# Patient Record
Sex: MALE | Race: WHITE | NOT HISPANIC OR LATINO | Employment: OTHER | URBAN - METROPOLITAN AREA
[De-identification: names, ages, dates, MRNs, and addresses within clinical notes are randomized per-mention and may not be internally consistent; named-entity substitution may affect disease eponyms.]

---

## 2017-03-08 ENCOUNTER — ALLSCRIPTS OFFICE VISIT (OUTPATIENT)
Dept: OTHER | Facility: OTHER | Age: 72
End: 2017-03-08

## 2017-04-04 ENCOUNTER — GENERIC CONVERSION - ENCOUNTER (OUTPATIENT)
Dept: OTHER | Facility: OTHER | Age: 72
End: 2017-04-04

## 2017-04-06 ENCOUNTER — APPOINTMENT (OUTPATIENT)
Dept: LAB | Facility: HOSPITAL | Age: 72
End: 2017-04-06
Attending: INTERNAL MEDICINE
Payer: MEDICARE

## 2017-04-06 DIAGNOSIS — E03.9 HYPOTHYROIDISM: ICD-10-CM

## 2017-04-06 DIAGNOSIS — M79.10 MYALGIA: ICD-10-CM

## 2017-04-06 DIAGNOSIS — I10 ESSENTIAL (PRIMARY) HYPERTENSION: ICD-10-CM

## 2017-04-06 DIAGNOSIS — E78.5 HYPERLIPIDEMIA: ICD-10-CM

## 2017-04-06 LAB
ALBUMIN SERPL BCP-MCNC: 3.7 G/DL (ref 3.5–5)
ALP SERPL-CCNC: 87 U/L (ref 46–116)
ALT SERPL W P-5'-P-CCNC: 32 U/L (ref 12–78)
ANION GAP SERPL CALCULATED.3IONS-SCNC: 8 MMOL/L (ref 4–13)
AST SERPL W P-5'-P-CCNC: 21 U/L (ref 5–45)
BASOPHILS # BLD AUTO: 0.03 THOUSANDS/ΜL (ref 0–0.1)
BASOPHILS NFR BLD AUTO: 1 % (ref 0–1)
BILIRUB DIRECT SERPL-MCNC: 0.21 MG/DL (ref 0–0.2)
BILIRUB SERPL-MCNC: 1 MG/DL (ref 0.2–1)
BUN SERPL-MCNC: 22 MG/DL (ref 5–25)
CALCIUM SERPL-MCNC: 9.4 MG/DL (ref 8.3–10.1)
CHLORIDE SERPL-SCNC: 107 MMOL/L (ref 100–108)
CHOLEST SERPL-MCNC: 141 MG/DL (ref 50–200)
CK MB SERPL-MCNC: 1.4 NG/ML (ref 0–5)
CK MB SERPL-MCNC: <1 % (ref 0–2.5)
CK SERPL-CCNC: 179 U/L (ref 39–308)
CO2 SERPL-SCNC: 27 MMOL/L (ref 21–32)
CREAT SERPL-MCNC: 1.31 MG/DL (ref 0.6–1.3)
EOSINOPHIL # BLD AUTO: 0.17 THOUSAND/ΜL (ref 0–0.61)
EOSINOPHIL NFR BLD AUTO: 3 % (ref 0–6)
ERYTHROCYTE [DISTWIDTH] IN BLOOD BY AUTOMATED COUNT: 13.9 % (ref 11.6–15.1)
GFR SERPL CREATININE-BSD FRML MDRD: 54.1 ML/MIN/1.73SQ M
GLUCOSE P FAST SERPL-MCNC: 107 MG/DL (ref 65–99)
HCT VFR BLD AUTO: 45 % (ref 36.5–49.3)
HDLC SERPL-MCNC: 46 MG/DL (ref 40–60)
HGB BLD-MCNC: 14.5 G/DL (ref 12–17)
LDLC SERPL CALC-MCNC: 68 MG/DL (ref 0–100)
LYMPHOCYTES # BLD AUTO: 1.62 THOUSANDS/ΜL (ref 0.6–4.47)
LYMPHOCYTES NFR BLD AUTO: 25 % (ref 14–44)
MCH RBC QN AUTO: 28.7 PG (ref 26.8–34.3)
MCHC RBC AUTO-ENTMCNC: 32.2 G/DL (ref 31.4–37.4)
MCV RBC AUTO: 89 FL (ref 82–98)
MONOCYTES # BLD AUTO: 0.68 THOUSAND/ΜL (ref 0.17–1.22)
MONOCYTES NFR BLD AUTO: 11 % (ref 4–12)
NEUTROPHILS # BLD AUTO: 3.94 THOUSANDS/ΜL (ref 1.85–7.62)
NEUTS SEG NFR BLD AUTO: 61 % (ref 43–75)
NRBC BLD AUTO-RTO: 0 /100 WBCS
PLATELET # BLD AUTO: 205 THOUSANDS/UL (ref 149–390)
PMV BLD AUTO: 10.5 FL (ref 8.9–12.7)
POTASSIUM SERPL-SCNC: 4.2 MMOL/L (ref 3.5–5.3)
PROT SERPL-MCNC: 7.3 G/DL (ref 6.4–8.2)
RBC # BLD AUTO: 5.06 MILLION/UL (ref 3.88–5.62)
SODIUM SERPL-SCNC: 142 MMOL/L (ref 136–145)
TRIGL SERPL-MCNC: 133 MG/DL
TSH SERPL DL<=0.05 MIU/L-ACNC: 3.74 UIU/ML (ref 0.36–3.74)
WBC # BLD AUTO: 6.46 THOUSAND/UL (ref 4.31–10.16)

## 2017-04-06 PROCEDURE — 80076 HEPATIC FUNCTION PANEL: CPT

## 2017-04-06 PROCEDURE — 82553 CREATINE MB FRACTION: CPT

## 2017-04-06 PROCEDURE — 85025 COMPLETE CBC W/AUTO DIFF WBC: CPT

## 2017-04-06 PROCEDURE — 84443 ASSAY THYROID STIM HORMONE: CPT

## 2017-04-06 PROCEDURE — 80048 BASIC METABOLIC PNL TOTAL CA: CPT

## 2017-04-06 PROCEDURE — 82550 ASSAY OF CK (CPK): CPT

## 2017-04-06 PROCEDURE — 36415 COLL VENOUS BLD VENIPUNCTURE: CPT

## 2017-04-06 PROCEDURE — 80061 LIPID PANEL: CPT

## 2017-04-11 ENCOUNTER — ALLSCRIPTS OFFICE VISIT (OUTPATIENT)
Dept: OTHER | Facility: OTHER | Age: 72
End: 2017-04-11

## 2017-04-27 ENCOUNTER — ALLSCRIPTS OFFICE VISIT (OUTPATIENT)
Dept: OTHER | Facility: OTHER | Age: 72
End: 2017-04-27

## 2017-07-18 ENCOUNTER — LAB REQUISITION (OUTPATIENT)
Dept: LAB | Facility: HOSPITAL | Age: 72
End: 2017-07-18
Payer: MEDICARE

## 2017-07-18 ENCOUNTER — ALLSCRIPTS OFFICE VISIT (OUTPATIENT)
Dept: OTHER | Facility: OTHER | Age: 72
End: 2017-07-18

## 2017-07-18 DIAGNOSIS — L98.9 DISORDER OF SKIN OR SUBCUTANEOUS TISSUE: ICD-10-CM

## 2017-07-18 PROCEDURE — 88305 TISSUE EXAM BY PATHOLOGIST: CPT | Performed by: DERMATOLOGY

## 2017-07-25 ENCOUNTER — GENERIC CONVERSION - ENCOUNTER (OUTPATIENT)
Dept: OTHER | Facility: OTHER | Age: 72
End: 2017-07-25

## 2017-09-05 ENCOUNTER — ALLSCRIPTS OFFICE VISIT (OUTPATIENT)
Dept: OTHER | Facility: OTHER | Age: 72
End: 2017-09-05

## 2017-10-01 DIAGNOSIS — I10 ESSENTIAL (PRIMARY) HYPERTENSION: ICD-10-CM

## 2017-10-01 DIAGNOSIS — E78.5 HYPERLIPIDEMIA: ICD-10-CM

## 2017-10-01 DIAGNOSIS — R73.01 IMPAIRED FASTING GLUCOSE: ICD-10-CM

## 2017-10-01 DIAGNOSIS — R05.9 COUGH: ICD-10-CM

## 2017-10-01 DIAGNOSIS — E03.9 HYPOTHYROIDISM: ICD-10-CM

## 2017-10-11 ENCOUNTER — LAB REQUISITION (OUTPATIENT)
Dept: LAB | Facility: HOSPITAL | Age: 72
End: 2017-10-11
Payer: MEDICARE

## 2017-10-11 ENCOUNTER — ALLSCRIPTS OFFICE VISIT (OUTPATIENT)
Dept: OTHER | Facility: OTHER | Age: 72
End: 2017-10-11

## 2017-10-11 DIAGNOSIS — L98.9 DISORDER OF SKIN OR SUBCUTANEOUS TISSUE: ICD-10-CM

## 2017-10-11 PROCEDURE — 88305 TISSUE EXAM BY PATHOLOGIST: CPT | Performed by: DERMATOLOGY

## 2017-10-17 ENCOUNTER — GENERIC CONVERSION - ENCOUNTER (OUTPATIENT)
Dept: OTHER | Facility: OTHER | Age: 72
End: 2017-10-17

## 2017-10-26 ENCOUNTER — APPOINTMENT (OUTPATIENT)
Dept: LAB | Facility: HOSPITAL | Age: 72
End: 2017-10-26
Attending: INTERNAL MEDICINE
Payer: MEDICARE

## 2017-10-26 ENCOUNTER — TRANSCRIBE ORDERS (OUTPATIENT)
Dept: ADMINISTRATIVE | Facility: HOSPITAL | Age: 72
End: 2017-10-26

## 2017-10-26 DIAGNOSIS — E78.5 HYPERLIPIDEMIA: ICD-10-CM

## 2017-10-26 DIAGNOSIS — E03.9 HYPOTHYROIDISM: ICD-10-CM

## 2017-10-26 DIAGNOSIS — R73.01 IMPAIRED FASTING GLUCOSE: ICD-10-CM

## 2017-10-26 DIAGNOSIS — I10 ESSENTIAL (PRIMARY) HYPERTENSION: ICD-10-CM

## 2017-10-26 LAB
ALBUMIN SERPL BCP-MCNC: 3.9 G/DL (ref 3.5–5)
ALP SERPL-CCNC: 93 U/L (ref 46–116)
ALT SERPL W P-5'-P-CCNC: 34 U/L (ref 12–78)
ANION GAP SERPL CALCULATED.3IONS-SCNC: 10 MMOL/L (ref 4–13)
AST SERPL W P-5'-P-CCNC: 28 U/L (ref 5–45)
BASOPHILS # BLD AUTO: 0.03 THOUSANDS/ΜL (ref 0–0.1)
BASOPHILS NFR BLD AUTO: 0 % (ref 0–1)
BILIRUB DIRECT SERPL-MCNC: 0.23 MG/DL (ref 0–0.2)
BILIRUB SERPL-MCNC: 1.6 MG/DL (ref 0.2–1)
BUN SERPL-MCNC: 28 MG/DL (ref 5–25)
CALCIUM SERPL-MCNC: 9.4 MG/DL (ref 8.3–10.1)
CHLORIDE SERPL-SCNC: 104 MMOL/L (ref 100–108)
CHOLEST SERPL-MCNC: 148 MG/DL (ref 50–200)
CO2 SERPL-SCNC: 25 MMOL/L (ref 21–32)
CREAT SERPL-MCNC: 1.39 MG/DL (ref 0.6–1.3)
EOSINOPHIL # BLD AUTO: 0.19 THOUSAND/ΜL (ref 0–0.61)
EOSINOPHIL NFR BLD AUTO: 3 % (ref 0–6)
ERYTHROCYTE [DISTWIDTH] IN BLOOD BY AUTOMATED COUNT: 13.2 % (ref 11.6–15.1)
EST. AVERAGE GLUCOSE BLD GHB EST-MCNC: 114 MG/DL
GFR SERPL CREATININE-BSD FRML MDRD: 51 ML/MIN/1.73SQ M
GLUCOSE P FAST SERPL-MCNC: 91 MG/DL (ref 65–99)
HBA1C MFR BLD: 5.6 % (ref 4.2–6.3)
HCT VFR BLD AUTO: 44.6 % (ref 36.5–49.3)
HDLC SERPL-MCNC: 42 MG/DL (ref 40–60)
HGB BLD-MCNC: 15 G/DL (ref 12–17)
LDLC SERPL CALC-MCNC: 90 MG/DL (ref 0–100)
LYMPHOCYTES # BLD AUTO: 1.6 THOUSANDS/ΜL (ref 0.6–4.47)
LYMPHOCYTES NFR BLD AUTO: 23 % (ref 14–44)
MCH RBC QN AUTO: 29 PG (ref 26.8–34.3)
MCHC RBC AUTO-ENTMCNC: 33.6 G/DL (ref 31.4–37.4)
MCV RBC AUTO: 86 FL (ref 82–98)
MONOCYTES # BLD AUTO: 0.64 THOUSAND/ΜL (ref 0.17–1.22)
MONOCYTES NFR BLD AUTO: 9 % (ref 4–12)
NEUTROPHILS # BLD AUTO: 4.37 THOUSANDS/ΜL (ref 1.85–7.62)
NEUTS SEG NFR BLD AUTO: 64 % (ref 43–75)
NRBC BLD AUTO-RTO: 0 /100 WBCS
PLATELET # BLD AUTO: 192 THOUSANDS/UL (ref 149–390)
PMV BLD AUTO: 11.1 FL (ref 8.9–12.7)
POTASSIUM SERPL-SCNC: 4.1 MMOL/L (ref 3.5–5.3)
PROT SERPL-MCNC: 7.4 G/DL (ref 6.4–8.2)
RBC # BLD AUTO: 5.18 MILLION/UL (ref 3.88–5.62)
SODIUM SERPL-SCNC: 139 MMOL/L (ref 136–145)
TRIGL SERPL-MCNC: 82 MG/DL
TSH SERPL DL<=0.05 MIU/L-ACNC: 1.53 UIU/ML (ref 0.36–3.74)
WBC # BLD AUTO: 6.85 THOUSAND/UL (ref 4.31–10.16)

## 2017-10-26 PROCEDURE — 84443 ASSAY THYROID STIM HORMONE: CPT

## 2017-10-26 PROCEDURE — 80061 LIPID PANEL: CPT

## 2017-10-26 PROCEDURE — 80076 HEPATIC FUNCTION PANEL: CPT

## 2017-10-26 PROCEDURE — 36415 COLL VENOUS BLD VENIPUNCTURE: CPT

## 2017-10-26 PROCEDURE — 80048 BASIC METABOLIC PNL TOTAL CA: CPT

## 2017-10-26 PROCEDURE — 85025 COMPLETE CBC W/AUTO DIFF WBC: CPT

## 2017-10-26 PROCEDURE — 83036 HEMOGLOBIN GLYCOSYLATED A1C: CPT

## 2017-10-31 ENCOUNTER — GENERIC CONVERSION - ENCOUNTER (OUTPATIENT)
Dept: OTHER | Facility: OTHER | Age: 72
End: 2017-10-31

## 2017-11-01 ENCOUNTER — TRANSCRIBE ORDERS (OUTPATIENT)
Dept: ADMINISTRATIVE | Facility: HOSPITAL | Age: 72
End: 2017-11-01

## 2017-11-01 DIAGNOSIS — R05.9 COUGH: Primary | ICD-10-CM

## 2017-11-15 ENCOUNTER — HOSPITAL ENCOUNTER (OUTPATIENT)
Dept: RADIOLOGY | Facility: HOSPITAL | Age: 72
Discharge: HOME | End: 2017-11-15
Payer: MEDICARE

## 2017-11-15 ENCOUNTER — GENERIC CONVERSION - ENCOUNTER (OUTPATIENT)
Dept: OTHER | Facility: OTHER | Age: 72
End: 2017-11-15

## 2017-11-15 ENCOUNTER — HOSPITAL ENCOUNTER (OUTPATIENT)
Dept: PULMONOLOGY | Facility: HOSPITAL | Age: 72
Discharge: HOME | End: 2017-11-15
Payer: MEDICARE

## 2017-11-15 DIAGNOSIS — R05.9 COUGH: ICD-10-CM

## 2017-11-15 PROCEDURE — 94727 GAS DIL/WSHOT DETER LNG VOL: CPT

## 2017-11-15 PROCEDURE — 94060 EVALUATION OF WHEEZING: CPT

## 2017-11-15 PROCEDURE — 94760 N-INVAS EAR/PLS OXIMETRY 1: CPT

## 2017-11-15 PROCEDURE — 94729 DIFFUSING CAPACITY: CPT

## 2017-11-15 PROCEDURE — 71020 HB CHEST X-RAY 2VW FRONTAL&LATL: CPT

## 2017-11-15 RX ORDER — ALBUTEROL SULFATE 2.5 MG/3ML
2.5 SOLUTION RESPIRATORY (INHALATION) ONCE
Status: COMPLETED | OUTPATIENT
Start: 2017-11-15 | End: 2017-11-15

## 2017-11-15 RX ADMIN — ALBUTEROL SULFATE 2.5 MG: 2.5 SOLUTION RESPIRATORY (INHALATION) at 10:39

## 2017-11-20 DIAGNOSIS — R06.02 SHORTNESS OF BREATH: ICD-10-CM

## 2017-11-20 DIAGNOSIS — I27.20 PULMONARY HYPERTENSION (HCC): ICD-10-CM

## 2017-11-30 ENCOUNTER — HOSPITAL ENCOUNTER (OUTPATIENT)
Dept: NON INVASIVE DIAGNOSTICS | Facility: CLINIC | Age: 72
Discharge: HOME | End: 2017-11-30
Payer: MEDICARE

## 2017-11-30 DIAGNOSIS — R06.02 SHORTNESS OF BREATH: ICD-10-CM

## 2017-11-30 DIAGNOSIS — I27.20 PULMONARY HYPERTENSION (HCC): ICD-10-CM

## 2017-11-30 PROCEDURE — 93306 TTE W/DOPPLER COMPLETE: CPT

## 2017-12-07 ENCOUNTER — ALLSCRIPTS OFFICE VISIT (OUTPATIENT)
Dept: OTHER | Facility: OTHER | Age: 72
End: 2017-12-07

## 2017-12-08 NOTE — PROGRESS NOTES
Assessment  Assessed    1  Coronary arteriosclerosis (414 00) (I25 10)   2  CABG   3  Hyperlipidemia (272 4) (E78 5)   4  Hypertension (401 9) (I10)   5  Impaired fasting glucose (790 21) (R73 01)    Plan  Anxiety    · Escitalopram Oxalate 20 MG Oral Tablet; take 1 tablet by mouth every day   Rx By: Michelle Benedict; Dispense: 90 Days ; #:90 Tablet; Refill: 0;Anxiety; CALI = N; Print Rx; Last Updated By: Bakari Coffman; 12/7/2017 3:39:33 PM  Anxiety, Health Maintenance    · LORazepam 1 MG Oral Tablet; TAKE 1 TABLET BY MOUTH THREE TIMES DAILYAS NEEDED   Rx By: Michelle Benedict; Dispense: 30 Days ; #:90 Tablet; Refill: 0;Anxiety, Health Maintenance; CALI = N; Print Rx; Last Updated By: Bakari Coffman; 12/7/2017 3:39:33 PM  Health Maintenance    · BuPROPion HCl ER (XL) 300 MG Oral Tablet Extended Release 24 Hour; take 1tablet by mouth every day   Rx By: Michelle Benedict; Dispense: 90 Days ; #:90 Tablet Extended Release 24 Hour; Refill: 0;For: Health Maintenance; CALI = N; Print Rx; Last Updated By: Bakari Coffman; 12/7/2017 3:39:33 PM  Hyperlipidemia    · Atorvastatin Calcium 80 MG Oral Tablet (Lipitor); take 1 tablet by mouth every day   Rx By: Michelle Benedict; Dispense: 90 Days ; #:90 Tablet; Refill: 3;Hyperlipidemia; CALI = N; Print Rx; Last Updated By: Bakari Coffman; 12/7/2017 3:39:33 PM  Hypertension    · Metoprolol Tartrate 25 MG Oral Tablet; TAKE 1 TABLET BY MOUTH THREE TIMESDAILY   Rx By: Michelle Benedict; Dispense: 90 Days ; #:270 Tablet; Refill: 3;Hypertension; CALI = N; Print Rx; Last Updated By: Bakari Coffman; 12/7/2017 3:39:33 PM  Unlinked    · Halobetasol Propionate 0 05 % External Ointment   Dispense: 0 Days ; #:0 GM; Refill: 0; CALI = Y; Record; Last Updated By: Lakeisha Pineda; 12/7/2017 2:55:25 PM      LORazepam 1 MG Oral Tablet; TAKE 1 TABLET BY MOUTH THREE TIMES DAILY AS NEEDED; Therapy: 88SPU1791 to (Evaluate:08Jan2016)  Requested for: 74XCU8493;  Last Rx:09Nov2015; Status: UNAUTHORIZED - Requires Signature Ordered Rx By: Rafa Shaw; Dispense: 30 Days ; #:90 Tablet; Refill: 1;  For: Anxiety, Health Maintenance; CALI = N; Print Rx; Last Updated By: Maria Esther Berger; 11/9/2015 1:41:07 PM  Atorvastatin Calcium 80 MG Oral Tablet; take 1 tablet by mouth every day; Therapy: 84WAF2014 to (Neto Calvin)  Requested for: 32VSO4035; Last Rx:09Nov2015; Status: ACTIVE - Retrospective By Protocol Authorization Ordered Rx By: Rafa Shaw; Dispense: 90 Days ; #:90 Tablet; Refill: 3;  For: Hyperlipidemia; CALI = N; Print Rx; Last Updated By: Maria Esther Berger; 11/9/2015 2:28:54 PM Metoprolol Tartrate 25 MG Oral Tablet; take 1 tablet by mouth twice daily; Therapy: 84JZW7277 to (Evaluate:60Roh0871)  Requested for: 70WTE1973; Last Rx:09Nov2015; Status: ACTIVE - Retrospective By Protocol Authorization Ordered Rx By: Rafa Shaw; Dispense: 90 Days ; #:180 Tablet; Refill: 2;  For: Benign essential hypertension; CALI = N; Print Rx; Last Updated By: Maria Esther Berger; 11/9/2015 2:28:54 PM BuPROPion HCl ER (XL) 300 MG Oral Tablet Extended Release 24 Hour; take 1 tablet by mouth every day; Therapy: 17CID1505 to (Neto Gift)  Requested for: 12KZC7183; Last Rx:09Nov2015; Status: UNAUTHORIZED - Requires Signature Ordered Rx By: Rafa Shaw; Dispense: 90 Days ; #:90 Tablet Extended Release 24 Hour; Refill: 3;  For: Anxiety; CALI = N; Sent To: Nydia Rodas [id number]; Last Updated By: Maria Esther Berger; 11/9/2015 2:30:10 PM   Discussion/Summary  Cardiology Discussion Summary Free Text Note Form [facility]:   The patient is cardiovascularly stable  Very active physically and asymptomatic No angina, CHF or symptomatic ectopy  Reviewed concepts of signs and symptoms of heart disease-including ischemia arrhythmia congestion  reviewed concepts of risk factor modification safety measures reviewed Previous studies reviewed with patient, medications reviewed and possible side effects discussed  All questions answered   Report any problems promptly to medical attention  Optimize weight  Diminished carbohydrates avoid excess alcoholasymptomatic on Lipitor with normal CPK and SGOTis emotionally doing well on present medical regimen much calmer not is aggravated about little thingsmeasures reviewed  Continue present medical regimen  Continue to stay compliant with therapy  up with appropriate specialists  Return for follow up visit in 6 months or earlier if needed  with Dr Joe Jewell PCP  Chief Complaint  Chief Complaint Free Text Note Form: Patient presents with follow up cardiovascular evaluation  Known history of CAHD status post CABG, HTN and HPL  History of Present Illness  Cardiology HPI Free Text Note Form [facility]: Patient presents for cardiac evaluation Known history of CAHD, HTN and HPL  is feeling very well from a cardiac perspective--active and asymptomatic- able to do strenuous walking up a hill in the morning hours with no symptoms of chest pain shortness of breath arrhythmias--generally walks 1 mile twice a day with no symptomsHe denies any exertional chest pain or shortness of breath    No angina, CHF, palpitations, syncope, seizures, claudication CVA/TIA, amaurosis, orthostasis, myositis or edema  No urinary or bowel complaints  No rashes, fevers, arrhythmic symptoms, thromboembolic symptoms  No claudication exercise intolerance PND  chest x-ray NADecho in November 2017 with normal LV function no significant valve diseasetests October 2017 CF 5 6 normal CBC creatinine 1 39 sugar 91 cholesterol 148 LDL 90 TSH normal  doing very well on present meds-quick temper agitated behavior resolved  has been using Lipitor no myositis recent CPK normal cholesterol numbers good  has history of anxiety and depression - doing well on current regimen  -Feels less stress and less aggravation on present medical regimen much more relaxed patient states his wife and friends notice the improvement  received flu shots ,pneumonia shots and shingles shots    of CAD, status post bypass surgery after two-vessel disease, documented on angiogram 2006  --Had vein graft to the right, vein graft to the LAD and first OM,, LIMA was not usable  test in July 2013 able to complete 8 minutes of Erick protocol with no angina no ischemia patient had resting hypokinesis of the base of the am for wall with no provokable regional wall motion abnormalities or significant ectopyrecent blood tests which look good reviewed in detail--has mild hyperglycemia will watch his weight and carbohydrate restriction-has mild renal insufficiency with creatinine of 1 31      History of hypertension, hyperlipidemia, history of DVT with Whitman filter placed after bleeding with Coumadin years agoof BPH  Patient declines urologic intervention at this time no dysuria flank pain hematuriaof anxiety, depression on therapyof arthritis in his hips lumbar radiculopathyof small thyroid nodule in the past with normal thyroid testsof Lymes in the past, treated negative titers in 2009was told of lung problems in the past, but never smoked  No wheezes, hemoptysis  No shortness of breathtest in 2010, with no ischemia  EF 62%  EKG showing old inferior infarct, nonspecific anteroseptal T wave inversions left atrial enlargementTakes Ativan for anxiety , as well as SSRIs, emotional support provided- was aggravated in the past about his sister and his mother's will--but this was previously settled satisfactorily in [place]--still doesn't speak to one sister  [ DVT, CAD s/p bypass 2006, DJD, hypertension, pneumonia, BPH, anxiety, depression, total hip 2010  ]  [No family history of sudden cardiac death]  [ Alcohol regularly  No smoking    ]  recent laboratory tests that look good, PSA normal in the past has gone for regular colonoscopies history of polyps      Review of Systems  Cardiology Male ROS:    Cardiac: No complaints of chest pain, no palpitations, no fainiting  Skin: No complaints of nonhealing sores or skin rash  Genitourinary: No complaints of recurrent urinary tract infections, frequent urination at night, difficult urination, blood in urine, kidney stones, loss of bladder control, no kidney or prostate problems, no erectile dysfunction  Psychological: depression-- and-- anxiety, but-- as noted in HPI--   resolved on therapy  General: No complaints of trouble sleeping, lack of energy, fatigue, appetite changes, weight changes, fever, frequent infections, or night sweats  Respiratory: No complaints of shortness of breath, cough with sputum, or wheezing  HEENT: No complaints of serious problems, hearing problems, nose problems, throat problems, or snoring  Gastrointestinal: No complaints of liver problems, nausea, vomiting, heartburn, constipation, bloody stools, diarrhea, problems swallowing, adbominal pain, or rectal bleeding  Hematologic: No complaints of bleeding disorders, anemia, blood clots, or excessive brusing  Neurological: No complaints of numbness, tingling, dizziness, weakness, seizures, headaches, syncope or fainting, AM fatigue, daytime sleepiness, no witnessed apnea episodes  -- and-- as noted in HPI  Musculoskeletal: arthritis-- and-- back pain, but-- as noted in HPI-- Mild noncompromising  Active Problems  Problems    1  Actinic keratosis (702 0) (L57 0)   2  Acute myocardial infarction (410 90) (I21 9)   3  Anxiety (300 00) (F41 9)   4  Arthralgia of left temporomandibular joint (524 62) (M26 622)   5  Basal cell carcinoma of chest (173 51) (C44 519)   6  BPH without urinary obstruction (600 00) (N40 0)   7  Breath shortness (786 05) (R06 02)   8  CABG   9  Changing skin lesion (709 9) (L98 9)   10  CKD (chronic kidney disease), stage III (585 3) (N18 3)   11  Coronary arteriosclerosis (414 00) (I25 10)   12  Cough (786 2) (R05)   13  Hyperlipidemia (272 4) (E78 5)   14  Hypertension (401 9) (I10)   15  Impaired fasting glucose (790 21) (R73 01)   16   Inflamed seborrheic keratosis (702 11) (L82 0)   17  Need for influenza vaccination (V04 81) (Z23)   18  Need for pneumococcal vaccine (V03 82) (Z23)   19  Osteoarthritis (715 90) (M19 90)   20  Peripheral vascular disease (443 9) (I73 9)   21  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)   22  Pulmonary hypertension (416 8) (I27 20)   23  Screening for AAA (abdominal aortic aneurysm) (V81 2) (Z13 6)   24  Screening for colon cancer (V76 51) (Z12 11)   25  Screening for genitourinary condition (V81 6) (Z13 89)   26  Screening for skin condition (V82 0) (Z13 89)   27  Seborrheic keratosis (702 19) (L82 1)   28  Special screening examination for neoplasm of prostate (V76 44) (Z12 5)   29  Subclinical hypothyroidism (244 8) (E03 9)    Past Medical History  Problems    1  History of Basal cell carcinoma of face (173 31) (C44 310)   2  History of BPH (benign prostatic hypertrophy) (600 00) (N40 0)   3  History of Colonoscopy (Fiberoptic)   4  History of Coronary atherosclerosis of native coronary artery (414 01) (I25 10)   5  H/O nonmelanoma skin cancer (V10 83) (B46 371)   6  History of anxiety disorder (V11 8) (Z86 59)   7  History of deep venous thrombosis (V12 51) (Z86 718)   8  History of depression (V11 8) (Z86 59)   9  History of osteoarthritis (V13 4) (Z87 39)   10  History of pneumonia (V12 61) (Z87 01)   11  History of seborrheic dermatitis (V13 3) (Z87 2)   12  Hyperlipidemia (272 4) (E78 5)   13  Hypertension (401 9) (I10)  Active Problems And Past Medical History Reviewed: The active problems and past medical history were reviewed and updated today  Surgical History  Problems    1  History of CABG   2  CABG   3  History of Cardiac Cath Procedure Outcome: Successful   4  History of Radiologic Supervision: Palmer Filter Placement In IVC   5  History of Total Hip Replacement  Surgical History Reviewed: The surgical history was reviewed and updated today  Family History  Mother    1  Family history of dementia (V17 2) (Z81 8)   2  Family history of Rectal cancer  Father    3  Family history of Coronary Artery Disease (V17 49)  Sister    4  Family history of Rectal cancer  Family History Reviewed: The family history was reviewed and updated today  Social History  Problems    · Denied: History of Alcohol Use (History)   · Marijuana   · Never A Smoker   · Tobacco use (305 1) (Z72 0)  Social History Reviewed: The social history was reviewed and updated today  The social history was reviewed and is unchanged  Current Meds   1  Aspirin 325 MG Oral Tablet; TAKE 1 TABLET DAILY; Therapy: (Recorded:27Jun2014) to Recorded   2  Atorvastatin Calcium 80 MG Oral Tablet; take 1 tablet by mouth every day; Therapy: 35Zrz4393 to (Evaluate:26Ybp8776)  Requested for: 66DHL7495; Last Rx:69Rpp8999 Ordered   3  BuPROPion HCl ER (XL) 300 MG Oral Tablet Extended Release 24 Hour; take 1 tablet by mouth every day; Therapy: 52UIV9328 to (Evaluate:22Apr2018)  Requested for: 27Apr2017; Last Rx:27Apr2017 Ordered   4  Escitalopram Oxalate 20 MG Oral Tablet; take 1 tablet by mouth every day; Therapy: 64QSB1276 to (Evaluate:26Aug2018)  Requested for: 96Pyg7098; Last Rx:10Rra9770 Ordered   5  Fluocinonide 0 05 % External Ointment; apply to affected area twice a day SPARINGLYto foot; Therapy: 97OHH6933 to (Last Rx:17Eaa2027)  Requested for: 30XSR2787 Ordered   6  Halobetasol Propionate 0 05 % External Ointment; APPLY AND GENTLY MASSAGE INTO AFFECTED AREA(S) TWICE DAILY Recorded   7  Halobetasol Propionate 0 05 % External Ointment; APPLY AND GENTLY MASSAGE INTO AFFECTED AREA(S) TWICE DAILY; Therapy: 48EVX5122 to (Last Rx:92Idg0379)  Requested for: 85RMB4916 Ordered   8  LORazepam 1 MG Oral Tablet; TAKE 1 TABLET BY MOUTH THREE TIMES DAILY AS NEEDED; Therapy: 65RPI7989 to (Evaluate:26Jun2017)  Requested for: 27Apr2017; Last Rx:27Apr2017 Ordered   9  Metoprolol Tartrate 25 MG Oral Tablet; TAKE 1 TABLET BY MOUTH THREE TIMES DAILY;  Therapy: 37CCL2951 to (Evaluate:10Khk6206)  Requested for: 54Swp0310; Last Rx:51Uso8233 Ordered   10  Tamsulosin HCl - 0 4 MG Oral Capsule (Flomax); TAKE 1 CAPSULE BY MOUTH TWICE  DAILY; Therapy: 30THZ8049 to (RRXKKPDI:12LZY0049)  Requested for: 91FXU7185; Last  Rx:02Nov2017 Ordered   11  Triamcinolone Acetonide 0 1 % External Ointment; APPLY TO AFFECTED AREA TWICE A  DAY shoulder leg; Therapy: 18OQY1630 to (Jim Melvine)  Requested for: 64YLH8043; Last  Rx:08Mar2017 Ordered  Medication List Reviewed: The medication list was reviewed and updated today  Allergies  Medication    1  No Known Drug Allergies    Vitals  Vital Signs    Recorded: 40UCP0518 02:56PM   Heart Rate 68   Systolic 065   Diastolic 72   Height 5 ft 10 in   Weight 180 lb 8 0 oz   BMI Calculated 25 9   BSA Calculated 2       Physical Exam   Constitutional  General appearance: No acute distress, well appearing and well nourished  -- No acute distress  Eyes  Conjunctiva and Sclera examination: Conjunctiva pink, sclera anicteric  Ears, Nose, Mouth, and Throat - External inspection of ears and nose: Normal without deformities or discharge  -- Oropharynx: Clear, nares are clear, mucous membranes are moist   Neck  Neck and thyroid: Normal, supple, trachea midline, no thyromegaly  Pulmonary  Respiratory effort: No increased work of breathing or signs of respiratory distress  Auscultation of lungs: Clear to auscultation, no rales, no rhonchi, no wheezing, good air movement  Cardiovascular  Palpation of heart: Normal PMI, no thrills  Auscultation of heart: Normal rate and rhythm, normal S1 and S2, no murmurs  Carotid pulses: Normal, 2+ bilaterally  Abdominal aorta: Normal    Femoral pulses: Normal, 2+ bilaterally  No abdominal aorta pulsations  Pedal pulses: Normal, 2+ bilaterally  Examination of extremities for edema and/or varicosities: Normal    Chest -  Abnormal  -- Well-healed median sternotomy  Abdomen  Abdomen: Non-tender and no distention  Liver and spleen: No hepatomegaly or splenomegaly  Musculoskeletal Gait and station: Normal gait  -- Digits and nails: Normal without clubbing or cyanosis  -- Inspection/palpation of joints, bones, and muscles: Normal, ROM normal    Skin - Skin and subcutaneous tissue: Normal without rashes or lesions  Skin is warm and well perfused, normal turgor  Neurologic - Speech: Normal    Psychiatric - Orientation to person, place, and time: Normal -- Mood and affect: Normal       Future Appointments    Date/Time Provider Specialty Site   05/07/2018 12:30 PM MYNOR Greene  Internal Medicine [facility]""   04/12/2018 11:05 AM MYNOR Joshi  Dermatology [facility]   07/19/2018 01:05 PM MYNOR Joshi   Dermatology [facility]       Signatures   Electronically signed by : MYNOR Eric ; Dec  7 2017  5:34PM EST                       (Author)

## 2017-12-28 ENCOUNTER — ALLSCRIPTS OFFICE VISIT (OUTPATIENT)
Dept: OTHER | Facility: OTHER | Age: 72
End: 2017-12-28

## 2017-12-29 NOTE — PROGRESS NOTES
Assessment   1  Bronchitis (490) (J40)    Plan   Bronchitis    · Azithromycin 250 MG Oral Tablet; TAKE 2 TABLETS ON DAY 1 THEN TAKE 1    TABLET A DAY FOR 4 DAYS   · Promethazine-Codeine 6 25-10 MG/5ML Oral Syrup; TAKE 5 ML EVERY 4 TO 6    HOURS AS NEEDED FOR COUGH    Discussion/Summary      Bronchitis- viral vs bacterial pt not improving  Zpak, promethazine with codeine  Call office if you donât feel better  Chief Complaint   cough, congestion for >2 weeks      History of Present Illness   HPI: Pt comes with sx for >2 weeks  runny nose, now thick  Cough getting worse  Was clear now green  Feels worse, not better over last 2 weeks  Feels feverish, no f/c/s    worse at night  +ST  No ear pain  No SOB    Bronchitis, Acute, Adult (Brief): The patient is being seen for an initial evaluation of acute bronchitis  Symptoms:  productive cough,-- non-productive cough,-- fatigue,-- sore throat-- and-- stuffy nose, but-- no wheezing,-- no shortness of breath,-- no fever,-- no myalgias,-- no pleuritic chest pain-- and-- no chest pain  Associated symptoms:  postnasal drainage, but-- no headache  Review of Systems        Constitutional: feeling poorly-- and-- feeling tired, but-- no fever-- and-- no chills  ENT: sore throat-- and-- nasal discharge, but-- no earache,-- no nosebleeds,-- no hearing loss-- and-- no hoarseness  Cardiovascular: no complaints of slow or fast heart rate, no chest pain, no palpitations, no leg claudication or lower extremity edema  Respiratory: cough, but-- no shortness of breath,-- no wheezing-- and-- no shortness of breath during exertion  Gastrointestinal: no complaints of abdominal pain, no constipation, no nausea or vomiting, no diarrhea or bloody stools  ROS reviewed  Active Problems   1  Actinic keratosis (702 0) (L57 0)   2  Acute myocardial infarction (410 90) (I21 9)   3  Anxiety (300 00) (F41 9)   4   Arthralgia of left temporomandibular joint (524 62) (M26 622)   5  Basal cell carcinoma of chest (173 51) (C44 519)   6  BPH without urinary obstruction (600 00) (N40 0)   7  Breath shortness (786 05) (R06 02)   8  Bronchitis (490) (J40)   9  CABG   10  Changing skin lesion (709 9) (L98 9)   11  CKD (chronic kidney disease), stage III (585 3) (N18 3)   12  Coronary arteriosclerosis (414 00) (I25 10)   13  Cough (786 2) (R05)   14  Hyperlipidemia (272 4) (E78 5)   15  Hypertension (401 9) (I10)   16  Impaired fasting glucose (790 21) (R73 01)   17  Inflamed seborrheic keratosis (702 11) (L82 0)   18  Need for influenza vaccination (V04 81) (Z23)   19  Need for pneumococcal vaccine (V03 82) (Z23)   20  Osteoarthritis (715 90) (M19 90)   21  Peripheral vascular disease (443 9) (I73 9)   22  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)   23  Pulmonary hypertension (416 8) (I27 20)   24  Screening for AAA (abdominal aortic aneurysm) (V81 2) (Z13 6)   25  Screening for colon cancer (V76 51) (Z12 11)   26  Screening for genitourinary condition (V81 6) (Z13 89)   27  Screening for skin condition (V82 0) (Z13 89)   28  Seborrheic keratosis (702 19) (L82 1)   29  Special screening examination for neoplasm of prostate (V76 44) (Z12 5)   30  Subclinical hypothyroidism (244 8) (E03 9)    Past Medical History   Active Problems And Past Medical History Reviewed: The active problems and past medical history were reviewed and updated today  Surgical History   Surgical History Reviewed: The surgical history was reviewed and updated today  Social History    · Denied: History of Alcohol Use (History)   · Marijuana   · Never A Smoker   · Tobacco use (305 1) (Z72 0)  The social history was reviewed and updated today  Family History   Family History Reviewed: The family history was reviewed and updated today  Current Meds    1  Aspirin 325 MG Oral Tablet; TAKE 1 TABLET DAILY; Therapy: (Recorded:27Jun2014) to Recorded   2   Atorvastatin Calcium 80 MG Oral Tablet (Lipitor); take 1 tablet by mouth every day; Therapy: 10DXM3211 to (Dana Giovanni)  Requested for: 58OLO2528; Last     Rx:07Dec2017 Ordered   3  BuPROPion HCl ER (XL) 300 MG Oral Tablet Extended Release 24 Hour; take 1 tablet by     mouth every day; Therapy: 68YPP3723 to (Sandra Peace)  Requested for: 72DSQ6605; Last     Rx:07Dec2017 Ordered   4  Escitalopram Oxalate 20 MG Oral Tablet; take 1 tablet by mouth every day; Therapy: 75FXY8597 to (Sandra Peace)  Requested for: 41BRW5789; Last     Rx:07Dec2017 Ordered   5  Fluocinonide 0 05 % External Ointment; apply to affected area twice a day SPARINGLYto     foot; Therapy: 71PPU6190 to (Last Rx:12Bno6917)  Requested for: 02FCL4220 Ordered   6  Halobetasol Propionate 0 05 % External Ointment; APPLY AND GENTLY MASSAGE INTO     AFFECTED AREA(S) TWICE DAILY; Therapy: 57SWU1546 to (Last Rx:15Wrv9858)  Requested for: 57KOY8308 Ordered   7  LORazepam 1 MG Oral Tablet; TAKE 1 TABLET BY MOUTH THREE TIMES DAILY AS     NEEDED; Therapy: 70CBB6449 to (Evaluate:06Jan2018)  Requested for: 02UAH9712; Last     Rx:87Fsi7965 Ordered   8  Metoprolol Tartrate 25 MG Oral Tablet; TAKE 1 TABLET BY MOUTH THREE TIMES DAILY; Therapy: 19QPD9795 to (Dana Davila)  Requested for: 99RAI5117; Last     Rx:07Dec2017 Ordered   9  Tamsulosin HCl - 0 4 MG Oral Capsule (Flomax); TAKE 1 CAPSULE BY MOUTH TWICE     DAILY; Therapy: 69SHF9057 to (KDGQGDPQ:21EFV2520)  Requested for: 70GBK7653; Last     Rx:02Nov2017 Ordered   10  Triamcinolone Acetonide 0 1 % External Ointment; APPLY TO AFFECTED AREA TWICE A      DAY shoulder leg; Therapy: 43UFV0818 to (Evaluate:07Apr2017)  Requested for: 16BEE2196; Last      Rx:08Mar2017 Ordered    Allergies   1   No Known Drug Allergies    Vitals    Recorded: 28Dec2017 02:58PM   Temperature 98 3 F   Heart Rate 88   Systolic 591   Diastolic 68   Height 5 ft 10 in   Weight 174 lb 8 oz   BMI Calculated 25 04   BSA Calculated 1 97   O2 Saturation 95     Physical Exam        Constitutional      General appearance: No acute distress, well appearing and well nourished  Eyes      Conjunctiva and lids: No swelling, erythema, or discharge  Pupils and irises: Equal, round and reactive to light  Ears, Nose, Mouth, and Throat      External inspection of ears and nose: Normal        Otoscopic examination: Tympanic membrance translucent with normal light reflex  Canals patent without erythema  Nasal mucosa, septum, and turbinates: Normal without edema or erythema  Oropharynx: Normal with no erythema, edema, exudate or lesions  Pulmonary      Respiratory effort: No increased work of breathing or signs of respiratory distress  Auscultation of lungs: Abnormal  -- coarse BS, No W/R/R  Cardiovascular      Auscultation of heart: Normal rate and rhythm, normal S1 and S2, without murmurs  Examination of extremities for edema and/or varicosities: Normal        Abdomen      Abdomen: Non-tender, no masses  Liver and spleen: No hepatomegaly or splenomegaly  Lymphatic      Palpation of lymph nodes in neck: No lymphadenopathy  Future Appointments      Date/Time Provider Specialty Site   05/07/2018 12:30 PM MYNOR Mcguire  Internal Medicine [facility]   04/12/2018 11:05 AM MYNOR Fowler  Dermatology [facility]   07/19/2018 01:05 PM MYNOR Fowler   Dermatology [facility]     Signatures    Electronically signed by : Vaishnavi Ann [facility]; Dec 28 2017  3:32PM EST                       (Author)     Electronically signed by : MYNOR Arnold ; Dec 28 2017  7:32PM EST

## 2018-01-11 NOTE — RESULT NOTES
Message   tell patient lesion is  a seb ker     Verified Results  (1) TISSUE EXAM [id number] 02:50PM Cong Chowdary     Test Name Result Flag Reference   LAB AP CASE REPORT (Report)     Surgical Pathology Report             Case: [id number]                   Authorizing Provider: Dorian iGbbons MD     Collected:      07/12/2016 1455        Pathologist:      Kristyn Cantu MD        Received:      07/13/2016 1512        Specimen:  Skin, Other, Right back   LAB AP FINAL DIAGNOSIS (Report)     A  Skin, Right back, shave biopsy:  - Seborrheic keratosis, irritated  Interpretation performed at [facility], [address] [place] [address], [address]        Amendment electronically signed by Kristyn Cantu MD on 7/22/2016 at 8:57 AM  Electronically signed by Kristyn Cantu MD on 7/21/2016 at 7:31 PM   LAB AP SURGICAL ADDITIONAL INFORMATION (Report)     These tests were developed and their performance characteristics   determined by [facility] or [facility]  They may not be cleared or approved by the [place]  Food and   Drug Administration  The FDA has determined that such clearance or   approval is not necessary  These tests are used for clinical purposes  They should not be regarded as investigational or for research  This   laboratory has been approved by [facility], designated as a high-complexity   laboratory and is qualified to perform these tests  LAB AP GROSS DESCRIPTION (Report)     A  The specimen is received in formalin, labeled with the patient's name   and hospital number, and is designated skin shave right back  The   specimen consists of a tan superficial shave of skin measuring 1 x 0 9 x   0 1 cm  The skin surface is hairbearing and exhibits a well-circumscribed   pigmented papule measuring 0 8 x 0 7 x 0 1 cm  The margin is inked blue  The skin surface is inked red  The specimen is trisected lengthwise  Entirely submitted  One cassette      Note: The estimated total formalin fixation time based upon information   provided by the submitting clinician and the standard processing schedule   is 53 75 hours      MAC

## 2018-01-12 NOTE — RESULT NOTES
Verified Results  (1) TISSUE EXAM [id number] 04:46PM Kim Chiang Order Number: [id number]     Test Name Result Flag Reference   LAB AP CASE REPORT (Report)     Surgical Pathology Report             Case: [id number]                   Authorizing Provider: Jaciel Lara MD     Collected:      07/18/2017 1646        Pathologist:      Priscilla Sandy MD      Received:      07/20/2017 1129        Specimen:  Skin, Other, Mid chest   LAB AP FINAL DIAGNOSIS (Report)     A  Skin, Mid chest, shave biopsy:  - Superficial/multicentric basal cell carcinoma with fibroepithelioma and   early    nodular features, extending to peripheral border and base of biopsy  Interpretation performed at [facility], [address] [place] [address]  Electronically signed by Priscilla Sandy MD on 7/25/2017 at 12:37 PM   LAB AP SURGICAL ADDITIONAL INFORMATION (Report)     All controls performed with the immunohistochemical stains reported above   reacted appropriately  These tests were developed and their performance   characteristics determined by [facility] or   [facility]  They may not be cleared or approved by the [place]  Food and Drug Administration  The FDA has determined that such clearance   or approval is not necessary  These tests are used for clinical purposes  They should not be regarded as investigational or for research  This   laboratory has been approved by [facility] 88, designated as a high-complexity   laboratory and is qualified to perform these tests  LAB AP GROSS DESCRIPTION (Report)     A  The specimen is received in formalin, labeled with the patient's name   and hospital number, and is designated mid chest  The specimen consists   of a tan hair? ??bearing portion of skin measuring 0 5 x 0 4 by less than 0 1   cm  The epithelial surface appears keratotic  The apparent margin of   resection is painted with green ink   Specimen is bisected lengthwise and entirely submitted in one cassette  Note: The estimated total formalin fixation time based upon information   provided by the submitting clinician and the standard processing schedule   is 58 75 hours   AEK   LAB AP CLINICAL INFORMATION      TW Order Number: [id number]  R/O [facility]

## 2018-01-12 NOTE — RESULT NOTES
Message   Till patient lesion is a normal nevus no further treatment needed     Verified Results  (1) TISSUE EXAM [id number] 02:50PM Marco A Washburn     Test Name Result Flag Reference   LAB AP CASE REPORT (Report)     Surgical Pathology Report             Case: [id number]                   Authorizing Provider: Jesus Ackerman MD     Collected:      07/12/2016 7600        Pathologist:      Brielle Guerra MD        Received:      07/13/2016 1512        Specimen:  Skin, Other, Right back   LAB AP FINAL DIAGNOSIS      A  Skin, Right back, shave biopsy:  - Dermal nevus  Interpretation performed at [facility], [address] [place] [address], [address]        Electronically signed by Brielle Guerra MD on 7/21/2016 at 7:31 PM   LAB AP SURGICAL ADDITIONAL INFORMATION (Report)     These tests were developed and their performance characteristics   determined by [facility] or [employer]  They may not be cleared or approved by the [place]  Food and   Drug Administration  The FDA has determined that such clearance or   approval is not necessary  These tests are used for clinical purposes  They should not be regarded as investigational or for research  This   laboratory has been approved by CLIA 88, designated as a high-complexity   laboratory and is qualified to perform these tests  LAB AP GROSS DESCRIPTION (Report)     A  The specimen is received in formalin, labeled with the patient's name   and hospital number, and is designated skin shave right back  The   specimen consists of a tan superficial shave of skin measuring 1 x 0 9 x   0 1 cm  The skin surface is hairbearing and exhibits a well-circumscribed   pigmented papule measuring 0 8 x 0 7 x 0 1 cm  The margin is inked blue  The skin surface is inked red  The specimen is trisected lengthwise  Entirely submitted  One cassette      Note: The estimated total formalin fixation time based upon information   provided by the submitting clinician and the standard processing schedule   is 53 75 hours      MAC

## 2018-01-13 NOTE — RESULT NOTES
Verified Results  (1) TISSUE EXAM [id number] 02:03PM Orlando Dior Order Number: [id number]     Test Name Result Flag Reference   LAB AP CASE REPORT (Report)     Surgical Pathology Report             Case: [id number]                   Authorizing Provider: Becki Easton MD     Collected:      10/11/2017 1403        Pathologist:      Edith Quarles MD      Received:      10/12/2017 1300        Specimen:  Skin, Other, Right elbow   LAB AP FINAL DIAGNOSIS (Report)     A  Skin, Right elbow, shave biopsy:  - Traumatized/irritated benign keratosis  - Negative for malignancy  Interpretation performed at [place] [address]  Electronically signed by Edith Quarles MD on 10/17/2017 at 10:51 AM   LAB AP SURGICAL ADDITIONAL INFORMATION (Report)     All controls performed with the immunohistochemical stains reported above   reacted appropriately  These tests were developed and their performance   characteristics determined by [facility] or   [employer]  They may not be cleared or approved by the [place]  Food and Drug Administration  The FDA has determined that such clearance   or approval is not necessary  These tests are used for clinical purposes  They should not be regarded as investigational or for research  This   laboratory has been approved by [facility] 88, designated as a high-complexity   laboratory and is qualified to perform these tests  LAB AP GROSS DESCRIPTION (Report)     A  The specimen is received in formalin, labeled with the patient's name   and hospital number, and is designated skin shave right elbow  The   specimen consists of a tan superficial shave of skin measuring 0 4 x 0 3 x   0 1 cm  The skin surface appears keratotic  The margin of resection is   inked green  The skin surface is inked red  Entirely submitted  One   cassette      Note: The estimated total formalin fixation time based upon information   provided by the submitting clinician and the standard processing schedule   is less than 72 hours   [facility]   LAB AP CLINICAL INFORMATION      TW Order Number: [id number]  Irritated keratosis R/O atypia

## 2018-01-14 VITALS
BODY MASS INDEX: 24.11 KG/M2 | HEART RATE: 68 BPM | SYSTOLIC BLOOD PRESSURE: 100 MMHG | WEIGHT: 168.38 LBS | HEIGHT: 70 IN | DIASTOLIC BLOOD PRESSURE: 64 MMHG

## 2018-01-14 VITALS
HEART RATE: 74 BPM | RESPIRATION RATE: 14 BRPM | WEIGHT: 171.5 LBS | DIASTOLIC BLOOD PRESSURE: 76 MMHG | SYSTOLIC BLOOD PRESSURE: 116 MMHG | BODY MASS INDEX: 24.55 KG/M2 | HEIGHT: 70 IN

## 2018-01-18 NOTE — PROGRESS NOTES
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)   2  Inflamed seborrheic keratosis (702 11) (L82 0)   3  Screening for skin condition (V82 0) (Z13 89)    Plan    · Nasacort AQ 55 MCG/ACT AERS    · Wound care as instructed ; Status:Complete;   Done: 25XKL5770   · Follow-up as previously scheduled Evaluation and Treatment  Follow-up  Status:  Complete  Done: 29EOA1049    · Triamcinolone Acetonide 0 1 % External Ointment; APPLY TO AFFECTED AREA  TWICE A DAY shoulder leg    · Ketoconazole 2 % External Cream    Discussion/Summary  Discussion Summary- [facility]:   Assessment #1: Psoriasiform dermatitis  Care Plan:   Probably irritated from chronic scratching we'll go ahead and treat the area with triamcinolone ointment and recheck in a few months if not resolved  Assessment #2: Irritated seborrheic keratosis  Care Plan:   Lesion treated because of patient concern and irritation  Assessment #3: Screening for dermatologic disorders  Care Plan:   Nothing else of concern noted exam sun protection recommended follow-up as previously scheduled  Chief Complaint  Chief Complaint Free Text Note Form: C/ ITCHY RASH ON L NECK  TAG ON R NECK  RED SPOT ON BACK  History of Present Illness  HPI: 79-year-old male presents for concerns regarding itchy area on his left upper shoulder also on his left lower leg and a growth on his right chest wall patient presents several months earlier than his yearly checkup      Review of Systems  Complete Male Dermatology [facility] On license of [facility] Patient:   Constitutional: Denies constitutional symptoms  Eyes: Denies eye symptoms  ENT:  denies ear symptoms, nasal symptoms, mouth or throat symptoms  Cardiovascular: Denies cardiovascular symptoms  Respiratory: Denies respiratory symptoms  Gastrointestinal: Denies gastrointestinal symptoms  Musculoskeletal: Denies musculoskeletal symptoms  Integumentary: Denies skin, hair and nail symptoms     Neurological: Denies neurologic symptoms  Psychiatric: Denies psychiatric symptoms  Endocrine: Denies endocrine symptoms  Hematologic/Lymphatic: Denies hematologic symptoms  Active Problems    1  Actinic keratosis (702 0) (L57 0)   2  Acute myocardial infarction (410 90) (I21 3)   3  Anxiety (300 00) (F41 9)   4  BPH without urinary obstruction (600 00) (N40 0)   5  CABG   6  Changing skin lesion (709 9) (L98 9)   7  Coronary arteriosclerosis (414 00) (I25 10)   8  Hyperlipidemia (272 4) (E78 5)   9  Hypertension (401 9) (I10)   10  Need for pneumococcal vaccine (V03 82) (Z23)   11  Osteoarthritis (715 90) (M19 90)   12  Peripheral vascular disease (443 9) (I73 9)   13  Screening for skin condition (V82 0) (Z13 89)   14  Seborrheic keratosis (702 19) (L82 1)   15  Special screening examination for neoplasm of prostate (V76 44) (Z12 5)   16  Subclinical hypothyroidism (244 8) (E03 9)    Past Medical History    1  History of Basal cell carcinoma of face (173 31) (C44 310)   2  History of BPH (benign prostatic hypertrophy) (600 00) (N40 0)   3  History of Colonoscopy (Fiberoptic)   4  History of Coronary atherosclerosis of native coronary artery (414 01) (I25 10)   5  H/O nonmelanoma skin cancer (V10 83) (F91 330)   6  History of anxiety disorder (V11 8) (Z86 59)   7  History of deep venous thrombosis (V12 51) (Z86 718)   8  History of depression (V11 8) (Z86 59)   9  History of osteoarthritis (V13 4) (Z87 39)   10  History of pneumonia (V12 61) (Z87 01)   11  History of seborrheic dermatitis (V13 3) (Z87 2)   12  Hyperlipidemia (272 4) (E78 5)   13  Hypertension (401 9) (I10)  Past Medical History Reviewed- Derm:   The past medical history was reviewed  Surgical History    1  History of CABG   2  CABG   3  History of Cardiac Cath Procedure Outcome: Successful   4  History of Radiologic Supervision: Hamlin Filter Placement In IVC   5   History of Total Hip Replacement  Surgical History Reviewed [address] [place] [address] 14- Derm:   Surgical History reviewed      Family History  Mother    1  Family history of dementia (V17 2) (Z81 8)   2  Family history of Rectal cancer  Father    3  Family history of Coronary Artery Disease (V17 49)  Sister    4  Family history of Rectal cancer  Family History Reviewed- Derm:   Family History was reviewed      Social History    · Denied: History of Alcohol Use (History)   · Never A Smoker  Social History Reviewed [facility]- Derm: The social history was reviewed      Current Meds   1  Aspirin 325 MG Oral Tablet; TAKE 1 TABLET DAILY; Therapy: (Recorded:27Jun2014) to Recorded   2  Atorvastatin Calcium 80 MG Oral Tablet; take 1 tablet by mouth every day; Therapy: 04RHL5512 to (Evaluate:08May2017)  Requested for: 12DTS7745; Last   Rx:75Uyu4079 Ordered   3  BuPROPion HCl ER (XL) 300 MG Oral Tablet Extended Release 24 Hour; take 1 tablet by   mouth every day; Therapy: 44SID2788 to (Evaluate:07Aug2017)  Requested for: 41JOK1950; Last   Rx:08Feb2017 Ordered   4  Escitalopram Oxalate 20 MG Oral Tablet; take 1 tablet by mouth every day; Therapy: 68DRP4804 to (Sabrina Herb)  Requested for: 08JCY3251; Last   Rx:08Feb2017 Ordered   5  Halobetasol Propionate 0 05 % External Ointment; Therapy: (Recorded:39Puo9636) to Recorded   6  Ketoconazole 2 % External Cream;   Therapy: (Recorded:68Rdz3998) to Recorded   7  LORazepam 1 MG Oral Tablet; TAKE 1 TABLET BY MOUTH THREE TIMES DAILY AS   NEEDED; Therapy: 38JJI1966 to (Evaluate:28Apr2017)  Requested for: 63Mxv1276; Last   Rx:80Ewy8480 Ordered   8  Metoprolol Tartrate 25 MG Oral Tablet; take 1 tablet by mouth tid; Therapy: 63SDH7834 to ([place])  Requested for: 57LQL9799; Last   Rx:97Czu5414 Ordered   9  Nasacort AQ 55 MCG/ACT AERS; PRN; Therapy: (Recorded:27Jun2014) to Recorded   10  Tamsulosin HCl - 0 4 MG Oral Capsule; TAKE 1 CAPSULE BY MOUTH TWICE DAILY;     Therapy: 13CVU3545 to (Evaluate:12Mar2017)  Requested for: 07Psj4040; Last    Rx:49Eim4370 Ordered  Medication List Reviewed: The medication list was reviewed and updated today  Allergies    1  No Known Drug Allergies    Physical Exam    Constitutional   General appearance: Appears healthy and well developed  Lymphatic   No visible disturbance  Musculoskeletal   Digits and nails: No clubbing, cyanosis or edema  Cutaneous and nail exam normal     Skin   Scalp skin texture and hair distribution: Normal skin texture on scalp, normal hair distribution  Head: Normal turgor, no rashes, no lesions  Neck: Normal turgor, no rashes, no lesions  Chest: Normal turgor, no rashes, no lesions  Abdomen: Normal turgor, no rashes, no lesions  Back: Normal turgor, no rashes, no lesions  Right upper extremity: Abnormal     Left upper extremity: Abnormal     Right lower extremity: Normal turgor, no rashes, no lesions  Left lower extremity: Abnormal     Neuro/Psych   Alert and oriented x 3  Displays comfort and cooperation during encounterl  Affect is normal     Finding Scaling erythematous area noted on the left lower leg also scattered excoriations and erythema noted on the left anterior shoulder normal keratotic papules with greasy stuck on appearance slightly inflamed keratotic papule noted on the right shoulder nothing else atypical noted on exam       Procedure    Procedure: destruction of lesion  Indications for the procedure include Irritated seborrheic keratosis  Risks, benefits, alternatives, infection risk, bleeding risk, risk of allergic reaction and the risk of scarring were discussed with the patient   verbal consent was obtained prior to the procedure  Procedure Note:   The lesion location: Right anterior shoulder  Destruction Technique: cryotherapy with liquid nitrogen application and 48-12 seconds via cryospray   Destruction of 1 lesions  Post-Procedure:   Patient Status: the patient tolerated the procedure well  Complications: there were no complications  Future Appointments    Date/Time Provider Specialty Site   04/11/2017 10:45 AM MYNOR Bauer  Internal Medicine [facility]   07/18/2017 01:50 PM MYNOR Boyce   Dermatology [facility]     Signatures   Electronically signed by : MYNOR Duggan ; Mar  8 2017  2:18PM EST                       (Author)

## 2018-01-22 VITALS
BODY MASS INDEX: 25.84 KG/M2 | HEIGHT: 70 IN | HEART RATE: 81 BPM | OXYGEN SATURATION: 94 % | SYSTOLIC BLOOD PRESSURE: 98 MMHG | WEIGHT: 180.5 LBS | DIASTOLIC BLOOD PRESSURE: 62 MMHG

## 2018-01-23 VITALS
TEMPERATURE: 98.3 F | OXYGEN SATURATION: 95 % | DIASTOLIC BLOOD PRESSURE: 68 MMHG | HEIGHT: 70 IN | HEART RATE: 88 BPM | SYSTOLIC BLOOD PRESSURE: 110 MMHG | WEIGHT: 174.5 LBS | BODY MASS INDEX: 24.98 KG/M2

## 2018-01-23 VITALS
WEIGHT: 180.5 LBS | SYSTOLIC BLOOD PRESSURE: 110 MMHG | BODY MASS INDEX: 25.84 KG/M2 | DIASTOLIC BLOOD PRESSURE: 72 MMHG | HEART RATE: 68 BPM | HEIGHT: 70 IN

## 2018-01-23 NOTE — PROCEDURES
Chief Complaint  one month post-op, lesion R elbow      History of Present Illness  HPI- Derm: 60-year-old male presents for follow-up for previously curetted basal cell carcinoma mid chest no complaining of a new lesion on his right elbow  Patient complains of some discomfort in the area      Current Meds   1  Aspirin 325 MG Oral Tablet; TAKE 1 TABLET DAILY; Therapy: (Recorded:27Jun2014) to Recorded   2  Atorvastatin Calcium 80 MG Oral Tablet; take 1 tablet by mouth every day; Therapy: 43Wva7783 to (Evaluate:76Lfy1796)  Requested for: 82UOB1338; Last   Rx:06Nuy4554 Ordered   3  BuPROPion HCl ER (XL) 300 MG Oral Tablet Extended Release 24 Hour; take 1 tablet by   mouth every day; Therapy: 17FAD0744 to (Evaluate:22Apr2018)  Requested for: 27Apr2017; Last   Rx:27Apr2017 Ordered   4  Escitalopram Oxalate 20 MG Oral Tablet; take 1 tablet by mouth every day; Therapy: 85RTC8514 to (Evaluate:26Aug2018)  Requested for: 09Quq1097; Last   Rx:31Aug2017 Ordered   5  Fluocinonide 0 05 % External Ointment; apply to affected area twice a day SPARINGLYto   foot; Therapy: 19TZE8515 to (Last Rx:36Itr2712)  Requested for: 35NOP2776 Ordered   6  Halobetasol Propionate 0 05 % External Ointment; APPLY AND GENTLY MASSAGE INTO   AFFECTED AREA(S) TWICE DAILY Recorded   7  Halobetasol Propionate 0 05 % External Ointment; APPLY AND GENTLY MASSAGE INTO   AFFECTED AREA(S) TWICE DAILY; Therapy: 96LHC3305 to (Last Rx:89Sjc7719)  Requested for: 79ZXE3745 Ordered   8  LORazepam 1 MG Oral Tablet; TAKE 1 TABLET BY MOUTH THREE TIMES DAILY AS   NEEDED; Therapy: 04LAF9251 to (Evaluate:26Jun2017)  Requested for: 27Apr2017; Last   Rx:27Apr2017 Ordered   9  Metoprolol Tartrate 25 MG Oral Tablet; TAKE 1 TABLET BY MOUTH THREE TIMES DAILY; Therapy: 54SRR7376 to (Evaluate:54Poh3099)  Requested for: 69Ptp2788; Last   Rx:92Hho8883 Ordered   10  Tamsulosin HCl - 0 4 MG Oral Capsule; TAKE 1 CAPSULE BY MOUTH TWICE DAILY;     Therapy: 87CSK6836 to (Evaluate:14Mar2018)  Requested for: 43HZF2650; Last    Rx:19Mar2017 Ordered   11  Triamcinolone Acetonide 0 1 % External Ointment; APPLY TO AFFECTED AREA TWICE A    DAY shoulder leg; Therapy: 64NFU4203 to (Evaluate:07Apr2017)  Requested for: 53VIO7422; Last    Rx:08Mar2017 Ordered    Allergies    1  No Known Drug Allergies    Physical Exam    Constitutional   General appearance: Appears healthy and well developed  Lymphatic   No visible disturbance  Musculoskeletal   Digits and nails: No clubbing, cyanosis or edema  Cutaneous and nail exam normal     Neuro/Psych   Alert and oriented x 3  Displays comfort and cooperation during encounterl  Affect is normal     Finding 2mm keratotic papule R elbow previous site of basal cell carcinoma chest wall well-healed without evidence  Procedure    Procedure: skin biopsy  Indications for the procedure include the lesion has changed  Risks, benefits, alternatives, infection risk, bleeding risk, risk of allergic reaction and risk of scarring were discussed with the patient   verbal consent was obtained prior to the procedure  Procedure Note:   Anesthesia: 1 ml of lidocaine 1% without epinephrine  The lesion was located on the Right elbow  The patient was prepped using alcohol  a shave biopsy of the lesion was taken  The hemostasis of the wound was achieved with aluminum chloride  Dressing: The wound was cleaned and petroleum jelly was applied and a sterile dressing was placed  Specimen: the excised lesion was place in buffered formalin and sent for pathology  Post-Procedure:   Patient Status: the patient tolerated the procedure well  Complications: there were no complications  Follow-up in the office  patient will be called in event skin cancer is found  Patient can call the office in 7-10 days for results if not contacted  Assessment    1  Basal cell carcinoma of chest (173 51) (C44 519)   2   Changing skin lesion (709 9) (L98 9)    Plan  Changing skin lesion    · Wound care as instructed ; Status:Complete;   Done: 72QKY8899   · Follow-up visit in 6 months Evaluation and Treatment  Follow-up  Status: Complete   Done: 80LIY5069    Discussion/Summary    Basal cell carcinoma chest wall well-healed without evidence of disease no further treatment needed skin lesion right elbow irritated keratosis rule out atypia await result of biopsy if further treatment needed  Follow-up otherwise in 6 months        Signatures   Electronically signed by : MYNOR Monteiro ; Oct 11 2017  1:30PM EST                       (Author)

## 2018-01-23 NOTE — PROGRESS NOTES
Assessment    1  Changing skin lesion (709 9) (L98 9)   2  Seborrheic keratosis (702 19) (L82 1)   3  Screening for skin condition (V82 0) (Z13 89)   4  H/O nonmelanoma skin cancer (V10 83) (Z85 828)    Plan    · Wound care as instructed ; Status:Complete;   Done: 89QHD8108    · Follow-up visit in 1 year Evaluation and Treatment  Follow-up  Status: Hold For -  Scheduling  Requested for: 96Fjm2772   · Use a sun block product with an SPF of 15 or more ; Status:Complete;   Done:  43CNN5233    · Fish Oil 1000 MG Oral Capsule    Discussion/Summary  Discussion Summary- [facility]:   Assessment #1: Changing skin lesion  Care Plan:   Irritated keratosis rule out atypia await result of biopsy further treatment indicated  Assessment #2: Seborrheic keratosis  Care Plan:   Patient reassured these are normal growths we acquire with age no treatment needed  Assessment #3: History skin cancer  Care Plan:   No recurrence nothing else atypical sunblock recommended follow-up yearly  Assessment #4: Screening for dermatologic disorders  Care Plan:   Nothing else of concern noted on complete exam sun protection recommended follow-up in one year  Chief Complaint  Chief Complaint Free Text Note Form: YEARLY SKIN CANCER EXAM      History of Present Illness  HPI: 67-year-old male with previous history of skin cancer who is not been seen for a year and a half presents for overall checkup complaining of the lesion on his right lower back that gets irritated and tender      Review of Systems  Complete Male Dermatology [facility] Patient:   Constitutional: Denies constitutional symptoms  Eyes: Denies eye symptoms  ENT:  denies ear symptoms, nasal symptoms, mouth or throat symptoms  Cardiovascular: Denies cardiovascular symptoms  Respiratory: Denies respiratory symptoms  Gastrointestinal: Denies gastrointestinal symptoms  Musculoskeletal: Denies musculoskeletal symptoms     Integumentary: Denies skin, hair and nail symptoms  Neurological: Denies neurologic symptoms  Psychiatric: Denies psychiatric symptoms  Endocrine: Denies endocrine symptoms  Hematologic/Lymphatic: Denies hematologic symptoms  Active Problems    1  Actinic keratosis (702 0) (L57 0)   2  Acute myocardial infarction (410 90) (I21 3)   3  Anxiety (300 00) (F41 9)   4  BPH without urinary obstruction (600 00) (N40 0)   5  CABG   6  Coronary arteriosclerosis (414 00) (I25 10)   7  Hyperlipidemia (272 4) (E78 5)   8  Hypertension (401 9) (I10)   9  Need for pneumococcal vaccine (V03 82) (Z23)   10  Osteoarthritis (715 90) (M19 90)   11  Peripheral vascular disease (443 9) (I73 9)   12  Screening for skin condition (V82 0) (Z13 89)   13  Seborrheic keratosis (702 19) (L82 1)   14  Special screening examination for neoplasm of prostate (V76 44) (Z12 5)    Past Medical History    1  History of Basal cell carcinoma of face (173 31) (C44 310)   2  History of BPH (benign prostatic hypertrophy) (600 00) (N40 0)   3  History of Colonoscopy (Fiberoptic)   4  History of Coronary atherosclerosis of native coronary artery (414 01) (I25 10)   5  H/O nonmelanoma skin cancer (V10 83) (J60 612)   6  History of anxiety disorder (V11 8) (Z86 59)   7  History of deep venous thrombosis (V12 51) (Z86 718)   8  History of depression (V11 8) (Z86 59)   9  History of osteoarthritis (V13 4) (Z87 39)   10  History of pneumonia (V12 61) (Z87 01)   11  History of seborrheic dermatitis (V13 3) (Z87 2)   12  Hyperlipidemia (272 4) (E78 5)   13  Hypertension (401 9) (I10)  Past Medical History Reviewed- Derm:   The past medical history was reviewed  Surgical History    1  History of CABG   2  CABG   3  History of Cardiac Cath Procedure Outcome: Successful   4  History of Radiologic Supervision: [place] Filter Placement In IVC   5  History of Total Hip Replacement  Surgical History Reviewed [facility]:   Surgical History reviewed      Family History  Mother    1  Family history of dementia (V17 2) (Z81 8)   2  Family history of Rectal cancer  Father    3  Family history of Coronary Artery Disease (V17 49)  Sister    4  Family history of Rectal cancer  Family History Reviewed- Derm:   Family History was reviewed      Social History    · Denied: History of Alcohol Use (History)   · Never A Smoker    Current Meds   1  Aspirin 325 MG Oral Tablet; TAKE 1 TABLET DAILY; Therapy: (Recorded:27Jun2014) to Recorded   2  Atorvastatin Calcium 80 MG Oral Tablet; take 1 tablet by mouth every day; Therapy: 08Rrs2513 to ([facility])  Requested for: 97CRG8816; Last   Rx:28Wsj2048 Ordered   3  BuPROPion HCl ER (XL) 300 MG Oral Tablet Extended Release 24 Hour; take 1 tablet by   mouth every day; Therapy: 33FTI5144 to ([facility])  Requested for: 17PGQ1100; Last   Rx:77Gjq7336 Ordered   4  Escitalopram Oxalate 20 MG Oral Tablet; TAKE 1 TABLET DAILY  Requested for:   01FLR6198; Last HC:26THH0904 Ordered   5  Fish Oil 1000 MG Oral Capsule; TAKE 1 CAPSULE DAILY; Therapy: (Recorded:27Jun2014) to Recorded   6  Halobetasol Propionate 0 05 % External Ointment; Therapy: (Recorded:05Gxl3076) to Recorded   7  Ketoconazole 2 % External Cream;   Therapy: (Recorded:74Rjk6087) to Recorded   8  LORazepam 1 MG Oral Tablet; TAKE 1 TABLET BY MOUTH THREE TIMES DAILY AS   NEEDED; Therapy: 96QQF4882 to (Evaluate:24Fwv4491)  Requested for: 17SDU4993; Last   Rx:09May2016 Ordered   9  Metoprolol Tartrate 25 MG Oral Tablet; take 1 tablet by mouth tid; Therapy: 29TTQ5865 to ([facility])  Requested for: 94QNX9990; Last   Rx:50Nij1841 Ordered   10  Nasacort AQ 55 MCG/ACT AERS; PRN; Therapy: (Recorded:27Jun2014) to Recorded   11  Tamsulosin HCl - 0 4 MG Oral Capsule; TAKE 1 CAPSULE BY MOUTH TWICE DAILY; Therapy: 69LHE1528 to ([facility])  Requested for: 90DET2249; Last    QR:05KYI6603 Ordered  Medication List Reviewed: The medication list was reviewed and updated today  Allergies    1  No Known Drug Allergies    Physical Exam    Constitutional   General appearance: Appears healthy and well developed  Lymphatic   No visible disturbance  Musculoskeletal   Digits and nails: No clubbing, cyanosis or edema  Cutaneous and nail exam normal     Skin   Scalp skin texture and hair distribution: Normal skin texture on scalp, normal hair distribution  Head: Normal turgor, no rashes, no lesions  Neck: Normal turgor, no rashes, no lesions  Chest: Normal turgor, no rashes, no lesions  Abdomen: Normal turgor, no rashes, no lesions  Back: Normal turgor, no rashes, no lesions  Right upper extremity: Normal turgor, no rashes, no lesions  Left upper extremity: Normal turgor, no rashes, no lesions  Right lower extremity: Normal turgor, no rashes, no lesions  Left lower extremity: Normal turgor, no rashes, no lesions  Neuro/Psych   Alert and oriented x 3  Displays comfort and cooperation during encounterl  Affect is normal     Finding Previous site of skin cancer well-healed without recurrence crusted 6 mm papule right lower back normal keratotic papules with greasy stuck on appearance nothing else atypical noted on clinic exam       Procedure    Procedure: skin biopsy  Indications for the procedure include the lesion has changed  Risks, benefits, alternatives, infection risk, bleeding risk, risk of allergic reaction and risk of scarring were discussed with the patient   verbal consent was obtained prior to the procedure  Procedure Note:   Anesthesia: 1 ml of lidocaine 1% with epinephrine  The lesion was located on the Right lower back  The patient was prepped using alcohol  a shave biopsy of the lesion was taken  The hemostasis of the wound was achieved with aluminum chloride  Dressing: The wound was cleaned and petroleum jelly was applied and a sterile dressing was placed     Specimen: the excised lesion was place in buffered formalin and sent for pathology  Post-Procedure:   Patient Status: the patient tolerated the procedure well  Complications: there were no complications  Follow-up in the office  patient will be called in event skin cancer is found  Patient can call the office in 7-10 days for results if not contacted  Future Appointments    Date/Time Provider Specialty Site   11/09/2016 01:15 PM MYNOR Stiles   Cardiology [facility]     Signatures   Electronically signed by : MYNOR Walden ; Jul 12 2016  1:58PM EST                       (Author)

## 2018-02-16 DIAGNOSIS — N40.0 BENIGN PROSTATIC HYPERPLASIA, UNSPECIFIED WHETHER LOWER URINARY TRACT SYMPTOMS PRESENT: Primary | ICD-10-CM

## 2018-02-16 RX ORDER — TAMSULOSIN HYDROCHLORIDE 0.4 MG/1
0.4 CAPSULE ORAL 2 TIMES DAILY
Qty: 30 CAPSULE | Refills: 3 | Status: SHIPPED | OUTPATIENT
Start: 2018-02-16 | End: 2018-02-20 | Stop reason: SDUPTHER

## 2018-02-16 RX ORDER — TAMSULOSIN HYDROCHLORIDE 0.4 MG/1
1 CAPSULE ORAL 2 TIMES DAILY
COMMUNITY
Start: 2014-12-26 | End: 2018-02-16 | Stop reason: SDUPTHER

## 2018-02-20 DIAGNOSIS — N40.0 BENIGN PROSTATIC HYPERPLASIA, UNSPECIFIED WHETHER LOWER URINARY TRACT SYMPTOMS PRESENT: ICD-10-CM

## 2018-02-20 RX ORDER — TAMSULOSIN HYDROCHLORIDE 0.4 MG/1
0.4 CAPSULE ORAL 2 TIMES DAILY
Qty: 180 CAPSULE | Refills: 0 | Status: SHIPPED | OUTPATIENT
Start: 2018-02-20 | End: 2018-06-06 | Stop reason: SDUPTHER

## 2018-02-20 NOTE — TELEPHONE ENCOUNTER
Script was sent for tamsuloin but only a 2 week supply  Patient takes 2 pills/day  Need the 90 day supply   Pls call to [facility]

## 2018-03-15 ENCOUNTER — TELEPHONE (OUTPATIENT)
Dept: CARDIOLOGY CLINIC | Facility: CLINIC | Age: 73
End: 2018-03-15

## 2018-03-15 DIAGNOSIS — I25.10 CORONARY ARTERY DISEASE INVOLVING NATIVE CORONARY ARTERY OF NATIVE HEART WITHOUT ANGINA PECTORIS: ICD-10-CM

## 2018-03-15 DIAGNOSIS — I10 ESSENTIAL HYPERTENSION: ICD-10-CM

## 2018-03-15 DIAGNOSIS — E78.2 MIXED HYPERLIPIDEMIA: Primary | ICD-10-CM

## 2018-03-15 NOTE — TELEPHONE ENCOUNTER
Orders were put in chart, LMO patient's VM that if he goes to a [facility] the order in chart is good  If he needs to have the lab mailed to him, he needs to call back and let me know

## 2018-03-15 NOTE — TELEPHONE ENCOUNTER
PT SCHEDULED APPT W/VK6 ON 05/07  PT NEEDS ORDER FOR BW PUT IN SYSTEM SO HE CAN GET IT DONE BEFORE APPT   PT SAID YOU CAN LOOK IN CHART TO SEE WHAT DR Deysi Kim WOULD PUT ON ORDER FOR HIM

## 2018-04-12 ENCOUNTER — OFFICE VISIT (OUTPATIENT)
Dept: DERMATOLOGY | Facility: CLINIC | Age: 73
End: 2018-04-12
Payer: MEDICARE

## 2018-04-12 DIAGNOSIS — Z85.828 HISTORY OF SKIN CANCER: Primary | ICD-10-CM

## 2018-04-12 DIAGNOSIS — L30.8 PSORIASIFORM DERMATITIS: ICD-10-CM

## 2018-04-12 DIAGNOSIS — L82.1 SEBORRHEIC KERATOSIS: ICD-10-CM

## 2018-04-12 DIAGNOSIS — Z13.89 SCREENING FOR SKIN CONDITION: ICD-10-CM

## 2018-04-12 PROCEDURE — 99213 OFFICE O/P EST LOW 20 MIN: CPT | Performed by: DERMATOLOGY

## 2018-04-12 RX ORDER — ASPIRIN 81 MG/1
1 TABLET, CHEWABLE ORAL DAILY
COMMUNITY

## 2018-04-12 RX ORDER — LORAZEPAM 1 MG/1
1 TABLET ORAL 3 TIMES DAILY PRN
COMMUNITY
Start: 2014-01-14 | End: 2018-05-07 | Stop reason: SDUPTHER

## 2018-04-12 RX ORDER — HALOBETASOL PROPIONATE 0.05 %
OINTMENT (GRAM) TOPICAL 2 TIMES DAILY
COMMUNITY
Start: 2017-07-18 | End: 2018-11-21

## 2018-04-12 RX ORDER — ATORVASTATIN CALCIUM 80 MG/1
1 TABLET, FILM COATED ORAL DAILY
COMMUNITY
Start: 2014-08-25 | End: 2018-04-17 | Stop reason: SDUPTHER

## 2018-04-12 RX ORDER — ESCITALOPRAM OXALATE 20 MG/1
1 TABLET ORAL DAILY
COMMUNITY
Start: 2017-02-08 | End: 2018-04-17 | Stop reason: SDUPTHER

## 2018-04-12 RX ORDER — BUPROPION HYDROCHLORIDE 300 MG/1
1 TABLET ORAL DAILY
COMMUNITY
Start: 2015-07-24 | End: 2018-04-23 | Stop reason: SDUPTHER

## 2018-04-12 RX ORDER — FLUOCINONIDE 0.5 MG/G
OINTMENT TOPICAL
COMMUNITY
Start: 2017-05-31 | End: 2018-11-21

## 2018-04-12 NOTE — PATIENT INSTRUCTIONS
psoriasiform dermatitis under good control continue use of halobetasol as needed  Seborrheic keratosis patient reassured these are normal growths we acquire with age no treatment needed  History of skin cancer in no recurrence nothing else atypical sunblock recommended follow-up in 6 months  Screening for dermatologic disorders nothing else of concern noted on complete exam follow-up in 6 months

## 2018-04-12 NOTE — PROGRESS NOTES
[address] [facility] [address] [facility]  [address] [address]     MRN: [id number] : 1945  Encounter: [id number]  Patient Information: Frandy Cardenas  Chief complaint:  Six-month checkup  History of present illness:  81-year-old male with previous history of skin cancer presents for overall checkup no specific complaints noted psoriasiform process that has previously been a problem the minimal evidence of disease at this point and under good control with use of  Halobetasol    Past Medical History:   Diagnosis Date    Anxiety disorder     last assessed - 81XES0143    Basal cell carcinoma of face     BPH (benign prostatic hyperplasia)     benign prostatic hypertrophy; last assessed - 42VKI2251    Coronary atherosclerosis of native coronary artery     last assessed - 29OWT3548    Deep vein thrombosis (DVT) ([address] )     last assessed - 94DCV2443    Depression     last assessed - 54DTT1309    History of colonoscopy     Fiberoptic; Resolved - 2011    Hyperlipemia     last assessed - 21XDT7062    Hypertension     last assessed - 81BPE8407    Nonmelanoma skin cancer     last assessed - 01VFA8089    Osteoarthritis     last assessed - 40YTQ1807    Pneumonia     last assessed - 72NLK4266    Seborrheic dermatitis      Past Surgical History:   Procedure Laterality Date    CARDIAC CATHETERIZATION      procedure outcome successful; last assessed - 07TUN9147    CORONARY ARTERY BYPASS GRAFT      last assessed - 67QCH3986    IVC FILTER INSERTION      Jael    TOTAL HIP ARTHROPLASTY      last assessed - 57EBC4235     Social History   History   Alcohol Use No     History   Drug Use    Types: Marijuana     History   Smoking Status    Never Smoker   Smokeless Tobacco    Never Used     Family History   Problem Relation Age of Onset    Dementia Mother     Rectal cancer Mother     Coronary artery disease Father     Rectal cancer Sister Meds/Allergies   No Known Allergies    Meds:  Prior to Admission medications    Medication Sig Start Date End Date Taking?  Authorizing Provider   aspirin 325 mg tablet Take 1 tablet by mouth daily   Yes Historical Provider, MD   atorvastatin (LIPITOR) 80 mg tablet Take 1 tablet by mouth daily 8/25/14  Yes Historical Provider, MD   buPROPion (WELLBUTRIN XL) 300 mg 24 hr tablet Take 1 tablet by mouth daily 7/24/15  Yes Historical Provider, MD   escitalopram (LEXAPRO) 20 mg tablet Take 1 tablet by mouth daily 2/8/17  Yes Historical Provider, MD   fluocinonide (LIDEX) 0 05 % ointment Apply topically 5/31/17  Yes Historical Provider, MD   halobetasol (ULTRAVATE) 0 05 % ointment Apply topically 2 (two) times a day 7/18/17  Yes Historical Provider, MD   LORazepam (ATIVAN) 1 mg tablet Take 1 tablet by mouth 3 (three) times a day as needed 1/14/14  Yes Historical Provider, MD   metoprolol tartrate (LOPRESSOR) 25 mg tablet Take 1 tablet by mouth 3 (three) times a day 10/20/14  Yes Historical Provider, MD   tamsulosin (FLOMAX) 0 4 mg Take 1 capsule (0 4 mg total) by mouth 2 (two) times a day 2/20/18  Yes Lily Garcia MD   triamcinolone (KENALOG) 0 1 % ointment Apply topically 3/8/17  Yes Historical Provider, MD       Subjective:     Review of Systems:    General: negative for - chills, fatigue, fever,  weight gain or weight loss  Psychological: negative for - anxiety, behavioral disorder, concentration difficulties, decreased libido, depression, irritability, memory difficulties, mood swings, sleep disturbances or suicidal ideation  ENT: negative for - hearing difficulties , nasal congestion, nasal discharge, oral lesions, sinus pain, sneezing, sore throat  Allergy and Immunology: negative for - hives, insect bite sensitivity,  Hematological and Lymphatic: negative for - bleeding problems, blood clots,bruising, swollen lymph nodes  Endocrine: negative for - hair pattern changes, hot flashes, malaise/lethargy, mood swings, palpitations, polydipsia/polyuria, skin changes, temperature intolerance or unexpected weight change  Respiratory: negative for - cough, hemoptysis, orthopnea, shortness of breath, or wheezing  Cardiovascular: negative for - chest pain, dyspnea on exertion, edema,  Gastrointestinal: negative for - abdominal pain, nausea/vomiting  Genito-Urinary: negative for - dysuria, incontinence, irregular/heavy menses or urinary frequency/urgency  Musculoskeletal: negative for - gait disturbance, joint pain, joint stiffness, joint swelling, muscle pain, muscular weakness  Dermatological:  As in HPI  Neurological: negative for confusion, dizziness, headaches, impaired coordination/balance, memory loss, numbness/tingling, seizures, speech problems, tremors or weakness       Objective: There were no vitals taken for this visit  Physical Exam:    General Appearance:    Alert, cooperative, no distress   Head:    Normocephalic, without obvious abnormality, atraumatic           Skin:   A full skin exam was performed including scalp, head scalp, eyes, ears, nose, lips, neck, chest, axilla, abdomen, back, buttocks, bilateral upper extremities, bilateral lower extremities, hands, feet, fingers, toes, fingernails, and toenails  Minimal scaling patch noted on the right thigh nothing else noted on exam previous site of skin cancer well healed without recurrence normal keratotic papules with greasy stuck on appearance nothing else atypical noted on exam     Assessment:     1  History of skin cancer     2  Seborrheic keratosis     3  Screening for skin condition     4   Psoriasiform dermatitis           Plan:    psoriasiform dermatitis under good control continue use of halobetasol as needed  Seborrheic keratosis patient reassured these are normal growths we acquire with age no treatment needed  History of skin cancer in no recurrence nothing else atypical sunblock recommended follow-up in 6 months  Screening for dermatologic disorders nothing else of concern noted on complete exam follow-up in 6 months    Jesus Ackerman MD  4/12/2018,11:02 AM    Portions of the record may have been created with voice recognition software   Occasional wrong word or "sound a like" substitutions may have occurred due to the inherent limitations of voice recognition software   Read the chart carefully and recognize, using context, where substitutions have occurred

## 2018-04-17 DIAGNOSIS — F41.9 ANXIETY: ICD-10-CM

## 2018-04-17 DIAGNOSIS — E78.5 HYPERLIPIDEMIA, UNSPECIFIED HYPERLIPIDEMIA TYPE: Primary | ICD-10-CM

## 2018-04-17 RX ORDER — ATORVASTATIN CALCIUM 80 MG/1
80 TABLET, FILM COATED ORAL DAILY
Qty: 90 TABLET | Refills: 3 | Status: SHIPPED | OUTPATIENT
Start: 2018-04-17 | End: 2018-05-07 | Stop reason: SDUPTHER

## 2018-04-17 RX ORDER — ESCITALOPRAM OXALATE 20 MG/1
20 TABLET ORAL DAILY
Qty: 90 TABLET | Refills: 3 | Status: SHIPPED | OUTPATIENT
Start: 2018-04-17 | End: 2018-10-25 | Stop reason: SDUPTHER

## 2018-04-17 NOTE — TELEPHONE ENCOUNTER
Refill request     escitalopram 20mg   1 tab po qd     atorvastatin 80mg   1 tab po qd     Send to [facility]

## 2018-04-23 DIAGNOSIS — F41.9 ANXIETY: Primary | ICD-10-CM

## 2018-04-23 RX ORDER — BUPROPION HYDROCHLORIDE 300 MG/1
300 TABLET ORAL DAILY
Qty: 30 TABLET | Refills: 1 | Status: SHIPPED | OUTPATIENT
Start: 2018-04-23 | End: 2018-05-07 | Stop reason: SDUPTHER

## 2018-04-30 DIAGNOSIS — R73.01 IMPAIRED FASTING GLUCOSE: ICD-10-CM

## 2018-04-30 DIAGNOSIS — E78.5 HYPERLIPIDEMIA: ICD-10-CM

## 2018-04-30 DIAGNOSIS — I10 ESSENTIAL (PRIMARY) HYPERTENSION: ICD-10-CM

## 2018-04-30 DIAGNOSIS — Z12.5 ENCOUNTER FOR SCREENING FOR MALIGNANT NEOPLASM OF PROSTATE: ICD-10-CM

## 2018-05-03 ENCOUNTER — APPOINTMENT (OUTPATIENT)
Dept: LAB | Facility: HOSPITAL | Age: 73
End: 2018-05-03
Payer: MEDICARE

## 2018-05-03 DIAGNOSIS — Z12.5 ENCOUNTER FOR SCREENING FOR MALIGNANT NEOPLASM OF PROSTATE: ICD-10-CM

## 2018-05-03 DIAGNOSIS — R73.01 IMPAIRED FASTING GLUCOSE: ICD-10-CM

## 2018-05-03 DIAGNOSIS — E78.5 HYPERLIPIDEMIA: ICD-10-CM

## 2018-05-03 DIAGNOSIS — I10 ESSENTIAL (PRIMARY) HYPERTENSION: ICD-10-CM

## 2018-05-03 LAB
ALBUMIN SERPL BCP-MCNC: 3.9 G/DL (ref 3.5–5)
ALP SERPL-CCNC: 84 U/L (ref 46–116)
ALT SERPL W P-5'-P-CCNC: 42 U/L (ref 12–78)
ANION GAP SERPL CALCULATED.3IONS-SCNC: 5 MMOL/L (ref 4–13)
AST SERPL W P-5'-P-CCNC: 40 U/L (ref 5–45)
BILIRUB SERPL-MCNC: 1 MG/DL (ref 0.2–1)
BUN SERPL-MCNC: 24 MG/DL (ref 5–25)
CALCIUM SERPL-MCNC: 10 MG/DL (ref 8.3–10.1)
CHLORIDE SERPL-SCNC: 107 MMOL/L (ref 100–108)
CHOLEST SERPL-MCNC: 132 MG/DL (ref 50–200)
CO2 SERPL-SCNC: 29 MMOL/L (ref 21–32)
CREAT SERPL-MCNC: 1.43 MG/DL (ref 0.6–1.3)
ERYTHROCYTE [DISTWIDTH] IN BLOOD BY AUTOMATED COUNT: 13.3 % (ref 11.6–15.1)
EST. AVERAGE GLUCOSE BLD GHB EST-MCNC: 114 MG/DL
GFR SERPL CREATININE-BSD FRML MDRD: 49 ML/MIN/1.73SQ M
GLUCOSE P FAST SERPL-MCNC: 100 MG/DL (ref 65–99)
HBA1C MFR BLD: 5.6 % (ref 4.2–6.3)
HCT VFR BLD AUTO: 46.5 % (ref 36.5–49.3)
HDLC SERPL-MCNC: 44 MG/DL (ref 40–60)
HGB BLD-MCNC: 15.2 G/DL (ref 12–17)
LDLC SERPL CALC-MCNC: 68 MG/DL (ref 0–100)
MCH RBC QN AUTO: 29.1 PG (ref 26.8–34.3)
MCHC RBC AUTO-ENTMCNC: 32.7 G/DL (ref 31.4–37.4)
MCV RBC AUTO: 89 FL (ref 82–98)
NONHDLC SERPL-MCNC: 88 MG/DL
PLATELET # BLD AUTO: 198 THOUSANDS/UL (ref 149–390)
PMV BLD AUTO: 10.7 FL (ref 8.9–12.7)
POTASSIUM SERPL-SCNC: 5 MMOL/L (ref 3.5–5.3)
PROT SERPL-MCNC: 7.5 G/DL (ref 6.4–8.2)
PSA SERPL-MCNC: 1.5 NG/ML (ref 0–4)
RBC # BLD AUTO: 5.23 MILLION/UL (ref 3.88–5.62)
SODIUM SERPL-SCNC: 141 MMOL/L (ref 136–145)
TRIGL SERPL-MCNC: 102 MG/DL
TSH SERPL DL<=0.05 MIU/L-ACNC: 2.62 UIU/ML (ref 0.36–3.74)
WBC # BLD AUTO: 7.69 THOUSAND/UL (ref 4.31–10.16)

## 2018-05-03 PROCEDURE — 80053 COMPREHEN METABOLIC PANEL: CPT

## 2018-05-03 PROCEDURE — 83036 HEMOGLOBIN GLYCOSYLATED A1C: CPT

## 2018-05-03 PROCEDURE — 80061 LIPID PANEL: CPT

## 2018-05-03 PROCEDURE — 85027 COMPLETE CBC AUTOMATED: CPT

## 2018-05-03 PROCEDURE — 84443 ASSAY THYROID STIM HORMONE: CPT

## 2018-05-03 PROCEDURE — 36415 COLL VENOUS BLD VENIPUNCTURE: CPT

## 2018-05-03 PROCEDURE — G0103 PSA SCREENING: HCPCS

## 2018-05-07 ENCOUNTER — OFFICE VISIT (OUTPATIENT)
Dept: CARDIOLOGY CLINIC | Facility: CLINIC | Age: 73
End: 2018-05-07
Payer: MEDICARE

## 2018-05-07 ENCOUNTER — OFFICE VISIT (OUTPATIENT)
Dept: INTERNAL MEDICINE CLINIC | Facility: CLINIC | Age: 73
End: 2018-05-07
Payer: MEDICARE

## 2018-05-07 VITALS
WEIGHT: 179.6 LBS | SYSTOLIC BLOOD PRESSURE: 120 MMHG | DIASTOLIC BLOOD PRESSURE: 86 MMHG | BODY MASS INDEX: 25.71 KG/M2 | HEIGHT: 70 IN | RESPIRATION RATE: 12 BRPM | HEART RATE: 60 BPM

## 2018-05-07 VITALS
OXYGEN SATURATION: 98 % | WEIGHT: 178.8 LBS | HEART RATE: 63 BPM | DIASTOLIC BLOOD PRESSURE: 84 MMHG | HEIGHT: 70 IN | SYSTOLIC BLOOD PRESSURE: 124 MMHG | BODY MASS INDEX: 25.6 KG/M2

## 2018-05-07 DIAGNOSIS — M25.512 BILATERAL SHOULDER PAIN, UNSPECIFIED CHRONICITY: ICD-10-CM

## 2018-05-07 DIAGNOSIS — E03.8 SUBCLINICAL HYPOTHYROIDISM: ICD-10-CM

## 2018-05-07 DIAGNOSIS — M25.511 BILATERAL SHOULDER PAIN, UNSPECIFIED CHRONICITY: ICD-10-CM

## 2018-05-07 DIAGNOSIS — I10 ESSENTIAL HYPERTENSION: ICD-10-CM

## 2018-05-07 DIAGNOSIS — E78.5 HYPERLIPIDEMIA, UNSPECIFIED HYPERLIPIDEMIA TYPE: ICD-10-CM

## 2018-05-07 DIAGNOSIS — E78.2 MIXED HYPERLIPIDEMIA: ICD-10-CM

## 2018-05-07 DIAGNOSIS — N40.0 BPH WITHOUT URINARY OBSTRUCTION: ICD-10-CM

## 2018-05-07 DIAGNOSIS — N18.30 CKD (CHRONIC KIDNEY DISEASE), STAGE III (HCC): ICD-10-CM

## 2018-05-07 DIAGNOSIS — I25.10 CORONARY ARTERY DISEASE INVOLVING NATIVE CORONARY ARTERY OF NATIVE HEART WITHOUT ANGINA PECTORIS: Primary | ICD-10-CM

## 2018-05-07 DIAGNOSIS — F41.9 ANXIETY: ICD-10-CM

## 2018-05-07 DIAGNOSIS — R73.01 IMPAIRED FASTING GLUCOSE: ICD-10-CM

## 2018-05-07 DIAGNOSIS — I20.8 OTHER FORMS OF ANGINA PECTORIS (HCC): ICD-10-CM

## 2018-05-07 PROBLEM — I20.89 OTHER FORMS OF ANGINA PECTORIS: Status: ACTIVE | Noted: 2018-05-07

## 2018-05-07 PROCEDURE — 99214 OFFICE O/P EST MOD 30 MIN: CPT | Performed by: INTERNAL MEDICINE

## 2018-05-07 RX ORDER — ATORVASTATIN CALCIUM 80 MG/1
80 TABLET, FILM COATED ORAL DAILY
Qty: 90 TABLET | Refills: 3 | Status: SHIPPED | OUTPATIENT
Start: 2018-05-07 | End: 2018-10-25 | Stop reason: SDUPTHER

## 2018-05-07 RX ORDER — BUPROPION HYDROCHLORIDE 300 MG/1
300 TABLET ORAL DAILY
Qty: 90 TABLET | Refills: 0 | Status: SHIPPED | OUTPATIENT
Start: 2018-05-07 | End: 2018-05-31 | Stop reason: SDUPTHER

## 2018-05-07 RX ORDER — LORAZEPAM 1 MG/1
1 TABLET ORAL 3 TIMES DAILY PRN
Qty: 90 TABLET | Refills: 0 | Status: SHIPPED | OUTPATIENT
Start: 2018-05-07 | End: 2018-11-21 | Stop reason: SDUPTHER

## 2018-05-07 NOTE — PATIENT INSTRUCTIONS
Lab data reviewed in detail and compared prior     Coronary artery disease- stable without angina, following with Cardiology, continue to modify risk factors as below    Chronic kidney disease stage 3 is stable with GFR 49- patient counseled to increase hydration, continue to avoid nonsteroidal anti inflammatory drugs like Advil and Aleve  Hypertension and hyperlipidemia remained optimally controlled on present regimen    Bilateral shoulder pains with full range of motion- we discussed stretching exercises to be done several times per day as needed, contact me for physical therapy prescription if symptoms fail to improve    Anxiety is stable on Wellbutrin, Lexapro and p r n  Lorazepam     impaired fasting glucose stable with A1c 5 6     BPH continue on tamsulosin, refer to urology    Routine follow-up after labs in 6 months, sooner as needed

## 2018-05-07 NOTE — PROGRESS NOTES
Assessment/Plan:    Patient Instructions    Lab data reviewed in detail and compared prior     Coronary artery disease- stable without angina, following with Cardiology, continue to modify risk factors as below    Chronic kidney disease stage 3 is stable with GFR 49- patient counseled to increase hydration, continue to avoid nonsteroidal anti inflammatory drugs like Advil and Aleve  Hypertension and hyperlipidemia remained optimally controlled on present regimen    Bilateral shoulder pains with full range of motion- we discussed stretching exercises to be done several times per day as needed, contact me for physical therapy prescription if symptoms fail to improve    Anxiety is stable on Wellbutrin, Lexapro and p r n  Lorazepam     impaired fasting glucose stable with A1c 5 6     BPH continue on tamsulosin, refer to urology    Routine follow-up after labs in 6 months, sooner as needed  Diagnoses and all orders for this visit:    Coronary artery disease involving native coronary artery of native heart without angina pectoris    Anxiety  -     buPROPion (WELLBUTRIN XL) 300 mg 24 hr tablet; Take 1 tablet (300 mg total) by mouth daily  -     LORazepam (ATIVAN) 1 mg tablet; Take 1 tablet (1 mg total) by mouth 3 (three) times a day as needed for anxiety    Essential hypertension  -     CBC; Future  -     Comprehensive metabolic panel; Future    Mixed hyperlipidemia  -     Lipid panel; Future    Impaired fasting glucose  -     Hemoglobin A1c; Future    Subclinical hypothyroidism  -     TSH, 3rd generation with T4 reflex; Future    BPH without urinary obstruction  -     Ambulatory referral to Urology; Future    CKD (chronic kidney disease), stage III    Bilateral shoulder pain, unspecified chronicity    Other orders  -      Colonoscopy         Subjective:      Patient ID: Frandy Cardenas is a 68 y o  male    Notes shoulder pain in R shoulder from snowblowing 5 mos ago, now on L side in trapezius    Better after hot shower, minimal neck pain  No headaches  CAD-seen by Dr Margaret Chaudhry today, no angina  HPL/HTN- taking rx as directed, no home bp's  Anxiety-stable on lexapro/wellbutrin, using lorazepam sporadically  BPH-improved w/ flomax, still w/ nocturia, wants to see urology            Current Outpatient Prescriptions:     aspirin 325 mg tablet, Take 1 tablet by mouth daily, Disp: , Rfl:     atorvastatin (LIPITOR) 80 mg tablet, Take 1 tablet (80 mg total) by mouth daily, Disp: 90 tablet, Rfl: 3    buPROPion (WELLBUTRIN XL) 300 mg 24 hr tablet, Take 1 tablet (300 mg total) by mouth daily, Disp: 90 tablet, Rfl: 0    escitalopram (LEXAPRO) 20 mg tablet, Take 1 tablet (20 mg total) by mouth daily, Disp: 90 tablet, Rfl: 3    fluocinonide (LIDEX) 0 05 % ointment, Apply topically, Disp: , Rfl:     halobetasol (ULTRAVATE) 0 05 % ointment, Apply topically 2 (two) times a day, Disp: , Rfl:     LORazepam (ATIVAN) 1 mg tablet, Take 1 tablet (1 mg total) by mouth 3 (three) times a day as needed for anxiety, Disp: 90 tablet, Rfl: 0    metoprolol tartrate (LOPRESSOR) 25 mg tablet, Take 1 tablet (25 mg total) by mouth 2 (two) times a day, Disp: 180 tablet, Rfl: 3    tamsulosin (FLOMAX) 0 4 mg, Take 1 capsule (0 4 mg total) by mouth 2 (two) times a day, Disp: 180 capsule, Rfl: 0    Recent Results (from the past 1008 hour(s))   CBC    Collection Time: 05/03/18 11:37 AM   Result Value Ref Range    WBC 7 69 4 31 - 10 16 Thousand/uL    RBC 5 23 3 88 - 5 62 Million/uL    Hemoglobin 15 2 12 0 - 17 0 g/dL    Hematocrit 46 5 36 5 - 49 3 %    MCV 89 82 - 98 fL    MCH 29 1 26 8 - 34 3 pg    MCHC 32 7 31 4 - 37 4 g/dL    RDW 13 3 11 6 - 15 1 %    Platelets 566 499 - 433 Thousands/uL    MPV 10 7 8 9 - 12 7 fL   Comprehensive metabolic panel    Collection Time: 05/03/18 11:37 AM   Result Value Ref Range    Sodium 141 136 - 145 mmol/L    Potassium 5 0 3 5 - 5 3 mmol/L    Chloride 107 100 - 108 mmol/L    CO2 29 21 - 32 mmol/L    Anion Gap 5 4 - 13 mmol/L    BUN 24 5 - 25 mg/dL    Creatinine 1 43 (H) 0 60 - 1 30 mg/dL    Glucose, Fasting 100 (H) 65 - 99 mg/dL    Calcium 10 0 8 3 - 10 1 mg/dL    AST 40 5 - 45 U/L    ALT 42 12 - 78 U/L    Alkaline Phosphatase 84 46 - 116 U/L    Total Protein 7 5 6 4 - 8 2 g/dL    Albumin 3 9 3 5 - 5 0 g/dL    Total Bilirubin 1 00 0 20 - 1 00 mg/dL    eGFR 49 ml/min/1 73sq m   Hemoglobin A1c    Collection Time: 05/03/18 11:37 AM   Result Value Ref Range    Hemoglobin A1C 5 6 4 2 - 6 3 %     mg/dl   Lipid panel    Collection Time: 05/03/18 11:37 AM   Result Value Ref Range    Cholesterol 132 50 - 200 mg/dL    Triglycerides 102 <=150 mg/dL    HDL, Direct 44 40 - 60 mg/dL    LDL Calculated 68 0 - 100 mg/dL    Non-HDL-Chol (CHOL-HDL) 88 mg/dl   TSH, 3rd generation    Collection Time: 05/03/18 11:37 AM   Result Value Ref Range    TSH 3RD GENERATON 2 615 0 358 - 3 740 uIU/mL   PSA    Collection Time: 05/03/18 11:37 AM   Result Value Ref Range    PSA 1 5 0 0 - 4 0 ng/mL       The following portions of the patient's history were reviewed and updated as appropriate: allergies, current medications, past family history, past medical history, past social history, past surgical history and problem list      Review of Systems   Constitutional: Negative for appetite change, chills, diaphoresis, fatigue, fever and unexpected weight change  HENT: Negative for congestion, hearing loss and rhinorrhea  Eyes: Negative for visual disturbance  Respiratory: Negative for cough, chest tightness, shortness of breath and wheezing  Cardiovascular: Negative for chest pain, palpitations and leg swelling  Gastrointestinal: Negative for abdominal pain and blood in stool  Endocrine: Negative for cold intolerance, heat intolerance, polydipsia and polyuria  Genitourinary: Negative for difficulty urinating, dysuria, frequency and urgency  Musculoskeletal: Positive for arthralgias and myalgias  Skin: Negative for rash  Neurological: Negative for dizziness, weakness, light-headedness and headaches  Hematological: Does not bruise/bleed easily  Psychiatric/Behavioral: Negative for dysphoric mood and sleep disturbance  Objective:      Vitals:    05/07/18 1218   BP: 120/86   Pulse: 60   Resp: 12          Physical Exam   Constitutional: He is oriented to person, place, and time  He appears well-developed and well-nourished  HENT:   Head: Normocephalic and atraumatic  Nose: Nose normal    Mouth/Throat: Oropharynx is clear and moist    Eyes: Conjunctivae and EOM are normal  Pupils are equal, round, and reactive to light  No scleral icterus  Neck: Normal range of motion  Neck supple  No JVD present  No tracheal deviation present  No thyromegaly present  Cardiovascular: Normal rate, regular rhythm and intact distal pulses  Exam reveals no gallop and no friction rub  No murmur heard  Pulmonary/Chest: Effort normal and breath sounds normal  No respiratory distress  He has no wheezes  He has no rales  Musculoskeletal: He exhibits no edema or deformity  Full range of motion at neck and shoulders, no trapezius spasm   Lymphadenopathy:     He has no cervical adenopathy  Neurological: He is alert and oriented to person, place, and time  No cranial nerve deficit  Skin: Skin is warm and dry  No rash noted  No erythema  No pallor  Psychiatric: He has a normal mood and affect   His behavior is normal  Judgment and thought content normal

## 2018-05-07 NOTE — PROGRESS NOTES
Cardiology Consultation     Iona Gibson  [id number]  1945  [address] [place] [address]    C/C:CAD, CABG, DVT, HTN, HLD  HPI:  71-year-old male with past medical history of coronary artery disease status post CABG, hypertension, hyperlipidemia is here for follow-up  Patient says that since last visit he has developed due chest pain which he describes as dull and pressure-like located on the right side of the chest   It is nonexertional with no aggravating or relieving factors  It lasts for few minutes and resolve spontaneously with no treatment  He denies having other cardiac symptoms including shortness of breath, palpitations, dizziness or syncope    Is currently chest pain-free    Patient Active Problem List   Diagnosis    Seborrheic keratosis    Screening for skin condition    History of skin cancer    Psoriasiform dermatitis     Past Medical History:   Diagnosis Date    Anxiety disorder     last assessed - [id number]    Basal cell carcinoma of face     BPH (benign prostatic hyperplasia)     benign prostatic hypertrophy; last assessed - [id number]    Chronic kidney disease     Coronary atherosclerosis of native coronary artery     last assessed - [id number]    [facility]     last assessed - [id number]    Depression     last assessed - [id number]    History of colonoscopy     Fiberoptic; Resolved - 12/1/2011    Hyperlipemia     last assessed - [id number]    Hypertension     last assessed - [id number]    Myocardial infarction [facility])     Nonmelanoma skin cancer     last assessed - [id number]    Osteoarthritis     last assessed - [id number]    Pneumonia     last assessed - [id number]    [facility]     [facility]VD [facility]peripheral vascular disease) ([facility] )     Seborrheic dermatitis      Social History     Social History    Marital status: /Civil Union     Spouse name: N/A    Number of children: N/A    Years of education: N/A     Occupational History    Not on file       Social History Main Topics    Smoking status: Never Smoker    Smokeless tobacco: Never Used    Alcohol use No    Drug use: Yes     Types: Marijuana    Sexual activity: Not on file     Other Topics Concern    Not on file     Social History Narrative    No narrative on file      Family History   Problem Relation Age of Onset    Dementia Mother     Rectal cancer Mother     Coronary artery disease Father     Rectal cancer Sister      Past Surgical History:   Procedure Laterality Date    CARDIAC CATHETERIZATION      procedure outcome successful; last assessed - [id number]    CORONARY ARTERY BYPASS GRAFT      last assessed - [id number]    IVC FILTER INSERTION      Jael    TOTAL HIP ARTHROPLASTY      last assessed - [id number]       Current Outpatient Prescriptions:     aspirin 325 mg tablet, Take 1 tablet by mouth daily, Disp: , Rfl:     atorvastatin (LIPITOR) 80 mg tablet, Take 1 tablet (80 mg total) by mouth daily, Disp: 90 tablet, Rfl: 3    buPROPion (WELLBUTRIN XL) 300 mg 24 hr tablet, Take 1 tablet (300 mg total) by mouth daily, Disp: 30 tablet, Rfl: 1    escitalopram (LEXAPRO) 20 mg tablet, Take 1 tablet (20 mg total) by mouth daily, Disp: 90 tablet, Rfl: 3    fluocinonide (LIDEX) 0 05 % ointment, Apply topically, Disp: , Rfl:     halobetasol (ULTRAVATE) 0 05 % ointment, Apply topically 2 (two) times a day, Disp: , Rfl:     LORazepam (ATIVAN) 1 mg tablet, Take 1 tablet by mouth 3 (three) times a day as needed, Disp: , Rfl:     metoprolol tartrate (LOPRESSOR) 25 mg tablet, Take 1 tablet by mouth 2 (two) times a day  , Disp: , Rfl:     tamsulosin (FLOMAX) 0 4 mg, Take 1 capsule (0 4 mg total) by mouth 2 (two) times a day, Disp: 180 capsule, Rfl: 0    triamcinolone (KENALOG) 0 1 % ointment, Apply topically, Disp: , Rfl:   No Known Allergies  Vitals:    05/07/18 1110   BP: 124/84   Pulse: 63   SpO2: 98%   Weight: 81 1 kg (178 lb 12 8 oz)   Height: 5' 10" (1 778 m)       Labs:  Appointment on 05/03/2018   Component Date Value    WBC 05/03/2018 7 69     RBC 05/03/2018 5 23     Hemoglobin 05/03/2018 15 2     Hematocrit 05/03/2018 46 5     MCV 05/03/2018 89     MCH 05/03/2018 29 1     MCHC 05/03/2018 32 7     RDW 05/03/2018 13 3     Platelets 46/93/7980 198     MPV 05/03/2018 10 7     Sodium 05/03/2018 141     Potassium 05/03/2018 5 0     Chloride 05/03/2018 107     CO2 05/03/2018 29     Anion Gap 05/03/2018 5     BUN 05/03/2018 24     Creatinine 05/03/2018 1 43*    Glucose, Fasting 05/03/2018 100*    Calcium 05/03/2018 10 0     AST 05/03/2018 40     ALT 05/03/2018 42     Alkaline Phosphatase 05/03/2018 84     Total Protein 05/03/2018 7 5     Albumin 05/03/2018 3 9     Total Bilirubin 05/03/2018 1 00     eGFR 05/03/2018 49     Hemoglobin A1C 05/03/2018 5 6     EAG 05/03/2018 114     Cholesterol 05/03/2018 132     Triglycerides 05/03/2018 102     HDL, Direct 05/03/2018 44     LDL Calculated 05/03/2018 68     Non-HDL-Chol (CHOL-HDL) 05/03/2018 88     TSH 3RD GENERATON 05/03/2018 2 615     PSA 05/03/2018 1 5      Imaging: No results found  Review of Systems:  Review of Systems   Constitutional: Negative for diaphoresis and fatigue  HENT: Negative for congestion and facial swelling  Eyes: Negative for photophobia and visual disturbance  Respiratory: Negative for chest tightness and shortness of breath  Cardiovascular: Positive for chest pain  Negative for palpitations and leg swelling  Gastrointestinal: Negative for abdominal pain and blood in stool  Endocrine: Negative for cold intolerance and heat intolerance  Musculoskeletal: Negative for arthralgias and myalgias  Skin: Negative for pallor and rash  Neurological: Negative for dizziness and syncope         Physical Exam:  Physical Exam   Constitutional: He is oriented to person, place, and time  He appears well-developed and well-nourished  HENT:   Head: Normocephalic and atraumatic  Eyes: Conjunctivae and EOM are normal  Pupils are equal, round, and reactive to light  Neck: Normal range of motion  Neck supple  No JVD present  No thyromegaly present  Cardiovascular: Normal rate, regular rhythm, normal heart sounds and intact distal pulses  Exam reveals no gallop and no friction rub  No murmur heard  Pulmonary/Chest: Effort normal and breath sounds normal  No respiratory distress  He has no wheezes  He has no rales  Abdominal: Soft  Bowel sounds are normal  He exhibits no distension  There is no tenderness  There is no rebound and no guarding  Musculoskeletal: Normal range of motion  He exhibits no edema  Neurological: He is alert and oriented to person, place, and time  He has normal reflexes  No cranial nerve deficit  Skin: Skin is warm and dry  Psychiatric: He has a normal mood and affect  Discussion/Summary:  1  CAD s/p CABG/chest pain  Patient is on optimal medical management for CAD including aspirin, statins, beta-blocker  I will get nuclear stress test rule out ischemia  2   HTN:  Blood pressure well controlled, continue current medications    3  DVT s/p IVCfilter  Patient was asked to avoid sitting for long period of time and ambulate as much as possible

## 2018-05-31 ENCOUNTER — HOSPITAL ENCOUNTER (OUTPATIENT)
Dept: RADIOLOGY | Facility: HOSPITAL | Age: 73
Discharge: HOME | End: 2018-05-31
Payer: MEDICARE

## 2018-05-31 ENCOUNTER — OFFICE VISIT (OUTPATIENT)
Dept: INTERNAL MEDICINE CLINIC | Facility: CLINIC | Age: 73
End: 2018-05-31
Payer: MEDICARE

## 2018-05-31 ENCOUNTER — TELEPHONE (OUTPATIENT)
Dept: INTERNAL MEDICINE CLINIC | Facility: CLINIC | Age: 73
End: 2018-05-31

## 2018-05-31 VITALS
HEIGHT: 70 IN | DIASTOLIC BLOOD PRESSURE: 80 MMHG | BODY MASS INDEX: 25.05 KG/M2 | TEMPERATURE: 98.3 F | HEART RATE: 81 BPM | SYSTOLIC BLOOD PRESSURE: 120 MMHG | WEIGHT: 175 LBS

## 2018-05-31 DIAGNOSIS — R05.9 COUGH: ICD-10-CM

## 2018-05-31 DIAGNOSIS — R05.9 COUGH: Primary | ICD-10-CM

## 2018-05-31 DIAGNOSIS — F41.9 ANXIETY: ICD-10-CM

## 2018-05-31 PROCEDURE — 99213 OFFICE O/P EST LOW 20 MIN: CPT | Performed by: NURSE PRACTITIONER

## 2018-05-31 PROCEDURE — 71046 X-RAY EXAM CHEST 2 VIEWS: CPT

## 2018-05-31 RX ORDER — PROMETHAZINE HYDROCHLORIDE AND CODEINE PHOSPHATE 6.25; 1 MG/5ML; MG/5ML
5 SYRUP ORAL EVERY 4 HOURS PRN
Qty: 240 ML | Refills: 0 | Status: SHIPPED | OUTPATIENT
Start: 2018-05-31 | End: 2018-09-11 | Stop reason: ALTCHOICE

## 2018-05-31 RX ORDER — BUPROPION HYDROCHLORIDE 300 MG/1
300 TABLET ORAL DAILY
Qty: 90 TABLET | Refills: 3 | Status: SHIPPED | OUTPATIENT
Start: 2018-05-31 | End: 2019-01-30 | Stop reason: SDUPTHER

## 2018-05-31 RX ORDER — PREDNISONE 10 MG/1
TABLET ORAL
Qty: 18 TABLET | Refills: 0 | Status: SHIPPED | OUTPATIENT
Start: 2018-05-31 | End: 2018-09-11 | Stop reason: ALTCHOICE

## 2018-05-31 NOTE — TELEPHONE ENCOUNTER
FYI - She is calling to let Familia know that the Stat Chest x-ray is in Epic  Bartholome Pinks No findings  Bartholome Pinks

## 2018-05-31 NOTE — TELEPHONE ENCOUNTER
Notify cxr was ok no pneumonia so we will just continue w/ our plan let us know if he isn't improving

## 2018-05-31 NOTE — PROGRESS NOTES
Assessment/Plan:    Patient Instructions   Cough with abnormal lung exam check CXR   We will start Prednisone taper  Recommend Claritin 10mg over the counter  Promethazine with codiene as needed  If evidence of pneumonia on CXR we will add antibiotics  Diagnoses and all orders for this visit:    Cough  -     XR chest pa & lateral; Future  -     predniSONE 10 mg tablet; Take 3 tablets for 3 days then 2 tablets for 3 days then 1 tablet for 3 days  -     promethazine-codeine (PHENERGAN WITH CODEINE) 6 25-10 mg/5 mL syrup; Take 5 mL by mouth every 4 (four) hours as needed for cough    Anxiety  -     buPROPion (WELLBUTRIN XL) 300 mg 24 hr tablet; Take 1 tablet (300 mg total) by mouth daily         Subjective:      Patient ID: Nicky Garcia is a 68 y o  male    Patient has not been feeling well for over a week started with sore throat then nasal congestion now cough wheeze shortness of breath he is concerned he has history of pneumonia  Feels pretty poorly    He is just taking homeopathic remedies including vitamin-C          Current Outpatient Prescriptions:     aspirin 325 mg tablet, Take 1 tablet by mouth daily, Disp: , Rfl:     atorvastatin (LIPITOR) 80 mg tablet, Take 1 tablet (80 mg total) by mouth daily, Disp: 90 tablet, Rfl: 3    buPROPion (WELLBUTRIN XL) 300 mg 24 hr tablet, Take 1 tablet (300 mg total) by mouth daily, Disp: 90 tablet, Rfl: 3    escitalopram (LEXAPRO) 20 mg tablet, Take 1 tablet (20 mg total) by mouth daily, Disp: 90 tablet, Rfl: 3    fluocinonide (LIDEX) 0 05 % ointment, Apply topically, Disp: , Rfl:     halobetasol (ULTRAVATE) 0 05 % ointment, Apply topically 2 (two) times a day, Disp: , Rfl:     LORazepam (ATIVAN) 1 mg tablet, Take 1 tablet (1 mg total) by mouth 3 (three) times a day as needed for anxiety, Disp: 90 tablet, Rfl: 0    metoprolol tartrate (LOPRESSOR) 25 mg tablet, Take 1 tablet (25 mg total) by mouth 2 (two) times a day, Disp: 180 tablet, Rfl: 3    tamsulosin (FLOMAX) 0 4 mg, Take 1 capsule (0 4 mg total) by mouth 2 (two) times a day, Disp: 180 capsule, Rfl: 0    predniSONE 10 mg tablet, Take 3 tablets for 3 days then 2 tablets for 3 days then 1 tablet for 3 days, Disp: 18 tablet, Rfl: 0    promethazine-codeine (PHENERGAN WITH CODEINE) 6 25-10 mg/5 mL syrup, Take 5 mL by mouth every 4 (four) hours as needed for cough, Disp: 240 mL, Rfl: 0    Recent Results (from the past 1008 hour(s))   CBC    Collection Time: 05/03/18 11:37 AM   Result Value Ref Range    WBC 7 69 4 31 - 10 16 Thousand/uL    RBC 5 23 3 88 - 5 62 Million/uL    Hemoglobin 15 2 12 0 - 17 0 g/dL    Hematocrit 46 5 36 5 - 49 3 %    MCV 89 82 - 98 fL    MCH 29 1 26 8 - 34 3 pg    MCHC 32 7 31 4 - 37 4 g/dL    RDW 13 3 11 6 - 15 1 %    Platelets 305 642 - 373 Thousands/uL    MPV 10 7 8 9 - 12 7 fL   Comprehensive metabolic panel    Collection Time: 05/03/18 11:37 AM   Result Value Ref Range    Sodium 141 136 - 145 mmol/L    Potassium 5 0 3 5 - 5 3 mmol/L    Chloride 107 100 - 108 mmol/L    CO2 29 21 - 32 mmol/L    Anion Gap 5 4 - 13 mmol/L    BUN 24 5 - 25 mg/dL    Creatinine 1 43 (H) 0 60 - 1 30 mg/dL    Glucose, Fasting 100 (H) 65 - 99 mg/dL    Calcium 10 0 8 3 - 10 1 mg/dL    AST 40 5 - 45 U/L    ALT 42 12 - 78 U/L    Alkaline Phosphatase 84 46 - 116 U/L    Total Protein 7 5 6 4 - 8 2 g/dL    Albumin 3 9 3 5 - 5 0 g/dL    Total Bilirubin 1 00 0 20 - 1 00 mg/dL    eGFR 49 ml/min/1 73sq m   Hemoglobin A1c    Collection Time: 05/03/18 11:37 AM   Result Value Ref Range    Hemoglobin A1C 5 6 4 2 - 6 3 %     mg/dl   Lipid panel    Collection Time: 05/03/18 11:37 AM   Result Value Ref Range    Cholesterol 132 50 - 200 mg/dL    Triglycerides 102 <=150 mg/dL    HDL, Direct 44 40 - 60 mg/dL    LDL Calculated 68 0 - 100 mg/dL    Non-HDL-Chol (CHOL-HDL) 88 mg/dl   TSH, 3rd generation    Collection Time: 05/03/18 11:37 AM   Result Value Ref Range    TSH 3RD GENERATON 2 615 0 358 - 3 740 uIU/mL   PSA Collection Time: 05/03/18 11:37 AM   Result Value Ref Range    PSA 1 5 0 0 - 4 0 ng/mL       The following portions of the patient's history were reviewed and updated as appropriate: allergies, current medications, past family history, past medical history, past social history, past surgical history and problem list      Review of Systems   Constitutional: Positive for fatigue  Negative for appetite change, chills, diaphoresis, fever and unexpected weight change  HENT: Negative for postnasal drip and sneezing  Eyes: Negative for visual disturbance  Respiratory: Positive for cough, shortness of breath and wheezing  Negative for chest tightness  Cardiovascular: Negative for chest pain, palpitations and leg swelling  Gastrointestinal: Negative for abdominal pain and blood in stool  Endocrine: Negative for cold intolerance, heat intolerance, polydipsia, polyphagia and polyuria  Genitourinary: Negative for difficulty urinating, dysuria, frequency and urgency  Musculoskeletal: Negative for arthralgias and myalgias  Skin: Negative for rash and wound  Neurological: Negative for dizziness, weakness, light-headedness and headaches  Hematological: Negative for adenopathy  Psychiatric/Behavioral: Negative for confusion, dysphoric mood and sleep disturbance  The patient is not nervous/anxious  Objective:      Vitals:    05/31/18 0923   BP: 120/80   Pulse: 81   Temp: 98 3 °F (36 8 °C)          Physical Exam   Constitutional: He is oriented to person, place, and time  He appears well-developed  No distress  HENT:   Head: Normocephalic and atraumatic  Nose: Nose normal    Mouth/Throat: Oropharynx is clear and moist    Eyes: Conjunctivae and EOM are normal  Pupils are equal, round, and reactive to light  Neck: Normal range of motion  Neck supple  No JVD present  No tracheal deviation present  No thyromegaly present     Cardiovascular: Normal rate, regular rhythm, normal heart sounds and intact distal pulses  Exam reveals no gallop and no friction rub  No murmur heard  Pulmonary/Chest: Effort normal  No respiratory distress  He has wheezes  He has rales  Abdominal: Soft  Bowel sounds are normal  He exhibits no distension  There is no tenderness  Musculoskeletal: Normal range of motion  He exhibits no edema  Lymphadenopathy:     He has no cervical adenopathy  Neurological: He is alert and oriented to person, place, and time  No cranial nerve deficit  Skin: Skin is warm and dry  No rash noted  He is not diaphoretic  Psychiatric: He has a normal mood and affect   His behavior is normal  Judgment and thought content normal

## 2018-05-31 NOTE — PATIENT INSTRUCTIONS
Cough with abnormal lung exam check CXR   We will start Prednisone taper  Recommend Claritin 10mg over the counter  Promethazine with codiene as needed  If evidence of pneumonia on CXR we will add antibiotics       Anxiety refilled Wellbutrin

## 2018-06-05 DIAGNOSIS — J18.9 PNEUMONIA DUE TO INFECTIOUS ORGANISM, UNSPECIFIED LATERALITY, UNSPECIFIED PART OF LUNG: Primary | ICD-10-CM

## 2018-06-05 RX ORDER — AZITHROMYCIN 250 MG/1
TABLET, FILM COATED ORAL
Qty: 6 TABLET | Refills: 0 | Status: SHIPPED | OUTPATIENT
Start: 2018-06-05 | End: 2018-06-06

## 2018-06-05 NOTE — TELEPHONE ENCOUNTER
Pts wife cld stating that the pt is still coughing and coughing up mucas  He still has a few (5) pills left  They would like a call back as to what to do next  [phone]

## 2018-06-05 NOTE — TELEPHONE ENCOUNTER
CALLED PT   AND STATED HE IS COUGHING UP GREEN AND IS CONGESTED IN HIS CHEST IS STILL TAKING THE PREDNISONE AND USING THE COUGH MED-PLEASE ADVISE

## 2018-06-06 ENCOUNTER — TELEPHONE (OUTPATIENT)
Dept: INTERNAL MEDICINE CLINIC | Facility: CLINIC | Age: 73
End: 2018-06-06

## 2018-06-06 DIAGNOSIS — J18.9 PNEUMONIA DUE TO INFECTIOUS ORGANISM, UNSPECIFIED LATERALITY, UNSPECIFIED PART OF LUNG: Primary | ICD-10-CM

## 2018-06-06 DIAGNOSIS — N40.0 BENIGN PROSTATIC HYPERPLASIA, UNSPECIFIED WHETHER LOWER URINARY TRACT SYMPTOMS PRESENT: ICD-10-CM

## 2018-06-06 RX ORDER — TAMSULOSIN HYDROCHLORIDE 0.4 MG/1
0.4 CAPSULE ORAL 2 TIMES DAILY
Qty: 180 CAPSULE | Refills: 3 | Status: SHIPPED | OUTPATIENT
Start: 2018-06-06 | End: 2018-12-20 | Stop reason: SDUPTHER

## 2018-06-06 RX ORDER — DOXYCYCLINE HYCLATE 100 MG/1
100 CAPSULE ORAL EVERY 12 HOURS SCHEDULED
Qty: 14 CAPSULE | Refills: 0 | Status: SHIPPED | OUTPATIENT
Start: 2018-06-06 | End: 2018-06-13

## 2018-06-06 NOTE — TELEPHONE ENCOUNTER
Laura Shah called asking to get a message to Hellen gave Buddy a zpack the other day but the pharmacist recommended they contact her-there's a risk with him taking it since he's a heart patient       Please call back with advice at [phone]

## 2018-06-07 NOTE — TELEPHONE ENCOUNTER
CALLED PT  AND MADE SURE THEY RECEIVED MSG  ON NEW ABX SENT- DID RECEIVE MSG   AND WILL GO  GET AND BEGIN TAKING

## 2018-07-26 ENCOUNTER — HOSPITAL ENCOUNTER (OUTPATIENT)
Dept: NON INVASIVE DIAGNOSTICS | Facility: CLINIC | Age: 73
Discharge: HOME | End: 2018-07-26
Payer: MEDICARE

## 2018-07-26 DIAGNOSIS — I20.8 OTHER FORMS OF ANGINA PECTORIS (HCC): ICD-10-CM

## 2018-07-26 DIAGNOSIS — E78.2 MIXED HYPERLIPIDEMIA: ICD-10-CM

## 2018-07-26 DIAGNOSIS — I25.10 CORONARY ARTERY DISEASE INVOLVING NATIVE CORONARY ARTERY OF NATIVE HEART WITHOUT ANGINA PECTORIS: ICD-10-CM

## 2018-07-26 DIAGNOSIS — I10 ESSENTIAL HYPERTENSION: ICD-10-CM

## 2018-07-26 PROCEDURE — 78452 HT MUSCLE IMAGE SPECT MULT: CPT

## 2018-07-26 PROCEDURE — A9502 TC99M TETROFOSMIN: HCPCS

## 2018-07-26 PROCEDURE — 93017 CV STRESS TEST TRACING ONLY: CPT

## 2018-07-26 RX ADMIN — REGADENOSON 0.4 MG: 0.08 INJECTION, SOLUTION INTRAVENOUS at 13:46

## 2018-07-27 ENCOUNTER — TELEPHONE (OUTPATIENT)
Dept: CARDIOLOGY CLINIC | Facility: CLINIC | Age: 73
End: 2018-07-27

## 2018-07-27 LAB
ARRHY DURING EX: NORMAL
CHEST PAIN STATEMENT: NORMAL
MAX DIASTOLIC BP: 62 MMHG
MAX HEART RATE: 93 BPM
MAX PREDICTED HEART RATE: 148 BPM
MAX. SYSTOLIC BP: 120 MMHG
PROTOCOL NAME: NORMAL
REASON FOR TERMINATION: NORMAL
TARGET HR FORMULA: NORMAL
TEST INDICATION: NORMAL
TIME IN EXERCISE PHASE: NORMAL

## 2018-07-27 NOTE — TELEPHONE ENCOUNTER
Spoke to patient about the abnormal stress test, old infarct no new ischemia  Patient was asked to call me if the chest pain worsens

## 2018-08-21 ENCOUNTER — OFFICE VISIT (OUTPATIENT)
Dept: UROLOGY | Facility: CLINIC | Age: 73
End: 2018-08-21
Payer: MEDICARE

## 2018-08-21 VITALS
HEIGHT: 70 IN | WEIGHT: 175 LBS | HEART RATE: 74 BPM | SYSTOLIC BLOOD PRESSURE: 130 MMHG | BODY MASS INDEX: 25.05 KG/M2 | DIASTOLIC BLOOD PRESSURE: 80 MMHG

## 2018-08-21 DIAGNOSIS — R35.1 NOCTURIA: Primary | ICD-10-CM

## 2018-08-21 DIAGNOSIS — N40.0 BPH WITHOUT URINARY OBSTRUCTION: ICD-10-CM

## 2018-08-21 LAB — POST-VOID RESIDUAL VOLUME, ML POC: 0 ML

## 2018-08-21 PROCEDURE — 99204 OFFICE O/P NEW MOD 45 MIN: CPT | Performed by: UROLOGY

## 2018-08-21 PROCEDURE — 51798 US URINE CAPACITY MEASURE: CPT | Performed by: UROLOGY

## 2018-08-21 NOTE — LETTER
August 21, 2018     Shyanne Monsivais MD  [address] [place] [address],  [facility] [address] [place] [address]    Patient: Yasmine Arango   YOB: 1945   Date of Visit: 8/21/2018       Dear Dr Bernarda Charles: Thank you for referring Yasmine Arango to me for evaluation  Below are my notes for this consultation  If you have questions, please do not hesitate to call me  I look forward to following your patient along with you  Sincerely,        Olga Root MD        CC: No Recipients  Olga Root MD  8/21/2018 10:41 AM  Sign at close encounter  Referring Physician: Shyanne Monsivais MD  A copy of this note was sent to the referring physician  Diagnoses and all orders for this visit:    Nocturia  -     Mirabegron ER 25 MG TB24; Take 25 mg by mouth daily at bedtime    BPH without urinary obstruction  -     Ambulatory referral to Urology  -     POCT Measure PVR  -     Cystoscopy; Future            Assessment and plan: Today, we discussed a stepwise approach in treating lower urinary tract symptoms associated with BPH  Lower urinary tract symptoms (LUTS) can be sub-divided into those that result from failure of the bladder to store urine normally ("storage symptoms"), those that result from difficulties in emptying ("voiding symptoms"), or those that follow micturition ("post-micturition symptoms")  Obstructive symptoms such as hesitancy, weak stream, and pushing to urinate are classified as voiding symptoms  OAB is due to the inability of the bladder to store urine normally  The symptoms of frequency, urgency, nocturia, and urge incontinence are classified as storage symptoms  I discussed the mainstays of therapy for voiding symptoms including alpha blockers, 5-alpha reductase inhibitors, and surgical management including TURP (laser, monopolar, bipolar, button electrode), TUIP, and prostatectomy   I had a candid discussion about the risks of each of these medications and the mechanism of action  I also discussed the use of PD5 inhibitors for LUTS  I recommended flexible cystoscopy for further evaluation  Discussed the risks benefits and alternatives to this diagnostic procedure  Risks include but are not limited to hematuria, pain, urinary tract infection, stricture formation, possible need for hospitalization  Patient verbalized understanding and has elected to proceed  Cystoscopy will be scheduled in the near future  Plan:  -mirabegron prescribed  -continue tamsulosin though recommended decreasing to standard dose of once q h s   - schedule cystoscopytransrectal ultrasound for consideration of the Sabi Olvera MD      Chief Complaint     Nocturia      History of Present Illness     Lilian Donohue is a 68 y o  male referred in consultation for medically refractory lower urinary tract symptoms  Patient was told approximately 8 years ago that he has an enlarged prostate and has been managed with tamsulosin for some time  He was evaluated by Dr Juan Claudio in the past   His urinary symptoms have been stable except for nocturia which has progressed over the past year  He is now waking up at night every 2 hours  During the day he feels that he has a good stream   No hematuria UTIs  He does have 1 glass of ice tea with dinner but [place] some fluids after supper time    Detailed Urologic History     - please refer to HPI    Review of Systems     Review of Systems   Constitutional: Negative for activity change and fatigue  HENT: Negative for congestion  Eyes: Negative for visual disturbance  Respiratory: Negative for shortness of breath and wheezing  Cardiovascular: Negative for chest pain and leg swelling  Gastrointestinal: Negative for abdominal pain  Endocrine: Positive for polyuria  Genitourinary: Negative for dysuria, flank pain, hematuria and urgency  Musculoskeletal: Negative for back pain  Allergic/Immunologic: Negative for immunocompromised state  Neurological: Negative for dizziness and numbness  Psychiatric/Behavioral: Negative for dysphoric mood  All other systems reviewed and are negative  AUA SYMPTOM SCORE      Most Recent Value   AUA SYMPTOM SCORE   How often have you had a sensation of not emptying your bladder completely after you finished urinating? 5   How often have you had to urinate again less than two hours after you finished urinating? 4   How often have you found you stopped and started again several times when you urinate?  0   How often have you found it difficult to postpone urination? 4   How often have you had a weak urinary stream?  2   How often have you had to push or strain to begin urination? 1   How many times did you most typically get up to urinate from the time you went to bed at night until the time you got up in the morning? 5   Quality of Life: If you were to spend the rest of your life with your urinary condition just the way it is now, how would you feel about that?  5   AUA SYMPTOM SCORE  21            Allergies     No Known Allergies    Physical Exam     Physical Exam   Constitutional: He is oriented to person, place, and time  He appears well-developed and well-nourished  No distress  HENT:   Head: Normocephalic and atraumatic  Eyes: EOM are normal    Neck: Normal range of motion  Cardiovascular:   Negative lower extremity edema   Pulmonary/Chest: Effort normal and breath sounds normal    Abdominal: Soft  Genitourinary:   Genitourinary Comments: Digital rectal examination 40 50 g prostate no nodules or induration   Musculoskeletal: Normal range of motion  Neurological: He is alert and oriented to person, place, and time  Skin: Skin is warm  Psychiatric: He has a normal mood and affect   His behavior is normal            Vital Signs  Vitals:    08/21/18 0938   BP: 130/80   BP Location: Left arm   Patient Position: Sitting   Cuff Size: Adult   Pulse: 74   Weight: 79 4 kg (175 lb)   Height: 5' 10" (1 778 m)         Current Medications       Current Outpatient Prescriptions:     aspirin 325 mg tablet, Take 1 tablet by mouth daily, Disp: , Rfl:     atorvastatin (LIPITOR) 80 mg tablet, Take 1 tablet (80 mg total) by mouth daily, Disp: 90 tablet, Rfl: 3    buPROPion (WELLBUTRIN XL) 300 mg 24 hr tablet, Take 1 tablet (300 mg total) by mouth daily, Disp: 90 tablet, Rfl: 3    escitalopram (LEXAPRO) 20 mg tablet, Take 1 tablet (20 mg total) by mouth daily, Disp: 90 tablet, Rfl: 3    fluocinonide (LIDEX) 0 05 % ointment, Apply topically, Disp: , Rfl:     halobetasol (ULTRAVATE) 0 05 % ointment, Apply topically 2 (two) times a day, Disp: , Rfl:     LORazepam (ATIVAN) 1 mg tablet, Take 1 tablet (1 mg total) by mouth 3 (three) times a day as needed for anxiety, Disp: 90 tablet, Rfl: 0    metoprolol tartrate (LOPRESSOR) 25 mg tablet, Take 1 tablet (25 mg total) by mouth 2 (two) times a day, Disp: 180 tablet, Rfl: 3    predniSONE 10 mg tablet, Take 3 tablets for 3 days then 2 tablets for 3 days then 1 tablet for 3 days, Disp: 18 tablet, Rfl: 0    promethazine-codeine (PHENERGAN WITH CODEINE) 6 25-10 mg/5 mL syrup, Take 5 mL by mouth every 4 (four) hours as needed for cough, Disp: 240 mL, Rfl: 0    tamsulosin (FLOMAX) 0 4 mg, TAKE 1 CAPSULE (0 4 MG TOTAL) BY MOUTH 2 (TWO) TIMES A DAY, Disp: 180 capsule, Rfl: 3      Active Problems     Patient Active Problem List   Diagnosis    Seborrheic keratosis    Screening for skin condition    History of skin cancer    Psoriasiform dermatitis    Coronary artery disease involving native coronary artery of native heart without angina pectoris    Other forms of angina pectoris (HCC)    Essential hypertension    Mixed hyperlipidemia    Anxiety    BPH without urinary obstruction    CKD (chronic kidney disease), stage III    Impaired fasting glucose    Subclinical hypothyroidism    Nocturia Past Medical History     Past Medical History:   Diagnosis Date    Anxiety disorder     last assessed - [id number]    Basal cell carcinoma of face     BPH (benign prostatic hyperplasia)     benign prostatic hypertrophy; last assessed - [id number]    Chronic kidney disease     Coronary atherosclerosis of native coronary artery     last assessed - [id number]    [facility]     last assessed - [id number]    Depression     last assessed - [id number]    History of colonoscopy     Fiberoptic; Resolved - 12/1/2011    Hyperlipemia     last assessed - 58ZDQ1047    Hypertension     last assessed - 41WHN2027    Myocardial infarction ([facility] )     Nonmelanoma skin cancer     last assessed - 76KCK0847    Osteoarthritis     last assessed - 23JUV5408    Pneumonia     last assessed - 44QVF6668    Pulmonary HTN ([facility] )     PVD (peripheral vascular disease) ([facility] )     Seborrheic dermatitis          Surgical History     Past Surgical History:   Procedure Laterality Date    CARDIAC CATHETERIZATION      procedure outcome successful; last assessed - [id number]    CORONARY ARTERY BYPASS GRAFT      last assessed - [id number]    IVC FILTER INSERTION      Jael    TOTAL HIP ARTHROPLASTY      last assessed - [id number]         Family History     Family History   Problem Relation Age of Onset    Dementia Mother     Rectal cancer Mother     Coronary artery disease Father     Rectal cancer Sister          Social History     Social History     History   Smoking Status    Never Smoker   Smokeless Tobacco    Never Used         Pertinent Lab Values     Lab Results   Component Value Date    CREATININE 1 43 (H) 05/03/2018       Lab Results   Component Value Date    PSA 1 5 05/03/2018    PSA 1 1 11/06/2015    PSA 1 52 05/05/2015       @RESULTRCNT(1H])@      Pertinent Imaging     Postvoid residual 0 cc as assessed on ultrasound    Portions of the record may have been created with voice recognition software   Occasional wrong word or "sound a like" substitutions may have occurred due to the inherent limitations of voice recognition software   Read the chart carefully and recognize, using context, where substitutions have occurred

## 2018-08-21 NOTE — PROGRESS NOTES
Referring Physician: Molly Nieves MD  A copy of this note was sent to the referring physician  Diagnoses and all orders for this visit:    Nocturia  -     Mirabegron ER 25 MG TB24; Take 25 mg by mouth daily at bedtime    BPH without urinary obstruction  -     Ambulatory referral to Urology  -     POCT Measure PVR  -     Cystoscopy; Future            Assessment and plan: Today, we discussed a stepwise approach in treating lower urinary tract symptoms associated with BPH  Lower urinary tract symptoms (LUTS) can be sub-divided into those that result from failure of the bladder to store urine normally ("storage symptoms"), those that result from difficulties in emptying ("voiding symptoms"), or those that follow micturition ("post-micturition symptoms")  Obstructive symptoms such as hesitancy, weak stream, and pushing to urinate are classified as voiding symptoms  OAB is due to the inability of the bladder to store urine normally  The symptoms of frequency, urgency, nocturia, and urge incontinence are classified as storage symptoms  I discussed the mainstays of therapy for voiding symptoms including alpha blockers, 5-alpha reductase inhibitors, and surgical management including TURP (laser, monopolar, bipolar, button electrode), TUIP, and prostatectomy  I had a candid discussion about the risks of each of these medications and the mechanism of action  I also discussed the use of PD5 inhibitors for LUTS  I recommended flexible cystoscopy for further evaluation  Discussed the risks benefits and alternatives to this diagnostic procedure  Risks include but are not limited to hematuria, pain, urinary tract infection, stricture formation, possible need for hospitalization  Patient verbalized understanding and has elected to proceed  Cystoscopy will be scheduled in the near future          Plan:  -mirabegron prescribed  -continue tamsulosin though recommended decreasing to standard dose of once q h s   - schedule cystoscopytransrectal ultrasound for consideration of the Evon Castillo MD      Chief Complaint     Nocturia      History of Present Illness     Arnold Marcelino is a 68 y o  male referred in consultation for medically refractory lower urinary tract symptoms  Patient was told approximately 8 years ago that he has an enlarged prostate and has been managed with tamsulosin for some time  He was evaluated by Dr Hillary Biggs in the past   His urinary symptoms have been stable except for nocturia which has progressed over the past year  He is now waking up at night every 2 hours  During the day he feels that he has a good stream   No hematuria UTIs  He does have 1 glass of ice tea with dinner but [place] some fluids after supper time    Detailed Urologic History     - please refer to HPI    Review of Systems     Review of Systems   Constitutional: Negative for activity change and fatigue  HENT: Negative for congestion  Eyes: Negative for visual disturbance  Respiratory: Negative for shortness of breath and wheezing  Cardiovascular: Negative for chest pain and leg swelling  Gastrointestinal: Negative for abdominal pain  Endocrine: Positive for polyuria  Genitourinary: Negative for dysuria, flank pain, hematuria and urgency  Musculoskeletal: Negative for back pain  Allergic/Immunologic: Negative for immunocompromised state  Neurological: Negative for dizziness and numbness  Psychiatric/Behavioral: Negative for dysphoric mood  All other systems reviewed and are negative  AUA SYMPTOM SCORE      Most Recent Value   AUA SYMPTOM SCORE   How often have you had a sensation of not emptying your bladder completely after you finished urinating? 5   How often have you had to urinate again less than two hours after you finished urinating?   4   How often have you found you stopped and started again several times when you urinate?  0   How often have you found it difficult to postpone urination? 4   How often have you had a weak urinary stream?  2   How often have you had to push or strain to begin urination? 1   How many times did you most typically get up to urinate from the time you went to bed at night until the time you got up in the morning? 5   Quality of Life: If you were to spend the rest of your life with your urinary condition just the way it is now, how would you feel about that?  5   AUA SYMPTOM SCORE  21            Allergies     No Known Allergies    Physical Exam     Physical Exam   Constitutional: He is oriented to person, place, and time  He appears well-developed and well-nourished  No distress  HENT:   Head: Normocephalic and atraumatic  Eyes: EOM are normal    Neck: Normal range of motion  Cardiovascular:   Negative lower extremity edema   Pulmonary/Chest: Effort normal and breath sounds normal    Abdominal: Soft  Genitourinary:   Genitourinary Comments: Digital rectal examination 40 50 g prostate no nodules or induration   Musculoskeletal: Normal range of motion  Neurological: He is alert and oriented to person, place, and time  Skin: Skin is warm  Psychiatric: He has a normal mood and affect   His behavior is normal            Vital Signs  Vitals:    08/21/18 0938   BP: 130/80   BP Location: Left arm   Patient Position: Sitting   Cuff Size: Adult   Pulse: 74   Weight: 79 4 kg (175 lb)   Height: 5' 10" (1 778 m)         Current Medications       Current Outpatient Prescriptions:     aspirin 325 mg tablet, Take 1 tablet by mouth daily, Disp: , Rfl:     atorvastatin (LIPITOR) 80 mg tablet, Take 1 tablet (80 mg total) by mouth daily, Disp: 90 tablet, Rfl: 3    buPROPion (WELLBUTRIN XL) 300 mg 24 hr tablet, Take 1 tablet (300 mg total) by mouth daily, Disp: 90 tablet, Rfl: 3    escitalopram (LEXAPRO) 20 mg tablet, Take 1 tablet (20 mg total) by mouth daily, Disp: 90 tablet, Rfl: 3    fluocinonide (LIDEX) 0 05 % ointment, Apply topically, Disp: , Rfl:     halobetasol (ULTRAVATE) 0 05 % ointment, Apply topically 2 (two) times a day, Disp: , Rfl:     LORazepam (ATIVAN) 1 mg tablet, Take 1 tablet (1 mg total) by mouth 3 (three) times a day as needed for anxiety, Disp: 90 tablet, Rfl: 0    metoprolol tartrate (LOPRESSOR) 25 mg tablet, Take 1 tablet (25 mg total) by mouth 2 (two) times a day, Disp: 180 tablet, Rfl: 3    predniSONE 10 mg tablet, Take 3 tablets for 3 days then 2 tablets for 3 days then 1 tablet for 3 days, Disp: 18 tablet, Rfl: 0    promethazine-codeine (PHENERGAN WITH CODEINE) 6 25-10 mg/5 mL syrup, Take 5 mL by mouth every 4 (four) hours as needed for cough, Disp: 240 mL, Rfl: 0    tamsulosin (FLOMAX) 0 4 mg, TAKE 1 CAPSULE (0 4 MG TOTAL) BY MOUTH 2 (TWO) TIMES A DAY, Disp: 180 capsule, Rfl: 3      Active Problems     Patient Active Problem List   Diagnosis    Seborrheic keratosis    Screening for skin condition    History of skin cancer    Psoriasiform dermatitis    Coronary artery disease involving native coronary artery of native heart without angina pectoris    Other forms of angina pectoris (HCC)    Essential hypertension    Mixed hyperlipidemia    Anxiety    BPH without urinary obstruction    CKD (chronic kidney disease), stage III    Impaired fasting glucose    Subclinical hypothyroidism    Nocturia         Past Medical History     Past Medical History:   Diagnosis Date    Anxiety disorder     last assessed - [id number]    Basal cell carcinoma of face     BPH (benign prostatic hyperplasia)     benign prostatic hypertrophy; last assessed - 59QAP3460    Chronic kidney disease     Coronary atherosclerosis of native coronary artery     last assessed - 23QIN3477    [facility]) [facility]     last assessed - 59VJM2260    Depression     last assessed - 42XII2678    History of colonoscopy     Fiberoptic; Resolved - 12/1/2011    Hyperlipemia     last assessed - 81PTF3180    Hypertension last assessed - 48AGS7414    Myocardial infarction [facility])     Nonmelanoma skin cancer     last assessed - 32EKQ9240    Osteoarthritis     last assessed - 66EVX7918    Pneumonia     last assessed - 97VHT4276    Pulmonary HTN ([facility] )     PVD (peripheral vascular disease) ([facility] )     Seborrheic dermatitis          Surgical History     Past Surgical History:   Procedure Laterality Date    CARDIAC CATHETERIZATION      procedure outcome successful; last assessed - [id number]    CORONARY ARTERY BYPASS GRAFT      last assessed - [id number]    IVC FILTER INSERTION      Jael    TOTAL HIP ARTHROPLASTY      last assessed - 54SKM9831         Family History     Family History   Problem Relation Age of Onset    Dementia Mother     Rectal cancer Mother     Coronary artery disease Father     Rectal cancer Sister          Social History     Social History     History   Smoking Status    Never Smoker   Smokeless Tobacco    Never Used         Pertinent Lab Values     Lab Results   Component Value Date    CREATININE 1 43 (H) 05/03/2018       Lab Results   Component Value Date    PSA 1 5 05/03/2018    PSA 1 1 11/06/2015    PSA 1 52 05/05/2015       @RESULTRCNT(1H])@      Pertinent Imaging     Postvoid residual 0 cc as assessed on ultrasound    Portions of the record may have been created with voice recognition software   Occasional wrong word or "sound a like" substitutions may have occurred due to the inherent limitations of voice recognition software   Read the chart carefully and recognize, using context, where substitutions have occurred

## 2018-09-10 ENCOUNTER — APPOINTMENT (OUTPATIENT)
Dept: LAB | Facility: HOSPITAL | Age: 73
End: 2018-09-10
Attending: INTERNAL MEDICINE
Payer: MEDICARE

## 2018-09-10 DIAGNOSIS — I25.10 CORONARY ARTERY DISEASE INVOLVING NATIVE CORONARY ARTERY OF NATIVE HEART WITHOUT ANGINA PECTORIS: ICD-10-CM

## 2018-09-10 DIAGNOSIS — I10 ESSENTIAL HYPERTENSION: ICD-10-CM

## 2018-09-10 DIAGNOSIS — E78.2 MIXED HYPERLIPIDEMIA: ICD-10-CM

## 2018-09-10 LAB
ALBUMIN SERPL BCP-MCNC: 3.9 G/DL (ref 3.5–5)
ALP SERPL-CCNC: 83 U/L (ref 46–116)
ALT SERPL W P-5'-P-CCNC: 34 U/L (ref 12–78)
ANION GAP SERPL CALCULATED.3IONS-SCNC: 3 MMOL/L (ref 4–13)
AST SERPL W P-5'-P-CCNC: 26 U/L (ref 5–45)
BILIRUB SERPL-MCNC: 1.3 MG/DL (ref 0.2–1)
BUN SERPL-MCNC: 26 MG/DL (ref 5–25)
CALCIUM SERPL-MCNC: 9.6 MG/DL (ref 8.3–10.1)
CHLORIDE SERPL-SCNC: 104 MMOL/L (ref 100–108)
CHOLEST SERPL-MCNC: 143 MG/DL (ref 50–200)
CO2 SERPL-SCNC: 32 MMOL/L (ref 21–32)
CREAT SERPL-MCNC: 1.56 MG/DL (ref 0.6–1.3)
GFR SERPL CREATININE-BSD FRML MDRD: 44 ML/MIN/1.73SQ M
GLUCOSE SERPL-MCNC: 86 MG/DL (ref 65–140)
HDLC SERPL-MCNC: 36 MG/DL (ref 40–60)
LDLC SERPL CALC-MCNC: 74 MG/DL (ref 0–100)
POTASSIUM SERPL-SCNC: 4.3 MMOL/L (ref 3.5–5.3)
PROT SERPL-MCNC: 7.3 G/DL (ref 6.4–8.2)
SODIUM SERPL-SCNC: 139 MMOL/L (ref 136–145)
TRIGL SERPL-MCNC: 164 MG/DL

## 2018-09-10 PROCEDURE — 80061 LIPID PANEL: CPT

## 2018-09-10 PROCEDURE — 36415 COLL VENOUS BLD VENIPUNCTURE: CPT

## 2018-09-10 PROCEDURE — 80053 COMPREHEN METABOLIC PANEL: CPT

## 2018-09-11 ENCOUNTER — OFFICE VISIT (OUTPATIENT)
Dept: CARDIOLOGY CLINIC | Facility: CLINIC | Age: 73
End: 2018-09-11
Payer: MEDICARE

## 2018-09-11 VITALS
SYSTOLIC BLOOD PRESSURE: 106 MMHG | BODY MASS INDEX: 25.25 KG/M2 | HEART RATE: 79 BPM | DIASTOLIC BLOOD PRESSURE: 70 MMHG | OXYGEN SATURATION: 95 % | WEIGHT: 176.4 LBS | HEIGHT: 70 IN

## 2018-09-11 DIAGNOSIS — I20.8 OTHER FORMS OF ANGINA PECTORIS (HCC): ICD-10-CM

## 2018-09-11 DIAGNOSIS — I25.10 CORONARY ARTERY DISEASE INVOLVING NATIVE CORONARY ARTERY OF NATIVE HEART WITHOUT ANGINA PECTORIS: Primary | ICD-10-CM

## 2018-09-11 DIAGNOSIS — R09.89 RIGHT CAROTID BRUIT: ICD-10-CM

## 2018-09-11 DIAGNOSIS — E78.2 MIXED HYPERLIPIDEMIA: ICD-10-CM

## 2018-09-11 DIAGNOSIS — I10 ESSENTIAL HYPERTENSION: ICD-10-CM

## 2018-09-11 DIAGNOSIS — I82.509 CHRONIC DEEP VEIN THROMBOSIS (DVT) OF LOWER EXTREMITY, UNSPECIFIED LATERALITY, UNSPECIFIED VEIN (HCC): ICD-10-CM

## 2018-09-11 PROBLEM — I20.89 OTHER FORMS OF ANGINA PECTORIS: Status: RESOLVED | Noted: 2018-05-07 | Resolved: 2018-09-11

## 2018-09-11 PROCEDURE — 99214 OFFICE O/P EST MOD 30 MIN: CPT | Performed by: INTERNAL MEDICINE

## 2018-09-11 NOTE — PROGRESS NOTES
Cardiology Consultation     Alexey Males  [id number]  1945  [address] [address]    C/C:CAD, CABG, DVT, HTN, HLD      HPI:  21-year-old male with past medical history of coronary artery disease status post CABG, hypertension, hyperlipidemia is here for follow-up  PATIENT HAD CHEST PAIN LAST VISIT FOLLOWING WHICH SHE HAD nuclear stress test which did not show any reversible ischemia  Since last visit patient has done well and denies having any cardiac symptoms including chest pain, shortness of breath, palpitations, dizziness or syncope  He denies having any lower extremity edema, orthopnea paroxysmal nocturnal dyspnea  Nuclear stress test:  July 2018There was a moderate-sized infarct in the inferior region  Left ventricular systolic function was reduced, with regional wall motion abnormalities  Echocardiogram November 2017  LEFT VENTRICLE:  Systolic function was normal  Ejection fraction was estimated in the range of 55 % to 65 %  There were no regional wall motion abnormalities    Doppler parameters were consistent with abnormal left ventricular relaxation (grade 1 diastolic dysfunction)      MITRAL VALVE:  There was trace regurgitation      TRICUSPID VALVE:  There was mild regurgitation      AORTA:  The root exhibited normal size and mild fibrocalcific change  Patient Active Problem List   Diagnosis    Seborrheic keratosis    Screening for skin condition    History of skin cancer    Psoriasiform dermatitis    Coronary artery disease involving native coronary artery of native heart without angina pectoris    Other forms of angina pectoris ([address] )    Essential hypertension    Mixed hyperlipidemia    Anxiety    BPH without urinary obstruction    CKD (chronic kidney disease), stage III    Impaired fasting glucose    Subclinical hypothyroidism    Nocturia Past Medical History:   Diagnosis Date    Anxiety disorder     last assessed - [id number]    Basal cell carcinoma of face     BPH (benign prostatic hyperplasia)     benign prostatic hypertrophy; last assessed - 12VQY5166    Chronic kidney disease     Coronary atherosclerosis of native coronary artery     last assessed - [id number]    Deep vein thrombosis (DVT) ([facility] )     last assessed - 49XPH0943    Depression     last assessed - 11BTX5472    History of colonoscopy     Fiberoptic; Resolved - 12/1/2011    Hyperlipemia     last assessed - [id number]    Hypertension     last assessed - 44OJL5420    Myocardial infarction ([facility] )     Nonmelanoma skin cancer     last assessed - 37IXO6487    Osteoarthritis     last assessed - 75LLB8380    Pneumonia     last assessed - 87XHK7365    Pulmonary HTN ([facility] )     PVD (peripheral vascular disease) ([facility] )     Seborrheic dermatitis      Social History     Social History    Marital status: /Civil Union     Spouse name: N/A    Number of children: N/A    Years of education: N/A     Occupational History    Not on file       Social History Main Topics    Smoking status: Never Smoker    Smokeless tobacco: Never Used    Alcohol use No    Drug use: Yes     Types: Marijuana    Sexual activity: Not Currently     Other Topics Concern    Not on file     Social History Narrative    No narrative on file      Family History   Problem Relation Age of Onset    Dementia Mother     Rectal cancer Mother     Coronary artery disease Father     Rectal cancer Sister      Past Surgical History:   Procedure Laterality Date    CARDIAC CATHETERIZATION      procedure outcome successful; last assessed - [id number]    CORONARY ARTERY BYPASS GRAFT      last assessed - 13VYX3628    IVC FILTER INSERTION      Jael    TOTAL HIP ARTHROPLASTY      last assessed - 57KAO4515       Current Outpatient Prescriptions:     aspirin 325 mg tablet, Take 1 tablet by mouth daily, Disp: , Rfl:     atorvastatin (LIPITOR) 80 mg tablet, Take 1 tablet (80 mg total) by mouth daily, Disp: 90 tablet, Rfl: 3    buPROPion (WELLBUTRIN XL) 300 mg 24 hr tablet, Take 1 tablet (300 mg total) by mouth daily, Disp: 90 tablet, Rfl: 3    escitalopram (LEXAPRO) 20 mg tablet, Take 1 tablet (20 mg total) by mouth daily, Disp: 90 tablet, Rfl: 3    halobetasol (ULTRAVATE) 0 05 % ointment, Apply topically 2 (two) times a day, Disp: , Rfl:     LORazepam (ATIVAN) 1 mg tablet, Take 1 tablet (1 mg total) by mouth 3 (three) times a day as needed for anxiety, Disp: 90 tablet, Rfl: 0    metoprolol tartrate (LOPRESSOR) 25 mg tablet, Take 1 tablet (25 mg total) by mouth 2 (two) times a day, Disp: 180 tablet, Rfl: 3    Mirabegron ER 25 MG TB24, Take 25 mg by mouth daily at bedtime, Disp: 30 tablet, Rfl: 6    tamsulosin (FLOMAX) 0 4 mg, TAKE 1 CAPSULE (0 4 MG TOTAL) BY MOUTH 2 (TWO) TIMES A DAY, Disp: 180 capsule, Rfl: 3    fluocinonide (LIDEX) 0 05 % ointment, Apply topically, Disp: , Rfl:   No Known Allergies  Vitals:    09/11/18 1328   BP: 106/70   BP Location: Left arm   Patient Position: Sitting   Cuff Size: Adult   Pulse: 79   SpO2: 95%   Weight: 80 kg (176 lb 6 4 oz)   Height: 5' 10" (1 778 m)       Labs:  Appointment on 09/10/2018   Component Date Value    Sodium 09/10/2018 139     Potassium 09/10/2018 4 3     Chloride 09/10/2018 104     CO2 09/10/2018 32     ANION GAP 09/10/2018 3*    BUN 09/10/2018 26*    Creatinine 09/10/2018 1 56*    Glucose 09/10/2018 86     Calcium 09/10/2018 9 6     AST 09/10/2018 26     ALT 09/10/2018 34     Alkaline Phosphatase 09/10/2018 83     Total Protein 09/10/2018 7 3     Albumin 09/10/2018 3 9     Total Bilirubin 09/10/2018 1 30*    eGFR 09/10/2018 44     Cholesterol 09/10/2018 143     Triglycerides 09/10/2018 164*    HDL, Direct 09/10/2018 36*    LDL Calculated 09/10/2018 74    Office Visit on 08/21/2018   Component Date Value    POST-VOID RESIDUAL VOLUM* 08/21/2018 0    Hospital Outpatient Visit on 07/26/2018   Component Date Value    Protocol Name 07/26/2018 LEXISCAN-SIT     Time In Exercise Phase 07/26/2018 00:03:00     MAX  SYSTOLIC BP 95/94/6091 689     Max Diastolic Bp 61/55/2880 62     Max Heart Rate 07/26/2018 93     Max Predicted Heart Rate 07/26/2018 148     Reason for Termination 07/26/2018 Protocol Complete     Test Indication 07/26/2018 CAD, CP     Target Hr Formular 07/26/2018 (220 - Age)*85%     Arrhy During Ex 07/26/2018 none     Chest Pain Statement 07/26/2018 none      Imaging: No results found  Review of Systems:  Review of Systems   Constitutional: Negative for diaphoresis and fatigue  HENT: Negative for congestion and facial swelling  Eyes: Negative for photophobia and visual disturbance  Respiratory: Negative for chest tightness and shortness of breath  Cardiovascular: Negative for chest pain, palpitations and leg swelling  Gastrointestinal: Negative for abdominal pain and blood in stool  Endocrine: Negative for cold intolerance and heat intolerance  Musculoskeletal: Negative for arthralgias and myalgias  Skin: Negative for pallor and rash  Neurological: Negative for dizziness and syncope  Physical Exam:  Physical Exam   Constitutional: He is oriented to person, place, and time  He appears well-developed and well-nourished  HENT:   Head: Normocephalic and atraumatic  Eyes: Conjunctivae and EOM are normal  Pupils are equal, round, and reactive to light  Neck: Normal range of motion  Neck supple  No JVD present  No thyromegaly present  Cardiovascular: Normal rate, regular rhythm, normal heart sounds and intact distal pulses  Exam reveals no gallop and no friction rub  No murmur heard  Pulses:       Carotid pulses are 2+ on the right side with bruit, and 2+ on the left side  Pulmonary/Chest: Effort normal and breath sounds normal  No respiratory distress  He has no wheezes  He has no rales  Abdominal: Soft  Bowel sounds are normal  He exhibits no distension  There is no tenderness  Musculoskeletal: Normal range of motion  He exhibits no edema  Neurological: He is alert and oriented to person, place, and time  He has normal reflexes  Skin: Skin is warm and dry  Psychiatric: He has a normal mood and affect  Discussion/Summary:  1  CAD s/p CABG/chest pain  Patient is on optimal medical management for CAD including aspirin, statins, beta-blocker  I will get nuclear stress test rule out ischemia      2  HTN:  Blood pressure well controlled, continue current medications     3  DVT s/p IVCfilter  Patient was asked to avoid sitting for long period of time and ambulate as much as possible  4    I will check abdominal ultrasound to rule out AAA patient has multiple risk factors including significant CAD, hypertension  5  Hyperlipidemia, triglycerides are mildly elevated but patient was not fasting lipid profile was checked  LDL is at goal    continue Lipitor for now     6  Right carotid bruit, will check carotid Doppler  Follow up with 4 months

## 2018-10-18 ENCOUNTER — OFFICE VISIT (OUTPATIENT)
Dept: DERMATOLOGY | Facility: CLINIC | Age: 73
End: 2018-10-18
Payer: MEDICARE

## 2018-10-18 DIAGNOSIS — L82.1 SEBORRHEIC KERATOSIS: Primary | ICD-10-CM

## 2018-10-18 DIAGNOSIS — Z85.828 HISTORY OF SKIN CANCER: ICD-10-CM

## 2018-10-18 DIAGNOSIS — Z13.89 SCREENING FOR SKIN CONDITION: ICD-10-CM

## 2018-10-18 PROCEDURE — 99213 OFFICE O/P EST LOW 20 MIN: CPT | Performed by: DERMATOLOGY

## 2018-10-18 RX ORDER — INFLUENZA A VIRUSA/MICHIGAN/45/2015 X-275 (H1N1) ANTIGEN (FORMALDEHYDE INACTIVATED), INFLUENZA A VIRUS A/HONG KONG/4801/2014 X-263B (H3N2) ANTIGEN (FORMALDEHYDE INACTIVATED), AND INFLUENZA B VIRUS B/BRISBANE/60/2008 ANTIGEN (FORMALDEHYDE INACTIVATED) 60; 60; 60 UG/.5ML; UG/.5ML; UG/.5ML
INJECTION, SUSPENSION INTRAMUSCULAR
Refills: 0 | COMMUNITY
Start: 2018-10-09 | End: 2018-11-21

## 2018-10-18 NOTE — PROGRESS NOTES
[facility] DERMATOLOGY  [address]  [phone]  [phone]     MRN: [id number] : 1945  Encounter: [id number]  Patient Information: Octavio Gomez  Chief complaint:  Six-month checkup    History of present illness:  27-year-old male presents for overall skin check previous history of skin cancer no specific concerns noted today except several new growths on his skin  Past Medical History:   Diagnosis Date    Anxiety disorder     last assessed - [id number]    Basal cell carcinoma of face     BPH (benign prostatic hyperplasia)     benign prostatic hypertrophy; last assessed - [id number]    Chronic kidney disease     Coronary atherosclerosis of native coronary artery     last assessed - 68NMT5142    Deep vein thrombosis (DVT) [facility] )     last assessed - [id number]    Depression     last assessed - 87SLB5282    History of colonoscopy     Fiberoptic; Resolved - 2011    Hyperlipemia     last assessed - 34LMC2996    Hypertension     last assessed - 75LFT1594    Myocardial infarction [facility]     Nonmelanoma skin cancer     last assessed - 11CXO8851    Osteoarthritis     last assessed - 86VTH4978    Pneumonia     last assessed - 85PAE3321    Pulmonary HTN ([facility]     PVD (peripheral vascular disease) ([facility]     Seborrheic dermatitis      Past Surgical History:   Procedure Laterality Date    CARDIAC CATHETERIZATION      procedure outcome successful; last assessed - [id number]    CORONARY ARTERY BYPASS GRAFT      last assessed - 92NCX1124    IVC FILTER INSERTION      [place]    TOTAL HIP ARTHROPLASTY      last assessed - 53RKX8739     Social History   History   Alcohol Use No     History   Drug Use    Types: Marijuana     History   Smoking Status    Never Smoker   Smokeless Tobacco    Never Used     Family History   Problem Relation Age of Onset    Dementia Mother     Rectal cancer Mother     Coronary artery disease Father  Rectal cancer Sister      Meds/Allergies   No Known Allergies    Meds:  Prior to Admission medications    Medication Sig Start Date End Date Taking?  Authorizing Provider   aspirin 325 mg tablet Take 1 tablet by mouth daily   Yes Historical Provider, MD   atorvastatin (LIPITOR) 80 mg tablet Take 1 tablet (80 mg total) by mouth daily 5/7/18  Yes Mari Blair MD   buPROPion (WELLBUTRIN XL) 300 mg 24 hr tablet Take 1 tablet (300 mg total) by mouth daily 5/31/18  Yes PA Decker   escitalopram (LEXAPRO) 20 mg tablet Take 1 tablet (20 mg total) by mouth daily 4/17/18  Yes Jabier Yao MD   fluocinonide (LIDEX) 0 05 % ointment Apply topically 5/31/17  Yes Historical Provider, MD   FLUZONE HIGH-DOSE 0 5 ML KATHY TO BE ADMINISTERED BY PHARMACIST FOR IMMUNIZATION 10/9/18  Yes Historical Provider, MD   halobetasol (ULTRAVATE) 0 05 % ointment Apply topically 2 (two) times a day 7/18/17  Yes Historical Provider, MD   LORazepam (ATIVAN) 1 mg tablet Take 1 tablet (1 mg total) by mouth 3 (three) times a day as needed for anxiety 5/7/18  Yes Jabier Yao MD   metoprolol tartrate (LOPRESSOR) 25 mg tablet Take 1 tablet (25 mg total) by mouth 2 (two) times a day 5/7/18  Yes Mari Blair MD   Mirabegron ER 25 MG TB24 Take 25 mg by mouth daily at bedtime 8/21/18  Yes Jonathan Castillo MD   tamsulosin (FLOMAX) 0 4 mg TAKE 1 CAPSULE (0 4 MG TOTAL) BY MOUTH 2 (TWO) TIMES A DAY 6/6/18  Yes Jabier Yao MD       Subjective:     Review of Systems:    General: negative for - chills, fatigue, fever,  weight gain or weight loss  Psychological: negative for - anxiety, behavioral disorder, concentration difficulties, decreased libido, depression, irritability, memory difficulties, mood swings, sleep disturbances or suicidal ideation  ENT: negative for - hearing difficulties , nasal congestion, nasal discharge, oral lesions, sinus pain, sneezing, sore throat  Allergy and Immunology: negative for - hives, insect bite sensitivity,  Hematological and Lymphatic: negative for - bleeding problems, blood clots,bruising, swollen lymph nodes  Endocrine: negative for - hair pattern changes, hot flashes, malaise/lethargy, mood swings, palpitations, polydipsia/polyuria, skin changes, temperature intolerance or unexpected weight change  Respiratory: negative for - cough, hemoptysis, orthopnea, shortness of breath, or wheezing  Cardiovascular: negative for - chest pain, dyspnea on exertion, edema,  Gastrointestinal: negative for - abdominal pain, nausea/vomiting  Genito-Urinary: negative for - dysuria, incontinence, irregular/heavy menses or urinary frequency/urgency  Musculoskeletal: negative for - gait disturbance, joint pain, joint stiffness, joint swelling, muscle pain, muscular weakness  Dermatological:  As in HPI  Neurological: negative for confusion, dizziness, headaches, impaired coordination/balance, memory loss, numbness/tingling, seizures, speech problems, tremors or weakness       Objective: There were no vitals taken for this visit  Physical Exam:    General Appearance:    Alert, cooperative, no distress   Head:    Normocephalic, without obvious abnormality, atraumatic           Skin:   A full skin exam was performed including scalp, head scalp, eyes, ears, nose, lips, neck, chest, axilla, abdomen, back, buttocks, bilateral upper extremities, bilateral lower extremities, hands, feet, fingers, toes, fingernails, and toenails normal keratotic papules with greasy stuck on appearance previous sites of skin cancer well healed without recurrence nothing else atypical noted on complete exam     Assessment:     1  Seborrheic keratosis     2  Screening for skin condition     3   History of skin cancer           Plan:   Seborrheic keratosis patient reassured these are normal growths we acquire with age no treatment needed  History of skin cancer in no recurrence nothing else atypical sunblock recommended follow-up in 6 months  Screening for dermatologic disorders nothing else of concern noted on complete exam follow-up in 6 months    Sim Madsen MD  10/18/2018,11:21 AM    Portions of the record may have been created with voice recognition software   Occasional wrong word or "sound a like" substitutions may have occurred due to the inherent limitations of voice recognition software   Read the chart carefully and recognize, using context, where substitutions have occurred

## 2018-10-23 ENCOUNTER — HOSPITAL ENCOUNTER (OUTPATIENT)
Dept: NON INVASIVE DIAGNOSTICS | Facility: CLINIC | Age: 73
Discharge: HOME | End: 2018-10-23
Payer: MEDICARE

## 2018-10-23 DIAGNOSIS — R09.89 RIGHT CAROTID BRUIT: ICD-10-CM

## 2018-10-23 DIAGNOSIS — R09.89 CARDIORESPIRATORY PROBLEMS: ICD-10-CM

## 2018-10-23 DIAGNOSIS — E78.2 MIXED HYPERLIPIDEMIA: ICD-10-CM

## 2018-10-23 DIAGNOSIS — I25.10 CORONARY ARTERY DISEASE INVOLVING NATIVE CORONARY ARTERY OF NATIVE HEART WITHOUT ANGINA PECTORIS: ICD-10-CM

## 2018-10-23 DIAGNOSIS — I10 ESSENTIAL HYPERTENSION: ICD-10-CM

## 2018-10-23 PROCEDURE — 93978 VASCULAR STUDY: CPT

## 2018-10-23 PROCEDURE — 93880 EXTRACRANIAL BILAT STUDY: CPT

## 2018-10-24 PROCEDURE — 93880 EXTRACRANIAL BILAT STUDY: CPT | Performed by: INTERNAL MEDICINE

## 2018-10-24 PROCEDURE — 93978 VASCULAR STUDY: CPT | Performed by: INTERNAL MEDICINE

## 2018-10-25 DIAGNOSIS — E78.5 HYPERLIPIDEMIA, UNSPECIFIED HYPERLIPIDEMIA TYPE: ICD-10-CM

## 2018-10-25 DIAGNOSIS — F41.9 ANXIETY: ICD-10-CM

## 2018-10-25 RX ORDER — ATORVASTATIN CALCIUM 80 MG/1
80 TABLET, FILM COATED ORAL DAILY
Qty: 90 TABLET | Refills: 2 | Status: SHIPPED | OUTPATIENT
Start: 2018-10-25 | End: 2019-05-06 | Stop reason: SDUPTHER

## 2018-10-25 RX ORDER — ESCITALOPRAM OXALATE 20 MG/1
20 TABLET ORAL DAILY
Qty: 90 TABLET | Refills: 2 | Status: SHIPPED | OUTPATIENT
Start: 2018-10-25 | End: 2019-05-06 | Stop reason: SDUPTHER

## 2018-10-25 NOTE — TELEPHONE ENCOUNTER
Needs refills on :  Atorvastatin 80 mg's  escitalopram 20 mg's  [facility]

## 2018-11-13 ENCOUNTER — APPOINTMENT (OUTPATIENT)
Dept: LAB | Facility: HOSPITAL | Age: 73
End: 2018-11-13
Attending: INTERNAL MEDICINE
Payer: MEDICARE

## 2018-11-13 DIAGNOSIS — E78.2 MIXED HYPERLIPIDEMIA: ICD-10-CM

## 2018-11-13 DIAGNOSIS — E03.8 SUBCLINICAL HYPOTHYROIDISM: ICD-10-CM

## 2018-11-13 DIAGNOSIS — I10 ESSENTIAL HYPERTENSION: ICD-10-CM

## 2018-11-13 DIAGNOSIS — R73.01 IMPAIRED FASTING GLUCOSE: ICD-10-CM

## 2018-11-13 LAB
ALBUMIN SERPL BCP-MCNC: 3.6 G/DL (ref 3.5–5)
ALP SERPL-CCNC: 86 U/L (ref 46–116)
ALT SERPL W P-5'-P-CCNC: 31 U/L (ref 12–78)
ANION GAP SERPL CALCULATED.3IONS-SCNC: 8 MMOL/L (ref 4–13)
AST SERPL W P-5'-P-CCNC: 24 U/L (ref 5–45)
BILIRUB SERPL-MCNC: 0.8 MG/DL (ref 0.2–1)
BUN SERPL-MCNC: 23 MG/DL (ref 5–25)
CALCIUM SERPL-MCNC: 9.4 MG/DL (ref 8.3–10.1)
CHLORIDE SERPL-SCNC: 107 MMOL/L (ref 100–108)
CHOLEST SERPL-MCNC: 134 MG/DL (ref 50–200)
CO2 SERPL-SCNC: 27 MMOL/L (ref 21–32)
CREAT SERPL-MCNC: 1.12 MG/DL (ref 0.6–1.3)
ERYTHROCYTE [DISTWIDTH] IN BLOOD BY AUTOMATED COUNT: 13.1 % (ref 11.6–15.1)
EST. AVERAGE GLUCOSE BLD GHB EST-MCNC: 120 MG/DL
GFR SERPL CREATININE-BSD FRML MDRD: 65 ML/MIN/1.73SQ M
GLUCOSE P FAST SERPL-MCNC: 98 MG/DL (ref 65–99)
HBA1C MFR BLD: 5.8 % (ref 4.2–6.3)
HCT VFR BLD AUTO: 45.9 % (ref 36.5–49.3)
HDLC SERPL-MCNC: 38 MG/DL (ref 40–60)
HGB BLD-MCNC: 15 G/DL (ref 12–17)
LDLC SERPL CALC-MCNC: 71 MG/DL (ref 0–100)
MCH RBC QN AUTO: 29.4 PG (ref 26.8–34.3)
MCHC RBC AUTO-ENTMCNC: 32.7 G/DL (ref 31.4–37.4)
MCV RBC AUTO: 90 FL (ref 82–98)
NONHDLC SERPL-MCNC: 96 MG/DL
PLATELET # BLD AUTO: 184 THOUSANDS/UL (ref 149–390)
PMV BLD AUTO: 10.8 FL (ref 8.9–12.7)
POTASSIUM SERPL-SCNC: 4.4 MMOL/L (ref 3.5–5.3)
PROT SERPL-MCNC: 6.9 G/DL (ref 6.4–8.2)
RBC # BLD AUTO: 5.11 MILLION/UL (ref 3.88–5.62)
SODIUM SERPL-SCNC: 142 MMOL/L (ref 136–145)
TRIGL SERPL-MCNC: 127 MG/DL
TSH SERPL DL<=0.05 MIU/L-ACNC: 1.81 UIU/ML (ref 0.36–3.74)
WBC # BLD AUTO: 6.64 THOUSAND/UL (ref 4.31–10.16)

## 2018-11-13 PROCEDURE — 80061 LIPID PANEL: CPT

## 2018-11-13 PROCEDURE — 84443 ASSAY THYROID STIM HORMONE: CPT

## 2018-11-13 PROCEDURE — 36415 COLL VENOUS BLD VENIPUNCTURE: CPT

## 2018-11-13 PROCEDURE — 80053 COMPREHEN METABOLIC PANEL: CPT

## 2018-11-13 PROCEDURE — 83036 HEMOGLOBIN GLYCOSYLATED A1C: CPT

## 2018-11-13 PROCEDURE — 85027 COMPLETE CBC AUTOMATED: CPT

## 2018-11-21 ENCOUNTER — OFFICE VISIT (OUTPATIENT)
Dept: INTERNAL MEDICINE CLINIC | Facility: CLINIC | Age: 73
End: 2018-11-21
Payer: MEDICARE

## 2018-11-21 VITALS
HEIGHT: 70 IN | SYSTOLIC BLOOD PRESSURE: 116 MMHG | DIASTOLIC BLOOD PRESSURE: 70 MMHG | BODY MASS INDEX: 24.94 KG/M2 | WEIGHT: 174.2 LBS | RESPIRATION RATE: 14 BRPM | HEART RATE: 76 BPM

## 2018-11-21 DIAGNOSIS — R73.01 IMPAIRED FASTING GLUCOSE: Primary | ICD-10-CM

## 2018-11-21 DIAGNOSIS — E03.8 SUBCLINICAL HYPOTHYROIDISM: ICD-10-CM

## 2018-11-21 DIAGNOSIS — N40.0 BPH WITHOUT URINARY OBSTRUCTION: ICD-10-CM

## 2018-11-21 DIAGNOSIS — Z23 NEED FOR TETANUS BOOSTER: ICD-10-CM

## 2018-11-21 DIAGNOSIS — Z23 ENCOUNTER FOR IMMUNIZATION: ICD-10-CM

## 2018-11-21 DIAGNOSIS — S61.211A LACERATION OF LEFT INDEX FINGER WITHOUT FOREIGN BODY WITHOUT DAMAGE TO NAIL, INITIAL ENCOUNTER: ICD-10-CM

## 2018-11-21 DIAGNOSIS — E78.2 MIXED HYPERLIPIDEMIA: ICD-10-CM

## 2018-11-21 DIAGNOSIS — N18.30 CKD (CHRONIC KIDNEY DISEASE), STAGE III (HCC): ICD-10-CM

## 2018-11-21 DIAGNOSIS — I10 ESSENTIAL HYPERTENSION: ICD-10-CM

## 2018-11-21 DIAGNOSIS — F41.9 ANXIETY: ICD-10-CM

## 2018-11-21 DIAGNOSIS — I25.10 CORONARY ARTERY DISEASE INVOLVING NATIVE CORONARY ARTERY OF NATIVE HEART WITHOUT ANGINA PECTORIS: ICD-10-CM

## 2018-11-21 PROCEDURE — 99214 OFFICE O/P EST MOD 30 MIN: CPT | Performed by: INTERNAL MEDICINE

## 2018-11-21 PROCEDURE — 90715 TDAP VACCINE 7 YRS/> IM: CPT

## 2018-11-21 PROCEDURE — 90471 IMMUNIZATION ADMIN: CPT

## 2018-11-21 RX ORDER — LORAZEPAM 1 MG/1
1 TABLET ORAL 3 TIMES DAILY PRN
Qty: 90 TABLET | Refills: 0 | Status: SHIPPED | OUTPATIENT
Start: 2018-11-21 | End: 2019-05-06 | Stop reason: SDUPTHER

## 2018-11-21 NOTE — PROGRESS NOTES
Assessment/Plan:    Patient Instructions   Lab data reviewed in detail and compared prior    Coronary artery disease stable without angina following with Cardiology    Hypertension stable on present regimen    Hyperlipidemia at goal on atorvastatin    Impaired fasting glucose-slight increase in hemoglobin Q8M 5 8, implications discussed, increase aerobic exercise, cut down on the carbs and weight loss as able    Depression and anxiety stable with Wellbutrin, Lexapro and p r n  Lorazepam, refill lorazepam     Finger laceration-well-healed without any evidence for infection however tetanus immunization is overdue, will get Tdap today    Routine follow-up after labs in 6 months, sooner as needed  Diagnoses and all orders for this visit:    Impaired fasting glucose  -     Hemoglobin A1C; Future    Anxiety  -     LORazepam (ATIVAN) 1 mg tablet; Take 1 tablet (1 mg total) by mouth 3 (three) times a day as needed for anxiety for up to 30 days    Subclinical hypothyroidism  -     TSH, 3rd generation with Free T4 reflex; Future    Coronary artery disease involving native coronary artery of native heart without angina pectoris    Essential hypertension  -     CBC and differential; Future  -     Comprehensive metabolic panel; Future    BPH without urinary obstruction    CKD (chronic kidney disease), stage III (HCC)    Mixed hyperlipidemia  -     Lipid panel; Future    Laceration of left index finger without foreign body without damage to nail, initial encounter    Encounter for immunization  -     Tdap vaccine greater than or equal to 8yo IM    Need for tetanus booster  -     Tdap vaccine greater than or equal to 8yo IM         Subjective:      Patient ID: Alma Posadas is a 68 y o  male    Feeling generally well    Cut L index finger on tin can a few weeks ago, healing w/o s/sx infection, can't recall last tetanus  CAD-seen by Dr Kathy Garcia last mo, no angina  Taking full asa daily      HPL/HTN- taking rx as directed, no home bp's  Anxiety-stable on lexapro/wellbutrin, using lorazepam sporadically  BPH-improved w/ flomax, still w/ nocturia, wants to see urology  Caitna didn't help             Current Outpatient Prescriptions:     aspirin 325 mg tablet, Take 1 tablet by mouth daily, Disp: , Rfl:     atorvastatin (LIPITOR) 80 mg tablet, Take 1 tablet (80 mg total) by mouth daily, Disp: 90 tablet, Rfl: 2    buPROPion (WELLBUTRIN XL) 300 mg 24 hr tablet, Take 1 tablet (300 mg total) by mouth daily, Disp: 90 tablet, Rfl: 3    escitalopram (LEXAPRO) 20 mg tablet, Take 1 tablet (20 mg total) by mouth daily, Disp: 90 tablet, Rfl: 2    LORazepam (ATIVAN) 1 mg tablet, Take 1 tablet (1 mg total) by mouth 3 (three) times a day as needed for anxiety for up to 30 days, Disp: 90 tablet, Rfl: 0    metoprolol tartrate (LOPRESSOR) 25 mg tablet, Take 1 tablet (25 mg total) by mouth 2 (two) times a day, Disp: 180 tablet, Rfl: 3    tamsulosin (FLOMAX) 0 4 mg, TAKE 1 CAPSULE (0 4 MG TOTAL) BY MOUTH 2 (TWO) TIMES A DAY, Disp: 180 capsule, Rfl: 3    Recent Results (from the past 1008 hour(s))   CBC    Collection Time: 11/13/18  9:20 AM   Result Value Ref Range    WBC 6 64 4 31 - 10 16 Thousand/uL    RBC 5 11 3 88 - 5 62 Million/uL    Hemoglobin 15 0 12 0 - 17 0 g/dL    Hematocrit 45 9 36 5 - 49 3 %    MCV 90 82 - 98 fL    MCH 29 4 26 8 - 34 3 pg    MCHC 32 7 31 4 - 37 4 g/dL    RDW 13 1 11 6 - 15 1 %    Platelets 866 439 - 904 Thousands/uL    MPV 10 8 8 9 - 12 7 fL   Comprehensive metabolic panel    Collection Time: 11/13/18  9:20 AM   Result Value Ref Range    Sodium 142 136 - 145 mmol/L    Potassium 4 4 3 5 - 5 3 mmol/L    Chloride 107 100 - 108 mmol/L    CO2 27 21 - 32 mmol/L    ANION GAP 8 4 - 13 mmol/L    BUN 23 5 - 25 mg/dL    Creatinine 1 12 0 60 - 1 30 mg/dL    Glucose, Fasting 98 65 - 99 mg/dL    Calcium 9 4 8 3 - 10 1 mg/dL    AST 24 5 - 45 U/L    ALT 31 12 - 78 U/L    Alkaline Phosphatase 86 46 - 116 U/L    Total Protein 6 9 6 4 - 8 2 g/dL    Albumin 3 6 3 5 - 5 0 g/dL    Total Bilirubin 0 80 0 20 - 1 00 mg/dL    eGFR 65 ml/min/1 73sq m   Lipid panel    Collection Time: 11/13/18  9:20 AM   Result Value Ref Range    Cholesterol 134 50 - 200 mg/dL    Triglycerides 127 <=150 mg/dL    HDL, Direct 38 (L) 40 - 60 mg/dL    LDL Calculated 71 0 - 100 mg/dL    Non-HDL-Chol (CHOL-HDL) 96 mg/dl   TSH, 3rd generation with T4 reflex    Collection Time: 11/13/18  9:20 AM   Result Value Ref Range    TSH 3RD GENERATON 1 813 0 358 - 3 740 uIU/mL   Hemoglobin A1C    Collection Time: 11/13/18  9:20 AM   Result Value Ref Range    Hemoglobin A1C 5 8 4 2 - 6 3 %     mg/dl       The following portions of the patient's history were reviewed and updated as appropriate: allergies, current medications, past family history, past medical history, past social history, past surgical history and problem list      Review of Systems   Constitutional: Negative for appetite change, chills, diaphoresis, fatigue, fever and unexpected weight change  HENT: Negative for congestion, hearing loss and rhinorrhea  Eyes: Negative for visual disturbance  Respiratory: Negative for cough, chest tightness, shortness of breath and wheezing  Cardiovascular: Negative for chest pain, palpitations and leg swelling  Gastrointestinal: Negative for abdominal pain and blood in stool  Endocrine: Negative for cold intolerance, heat intolerance, polydipsia and polyuria  Genitourinary: Negative for difficulty urinating, dysuria, frequency and urgency  Musculoskeletal: Negative for arthralgias and myalgias  Skin: Positive for wound  Negative for rash  Neurological: Negative for dizziness, weakness, light-headedness and headaches  Hematological: Does not bruise/bleed easily  Psychiatric/Behavioral: Negative for dysphoric mood and sleep disturbance           Objective:      Vitals:    11/21/18 1316   BP: 116/70   Pulse: 76   Resp: 14          Physical Exam Constitutional: He is oriented to person, place, and time  He appears well-developed and well-nourished  HENT:   Head: Normocephalic and atraumatic  Nose: Nose normal    Mouth/Throat: Oropharynx is clear and moist    Eyes: Pupils are equal, round, and reactive to light  Conjunctivae and EOM are normal  No scleral icterus  Neck: Normal range of motion  Neck supple  No JVD present  No tracheal deviation present  No thyromegaly present  Cardiovascular: Normal rate, regular rhythm and intact distal pulses  Exam reveals no gallop and no friction rub  No murmur heard  Pulmonary/Chest: Effort normal and breath sounds normal  No respiratory distress  He has no wheezes  He has no rales  Musculoskeletal: He exhibits no edema or deformity  L index finger w/ healing laceration w/o s/sx infection  Lymphadenopathy:     He has no cervical adenopathy  Neurological: He is alert and oriented to person, place, and time  No cranial nerve deficit  Skin: Skin is warm and dry  No rash noted  No erythema  No pallor  Psychiatric: He has a normal mood and affect   His behavior is normal  Judgment and thought content normal

## 2018-11-21 NOTE — PATIENT INSTRUCTIONS
Lab data reviewed in detail and compared prior    Coronary artery disease stable without angina following with Cardiology    Hypertension stable on present regimen    Hyperlipidemia at goal on atorvastatin    Impaired fasting glucose-slight increase in hemoglobin E9G 5 8, implications discussed, increase aerobic exercise, cut down on the carbs and weight loss as able    Depression and anxiety stable with Wellbutrin, Lexapro and p r n  Lorazepam, refill lorazepam     Finger laceration-well-healed without any evidence for infection however tetanus immunization is overdue, will get Tdap today    Routine follow-up after labs in 6 months, sooner as needed

## 2018-12-20 ENCOUNTER — PROCEDURE VISIT (OUTPATIENT)
Dept: UROLOGY | Facility: CLINIC | Age: 73
End: 2018-12-20
Payer: MEDICARE

## 2018-12-20 DIAGNOSIS — N40.0 BENIGN PROSTATIC HYPERPLASIA, UNSPECIFIED WHETHER LOWER URINARY TRACT SYMPTOMS PRESENT: ICD-10-CM

## 2018-12-20 DIAGNOSIS — N40.0 BPH WITHOUT URINARY OBSTRUCTION: Primary | ICD-10-CM

## 2018-12-20 PROCEDURE — 99214 OFFICE O/P EST MOD 30 MIN: CPT | Performed by: UROLOGY

## 2018-12-20 RX ORDER — TAMSULOSIN HYDROCHLORIDE 0.4 MG/1
0.4 CAPSULE ORAL
Qty: 90 CAPSULE | Refills: 3 | Status: SHIPPED | OUTPATIENT
Start: 2018-12-20 | End: 2019-05-06 | Stop reason: SDUPTHER

## 2018-12-20 NOTE — PROGRESS NOTES
Referring Physician: Carolina Smith MD  A copy of this note was sent to the referring physician  Diagnoses and all orders for this visit:    BPH without urinary obstruction  -     tamsulosin (FLOMAX) 0 4 mg; Take 1 capsule (0 4 mg total) by mouth daily with dinner for 90 days  -     PSA, Total Screen; Future    Benign prostatic hyperplasia, unspecified whether lower urinary tract symptoms present  -     tamsulosin (FLOMAX) 0 4 mg; Take 1 capsule (0 4 mg total) by mouth daily with dinner for 90 days  -     PSA, Total Screen; Future            Assessment and plan:     1  BPH with nocturia  - moderate level of control with tamsulosin monotherapy  - no improvement with beta 3 agonist  - patient was offered and subsequently declined diagnostic cystoscopy transrectal ultrasound    For now the patient is comfort with continue medical management  This is certainly very reasonable  I renewed his tamsulosin  Again advised him to take just 1 daily not to  He will follow up in 1 year for symptom reassessment as well as an updated PSA      Jeannette Dimas MD      Chief Complaint     Nocturia follow-up      History of Present Illness     Jackqueline Nageotte is a 68 y o  returns in follow-up for BPH with nocturia  Mirabegron was added at the prior visit  He had been using tamsulosin twice daily for number of years prior to the time of my initial evaluation  He felt the mirabegron did not improve his nocturia  His symptoms remain well controlled during the day  He was offered and elected to schedule flexible cystoscopy transrectal ultrasound  On evaluation today he declines to undergo any invasive testing    He continues to do well during the day but has stable nocturia at night  Detailed Urologic History     - please refer to HPI    Review of Systems     Review of Systems   Constitutional: Negative for activity change and fatigue  HENT: Negative for congestion  Eyes: Negative for visual disturbance  Respiratory: Negative for shortness of breath and wheezing  Cardiovascular: Negative for chest pain and leg swelling  Gastrointestinal: Negative for abdominal pain  Endocrine: Positive for polyuria  Genitourinary: Negative for dysuria, flank pain, hematuria and urgency  Musculoskeletal: Negative for back pain  Allergic/Immunologic: Negative for immunocompromised state  Neurological: Negative for dizziness and numbness  Psychiatric/Behavioral: Negative for dysphoric mood  All other systems reviewed and are negative  AUA SYMPTOM SCORE      Most Recent Value   AUA SYMPTOM SCORE   How often have you had a sensation of not emptying your bladder completely after you finished urinating? 4   How often have you had to urinate again less than two hours after you finished urinating? 4   How often have you found you stopped and started again several times when you urinate?  0   How often have you found it difficult to postpone urination? 4   How often have you had a weak urinary stream?  0   How often have you had to push or strain to begin urination? 0   How many times did you most typically get up to urinate from the time you went to bed at night until the time you got up in the morning? 4   Quality of Life: If you were to spend the rest of your life with your urinary condition just the way it is now, how would you feel about that?  4   AUA SYMPTOM SCORE  16            Allergies     No Known Allergies    Physical Exam     Physical Exam   Constitutional: He is oriented to person, place, and time  He appears well-developed and well-nourished  No distress  HENT:   Head: Normocephalic and atraumatic  Eyes: EOM are normal    Neck: Normal range of motion  Cardiovascular:   Negative lower extremity edema   Pulmonary/Chest: Effort normal and breath sounds normal    Abdominal: Soft  Musculoskeletal: Normal range of motion  Neurological: He is alert and oriented to person, place, and time     Skin: Skin is warm  Psychiatric: He has a normal mood and affect  His behavior is normal            Vital Signs  There were no vitals filed for this visit        Current Medications       Current Outpatient Prescriptions:     aspirin 325 mg tablet, Take 1 tablet by mouth daily, Disp: , Rfl:     atorvastatin (LIPITOR) 80 mg tablet, Take 1 tablet (80 mg total) by mouth daily, Disp: 90 tablet, Rfl: 2    buPROPion (WELLBUTRIN XL) 300 mg 24 hr tablet, Take 1 tablet (300 mg total) by mouth daily, Disp: 90 tablet, Rfl: 3    escitalopram (LEXAPRO) 20 mg tablet, Take 1 tablet (20 mg total) by mouth daily, Disp: 90 tablet, Rfl: 2    LORazepam (ATIVAN) 1 mg tablet, Take 1 tablet (1 mg total) by mouth 3 (three) times a day as needed for anxiety for up to 30 days, Disp: 90 tablet, Rfl: 0    metoprolol tartrate (LOPRESSOR) 25 mg tablet, Take 1 tablet (25 mg total) by mouth 2 (two) times a day, Disp: 180 tablet, Rfl: 3    tamsulosin (FLOMAX) 0 4 mg, Take 1 capsule (0 4 mg total) by mouth daily with dinner for 90 days, Disp: 90 capsule, Rfl: 3      Active Problems     Patient Active Problem List   Diagnosis    Seborrheic keratosis    Screening for skin condition    History of skin cancer    Psoriasiform dermatitis    Coronary artery disease involving native coronary artery of native heart without angina pectoris    Essential hypertension    Mixed hyperlipidemia    Anxiety    Benign prostatic hyperplasia    CKD (chronic kidney disease), stage III (HCC)    Impaired fasting glucose    Subclinical hypothyroidism    Nocturia    Chronic deep vein thrombosis (DVT) of lower extremity (HCC)    Right carotid bruit         Past Medical History     Past Medical History:   Diagnosis Date    Anxiety disorder     last assessed - [id number]    Basal cell carcinoma of face     BPH (benign prostatic hyperplasia)     benign prostatic hypertrophy; last assessed - [id number]    Chronic kidney disease     Coronary atherosclerosis of native coronary artery     last assessed - 89VRA5039    Deep vein thrombosis (DVT) ([facility] )     last assessed - 59FHC3075    Depression     last assessed - 84OYK8901    History of colonoscopy     Fiberoptic; Resolved - 12/1/2011    Hyperlipemia     last assessed - 14ZQA0570    Hypertension     last assessed - 91SUI2919    Myocardial infarction [facility])     Nonmelanoma skin cancer     last assessed - 85NYB9369    Osteoarthritis     last assessed - 04ZQM4642    Pneumonia     last assessed - 70ZQK9765    Pulmonary HTN ([facility] )     PVD [facility]peripheral vascular disease) [facility] )     Seborrheic dermatitis          Surgical History     Past Surgical History:   Procedure Laterality Date    CARDIAC CATHETERIZATION      procedure outcome successful; last assessed - [id number]    CORONARY ARTERY BYPASS GRAFT      last assessed - 78LWH9810    IVC FILTER INSERTION      Jael    TOTAL HIP ARTHROPLASTY      last assessed - 94KWP8047         Family History     Family History   Problem Relation Age of Onset    Dementia Mother     Rectal cancer Mother     Coronary artery disease Father     Rectal cancer Sister          Social History     Social History     History   Smoking Status    Never Smoker   Smokeless Tobacco    Never Used         Pertinent Lab Values     Lab Results   Component Value Date    CREATININE 1 12 11/13/2018       Lab Results   Component Value Date    PSA 1 5 05/03/2018    PSA 1 1 11/06/2015    PSA 1 52 05/05/2015       @RESULTRCNT(1H])@      Pertinent Imaging     Postvoid residual 0 cc as assessed on ultrasound    Portions of the record may have been created with voice recognition software   Occasional wrong word or "sound a like" substitutions may have occurred due to the inherent limitations of voice recognition software   Read the chart carefully and recognize, using context, where substitutions have occurred

## 2019-01-09 ENCOUNTER — OFFICE VISIT (OUTPATIENT)
Dept: CARDIOLOGY CLINIC | Facility: CLINIC | Age: 74
End: 2019-01-09
Payer: MEDICARE

## 2019-01-09 VITALS
WEIGHT: 174.4 LBS | BODY MASS INDEX: 24.97 KG/M2 | HEIGHT: 70 IN | HEART RATE: 66 BPM | SYSTOLIC BLOOD PRESSURE: 112 MMHG | DIASTOLIC BLOOD PRESSURE: 70 MMHG | OXYGEN SATURATION: 95 %

## 2019-01-09 DIAGNOSIS — I10 ESSENTIAL HYPERTENSION: ICD-10-CM

## 2019-01-09 DIAGNOSIS — I65.21 ASYMPTOMATIC STENOSIS OF RIGHT CAROTID ARTERY: ICD-10-CM

## 2019-01-09 DIAGNOSIS — E78.2 MIXED HYPERLIPIDEMIA: ICD-10-CM

## 2019-01-09 DIAGNOSIS — I25.10 CORONARY ARTERY DISEASE INVOLVING NATIVE CORONARY ARTERY OF NATIVE HEART WITHOUT ANGINA PECTORIS: Primary | ICD-10-CM

## 2019-01-09 DIAGNOSIS — I82.509 CHRONIC DEEP VEIN THROMBOSIS (DVT) OF LOWER EXTREMITY, UNSPECIFIED LATERALITY, UNSPECIFIED VEIN (HCC): ICD-10-CM

## 2019-01-09 PROCEDURE — 99214 OFFICE O/P EST MOD 30 MIN: CPT | Performed by: INTERNAL MEDICINE

## 2019-01-09 NOTE — PROGRESS NOTES
Cardiology Consultation     Anita Santoyo  [id number]  1945  [address] [place] [address] Marsha Carpenter [id number]    C/C: FOLLOW-UP OF CAD, CABG, DVT, HTN, HLD      HPI:  68year-old male with past medical history of coronary artery disease status post CABG, hypertension, hyperlipidemia is here for follow-up    PATIENT HAD CHEST PAIN LAST VISIT FOLLOWING WHICH SHE HAD nuclear stress test which did not show any reversible ischemia  Since last visit patient has done well and denies having any cardiac symptoms including chest pain, shortness of breath, palpitations, dizziness or syncope  He denies having any lower extremity edema, orthopnea paroxysmal nocturnal dyspnea      Nuclear stress test:  July 2018There was a moderate-sized infarct in the inferior region  Left ventricular systolic function was reduced, with regional wall motion abnormalities      Echocardiogram November 2017  LEFT VENTRICLE:  Systolic function was normal  Ejection fraction was estimated in the range of 55 % to 65 %  There were no regional wall motion abnormalities    Doppler parameters were consistent with abnormal left ventricular relaxation (grade 1 diastolic dysfunction)      MITRAL VALVE:  There was trace regurgitation      TRICUSPID VALVE:  There was mild regurgitation      AORTA:  The root exhibited normal size and mild fibrocalcific change    Patient Active Problem List   Diagnosis    Seborrheic keratosis    Screening for skin condition    History of skin cancer    Psoriasiform dermatitis    Coronary artery disease involving native coronary artery of native heart without angina pectoris    Essential hypertension    Mixed hyperlipidemia    Anxiety    Benign prostatic hyperplasia    CKD (chronic kidney disease), stage III (HCC)    Impaired fasting glucose    Subclinical hypothyroidism    Nocturia    Chronic deep vein thrombosis (DVT) of lower extremity ([place] [address] )    Right carotid bruit     Past Medical History:   Diagnosis Date    Anxiety disorder     last assessed - [id number]    Basal cell carcinoma of face     BPH (benign prostatic hyperplasia)     benign prostatic hypertrophy; last assessed - 71QZW8411    Chronic kidney disease     Coronary atherosclerosis of native coronary artery     last assessed - [id number]    Deep vein thrombosis (DVT) ([facility] )     last assessed - 67XTU9332    Depression     last assessed - 35HGY6126    History of colonoscopy     Fiberoptic; Resolved - 12/1/2011    Hyperlipemia     last assessed - 82ITM2137    Hypertension     last assessed - 87FEW2993    Myocardial infarction ([place] [address] )     Nonmelanoma skin cancer     last assessed - 23RRA5700    Osteoarthritis     last assessed - 23DTA6899    Pneumonia     last assessed - 80PZH2627    Pulmonary HTN ([place] [address] )     PVD (peripheral vascular disease) ([place] [address] )     Seborrheic dermatitis      Social History     Social History    Marital status: /Civil Union     Spouse name: N/A    Number of children: N/A    Years of education: N/A     Occupational History    Not on file       Social History Main Topics    Smoking status: Never Smoker    Smokeless tobacco: Never Used    Alcohol use No    Drug use: Yes     Types: Marijuana    Sexual activity: Not Currently     Other Topics Concern    Not on file     Social History Narrative    No narrative on file      Family History   Problem Relation Age of Onset    Dementia Mother     Rectal cancer Mother     Coronary artery disease Father     Rectal cancer Sister      Past Surgical History:   Procedure Laterality Date    CARDIAC CATHETERIZATION      procedure outcome successful; last assessed - 74CKB3455    CORONARY ARTERY BYPASS GRAFT      last assessed - 62TVT8690    IVC FILTER INSERTION      Jael    TOTAL HIP ARTHROPLASTY      last assessed - 40LEP6535       Current Outpatient Prescriptions:   aspirin 325 mg tablet, Take 1 tablet by mouth daily, Disp: , Rfl:     atorvastatin (LIPITOR) 80 mg tablet, Take 1 tablet (80 mg total) by mouth daily, Disp: 90 tablet, Rfl: 2    buPROPion (WELLBUTRIN XL) 300 mg 24 hr tablet, Take 1 tablet (300 mg total) by mouth daily, Disp: 90 tablet, Rfl: 3    escitalopram (LEXAPRO) 20 mg tablet, Take 1 tablet (20 mg total) by mouth daily, Disp: 90 tablet, Rfl: 2    LORazepam (ATIVAN) 1 mg tablet, Take 1 tablet (1 mg total) by mouth 3 (three) times a day as needed for anxiety for up to 30 days, Disp: 90 tablet, Rfl: 0    metoprolol tartrate (LOPRESSOR) 25 mg tablet, Take 1 tablet (25 mg total) by mouth 2 (two) times a day, Disp: 180 tablet, Rfl: 3    tamsulosin (FLOMAX) 0 4 mg, Take 1 capsule (0 4 mg total) by mouth daily with dinner for 90 days, Disp: 90 capsule, Rfl: 3  No Known Allergies  Vitals:    01/09/19 1314   BP: 112/70   BP Location: Left arm   Patient Position: Sitting   Cuff Size: Adult   Pulse: 66   SpO2: 95%   Weight: 79 1 kg (174 lb 6 4 oz)   Height: 5' 10" (1 778 m)       Labs:  Appointment on 11/13/2018   Component Date Value    WBC 11/13/2018 6 64     RBC 11/13/2018 5 11     Hemoglobin 11/13/2018 15 0     Hematocrit 11/13/2018 45 9     MCV 11/13/2018 90     MCH 11/13/2018 29 4     MCHC 11/13/2018 32 7     RDW 11/13/2018 13 1     Platelets 93/82/0803 184     MPV 11/13/2018 10 8     Sodium 11/13/2018 142     Potassium 11/13/2018 4 4     Chloride 11/13/2018 107     CO2 11/13/2018 27     ANION GAP 11/13/2018 8     BUN 11/13/2018 23     Creatinine 11/13/2018 1 12     Glucose, Fasting 11/13/2018 98     Calcium 11/13/2018 9 4     AST 11/13/2018 24     ALT 11/13/2018 31     Alkaline Phosphatase 11/13/2018 86     Total Protein 11/13/2018 6 9     Albumin 11/13/2018 3 6     Total Bilirubin 11/13/2018 0 80     eGFR 11/13/2018 65     Cholesterol 11/13/2018 134     Triglycerides 11/13/2018 127     HDL, Direct 11/13/2018 38*    LDL Calculated 11/13/2018 71     Non-HDL-Chol (CHOL-HDL) 11/13/2018 96     TSH 3RD [facility] 11/13/2018 1 813     Hemoglobin A1C 11/13/2018 5 8     EAG 11/13/2018 120      Imaging: No results found  Review of Systems:  Review of Systems   Constitutional: Negative for diaphoresis and fatigue  HENT: Negative for congestion and facial swelling  Eyes: Negative for photophobia and visual disturbance  Respiratory: Negative for chest tightness and shortness of breath  Cardiovascular: Negative for chest pain, palpitations and leg swelling  Gastrointestinal: Negative for abdominal pain and blood in stool  Endocrine: Negative for cold intolerance and heat intolerance  Musculoskeletal: Negative for arthralgias and myalgias  Skin: Negative for pallor and rash  Neurological: Negative for dizziness and syncope  Physical Exam:  Physical Exam   Constitutional: He is oriented to person, place, and time  He appears well-developed and well-nourished  HENT:   Head: Normocephalic and atraumatic  Eyes: Pupils are equal, round, and reactive to light  Conjunctivae and EOM are normal    Neck: Normal range of motion  Neck supple  No JVD present  No thyromegaly present  Cardiovascular: Normal rate, regular rhythm, normal heart sounds and intact distal pulses  Exam reveals no gallop and no friction rub  No murmur heard  Pulmonary/Chest: Effort normal and breath sounds normal  No respiratory distress  He has no wheezes  He has no rales  Abdominal: Soft  Bowel sounds are normal  He exhibits no distension  There is no tenderness  Musculoskeletal: Normal range of motion  He exhibits no edema  Neurological: He is alert and oriented to person, place, and time  He has normal reflexes  Skin: Skin is warm and dry  Psychiatric: He has a normal mood and affect  Discussion/Summary:  1    CAD s/p CABG:  ASYMPTOMATIC  Patient is on optimal medical management for CAD including aspirin, statins, beta-blocker  PATIENT SHOWED INFERIOR INFARCT ON HIS NUCLEAR STRESS TEST, WILL REPEAT ECHOCARDIOGRAM TO EVALUATE LV SYSTOLIC FUNCTION      2   HTN:  Blood pressure well controlled, continue current medications     3   DVT s/p IVCfilter     Patient was asked to avoid sitting for long period of time and ambulate as much as possible      4  MILD RIGHT-SIDED CAROTID DISEASE:  ON ASPIRIN STATINS      5  Hyperlipidemia, triglycerides are mildly elevated but patient was not fasting lipid profile was checked  LDL is at goal    continue Lipitor for now      Follow up with 6 months

## 2019-01-24 ENCOUNTER — HOSPITAL ENCOUNTER (OUTPATIENT)
Dept: NON INVASIVE DIAGNOSTICS | Facility: CLINIC | Age: 74
Discharge: HOME | End: 2019-01-24
Payer: MEDICARE

## 2019-01-24 DIAGNOSIS — I25.10 CORONARY ARTERY DISEASE INVOLVING NATIVE CORONARY ARTERY OF NATIVE HEART WITHOUT ANGINA PECTORIS: ICD-10-CM

## 2019-01-24 PROCEDURE — 93306 TTE W/DOPPLER COMPLETE: CPT

## 2019-01-25 PROCEDURE — 93306 TTE W/DOPPLER COMPLETE: CPT | Performed by: INTERNAL MEDICINE

## 2019-01-30 DIAGNOSIS — F41.9 ANXIETY: ICD-10-CM

## 2019-01-30 RX ORDER — BUPROPION HYDROCHLORIDE 300 MG/1
300 TABLET ORAL DAILY
Qty: 90 TABLET | Refills: 1 | Status: SHIPPED | OUTPATIENT
Start: 2019-01-30 | End: 2019-05-06 | Stop reason: SDUPTHER

## 2019-04-25 ENCOUNTER — OFFICE VISIT (OUTPATIENT)
Dept: DERMATOLOGY | Facility: CLINIC | Age: 74
End: 2019-04-25
Payer: MEDICARE

## 2019-04-25 DIAGNOSIS — Z85.828 HISTORY OF SKIN CANCER: ICD-10-CM

## 2019-04-25 DIAGNOSIS — L23.7 POISON IVY: ICD-10-CM

## 2019-04-25 DIAGNOSIS — Z13.89 SCREENING FOR SKIN CONDITION: ICD-10-CM

## 2019-04-25 DIAGNOSIS — L82.1 SEBORRHEIC KERATOSIS: Primary | ICD-10-CM

## 2019-04-25 PROCEDURE — 99213 OFFICE O/P EST LOW 20 MIN: CPT | Performed by: DERMATOLOGY

## 2019-04-29 ENCOUNTER — OFFICE VISIT (OUTPATIENT)
Dept: CARDIOLOGY CLINIC | Facility: CLINIC | Age: 74
End: 2019-04-29
Payer: MEDICARE

## 2019-04-29 VITALS
WEIGHT: 171 LBS | SYSTOLIC BLOOD PRESSURE: 120 MMHG | HEART RATE: 78 BPM | DIASTOLIC BLOOD PRESSURE: 76 MMHG | HEIGHT: 70 IN | BODY MASS INDEX: 24.48 KG/M2 | OXYGEN SATURATION: 99 %

## 2019-04-29 DIAGNOSIS — I82.509 CHRONIC DEEP VEIN THROMBOSIS (DVT) OF LOWER EXTREMITY, UNSPECIFIED LATERALITY, UNSPECIFIED VEIN (HCC): ICD-10-CM

## 2019-04-29 DIAGNOSIS — I65.21 CAROTID STENOSIS, RIGHT: ICD-10-CM

## 2019-04-29 DIAGNOSIS — I10 ESSENTIAL HYPERTENSION: ICD-10-CM

## 2019-04-29 DIAGNOSIS — I25.10 CORONARY ARTERY DISEASE INVOLVING NATIVE CORONARY ARTERY OF NATIVE HEART WITHOUT ANGINA PECTORIS: Primary | ICD-10-CM

## 2019-04-29 DIAGNOSIS — E78.2 MIXED HYPERLIPIDEMIA: ICD-10-CM

## 2019-04-29 PROCEDURE — 99214 OFFICE O/P EST MOD 30 MIN: CPT | Performed by: INTERNAL MEDICINE

## 2019-05-01 ENCOUNTER — TELEPHONE (OUTPATIENT)
Dept: INTERNAL MEDICINE CLINIC | Facility: CLINIC | Age: 74
End: 2019-05-01

## 2019-05-04 ENCOUNTER — APPOINTMENT (OUTPATIENT)
Dept: LAB | Facility: HOSPITAL | Age: 74
End: 2019-05-04
Attending: INTERNAL MEDICINE
Payer: MEDICARE

## 2019-05-04 DIAGNOSIS — E78.2 MIXED HYPERLIPIDEMIA: ICD-10-CM

## 2019-05-04 DIAGNOSIS — I10 ESSENTIAL HYPERTENSION: ICD-10-CM

## 2019-05-04 DIAGNOSIS — E03.8 SUBCLINICAL HYPOTHYROIDISM: ICD-10-CM

## 2019-05-04 DIAGNOSIS — R73.01 IMPAIRED FASTING GLUCOSE: ICD-10-CM

## 2019-05-04 LAB
ALBUMIN SERPL BCP-MCNC: 3.8 G/DL (ref 3.5–5)
ALP SERPL-CCNC: 74 U/L (ref 46–116)
ALT SERPL W P-5'-P-CCNC: 39 U/L (ref 12–78)
ANION GAP SERPL CALCULATED.3IONS-SCNC: 6 MMOL/L (ref 4–13)
AST SERPL W P-5'-P-CCNC: 30 U/L (ref 5–45)
BASOPHILS # BLD AUTO: 0.06 THOUSANDS/ΜL (ref 0–0.1)
BASOPHILS NFR BLD AUTO: 1 % (ref 0–1)
BILIRUB SERPL-MCNC: 0.7 MG/DL (ref 0.2–1)
BUN SERPL-MCNC: 21 MG/DL (ref 5–25)
CALCIUM SERPL-MCNC: 9.8 MG/DL (ref 8.3–10.1)
CHLORIDE SERPL-SCNC: 104 MMOL/L (ref 100–108)
CHOLEST SERPL-MCNC: 142 MG/DL (ref 50–200)
CO2 SERPL-SCNC: 28 MMOL/L (ref 21–32)
CREAT SERPL-MCNC: 1.32 MG/DL (ref 0.6–1.3)
EOSINOPHIL # BLD AUTO: 0.31 THOUSAND/ΜL (ref 0–0.61)
EOSINOPHIL NFR BLD AUTO: 5 % (ref 0–6)
ERYTHROCYTE [DISTWIDTH] IN BLOOD BY AUTOMATED COUNT: 12.9 % (ref 11.6–15.1)
GFR SERPL CREATININE-BSD FRML MDRD: 53 ML/MIN/1.73SQ M
GLUCOSE P FAST SERPL-MCNC: 99 MG/DL (ref 65–99)
HCT VFR BLD AUTO: 42.7 % (ref 36.5–49.3)
HDLC SERPL-MCNC: 34 MG/DL (ref 40–60)
HGB BLD-MCNC: 14.1 G/DL (ref 12–17)
IMM GRANULOCYTES # BLD AUTO: 0.02 THOUSAND/UL (ref 0–0.2)
IMM GRANULOCYTES NFR BLD AUTO: 0 % (ref 0–2)
LDLC SERPL CALC-MCNC: 78 MG/DL (ref 0–100)
LYMPHOCYTES # BLD AUTO: 1.79 THOUSANDS/ΜL (ref 0.6–4.47)
LYMPHOCYTES NFR BLD AUTO: 26 % (ref 14–44)
MCH RBC QN AUTO: 29.4 PG (ref 26.8–34.3)
MCHC RBC AUTO-ENTMCNC: 33 G/DL (ref 31.4–37.4)
MCV RBC AUTO: 89 FL (ref 82–98)
MONOCYTES # BLD AUTO: 0.72 THOUSAND/ΜL (ref 0.17–1.22)
MONOCYTES NFR BLD AUTO: 11 % (ref 4–12)
NEUTROPHILS # BLD AUTO: 3.97 THOUSANDS/ΜL (ref 1.85–7.62)
NEUTS SEG NFR BLD AUTO: 57 % (ref 43–75)
NONHDLC SERPL-MCNC: 108 MG/DL
NRBC BLD AUTO-RTO: 0 /100 WBCS
PLATELET # BLD AUTO: 210 THOUSANDS/UL (ref 149–390)
PMV BLD AUTO: 10.8 FL (ref 8.9–12.7)
POTASSIUM SERPL-SCNC: 4.8 MMOL/L (ref 3.5–5.3)
PROT SERPL-MCNC: 6.9 G/DL (ref 6.4–8.2)
RBC # BLD AUTO: 4.8 MILLION/UL (ref 3.88–5.62)
SODIUM SERPL-SCNC: 138 MMOL/L (ref 136–145)
TRIGL SERPL-MCNC: 148 MG/DL
TSH SERPL DL<=0.05 MIU/L-ACNC: 1.49 UIU/ML (ref 0.36–3.74)
WBC # BLD AUTO: 6.87 THOUSAND/UL (ref 4.31–10.16)

## 2019-05-04 PROCEDURE — 84443 ASSAY THYROID STIM HORMONE: CPT

## 2019-05-04 PROCEDURE — 83036 HEMOGLOBIN GLYCOSYLATED A1C: CPT

## 2019-05-04 PROCEDURE — 80053 COMPREHEN METABOLIC PANEL: CPT

## 2019-05-04 PROCEDURE — 36415 COLL VENOUS BLD VENIPUNCTURE: CPT

## 2019-05-04 PROCEDURE — 85025 COMPLETE CBC W/AUTO DIFF WBC: CPT

## 2019-05-04 PROCEDURE — 80061 LIPID PANEL: CPT

## 2019-05-05 LAB
EST. AVERAGE GLUCOSE BLD GHB EST-MCNC: 117 MG/DL
HBA1C MFR BLD: 5.7 % (ref 4.2–6.3)

## 2019-05-06 ENCOUNTER — OFFICE VISIT (OUTPATIENT)
Dept: INTERNAL MEDICINE CLINIC | Facility: CLINIC | Age: 74
End: 2019-05-06
Payer: MEDICARE

## 2019-05-06 VITALS
OXYGEN SATURATION: 93 % | BODY MASS INDEX: 24.97 KG/M2 | WEIGHT: 174.4 LBS | SYSTOLIC BLOOD PRESSURE: 108 MMHG | HEART RATE: 71 BPM | HEIGHT: 70 IN | DIASTOLIC BLOOD PRESSURE: 64 MMHG

## 2019-05-06 DIAGNOSIS — N40.0 BPH WITHOUT URINARY OBSTRUCTION: ICD-10-CM

## 2019-05-06 DIAGNOSIS — I25.10 CORONARY ARTERY DISEASE INVOLVING NATIVE CORONARY ARTERY OF NATIVE HEART WITHOUT ANGINA PECTORIS: ICD-10-CM

## 2019-05-06 DIAGNOSIS — E78.2 MIXED HYPERLIPIDEMIA: ICD-10-CM

## 2019-05-06 DIAGNOSIS — N40.0 BENIGN PROSTATIC HYPERPLASIA, UNSPECIFIED WHETHER LOWER URINARY TRACT SYMPTOMS PRESENT: ICD-10-CM

## 2019-05-06 DIAGNOSIS — I10 ESSENTIAL HYPERTENSION: ICD-10-CM

## 2019-05-06 DIAGNOSIS — N18.30 CKD (CHRONIC KIDNEY DISEASE), STAGE III (HCC): ICD-10-CM

## 2019-05-06 DIAGNOSIS — F41.9 ANXIETY: ICD-10-CM

## 2019-05-06 DIAGNOSIS — E03.8 SUBCLINICAL HYPOTHYROIDISM: ICD-10-CM

## 2019-05-06 DIAGNOSIS — E78.5 HYPERLIPIDEMIA, UNSPECIFIED HYPERLIPIDEMIA TYPE: ICD-10-CM

## 2019-05-06 DIAGNOSIS — R73.01 IMPAIRED FASTING GLUCOSE: Primary | ICD-10-CM

## 2019-05-06 PROCEDURE — 99214 OFFICE O/P EST MOD 30 MIN: CPT | Performed by: INTERNAL MEDICINE

## 2019-05-06 RX ORDER — TAMSULOSIN HYDROCHLORIDE 0.4 MG/1
0.4 CAPSULE ORAL
Qty: 90 CAPSULE | Refills: 3 | Status: SHIPPED | OUTPATIENT
Start: 2019-05-06 | End: 2019-11-19 | Stop reason: SDUPTHER

## 2019-05-06 RX ORDER — ATORVASTATIN CALCIUM 80 MG/1
80 TABLET, FILM COATED ORAL DAILY
Qty: 90 TABLET | Refills: 3 | Status: SHIPPED | OUTPATIENT
Start: 2019-05-06 | End: 2019-11-19 | Stop reason: SDUPTHER

## 2019-05-06 RX ORDER — BUPROPION HYDROCHLORIDE 300 MG/1
300 TABLET ORAL DAILY
Qty: 90 TABLET | Refills: 3 | Status: SHIPPED | OUTPATIENT
Start: 2019-05-06 | End: 2019-11-19 | Stop reason: SDUPTHER

## 2019-05-06 RX ORDER — LORAZEPAM 1 MG/1
1 TABLET ORAL 3 TIMES DAILY PRN
Qty: 90 TABLET | Refills: 0 | Status: SHIPPED | OUTPATIENT
Start: 2019-05-06 | End: 2019-09-17 | Stop reason: SDUPTHER

## 2019-05-06 RX ORDER — ESCITALOPRAM OXALATE 20 MG/1
20 TABLET ORAL DAILY
Qty: 90 TABLET | Refills: 2 | Status: SHIPPED | OUTPATIENT
Start: 2019-05-06 | End: 2019-11-19 | Stop reason: SDUPTHER

## 2019-09-12 ENCOUNTER — TELEPHONE (OUTPATIENT)
Dept: INTERNAL MEDICINE CLINIC | Facility: CLINIC | Age: 74
End: 2019-09-12

## 2019-09-12 NOTE — TELEPHONE ENCOUNTER
Graciela Espinal called from Neurology Dr Sabrina Valera office regarding this patient  Graciela Espinal is going to be seeing this patient on Monday and needs the most recent office notes that the patient was seen by Dr Mateus Cook      [address] [place] [address]

## 2019-09-16 ENCOUNTER — TRANSCRIBE ORDERS (OUTPATIENT)
Dept: ADMINISTRATIVE | Facility: HOSPITAL | Age: 74
End: 2019-09-16

## 2019-09-16 DIAGNOSIS — I63.312 CEREBRAL INFARCTION DUE TO THROMBOSIS OF LEFT MIDDLE CEREBRAL ARTERY (HCC): Primary | ICD-10-CM

## 2019-09-16 DIAGNOSIS — D51.1 MEGALOBLASTIC ANEMIA DUE TO VITAMIN B12 MALABSORPTION WITH PROTEINURIA: ICD-10-CM

## 2019-09-16 DIAGNOSIS — E55.9 AVITAMINOSIS D: ICD-10-CM

## 2019-09-16 DIAGNOSIS — E53.8 BIOTIN-(PROPIONYL-COA-CARBOXYLASE) LIGASE DEFICIENCY: ICD-10-CM

## 2019-09-17 DIAGNOSIS — F41.9 ANXIETY: ICD-10-CM

## 2019-09-17 RX ORDER — LORAZEPAM 1 MG/1
1 TABLET ORAL 3 TIMES DAILY PRN
Qty: 90 TABLET | Refills: 0 | Status: SHIPPED | OUTPATIENT
Start: 2019-09-17 | End: 2019-11-19 | Stop reason: SDUPTHER

## 2019-09-29 ENCOUNTER — HOSPITAL ENCOUNTER (OUTPATIENT)
Dept: MRI IMAGING | Facility: HOSPITAL | Age: 74
Discharge: HOME | End: 2019-09-29
Payer: MEDICARE

## 2019-09-29 DIAGNOSIS — I63.312 CEREBRAL INFARCTION DUE TO THROMBOSIS OF LEFT MIDDLE CEREBRAL ARTERY (HCC): ICD-10-CM

## 2019-09-29 PROCEDURE — 70551 MRI BRAIN STEM W/O DYE: CPT

## 2019-10-04 ENCOUNTER — APPOINTMENT (OUTPATIENT)
Dept: LAB | Facility: CLINIC | Age: 74
End: 2019-10-04
Payer: MEDICARE

## 2019-10-04 DIAGNOSIS — E55.9 AVITAMINOSIS D: ICD-10-CM

## 2019-10-04 DIAGNOSIS — E53.8 BIOTIN-(PROPIONYL-COA-CARBOXYLASE) LIGASE DEFICIENCY: ICD-10-CM

## 2019-10-04 DIAGNOSIS — D51.1 MEGALOBLASTIC ANEMIA DUE TO VITAMIN B12 MALABSORPTION WITH PROTEINURIA: ICD-10-CM

## 2019-10-04 LAB
25(OH)D3 SERPL-MCNC: 22.6 NG/ML (ref 30–100)
CK MB SERPL-MCNC: 2.5 % (ref 0–2.5)
CK MB SERPL-MCNC: 4.9 NG/ML (ref 0–5)
CK SERPL-CCNC: 193 U/L (ref 39–308)
FOLATE SERPL-MCNC: 15 NG/ML (ref 3.1–17.5)
T4 FREE SERPL-MCNC: 0.81 NG/DL (ref 0.76–1.46)
TSH SERPL DL<=0.05 MIU/L-ACNC: 1.88 UIU/ML (ref 0.36–3.74)
VIT B12 SERPL-MCNC: 223 PG/ML (ref 100–900)

## 2019-10-04 PROCEDURE — 36415 COLL VENOUS BLD VENIPUNCTURE: CPT

## 2019-10-04 PROCEDURE — 82085 ASSAY OF ALDOLASE: CPT

## 2019-10-04 PROCEDURE — 82550 ASSAY OF CK (CPK): CPT

## 2019-10-04 PROCEDURE — 82553 CREATINE MB FRACTION: CPT

## 2019-10-04 PROCEDURE — 82306 VITAMIN D 25 HYDROXY: CPT

## 2019-10-04 PROCEDURE — 84439 ASSAY OF FREE THYROXINE: CPT

## 2019-10-04 PROCEDURE — 84443 ASSAY THYROID STIM HORMONE: CPT

## 2019-10-04 PROCEDURE — 82607 VITAMIN B-12: CPT

## 2019-10-04 PROCEDURE — 82746 ASSAY OF FOLIC ACID SERUM: CPT

## 2019-10-07 LAB — ALDOLASE SERPL-CCNC: 6.7 U/L (ref 3.3–10.3)

## 2019-10-17 NOTE — PROGRESS NOTES
There are no diagnoses linked to this encounter  Assessment and plan:       1  BPH with nocturia  - Patient continues to have bothersome nocturia  Continues to take tamsulosin but did not have an improvement with a previous trial with a beta 3 agonist   - Patient's wife reports that he snores and I have referred him to sleep medicine for a workup for sleep apnea  We discussed that if the patient does have sleep apnea, being treated with a CPAP machine can decrease his episodes of nocturia   - He will follow up in 6 months for symptom reassessment  Allyne Kussmaul, PA-C      Chief Complaint     Chief Complaint   Patient presents with    Nocturia         History of Present Illness     Joana Magdaleno is a 69 y o  male patient known to Dr Coleman Davalos for history of BPH with nocturia  Patient has achieved a moderate level of control with tamsulosin  He did briefly try a beta 3 agonist with no symptom improvement  Patient previously declined cystoscopic evaluation with transrectal ultrasound  Patient continues to have bothersome nocturia reporting at least 4 episodes of nocturia nightly  AUA symptom score today is 14 with an overall bother score of 5  Patient's wife reports that he snores frequently and loudly  Laboratory     Lab Results   Component Value Date    CREATININE 1 32 (H) 05/04/2019       Lab Results   Component Value Date    PSA 1 5 05/03/2018    PSA 1 1 11/06/2015    PSA 1 52 05/05/2015       No results found for this or any previous visit (from the past 1 hour(s))  Review of Systems     Review of Systems   Constitutional: Negative for chills and fever  HENT: Negative  Eyes: Negative  Respiratory: Negative for shortness of breath  Cardiovascular: Negative for chest pain  Gastrointestinal: Negative for constipation, diarrhea, nausea and vomiting  Genitourinary: Positive for frequency (At night only)   Negative for difficulty urinating, dysuria, enuresis, hematuria and urgency  Musculoskeletal: Negative  AUA SYMPTOM SCORE      Most Recent Value   AUA SYMPTOM SCORE   How often have you had a sensation of not emptying your bladder completely after you finished urinating? 5   How often have you had to urinate again less than two hours after you finished urinating? 0   How often have you found you stopped and started again several times when you urinate?  0   How often have you found it difficult to postpone urination? 0   How often have you had a weak urinary stream?  5   How often have you had to push or strain to begin urination? 0   How many times did you most typically get up to urinate from the time you went to bed at night until the time you got up in the morning? 4   Quality of Life: If you were to spend the rest of your life with your urinary condition just the way it is now, how would you feel about that?  5   AUA SYMPTOM SCORE  14                    Allergies     No Known Allergies    Physical Exam     Physical Exam   Constitutional: He is oriented to person, place, and time  He appears well-developed and well-nourished  No distress  HENT:   Head: Normocephalic and atraumatic  Right Ear: External ear normal    Left Ear: External ear normal    Nose: Nose normal    Eyes: Right eye exhibits no discharge  Left eye exhibits no discharge  No scleral icterus  Cardiovascular: Normal rate  Pulmonary/Chest: Effort normal    Musculoskeletal:   Ambulates independently   Neurological: He is alert and oriented to person, place, and time  Skin: Skin is warm and dry  He is not diaphoretic  Psychiatric: He has a normal mood and affect  His behavior is normal  Judgment and thought content normal    Nursing note and vitals reviewed          Vital Signs     Vitals:    10/18/19 1316   BP: 128/88   Pulse: 71   Weight: 75 8 kg (167 lb)   Height: 5' 10" (1 778 m)         Current Medications       Current Outpatient Medications:     aspirin 325 mg tablet, Take 1 tablet by mouth daily, Disp: , Rfl:     atorvastatin (LIPITOR) 80 mg tablet, Take 1 tablet (80 mg total) by mouth daily, Disp: 90 tablet, Rfl: 3    buPROPion (WELLBUTRIN XL) 300 mg 24 hr tablet, Take 1 tablet (300 mg total) by mouth daily, Disp: 90 tablet, Rfl: 3    escitalopram (LEXAPRO) 20 mg tablet, Take 1 tablet (20 mg total) by mouth daily, Disp: 90 tablet, Rfl: 2    LORazepam (ATIVAN) 1 mg tablet, Take 1 tablet (1 mg total) by mouth 3 (three) times a day as needed for anxiety, Disp: 90 tablet, Rfl: 0    metoprolol tartrate (LOPRESSOR) 25 mg tablet, Take 1 tablet (25 mg total) by mouth 2 (two) times a day, Disp: 180 tablet, Rfl: 3    fluocinonide (LIDEX) 0 05 % cream, Apply topically 2 (two) times a day Applied to rash to arms until resolved (Patient not taking: Reported on 10/18/2019), Disp: 30 g, Rfl: 2    tamsulosin (FLOMAX) 0 4 mg, Take 1 capsule (0 4 mg total) by mouth daily with dinner for 137 days, Disp: 90 capsule, Rfl: 3      Active Problems     Patient Active Problem List   Diagnosis    Seborrheic keratosis    Screening for skin condition    History of skin cancer    Psoriasiform dermatitis    Coronary artery disease involving native coronary artery of native heart without angina pectoris    Essential hypertension    Mixed hyperlipidemia    Anxiety    Benign prostatic hyperplasia    CKD (chronic kidney disease), stage III (HCC)    Impaired fasting glucose    Subclinical hypothyroidism    Nocturia    Chronic deep vein thrombosis (DVT) of lower extremity (HCC)    Right carotid bruit    Carotid stenosis, right         Past Medical History     Past Medical History:   Diagnosis Date    Anxiety disorder     last assessed - [id number]    Basal cell carcinoma of face     BPH (benign prostatic hyperplasia)     benign prostatic hypertrophy; last assessed - 55GAD7779    Chronic kidney disease     Coronary atherosclerosis of native coronary artery     last assessed - 71LVP1855  Deep vein thrombosis (DVT) ([facility] )     last assessed - 28XQS5169    Depression     last assessed - 13FCA4128    History of colonoscopy     Fiberoptic; Resolved - 12/1/2011    Hyperlipemia     last assessed - 12DMR6910    Hypertension     last assessed - 61OIC8593    Myocardial infarction [facility])     Nonmelanoma skin cancer     last assessed - 31XYG3786    Osteoarthritis     last assessed - 79NOG2224    Pneumonia     last assessed - 94GNN7826    Pulmonary HTN ([facility] )     PVD (peripheral vascular disease) ([facility] )     Seborrheic dermatitis          Surgical History     Past Surgical History:   Procedure Laterality Date    CARDIAC CATHETERIZATION      procedure outcome successful; last assessed - 19UWQ9484    CORONARY ARTERY BYPASS GRAFT      last assessed - 09QLZ9632    IVC FILTER INSERTION      [place]    TOTAL HIP ARTHROPLASTY      last assessed - [id number]         Family History     Family History   Problem Relation Age of Onset    Dementia Mother     Rectal cancer Mother     Coronary artery disease Father     Rectal cancer Sister          Social History     Social History       Radiology

## 2019-10-18 ENCOUNTER — OFFICE VISIT (OUTPATIENT)
Dept: UROLOGY | Facility: CLINIC | Age: 74
End: 2019-10-18
Payer: MEDICARE

## 2019-10-18 ENCOUNTER — TELEPHONE (OUTPATIENT)
Dept: UROLOGY | Facility: MEDICAL CENTER | Age: 74
End: 2019-10-18

## 2019-10-18 VITALS
WEIGHT: 167 LBS | HEIGHT: 70 IN | HEART RATE: 71 BPM | BODY MASS INDEX: 23.91 KG/M2 | DIASTOLIC BLOOD PRESSURE: 88 MMHG | SYSTOLIC BLOOD PRESSURE: 128 MMHG

## 2019-10-18 DIAGNOSIS — R35.1 NOCTURIA: Primary | ICD-10-CM

## 2019-10-18 PROCEDURE — 99213 OFFICE O/P EST LOW 20 MIN: CPT | Performed by: PHYSICIAN ASSISTANT

## 2019-10-18 NOTE — TELEPHONE ENCOUNTER
Patient of Dr Harry Cardoso seen in the [facility] office  Patient wife called for the referral that was given today to schedule for a sleep study  Patient wife advised that they can not travel and would like to know of any suggestions in ease [place]  Please advise

## 2019-10-18 NOTE — TELEPHONE ENCOUNTER
Called and spoke to patients wife  Made her aware that she needs to call central scheduling to schedule this  This is not something we can schedule for her  Patients wife reports that you gave her a list of places outside the network and would like you to call her back about this

## 2019-10-21 NOTE — TELEPHONE ENCOUNTER
Spoke w PTs wife I had given her info for  Sleep Study facility and had said there is [facility] and a private practice in the [facility] area but do not have that information she would have to call and get the details  PTs wife understood and will be calling to look into this

## 2019-11-01 DIAGNOSIS — M79.10 MYALGIA: Primary | ICD-10-CM

## 2019-11-05 ENCOUNTER — APPOINTMENT (OUTPATIENT)
Dept: LAB | Facility: HOSPITAL | Age: 74
End: 2019-11-05
Attending: INTERNAL MEDICINE
Payer: MEDICARE

## 2019-11-05 DIAGNOSIS — E03.8 SUBCLINICAL HYPOTHYROIDISM: ICD-10-CM

## 2019-11-05 DIAGNOSIS — M79.10 MYALGIA: ICD-10-CM

## 2019-11-05 DIAGNOSIS — I10 ESSENTIAL HYPERTENSION: ICD-10-CM

## 2019-11-05 DIAGNOSIS — E78.5 HYPERLIPIDEMIA, UNSPECIFIED HYPERLIPIDEMIA TYPE: ICD-10-CM

## 2019-11-05 DIAGNOSIS — R73.01 IMPAIRED FASTING GLUCOSE: ICD-10-CM

## 2019-11-05 LAB
ALBUMIN SERPL BCP-MCNC: 3.7 G/DL (ref 3.5–5)
ALP SERPL-CCNC: 86 U/L (ref 46–116)
ALT SERPL W P-5'-P-CCNC: 33 U/L (ref 12–78)
ANION GAP SERPL CALCULATED.3IONS-SCNC: 6 MMOL/L (ref 4–13)
AST SERPL W P-5'-P-CCNC: 27 U/L (ref 5–45)
BASOPHILS # BLD AUTO: 0.04 THOUSANDS/ΜL (ref 0–0.1)
BASOPHILS NFR BLD AUTO: 1 % (ref 0–1)
BILIRUB SERPL-MCNC: 0.9 MG/DL (ref 0.2–1)
BUN SERPL-MCNC: 21 MG/DL (ref 5–25)
CALCIUM SERPL-MCNC: 9.4 MG/DL (ref 8.3–10.1)
CHLORIDE SERPL-SCNC: 106 MMOL/L (ref 100–108)
CHOLEST SERPL-MCNC: 137 MG/DL (ref 50–200)
CK MB SERPL-MCNC: 1.9 % (ref 0–2.5)
CK MB SERPL-MCNC: 4.7 NG/ML (ref 0–5)
CK SERPL-CCNC: 245 U/L (ref 39–308)
CO2 SERPL-SCNC: 29 MMOL/L (ref 21–32)
CREAT SERPL-MCNC: 1.39 MG/DL (ref 0.6–1.3)
EOSINOPHIL # BLD AUTO: 0.27 THOUSAND/ΜL (ref 0–0.61)
EOSINOPHIL NFR BLD AUTO: 4 % (ref 0–6)
ERYTHROCYTE [DISTWIDTH] IN BLOOD BY AUTOMATED COUNT: 13.2 % (ref 11.6–15.1)
EST. AVERAGE GLUCOSE BLD GHB EST-MCNC: 111 MG/DL
GFR SERPL CREATININE-BSD FRML MDRD: 50 ML/MIN/1.73SQ M
GLUCOSE P FAST SERPL-MCNC: 92 MG/DL (ref 65–99)
HBA1C MFR BLD: 5.5 % (ref 4.2–6.3)
HCT VFR BLD AUTO: 47.8 % (ref 36.5–49.3)
HDLC SERPL-MCNC: 36 MG/DL
HGB BLD-MCNC: 15.4 G/DL (ref 12–17)
IMM GRANULOCYTES # BLD AUTO: 0.03 THOUSAND/UL (ref 0–0.2)
IMM GRANULOCYTES NFR BLD AUTO: 1 % (ref 0–2)
LDLC SERPL CALC-MCNC: 83 MG/DL (ref 0–100)
LYMPHOCYTES # BLD AUTO: 1.58 THOUSANDS/ΜL (ref 0.6–4.47)
LYMPHOCYTES NFR BLD AUTO: 25 % (ref 14–44)
MCH RBC QN AUTO: 29.1 PG (ref 26.8–34.3)
MCHC RBC AUTO-ENTMCNC: 32.2 G/DL (ref 31.4–37.4)
MCV RBC AUTO: 90 FL (ref 82–98)
MONOCYTES # BLD AUTO: 0.52 THOUSAND/ΜL (ref 0.17–1.22)
MONOCYTES NFR BLD AUTO: 8 % (ref 4–12)
NEUTROPHILS # BLD AUTO: 3.98 THOUSANDS/ΜL (ref 1.85–7.62)
NEUTS SEG NFR BLD AUTO: 61 % (ref 43–75)
NONHDLC SERPL-MCNC: 101 MG/DL
NRBC BLD AUTO-RTO: 0 /100 WBCS
PLATELET # BLD AUTO: 213 THOUSANDS/UL (ref 149–390)
PMV BLD AUTO: 11 FL (ref 8.9–12.7)
POTASSIUM SERPL-SCNC: 4.7 MMOL/L (ref 3.5–5.3)
PROT SERPL-MCNC: 6.9 G/DL (ref 6.4–8.2)
RBC # BLD AUTO: 5.3 MILLION/UL (ref 3.88–5.62)
SODIUM SERPL-SCNC: 141 MMOL/L (ref 136–145)
TRIGL SERPL-MCNC: 91 MG/DL
TSH SERPL DL<=0.05 MIU/L-ACNC: 2.57 UIU/ML (ref 0.36–3.74)
WBC # BLD AUTO: 6.42 THOUSAND/UL (ref 4.31–10.16)

## 2019-11-05 PROCEDURE — 83036 HEMOGLOBIN GLYCOSYLATED A1C: CPT

## 2019-11-05 PROCEDURE — 85025 COMPLETE CBC W/AUTO DIFF WBC: CPT

## 2019-11-05 PROCEDURE — 82553 CREATINE MB FRACTION: CPT

## 2019-11-05 PROCEDURE — 80053 COMPREHEN METABOLIC PANEL: CPT

## 2019-11-05 PROCEDURE — 36415 COLL VENOUS BLD VENIPUNCTURE: CPT

## 2019-11-05 PROCEDURE — 82550 ASSAY OF CK (CPK): CPT

## 2019-11-05 PROCEDURE — 84443 ASSAY THYROID STIM HORMONE: CPT

## 2019-11-05 PROCEDURE — 80061 LIPID PANEL: CPT

## 2019-11-07 ENCOUNTER — OFFICE VISIT (OUTPATIENT)
Dept: CARDIOLOGY CLINIC | Facility: CLINIC | Age: 74
End: 2019-11-07
Payer: MEDICARE

## 2019-11-07 VITALS
HEIGHT: 70 IN | WEIGHT: 170 LBS | OXYGEN SATURATION: 98 % | SYSTOLIC BLOOD PRESSURE: 119 MMHG | BODY MASS INDEX: 24.34 KG/M2 | DIASTOLIC BLOOD PRESSURE: 72 MMHG | HEART RATE: 67 BPM

## 2019-11-07 DIAGNOSIS — I65.21 CAROTID STENOSIS, RIGHT: ICD-10-CM

## 2019-11-07 DIAGNOSIS — Z95.1 HX OF CABG: ICD-10-CM

## 2019-11-07 DIAGNOSIS — I25.10 CORONARY ARTERY DISEASE INVOLVING NATIVE HEART WITHOUT ANGINA PECTORIS, UNSPECIFIED VESSEL OR LESION TYPE: Primary | ICD-10-CM

## 2019-11-07 DIAGNOSIS — I10 ESSENTIAL HYPERTENSION: ICD-10-CM

## 2019-11-07 DIAGNOSIS — E78.2 MIXED HYPERLIPIDEMIA: ICD-10-CM

## 2019-11-07 PROCEDURE — 99214 OFFICE O/P EST MOD 30 MIN: CPT | Performed by: INTERNAL MEDICINE

## 2019-11-07 RX ORDER — MEMANTINE HYDROCHLORIDE 5 MG/1
5 TABLET ORAL 2 TIMES DAILY
Refills: 3 | COMMUNITY
Start: 2019-10-07 | End: 2020-05-19

## 2019-11-07 RX ORDER — ERGOCALCIFEROL 1.25 MG/1
CAPSULE ORAL
Refills: 1 | COMMUNITY
Start: 2019-10-07 | End: 2020-08-17

## 2019-11-07 NOTE — PROGRESS NOTES
CARDIOLOGY OFFICE VISIT  [facility]  [address], [address] [place] [address], [place], [address]  Tel: [phone]      NAME: Arlette House  AGE: 69 y o  SEX: male  : 1945   MRN: [id number]      Chief Complaint:  Chief Complaint   Patient presents with    Follow-up     6 months         History of Present Illness:   Patient of Dr Chasity Kelly who comes for follow up  States he is doing well from cardiac stand point and denies chest pain / pressure, SOB, palpitations, lightheadedness, syncope, swelling feet, orthopnea, PND, claudication  CAD s/p 3 V CABG in  -  States is doing well cardiac wise with no cardiac symptoms  Taking all medications (ASA, BB, statin) regularly  HTN -  Has been hypertensive for many years  Taking medications regularly  Denies lightheadedness, headache, medication side effects  HLP -  Has had hyperlipidemia for many years  Taking statin regularly along with diet control  Denies myalgia  PCP closely monitoring the blood work  Asymptomatic right carotid atherosclerosis -  On aspirin, statin  Denies symptoms suggestive heart TIA, CVA, amaurosis    Nuclear stress test, 2018 - There was a moderate-sized infarct in the inferior region   Left ventricular systolic function was reduced, with regional wall motion abnormalities       2D echocardiogram 2019 showed normal LV systolic function, grade 1 DD, mild MR, mild TR    Past Medical History:  Past Medical History:   Diagnosis Date    Anxiety disorder     last assessed - [id number]    Basal cell carcinoma of face     BPH (benign prostatic hyperplasia)     benign prostatic hypertrophy; last assessed - [id number]    Chronic kidney disease     Coronary atherosclerosis of native coronary artery     last assessed - [id number]    Deep vein thrombosis (DVT) ([facility] [address] )     last assessed - [id number]    Depression     last assessed - [id number]    History of colonoscopy     Fiberoptic; Resolved - 12/1/2011    Hyperlipemia     last assessed - [id number]    Hypertension     last assessed - 85DHN4653    Myocardial infarction [facility])     Nonmelanoma skin cancer     last assessed - [id number]    Osteoarthritis     last assessed - 75MYD7737    Pneumonia     last assessed - [id number]    Pulmonary HTN ([facility] [address] )     [facility]ipheral vascular disease) [facility]     Seborrheic dermatitis          Past Surgical History:  Past Surgical History:   Procedure Laterality Date    CARDIAC CATHETERIZATION      procedure outcome successful; last assessed - [id number]    CORONARY ARTERY BYPASS GRAFT      last assessed - 49OBU0401    IVC FILTER INSERTION      Jael    TOTAL HIP ARTHROPLASTY      last assessed - [id number]         Family History:  Family History   Problem Relation Age of Onset    Dementia Mother     Rectal cancer Mother     Coronary artery disease Father     Rectal cancer Sister          Social History:  Social History     Socioeconomic History    Marital status: /Civil Union     Spouse name: None    Number of children: None    Years of education: None    Highest education level: None   Occupational History    None   Social Needs    Financial resource strain: None    Food insecurity:     Worry: None     Inability: None    Transportation needs:     Medical: None     Non-medical: None   Tobacco Use    Smoking status: Never Smoker    Smokeless tobacco: Never Used   Substance and Sexual Activity    Alcohol use: No    Drug use: Yes     Types: Marijuana    Sexual activity: Not Currently   Lifestyle    Physical activity:     Days per week: None     Minutes per session: None    Stress: None   Relationships    Social connections:     Talks on phone: None     Gets together: None     Attends Scientologist service: None     Active member of club or organization: None     Attends meetings of clubs or organizations: None     Relationship status: None    Intimate partner violence:     Fear of current or ex partner: None     Emotionally abused: None     Physically abused: None     Forced sexual activity: None   Other Topics Concern    None   Social History Narrative    None         Active Problems:  Patient Active Problem List   Diagnosis    Seborrheic keratosis    Screening for skin condition    History of skin cancer    Psoriasiform dermatitis    Coronary artery disease involving native coronary artery of native heart without angina pectoris    Essential hypertension    Mixed hyperlipidemia    Anxiety    Benign prostatic hyperplasia    CKD (chronic kidney disease), stage III (HCC)    Impaired fasting glucose    Subclinical hypothyroidism    Nocturia    Chronic deep vein thrombosis (DVT) of lower extremity (HCC)    Right carotid bruit    Carotid stenosis, right         The following portions of the patient's history were reviewed and updated as appropriate: past medical history, past surgical history, past family history,  past social history, current medications, allergies and problem list       Review of Systems:  Constitutional: Denies fever, chills, fatigue  Eyes: Denies eye redness, eye discharge, double vision  ENT: Denies hearing loss, tinnitus, sneezing, nasal discharge, sore throat   Respiratory: Denies cough, expectoration, hemoptysis, shortness of breath  Cardiovascular: Denies chest pain, palpitations, orthopnea, PND, lower extremity swelling  Gastrointestinal: Denies abdominal pain, nausea, vomiting, hematemesis, diarrhea, bloody stools  Genito-Urinary: Denies dysuria, incontinence  Musculoskeletal: Denies back pain, joint pain, muscle pain  Neurologic: Denies confusion, lightheadedness, syncope, headache, focal weakness, sensory changes, seizures  Endocrine: Denies polyuria, polydipsia, temperature intolerance  Allergy and Immunology: Denies hives, insect bite sensitivity  Hematological and Lymphatic: Denies bleeding problems, swollen glands Psychological: Denies depression, suicidal ideation, anxiety, panic  Dermatological: Denies pruritus, rash, skin lesion changes      Vitals:  Vitals:    11/07/19 1021   BP: 119/72   Pulse: 67   SpO2: 98%       Body mass index is 24 39 kg/m²  Weight (last 2 days)     Date/Time   Weight    11/07/19 1021   77 1 (170)                Physical Examination:  General: Patient is not in acute distress  Awake, alert, oriented in time, place and person  Responding to commands  Head: Normocephalic  Atraumatic  Eyes: Both pupils normal sized, round and reactive to light  Nonicteric  ENT: Normal external ear canals  Nares normal, no drainage  Lips and oral mucosa normal  Neck: Supple  JVP not raised  Trachea central  No thyromegaly  Lungs: Bilateral bronchovascular breath sounds with no crackles or rhonchi  Chest wall: No tenderness  Cardiovascular: RRR  S1 and S2 normal  No murmur, rub or gallop  Gastrointestinal: Abdomen soft, nontender  No guarding or rigidity  Liver and spleen not palpable  Bowel sounds present  Neurologic: Patient is awake, alert, oriented in time, place and person  Responding to command  Moving all extremities  Integumentary:  No skin rash  Lymphatic: No cervical lymphadenopathy  Back: Symmetric   No CVA tenderness  Extremities: No clubbing, cyanosis or edema      Laboratory Results:  CBC with diff:   Lab Results   Component Value Date    WBC 6 42 11/05/2019    WBC 7 42 11/06/2015    RBC 5 30 11/05/2019    RBC 5 28 11/06/2015    HGB 15 4 11/05/2019    HGB 15 5 11/06/2015    HCT 47 8 11/05/2019    HCT 46 0 11/06/2015    MCV 90 11/05/2019    MCV 87 11/06/2015    MCH 29 1 11/05/2019    MCH 29 4 11/06/2015    RDW 13 2 11/05/2019    RDW 13 2 11/06/2015     11/05/2019     11/06/2015       CMP:  Lab Results   Component Value Date    CREATININE 1 39 (H) 11/05/2019    CREATININE 1 41 (H) 11/06/2015    BUN 21 11/05/2019    BUN 16 11/06/2015     11/06/2015    K 4 7 11/05/2019    K 4 5 2015     2019     (H) 2015    CO2 29 2019    CO2 28 2015    GLUCOSE 102 2015    PROT 7 3 2015    ALKPHOS 86 2019    ALKPHOS 72 2015    ALT 33 2019    ALT 46 2015    AST 27 2019    AST 31 2015    BILIDIR 0 23 (H) 10/26/2017       Lab Results   Component Value Date    HGBA1C 5 5 2019    MG 2 5 2016       Lab Results   Component Value Date    CKMB 4 7 2019    CKMB 4 9 10/04/2019    CKMB 1 4 2017    CKTOTAL 245 2019    CKTOTAL 193 10/04/2019    CKTOTAL 179 2017       Lipid Profile:   Lab Results   Component Value Date    CHOL 152 2015    CHOL 167 2015    CHOL 163 10/29/2014     Lab Results   Component Value Date    HDL 36 (L) 2019    HDL 34 (L) 2019    HDL 38 (L) 2018     Lab Results   Component Value Date    LDLCALC 83 2019    LDLCALC 78 2019    LDLCALC 71 2018     Lab Results   Component Value Date    TRIG 91 2019    TRIG 148 2019    TRIG 127 2018       Cardiac testing:   Results for orders placed during the hospital encounter of 19   Echo complete with contrast if indicated    Narrative [place] [address] [address] [place] [address]  [phone]    Transthoracic Echocardiogram  2D, M-mode, and Color Doppler    Study date:  2019    Patient: Elke Simpson  MR number: [id number]  Account number: [de-identified]  : 1945  Age: 68 years  Gender: Male  Status: Outpatient  Location: [facility]  Height: 70 in  Weight: 174 lb  BP: 112/ 70 mmHg    Indications: Coronary artery disease      Diagnoses: I25 10 - Atherosclerotic heart disease of native coronary artery without angina pectoris    Sonographer:  Gutiérrez RCS  Interpreting Physician:  Christina Sandoval MD  Primary Physician:  Garry Vail MD  Referring Physician:  Christina Sandoval MD  Group:  [facility]    SUMMARY    LEFT VENTRICLE:  Systolic function was normal  Ejection fraction was estimated in the range of 55 % to 65 %  There were no regional wall motion abnormalities  Doppler parameters were consistent with abnormal left ventricular relaxation (grade 1 diastolic dysfunction)  MITRAL VALVE:  There was mild regurgitation  TRICUSPID VALVE:  There was mild regurgitation  HISTORY: PRIOR HISTORY: Medication-treated hyperlipidemia, DVT, Chronic kidney disease  Previous (remote) myocardial infarction  Peripheral vascular disease  Risk factors: hypertension and a family history of coronary artery disease  PRIOR  PROCEDURES: Coronary bypass  PROCEDURE: The study was performed in the [address] [place] [address]  This was a routine study  The transthoracic approach was used  The study included complete 2D imaging, M-mode, and color Doppler  The heart rate was 65 bpm, at the  start of the study  Images were obtained from the parasternal, apical, subcostal, and suprasternal notch acoustic windows  Image quality was adequate  LEFT VENTRICLE: Size was normal  Systolic function was normal  Ejection fraction was estimated in the range of 55 % to 65 %  There were no regional wall motion abnormalities  Wall thickness was normal  DOPPLER: Doppler parameters were  consistent with abnormal left ventricular relaxation (grade 1 diastolic dysfunction)  RIGHT VENTRICLE: The size was normal  Systolic function was normal  Wall thickness was normal     LEFT ATRIUM: Size was normal     RIGHT ATRIUM: Size was normal     MITRAL VALVE: Valve structure was normal  There was normal leaflet separation  DOPPLER: The transmitral velocity was within the normal range  There was no evidence for stenosis  There was mild regurgitation  AORTIC VALVE: The valve was trileaflet  Leaflets exhibited normal thickness and normal cuspal separation  DOPPLER: Transaortic velocity was within the normal range   There was no evidence for stenosis  There was no regurgitation  TRICUSPID VALVE: The valve structure was normal  There was normal leaflet separation  DOPPLER: The transtricuspid velocity was within the normal range  There was no evidence for stenosis  There was mild regurgitation  PULMONIC VALVE: Leaflets exhibited normal thickness, no calcification, and normal cuspal separation  DOPPLER: The transpulmonic velocity was within the normal range  There was no regurgitation  PERICARDIUM: There was no pericardial effusion  The pericardium was normal in appearance  AORTA: The root exhibited normal size  SYSTEMIC VEINS: IVC: The inferior vena cava was normal in size and course  Respirophasic changes were normal     SYSTEM MEASUREMENT TABLES    Apical four chamber  4 chamber Left Atrium Volume Index; Planimetry; End Systole; Apical four chamber;: 16 24 cm2  Left Ventricular Ejection Fraction; Method of Disks, Single Plane; Apical four chamber;: 62 3 %  Left Ventricular End Diastolic Volume; Method of Disks, Single Plane; Apical four chamber;: 90 5 ml  Left Ventricular End Systolic Volume; Method of Disks, Single Plane; Apical four chamber;: 34 1 ml  Right Atrium Systolic Area; Planimetry; End Systole; Apical four chamber;: 15 12 cm2  Right Ventricular Internal Diastolic Dimension; End Diastole; Apical four chamber;: 36 4 mm  TAPSE: 15 1 mm    Unspecified Scan Mode  Aortic Root Diameter; End Systole;: 27 1 mm  Gradient Pressure, Peak; Simplified Bernoulli; Antegrade Flow; Systole;: 5 2 mm[Hg]  Gradient pressure, average; Simplified Bernoulli; Antegrade Flow; Systole;: 2 8 mm[Hg]  Left atrial diameter; End Diastole;: 33 2 mm  Cardiac Output; Teichholz; Systole;: 3 41 L/min  Heart rate; Teichholz;: 64 /min  Interventricular Septum Diastolic Thickness; Teichholz; End Diastole;: 10 2 mm  Left Ventricle Internal End Diastolic Dimension; Teichholz;: 41 1 mm  Left Ventricle Internal Systolic Dimension;  Teichholz; End Systole;: 24 6 mm  Left Ventricle Mass; Mass AVCube with Teichholz; End Diastole;: 139 g  Left Ventricle Posterior Wall Diastolic Thickness; Teichholz; End Diastole;: 10 5 mm  Left Ventricular Ejection Fraction; Teichholz;: 71 4 %  Left Ventricular End Diastolic Volume; Teichholz;: 74 7 ml  Left Ventricular End Systolic Volume; Teichholz;: 21 4 ml  Left Ventricular Fractional Shortening;: 40 1 %  Stroke volume;  Teichholz; Systole;: 53 3 ml  Mitral Valve Area; Area by Pressure Half-Time; Systole;: 3 49 cm2  Mitral Valve E to A Ratio; Systole;: 0 82  Pressure half time; Diastole;: 0 06 s  Maximum Tricuspid valve regurgitation pressure gradient; Regurgitant Flow; Systole;: 20 3 mm[Hg]    [facility] Accredited Echocardiography Laboratory    Prepared and electronically signed by    Christie Leonard MD  Signed 25-Jan-2019 09:07:36         Medications:    Current Outpatient Medications:     aspirin 325 mg tablet, Take 1 tablet by mouth daily, Disp: , Rfl:     atorvastatin (LIPITOR) 80 mg tablet, Take 1 tablet (80 mg total) by mouth daily, Disp: 90 tablet, Rfl: 3    buPROPion (WELLBUTRIN XL) 300 mg 24 hr tablet, Take 1 tablet (300 mg total) by mouth daily, Disp: 90 tablet, Rfl: 3    ergocalciferol (VITAMIN D2) 50,000 units, TAKE 1 CAPSULE BY MOUTH WEEKLY FOR 1 MONTH, Disp: , Rfl: 1    escitalopram (LEXAPRO) 20 mg tablet, Take 1 tablet (20 mg total) by mouth daily, Disp: 90 tablet, Rfl: 2    LORazepam (ATIVAN) 1 mg tablet, Take 1 tablet (1 mg total) by mouth 3 (three) times a day as needed for anxiety, Disp: 90 tablet, Rfl: 0    memantine (NAMENDA) 5 mg tablet, Take 5 mg by mouth 2 (two) times a day, Disp: , Rfl: 3    metoprolol tartrate (LOPRESSOR) 25 mg tablet, Take 1 tablet (25 mg total) by mouth 2 (two) times a day, Disp: 180 tablet, Rfl: 3    tamsulosin (FLOMAX) 0 4 mg, Take 1 capsule (0 4 mg total) by mouth daily with dinner for 137 days, Disp: 90 capsule, Rfl: 3    fluocinonide (LIDEX) 0 05 % cream, Apply topically 2 (two) times a day Applied to rash to arms until resolved (Patient not taking: Reported on 10/18/2019), Disp: 30 g, Rfl: 2      Allergies:  No Known Allergies      Assessment and Plan:  1  Coronary artery disease s/p CABG   stable  Continue aspirin, statin, beta-blocker    2  Essential hypertension   BP stable  Continue current medications    3  Mixed hyperlipidemia   continue statin and diet control  His last lipid panel reviewed and discussed with him  Tighter diet control stressed    4  Carotid stenosis, right   continue aspirin and statin    Recommend aggressive risk factor modification and therapeutic lifestyle changes  Low-salt, low-calorie, low-fat, low-cholesterol diet with regular exercise and to optimize weight  I will defer the ordering and monitoring of necessity lab studies to you, but I am available and happy to review and manage any of the data at your request in the future  Discussed concepts of atherosclerosis, including signs and symptoms of cardiac disease  Previous studies were reviewed  Safety measures were reviewed  Questions were entertained and answered  Patient was advised to report any problems requiring medical attention  Follow-up with PCP and appropriate specialist and lab work as discussed  Return for follow up visit as scheduled or earlier, if needed  Thank you for allowing me to participate in the care and evaluation of your patient  Should you have any questions, please feel free to contact me        Sathish Calhoun MD  11/7/2019,11:21 AM

## 2019-11-19 ENCOUNTER — OFFICE VISIT (OUTPATIENT)
Dept: INTERNAL MEDICINE CLINIC | Facility: CLINIC | Age: 74
End: 2019-11-19
Payer: MEDICARE

## 2019-11-19 VITALS
SYSTOLIC BLOOD PRESSURE: 116 MMHG | HEART RATE: 72 BPM | HEIGHT: 70 IN | DIASTOLIC BLOOD PRESSURE: 82 MMHG | WEIGHT: 166.2 LBS | RESPIRATION RATE: 12 BRPM | BODY MASS INDEX: 23.79 KG/M2

## 2019-11-19 DIAGNOSIS — N40.0 BPH WITHOUT URINARY OBSTRUCTION: ICD-10-CM

## 2019-11-19 DIAGNOSIS — F41.9 ANXIETY: ICD-10-CM

## 2019-11-19 DIAGNOSIS — E03.8 SUBCLINICAL HYPOTHYROIDISM: ICD-10-CM

## 2019-11-19 DIAGNOSIS — R73.01 IMPAIRED FASTING GLUCOSE: Primary | ICD-10-CM

## 2019-11-19 DIAGNOSIS — E78.5 HYPERLIPIDEMIA, UNSPECIFIED HYPERLIPIDEMIA TYPE: ICD-10-CM

## 2019-11-19 DIAGNOSIS — N40.0 BENIGN PROSTATIC HYPERPLASIA, UNSPECIFIED WHETHER LOWER URINARY TRACT SYMPTOMS PRESENT: ICD-10-CM

## 2019-11-19 DIAGNOSIS — I25.10 CORONARY ARTERY DISEASE INVOLVING NATIVE CORONARY ARTERY OF NATIVE HEART WITHOUT ANGINA PECTORIS: ICD-10-CM

## 2019-11-19 DIAGNOSIS — E78.2 MIXED HYPERLIPIDEMIA: ICD-10-CM

## 2019-11-19 DIAGNOSIS — I10 ESSENTIAL HYPERTENSION: ICD-10-CM

## 2019-11-19 DIAGNOSIS — N18.30 CKD (CHRONIC KIDNEY DISEASE), STAGE III (HCC): ICD-10-CM

## 2019-11-19 PROCEDURE — 99214 OFFICE O/P EST MOD 30 MIN: CPT | Performed by: INTERNAL MEDICINE

## 2019-11-19 RX ORDER — MEMANTINE HYDROCHLORIDE 5 MG/1
5 TABLET ORAL 2 TIMES DAILY
Qty: 180 TABLET | Refills: 3 | Status: CANCELLED | OUTPATIENT
Start: 2019-11-19

## 2019-11-19 RX ORDER — ESCITALOPRAM OXALATE 20 MG/1
20 TABLET ORAL DAILY
Qty: 90 TABLET | Refills: 2 | Status: SHIPPED | OUTPATIENT
Start: 2019-11-19 | End: 2020-09-08 | Stop reason: SDUPTHER

## 2019-11-19 RX ORDER — BUPROPION HYDROCHLORIDE 300 MG/1
300 TABLET ORAL DAILY
Qty: 90 TABLET | Refills: 3 | Status: SHIPPED | OUTPATIENT
Start: 2019-11-19 | End: 2021-03-15 | Stop reason: SDUPTHER

## 2019-11-19 RX ORDER — ATORVASTATIN CALCIUM 80 MG/1
80 TABLET, FILM COATED ORAL DAILY
Qty: 90 TABLET | Refills: 3 | Status: SHIPPED | OUTPATIENT
Start: 2019-11-19 | End: 2020-08-17

## 2019-11-19 RX ORDER — TAMSULOSIN HYDROCHLORIDE 0.4 MG/1
0.4 CAPSULE ORAL
Qty: 90 CAPSULE | Refills: 3 | Status: SHIPPED | OUTPATIENT
Start: 2019-11-19 | End: 2020-06-17 | Stop reason: SDUPTHER

## 2019-11-19 RX ORDER — LORAZEPAM 1 MG/1
1 TABLET ORAL 3 TIMES DAILY PRN
Qty: 90 TABLET | Refills: 0 | Status: SHIPPED | OUTPATIENT
Start: 2019-11-19 | End: 2020-05-12

## 2019-11-19 NOTE — PROGRESS NOTES
Assessment/Plan:    Diagnoses and all orders for this visit:    Impaired fasting glucose  -     Hemoglobin A1C; Future    Hyperlipidemia, unspecified hyperlipidemia type  -     atorvastatin (LIPITOR) 80 mg tablet; Take 1 tablet (80 mg total) by mouth daily    Anxiety  -     buPROPion (WELLBUTRIN XL) 300 mg 24 hr tablet; Take 1 tablet (300 mg total) by mouth daily  -     escitalopram (LEXAPRO) 20 mg tablet; Take 1 tablet (20 mg total) by mouth daily  -     LORazepam (ATIVAN) 1 mg tablet; Take 1 tablet (1 mg total) by mouth 3 (three) times a day as needed for anxiety    Coronary artery disease involving native coronary artery of native heart without angina pectoris  -     metoprolol tartrate (LOPRESSOR) 25 mg tablet; Take 1 tablet (25 mg total) by mouth 2 (two) times a day    Benign prostatic hyperplasia, unspecified whether lower urinary tract symptoms present  -     tamsulosin (FLOMAX) 0 4 mg; Take 1 capsule (0 4 mg total) by mouth daily with dinner    BPH without urinary obstruction  -     tamsulosin (FLOMAX) 0 4 mg; Take 1 capsule (0 4 mg total) by mouth daily with dinner    Subclinical hypothyroidism  -     TSH, 3rd generation with Free T4 reflex; Future    Essential hypertension  -     CBC and differential; Future  -     Comprehensive metabolic panel; Future    CKD (chronic kidney disease), stage III (HCC)    Mixed hyperlipidemia  -     Lipid panel; Future    Other orders  -     Cancel: memantine (NAMENDA) 5 mg tablet; Take 1 tablet (5 mg total) by mouth 2 (two) times a day         Patient Instructions   Lab data reviewed in detail and compared prior    Coronary artery disease stable without angina, continue modify risk factors as below    Hypertension stable on present regimen    Hyperlipidemia-LDL remains at goal on atorvastatin, HDL remains low, patient encouraged to continue with excellent exercise regimen but increase to at least 5 days per week      BPH stable with tamsulosin    Mild cognitive impairment following with Neurology seems to be improved with Namenda  Impaired fasting glucose continues to improve with A1c down to 5 5    Anxiety is stable with bupropion and Lexapro with very rare use of lorazepam, repeat film lorazepam     Health maintenance-screening colonoscopy in April, otherwise up-to-date, consider Shingrix available at local pharmacy without prescription    Routine follow-up after labs in 6 months, sooner as needed  Subjective:      Patient ID: Manon Sever is a 69 y o  male    Feeling generally well  Exercising at least 4 d per week, walking 1 mi bid w/o exertional cp/sob  Occasional calf pains  CAD-seen by Dr Mainor Birch last week, no angina  Taking full asa daily  HPL/HTN- taking rx as directed, no home bp's  Anxiety-stable on lexapro/wellbutrin, using lorazepam sporadically  Occasional outbursts  MCI-seen by Dr Felicia Lawson, started Trbonje, started doing crosswords and being more mentally active  Seems to be better  BPH-improved w/ flomax, still w/ nocturia, wants to see urology  Catina didn't help           Current Outpatient Medications:     aspirin 325 mg tablet, Take 1 tablet by mouth daily, Disp: , Rfl:     atorvastatin (LIPITOR) 80 mg tablet, Take 1 tablet (80 mg total) by mouth daily, Disp: 90 tablet, Rfl: 3    buPROPion (WELLBUTRIN XL) 300 mg 24 hr tablet, Take 1 tablet (300 mg total) by mouth daily, Disp: 90 tablet, Rfl: 3    ergocalciferol (VITAMIN D2) 50,000 units, TAKE 1 CAPSULE BY MOUTH WEEKLY FOR 1 MONTH, Disp: , Rfl: 1    escitalopram (LEXAPRO) 20 mg tablet, Take 1 tablet (20 mg total) by mouth daily, Disp: 90 tablet, Rfl: 2    LORazepam (ATIVAN) 1 mg tablet, Take 1 tablet (1 mg total) by mouth 3 (three) times a day as needed for anxiety, Disp: 90 tablet, Rfl: 0    memantine (NAMENDA) 5 mg tablet, Take 5 mg by mouth 2 (two) times a day, Disp: , Rfl: 3    metoprolol tartrate (LOPRESSOR) 25 mg tablet, Take 1 tablet (25 mg total) by mouth 2 (two) times a day, Disp: 180 tablet, Rfl: 3    tamsulosin (FLOMAX) 0 4 mg, Take 1 capsule (0 4 mg total) by mouth daily with dinner, Disp: 90 capsule, Rfl: 3    Recent Results (from the past 1008 hour(s))   CBC and differential    Collection Time: 11/05/19  9:44 AM   Result Value Ref Range    WBC 6 42 4 31 - 10 16 Thousand/uL    RBC 5 30 3 88 - 5 62 Million/uL    Hemoglobin 15 4 12 0 - 17 0 g/dL    Hematocrit 47 8 36 5 - 49 3 %    MCV 90 82 - 98 fL    MCH 29 1 26 8 - 34 3 pg    MCHC 32 2 31 4 - 37 4 g/dL    RDW 13 2 11 6 - 15 1 %    MPV 11 0 8 9 - 12 7 fL    Platelets 161 149 - 849 Thousands/uL    nRBC 0 /100 WBCs    Neutrophils Relative 61 43 - 75 %    Immat GRANS % 1 0 - 2 %    Lymphocytes Relative 25 14 - 44 %    Monocytes Relative 8 4 - 12 %    Eosinophils Relative 4 0 - 6 %    Basophils Relative 1 0 - 1 %    Neutrophils Absolute 3 98 1 85 - 7 62 Thousands/µL    Immature Grans Absolute 0 03 0 00 - 0 20 Thousand/uL    Lymphocytes Absolute 1 58 0 60 - 4 47 Thousands/µL    Monocytes Absolute 0 52 0 17 - 1 22 Thousand/µL    Eosinophils Absolute 0 27 0 00 - 0 61 Thousand/µL    Basophils Absolute 0 04 0 00 - 0 10 Thousands/µL   Comprehensive metabolic panel    Collection Time: 11/05/19  9:44 AM   Result Value Ref Range    Sodium 141 136 - 145 mmol/L    Potassium 4 7 3 5 - 5 3 mmol/L    Chloride 106 100 - 108 mmol/L    CO2 29 21 - 32 mmol/L    ANION GAP 6 4 - 13 mmol/L    BUN 21 5 - 25 mg/dL    Creatinine 1 39 (H) 0 60 - 1 30 mg/dL    Glucose, Fasting 92 65 - 99 mg/dL    Calcium 9 4 8 3 - 10 1 mg/dL    AST 27 5 - 45 U/L    ALT 33 12 - 78 U/L    Alkaline Phosphatase 86 46 - 116 U/L    Total Protein 6 9 6 4 - 8 2 g/dL    Albumin 3 7 3 5 - 5 0 g/dL    Total Bilirubin 0 90 0 20 - 1 00 mg/dL    eGFR 50 ml/min/1 73sq m   Lipid panel    Collection Time: 11/05/19  9:44 AM   Result Value Ref Range    Cholesterol 137 50 - 200 mg/dL    Triglycerides 91 <=150 mg/dL    HDL, Direct 36 (L) >=40 mg/dL    LDL Calculated 83 0 - 100 mg/dL Non-HDL-Chol (CHOL-HDL) 101 mg/dl   Hemoglobin A1C    Collection Time: 11/05/19  9:44 AM   Result Value Ref Range    Hemoglobin A1C 5 5 4 2 - 6 3 %     mg/dl   TSH, 3rd generation with Free T4 reflex    Collection Time: 11/05/19  9:44 AM   Result Value Ref Range    TSH 3RD GENERATON 2 569 0 358 - 3 740 uIU/mL   CK    Collection Time: 11/05/19  9:44 AM   Result Value Ref Range    Total  39 - 308 U/L   CKMB    Collection Time: 11/05/19  9:44 AM   Result Value Ref Range    CK-MB Index 1 9 0 0 - 2 5 %    CK-MB 4 7 0 0 - 5 0 ng/mL       The following portions of the patient's history were reviewed and updated as appropriate: allergies, current medications, past family history, past medical history, past social history, past surgical history and problem list      Review of Systems   Constitutional: Negative for appetite change, chills, diaphoresis, fatigue, fever and unexpected weight change  HENT: Negative for congestion, hearing loss and rhinorrhea  Eyes: Negative for visual disturbance  Respiratory: Negative for cough, chest tightness, shortness of breath and wheezing  Cardiovascular: Negative for chest pain, palpitations and leg swelling  Gastrointestinal: Negative for abdominal pain and blood in stool  Endocrine: Negative for cold intolerance, heat intolerance, polydipsia and polyuria  Genitourinary: Negative for difficulty urinating, dysuria, frequency and urgency  Musculoskeletal: Negative for arthralgias and myalgias  Skin: Negative for rash  Neurological: Negative for dizziness, weakness, light-headedness and headaches  Hematological: Does not bruise/bleed easily  Psychiatric/Behavioral: Negative for dysphoric mood and sleep disturbance  Objective:      Vitals:    11/19/19 1308   BP: 116/82   Pulse: 72   Resp: 12          Physical Exam   Constitutional: He is oriented to person, place, and time  He appears well-developed and well-nourished     HENT:   Head: Normocephalic and atraumatic  Nose: Nose normal    Mouth/Throat: Oropharynx is clear and moist    Eyes: Pupils are equal, round, and reactive to light  Conjunctivae and EOM are normal  No scleral icterus  Neck: Normal range of motion  Neck supple  No JVD present  No tracheal deviation present  No thyromegaly present  Cardiovascular: Normal rate, regular rhythm and intact distal pulses  Exam reveals no gallop and no friction rub  No murmur heard  Pulmonary/Chest: Effort normal and breath sounds normal  No respiratory distress  He has no wheezes  He has no rales  Musculoskeletal: He exhibits no edema or deformity  Lymphadenopathy:     He has no cervical adenopathy  Neurological: He is alert and oriented to person, place, and time  No cranial nerve deficit  Skin: Skin is warm and dry  No rash noted  No erythema  No pallor  Psychiatric: He has a normal mood and affect   His behavior is normal  Judgment and thought content normal

## 2019-11-19 NOTE — PATIENT INSTRUCTIONS
Lab data reviewed in detail and compared prior    Coronary artery disease stable without angina, continue modify risk factors as below    Hypertension stable on present regimen    Hyperlipidemia-LDL remains at goal on atorvastatin, HDL remains low, patient encouraged to continue with excellent exercise regimen but increase to at least 5 days per week  BPH stable with tamsulosin    Mild cognitive impairment following with Neurology seems to be improved with Namenda  Impaired fasting glucose continues to improve with A1c down to 5 5    Anxiety is stable with bupropion and Lexapro with very rare use of lorazepam, repeat film lorazepam     Health maintenance-screening colonoscopy in April, otherwise up-to-date, consider Shingrix available at local pharmacy without prescription    Routine follow-up after labs in 6 months, sooner as needed

## 2019-11-20 ENCOUNTER — TELEPHONE (OUTPATIENT)
Dept: CARDIOLOGY CLINIC | Facility: CLINIC | Age: 74
End: 2019-11-20

## 2019-11-20 NOTE — TELEPHONE ENCOUNTER
----- Message from Rachele Gomes MD sent at 11/19/2019  3:01 PM EST -----  Regarding: Appt in March 2020  Message from Dr Aceves  He wants this patient seen by me in the next few months  He had seen Dr Jamilah Lynn recently  Please bring him in for an appointment for March 2020

## 2020-02-21 ENCOUNTER — OFFICE VISIT (OUTPATIENT)
Dept: INTERNAL MEDICINE CLINIC | Facility: CLINIC | Age: 75
End: 2020-02-21
Payer: MEDICARE

## 2020-02-21 VITALS
RESPIRATION RATE: 14 BRPM | TEMPERATURE: 98.6 F | BODY MASS INDEX: 24.65 KG/M2 | SYSTOLIC BLOOD PRESSURE: 92 MMHG | HEIGHT: 70 IN | HEART RATE: 76 BPM | WEIGHT: 172.2 LBS | DIASTOLIC BLOOD PRESSURE: 62 MMHG

## 2020-02-21 DIAGNOSIS — Z12.5 SCREENING FOR PROSTATE CANCER: ICD-10-CM

## 2020-02-21 DIAGNOSIS — E53.8 VITAMIN B 12 DEFICIENCY: Primary | ICD-10-CM

## 2020-02-21 DIAGNOSIS — Z11.59 NEED FOR HEPATITIS C SCREENING TEST: ICD-10-CM

## 2020-02-21 DIAGNOSIS — Z00.00 MEDICARE ANNUAL WELLNESS VISIT, SUBSEQUENT: Primary | ICD-10-CM

## 2020-02-21 DIAGNOSIS — E55.9 VITAMIN D DEFICIENCY: ICD-10-CM

## 2020-02-21 DIAGNOSIS — R05.9 COUGH: ICD-10-CM

## 2020-02-21 PROCEDURE — 1036F TOBACCO NON-USER: CPT | Performed by: NURSE PRACTITIONER

## 2020-02-21 PROCEDURE — 4040F PNEUMOC VAC/ADMIN/RCVD: CPT | Performed by: NURSE PRACTITIONER

## 2020-02-21 PROCEDURE — 1160F RVW MEDS BY RX/DR IN RCRD: CPT | Performed by: NURSE PRACTITIONER

## 2020-02-21 PROCEDURE — 3074F SYST BP LT 130 MM HG: CPT | Performed by: NURSE PRACTITIONER

## 2020-02-21 PROCEDURE — 3078F DIAST BP <80 MM HG: CPT | Performed by: NURSE PRACTITIONER

## 2020-02-21 PROCEDURE — 3008F BODY MASS INDEX DOCD: CPT | Performed by: NURSE PRACTITIONER

## 2020-02-21 PROCEDURE — G0439 PPPS, SUBSEQ VISIT: HCPCS | Performed by: NURSE PRACTITIONER

## 2020-02-21 PROCEDURE — 99213 OFFICE O/P EST LOW 20 MIN: CPT | Performed by: NURSE PRACTITIONER

## 2020-02-21 RX ORDER — PROMETHAZINE HYDROCHLORIDE AND CODEINE PHOSPHATE 6.25; 1 MG/5ML; MG/5ML
5 SYRUP ORAL EVERY 4 HOURS PRN
Qty: 240 ML | Refills: 0 | Status: SHIPPED | OUTPATIENT
Start: 2020-02-21 | End: 2020-03-04

## 2020-02-21 NOTE — PROGRESS NOTES
HPI:  Manon Sever is a 69 y o  male here for his Subsequent Wellness Visit      Patient Active Problem List   Diagnosis    Seborrheic keratosis    Screening for skin condition    History of skin cancer    Psoriasiform dermatitis    Coronary artery disease involving native coronary artery of native heart without angina pectoris    Essential hypertension    Mixed hyperlipidemia    Anxiety    Benign prostatic hyperplasia    CKD (chronic kidney disease), stage III (HCC)    Impaired fasting glucose    Subclinical hypothyroidism    Nocturia    Chronic deep vein thrombosis (DVT) of lower extremity ([facility] [address] [facility]    Right carotid bruit    Carotid stenosis, right     Past Medical History:   Diagnosis Date    Anxiety disorder     last assessed - [id number]    Basal cell carcinoma of face     BPH (benign prostatic hyperplasia)     benign prostatic hypertrophy; last assessed - [id number]    Chronic kidney disease     Coronary atherosclerosis of native coronary artery     last assessed - [id number]    Deep vein thrombosis (DVT) ([facility] [address] [facility]     last assessed - [id number]    Depression     last assessed - [id number]    History of colonoscopy     Fiberoptic; Resolved - 12/1/2011    Hyperlipemia     last assessed - 58FHA0382    Hypertension     last assessed - 23EBV1404    Myocardial infarction ([facility] [address] [facility]     Nonmelanoma skin cancer     last assessed - 88TQB5071    Osteoarthritis     last assessed - 17XCA6937    Pneumonia     last assessed - 04LPP4128    Pulmonary HTN ([facility] [address] [facility]     PVD (peripheral vascular disease) ([facility] [address] [facility]     Seborrheic dermatitis      Past Surgical History:   Procedure Laterality Date    CARDIAC CATHETERIZATION      procedure outcome successful; last assessed - [id number]    CORONARY ARTERY BYPASS GRAFT      last assessed - 18QXW2898    IVC FILTER INSERTION      [place]    TOTAL HIP ARTHROPLASTY      last assessed - [id number]     Family History   Problem Relation Age of Onset    Dementia Mother    Zehra Rectal cancer Mother     Coronary artery disease Father     Rectal cancer Sister      Social History     Tobacco Use   Smoking Status Never Smoker   Smokeless Tobacco Never Used     Social History     Substance and Sexual Activity   Alcohol Use Not Currently      Social History     Substance and Sexual Activity   Drug Use Yes    Types: Marijuana     BP 92/62 (BP Location: Left arm, Patient Position: Sitting, Cuff Size: Standard)   Pulse 76   Temp 98 6 °F (37 °C) (Tympanic)   Resp 14   Ht 5' 10" (1 778 m)   Wt 78 1 kg (172 lb 3 2 oz)   BMI 24 71 kg/m²       Current Outpatient Medications   Medication Sig Dispense Refill    aspirin 325 mg tablet Take 1 tablet by mouth daily      atorvastatin (LIPITOR) 80 mg tablet Take 1 tablet (80 mg total) by mouth daily 90 tablet 3    buPROPion (WELLBUTRIN XL) 300 mg 24 hr tablet Take 1 tablet (300 mg total) by mouth daily 90 tablet 3    ergocalciferol (VITAMIN D2) 50,000 units TAKE 1 CAPSULE BY MOUTH WEEKLY FOR 1 MONTH  1    escitalopram (LEXAPRO) 20 mg tablet Take 1 tablet (20 mg total) by mouth daily 90 tablet 2    LORazepam (ATIVAN) 1 mg tablet Take 1 tablet (1 mg total) by mouth 3 (three) times a day as needed for anxiety 90 tablet 0    memantine (NAMENDA) 5 mg tablet Take 5 mg by mouth 2 (two) times a day  3    metoprolol tartrate (LOPRESSOR) 25 mg tablet Take 1 tablet (25 mg total) by mouth 2 (two) times a day 180 tablet 3    tamsulosin (FLOMAX) 0 4 mg Take 1 capsule (0 4 mg total) by mouth daily with dinner 90 capsule 3    promethazine-codeine (PHENERGAN WITH CODEINE) 6 25-10 mg/5 mL syrup Take 5 mL by mouth every 4 (four) hours as needed for cough 240 mL 0     No current facility-administered medications for this visit        No Known Allergies  Immunization History   Administered Date(s) Administered    INFLUENZA 10/21/2010, 10/18/2011, 10/12/2012, 10/08/2014, 10/01/2015, 11/06/2015, 09/13/2016, 10/31/2017, 10/09/2018    Influenza Split High Dose Preservative Free IM 09/13/2016, 10/31/2017, 09/24/2019    Influenza TIV (IM) 10/01/2015    Pneumococcal Conjugate 13-Valent 11/18/2015, 05/11/2016    Pneumococcal Polysaccharide PPV23 04/22/2013    Tdap 1945, 11/21/2018    Zoster 04/26/2014       Patient Care Team:  Dia Kim MD as PCP - General  Kal Garrido MD      Medicare Screening Tests and Risk Assessments:  [unfilled]   @[email or web address]  No exam data present  Reviewed Updated [facility] Prior Wellness Visits:   Last Medicare wellness visit information was reviewed, patient interviewed , no change since last AWVyes  Last Medicare wellness visit information was reviewed, patient interviewed and updates made to the record today yes    Assessment and Plan:  1  Vitamin B 12 deficiency  Vitamin B12   2  Screening for prostate cancer  PSA, Total Screen   3  Vitamin D deficiency  Vitamin D 25 hydroxy   4  Need for hepatitis C screening test  Hepatitis C antibody   5   Cough  promethazine-codeine (PHENERGAN WITH CODEINE) 6 25-10 mg/5 mL syrup       Health Maintenance Due   Topic Date Due    Hepatitis C Screening  1945   Carol Kothari Medicare Annual Wellness Visit (AWV)  43/05/[birth year]01    CRC Screening: Colonoscopy  04/04/2020

## 2020-02-21 NOTE — PROGRESS NOTES
Assessment and Plan:     Problem List Items Addressed This Visit     None           Preventive health issues were discussed with patient, and age appropriate screening tests were ordered as noted in patient's After Visit Summary  Personalized health advice and appropriate referrals for health education or preventive services given if needed, as noted in patient's After Visit Summary       History of Present Illness:     Patient presents for Medicare Annual Wellness visit    Patient Care Team:  Jaylen Zamora MD as PCP - General  Catalina Abreu MD     Problem List:     Patient Active Problem List   Diagnosis    Seborrheic keratosis    Screening for skin condition    History of skin cancer    Psoriasiform dermatitis    Coronary artery disease involving native coronary artery of native heart without angina pectoris    Essential hypertension    Mixed hyperlipidemia    Anxiety    Benign prostatic hyperplasia    CKD (chronic kidney disease), stage III ([facility] [address]    Impaired fasting glucose    Subclinical hypothyroidism    Nocturia    Chronic deep vein thrombosis (DVT) of lower extremity ([facility] [address]    Right carotid bruit    Carotid stenosis, right      Past Medical and Surgical History:     Past Medical History:   Diagnosis Date    Anxiety disorder     last assessed - [id number]    Basal cell carcinoma of face     BPH (benign prostatic hyperplasia)     benign prostatic hypertrophy; last assessed - [id number]    Chronic kidney disease     Coronary atherosclerosis of native coronary artery     last assessed - 84PSF4691    Deep vein thrombosis (DVT) ([facility] [address] [facility]     last assessed - 77EMI0090    Depression     last assessed - 66HZI3704    History of colonoscopy     Fiberoptic; Resolved - 12/1/2011    Hyperlipemia     last assessed - [id number]    Hypertension     last assessed - 89ZEF8314    Myocardial infarction ([facility] [address] [facility]     Nonmelanoma skin cancer     last assessed - 56LXO3269    Osteoarthritis     last assessed - 10CSX3133    Pneumonia     last assessed - 83ZHB3662    Pulmonary HTN ([facility] [address]     PVD [facility]peripheral vascular disease) ([facility]     Seborrheic dermatitis      Past Surgical History:   Procedure Laterality Date    CARDIAC CATHETERIZATION      procedure outcome successful; last assessed - [id number]    CORONARY ARTERY BYPASS GRAFT      last assessed - 61IHD3233    IVC FILTER INSERTION      [place]    TOTAL HIP ARTHROPLASTY      last assessed - 95LYO3800      Family History:     Family History   Problem Relation Age of Onset    Dementia Mother     Rectal cancer Mother     Coronary artery disease Father     Rectal cancer Sister       Social History:        Social History     Socioeconomic History    Marital status: /Civil Union     Spouse name: Not on file    Number of children: Not on file    Years of education: Not on file    Highest education level: Not on file   Occupational History    Not on file   Social Needs    Financial resource strain: Not on file    Food insecurity:     Worry: Not on file     Inability: Not on file    Transportation needs:     Medical: Not on file     Non-medical: Not on file   Tobacco Use    Smoking status: Never Smoker    Smokeless tobacco: Never Used   Substance and Sexual Activity    Alcohol use: Not Currently    Drug use: Yes     Types: Marijuana    Sexual activity: Not Currently     Partners: Female   Lifestyle    Physical activity:     Days per week: 5 days     Minutes per session: 20 min    Stress: Not at all   Relationships    Social connections:     Talks on phone: Not on file     Gets together: Not on file     Attends Ten[employer] service: Not on file     Active member of club or organization: Not on file     Attends meetings of clubs or organizations: Not on file     Relationship status: Not on file    Intimate partner violence:     Fear of current or ex partner: Not on file     Emotionally abused: Not on file     Physically abused: Not on file Forced sexual activity: Not on file   Other Topics Concern    Not on file   Social History Narrative    Not on file      Medications and Allergies:     Current Outpatient Medications   Medication Sig Dispense Refill    aspirin 325 mg tablet Take 1 tablet by mouth daily      atorvastatin (LIPITOR) 80 mg tablet Take 1 tablet (80 mg total) by mouth daily 90 tablet 3    buPROPion (WELLBUTRIN XL) 300 mg 24 hr tablet Take 1 tablet (300 mg total) by mouth daily 90 tablet 3    ergocalciferol (VITAMIN D2) 50,000 units TAKE 1 CAPSULE BY MOUTH WEEKLY FOR 1 MONTH  1    escitalopram (LEXAPRO) 20 mg tablet Take 1 tablet (20 mg total) by mouth daily 90 tablet 2    LORazepam (ATIVAN) 1 mg tablet Take 1 tablet (1 mg total) by mouth 3 (three) times a day as needed for anxiety 90 tablet 0    memantine (NAMENDA) 5 mg tablet Take 5 mg by mouth 2 (two) times a day  3    metoprolol tartrate (LOPRESSOR) 25 mg tablet Take 1 tablet (25 mg total) by mouth 2 (two) times a day 180 tablet 3    promethazine-codeine (PHENERGAN WITH CODEINE) 6 25-10 mg/5 mL syrup Take 5 mL by mouth every 4 (four) hours as needed for cough 240 mL 0    tamsulosin (FLOMAX) 0 4 mg Take 1 capsule (0 4 mg total) by mouth daily with dinner 90 capsule 3     No current facility-administered medications for this visit        No Known Allergies   Immunizations:     Immunization History   Administered Date(s) Administered    INFLUENZA 10/21/2010, 10/18/2011, 10/12/2012, 10/08/2014, 10/01/2015, 11/06/2015, 09/13/2016, 10/31/2017, 10/09/2018    Influenza Split High Dose Preservative Free IM 09/13/2016, 10/31/2017, 09/24/2019    Influenza TIV (IM) 10/01/2015    Pneumococcal Conjugate 13-Valent 11/18/2015, 05/11/2016    Pneumococcal Polysaccharide PPV23 04/22/2013    Tdap 1945, 11/21/2018    Zoster 04/26/2014      Health Maintenance:         Topic Date Due    Hepatitis C Screening  1945    CRC Screening: Colonoscopy  04/04/2020     There are no preventive care reminders to display for this patient  Medicare Health Risk Assessment:     There were no vitals taken for this visit  Kahlil Bernard is here for his Subsequent Wellness visit  Health Risk Assessment:   Patient rates overall health as excellent  Patient feels that their physical health rating is same  Eyesight was rated as slightly worse  Hearing was rated as same  Patient feels that their emotional and mental health rating is same  Pain experienced in the last 7 days has been none  Patient states that he has experienced no weight loss or gain in last 6 months  Depression Screening:   PHQ-2 Score: 0      Fall Risk Screening: In the past year, patient has experienced: no history of falling in past year      Home Safety:  Patient does not have trouble with stairs inside or outside of their home  Patient has working smoke alarms and has working carbon monoxide detector  Home safety hazards include: none  Nutrition:   Current diet is Regular, Frequent junk food and Unhealthy  Medications:   Patient is currently taking over-the-counter supplements  OTC medications include: see medication list  Patient is able to manage medications  Activities of Daily Living (ADLs)/Instrumental Activities of Daily Living (IADLs):   Walk and transfer into and out of bed and chair?: Yes  Dress and groom yourself?: Yes    Bathe or shower yourself?: Yes    Feed yourself?  Yes  Do your laundry/housekeeping?: Yes  Manage your money, pay your bills and track your expenses?: Yes  Make your own meals?: Yes    Do your own shopping?: Yes    Previous Hospitalizations:   Any hospitalizations or ED visits within the last 12 months?: No      Advance Care Planning:   Living will: No    Advanced directive: No    Five wishes given: Yes      Cognitive Screening:   Provider or family/friend/caregiver concerned regarding cognition?: No    PREVENTIVE SCREENINGS      Cardiovascular Screening:    General: Screening Not Indicated and History Lipid Disorder      Diabetes Screening:     General: Screening Current      Colorectal Cancer Screening:     General: Screening Current      Prostate Cancer Screening:    General: Risks and Benefits Discussed      Osteoporosis Screening:    General: Screening Not Indicated      Abdominal Aortic Aneurysm (AAA) Screening:    Risk factors include: age between 73-69 yo        General: Screening Not Indicated      Lung Cancer Screening:     General: Screening Not Indicated      Hepatitis C Screening:    General: Risks and Benefits Discussed    Hep C Screening Accepted: Yes        PA Mcguire

## 2020-02-21 NOTE — PROGRESS NOTES
Assessment and Plan:     Problem List Items Addressed This Visit     None      Visit Diagnoses     Vitamin B 12 deficiency    -  Primary    Relevant Orders    Vitamin B12    Screening for prostate cancer        Relevant Orders    PSA, Total Screen    Vitamin D deficiency        Relevant Orders    Vitamin D 25 hydroxy    Need for hepatitis C screening test        Relevant Orders    Hepatitis C antibody    Cough        Relevant Medications    promethazine-codeine (PHENERGAN WITH CODEINE) 6 25-10 mg/5 mL syrup           Preventive health issues were discussed with patient, and age appropriate screening tests were ordered as noted in patient's After Visit Summary  Personalized health advice and appropriate referrals for health education or preventive services given if needed, as noted in patient's After Visit Summary       History of Present Illness:     Patient presents for Medicare Annual Wellness visit    Patient Care Team:  Cleon Dakins, MD as PCP - General  Rachele Lan MD     Problem List:     Patient Active Problem List   Diagnosis    Seborrheic keratosis    Screening for skin condition    History of skin cancer    Psoriasiform dermatitis    Coronary artery disease involving native coronary artery of native heart without angina pectoris    Essential hypertension    Mixed hyperlipidemia    Anxiety    Benign prostatic hyperplasia    CKD (chronic kidney disease), stage III ([address] )    Impaired fasting glucose    Subclinical hypothyroidism    Nocturia    Chronic deep vein thrombosis (DVT) of lower extremity ([address] )    Right carotid bruit    Carotid stenosis, right      Past Medical and Surgical History:     Past Medical History:   Diagnosis Date    Anxiety disorder     last assessed - [id number]    Basal cell carcinoma of face     BPH (benign prostatic hyperplasia)     benign prostatic hypertrophy; last assessed - [id number]    Chronic kidney disease     Coronary atherosclerosis of native coronary artery     last assessed - 08DSL6024    Deep vein thrombosis (DVT) [facility] )     last assessed - [id number]    Depression     last assessed - 02XVT1118    History of colonoscopy     Fiberoptic; Resolved - 12/1/2011    Hyperlipemia     last assessed - 35POX0185    Hypertension     last assessed - 17LES3911    Myocardial infarction [facility])     Nonmelanoma skin cancer     last assessed - 18DGU5865    Osteoarthritis     last assessed - 08ZTL5324    Pneumonia     last assessed - 70JUA4612    [facility] HTN ([email or web address] )     PVD (peripheral vascular disease) ([email or web address] )     Seborrheic dermatitis      Past Surgical History:   Procedure Laterality Date    CARDIAC CATHETERIZATION      procedure outcome successful; last assessed - 88SIO0039    CORONARY ARTERY BYPASS GRAFT      last assessed - [id number]    IVC FILTER INSERTION      Jael    TOTAL HIP ARTHROPLASTY      last assessed - [id number]      Family History:     Family History   Problem Relation Age of Onset    Dementia Mother     Rectal cancer Mother     Coronary artery disease Father     Rectal cancer Sister       Social History:        Social History     Socioeconomic History    Marital status: /Civil Union     Spouse name: None    Number of children: None    Years of education: None    Highest education level: None   Occupational History    None   Social Needs    Financial resource strain: None    Food insecurity:     Worry: None     Inability: None    Transportation needs:     Medical: None     Non-medical: None   Tobacco Use    Smoking status: Never Smoker    Smokeless tobacco: Never Used   Substance and Sexual Activity    Alcohol use: Not Currently    Drug use: Yes     Types: Marijuana    Sexual activity: Not Currently     Partners: Female   Lifestyle    Physical activity:     Days per week: 5 days     Minutes per session: 20 min    Stress: Not at all   Relationships    Social connections:     Talks on phone: None     Gets together: None Attends Zoroastrianism service: None     Active member of club or organization: None     Attends meetings of clubs or organizations: None     Relationship status: None    Intimate partner violence:     Fear of current or ex partner: None     Emotionally abused: None     Physically abused: None     Forced sexual activity: None   Other Topics Concern    None   Social History Narrative    None      Medications and Allergies:     Current Outpatient Medications   Medication Sig Dispense Refill    aspirin 325 mg tablet Take 1 tablet by mouth daily      atorvastatin (LIPITOR) 80 mg tablet Take 1 tablet (80 mg total) by mouth daily 90 tablet 3    buPROPion (WELLBUTRIN XL) 300 mg 24 hr tablet Take 1 tablet (300 mg total) by mouth daily 90 tablet 3    ergocalciferol (VITAMIN D2) 50,000 units TAKE 1 CAPSULE BY MOUTH WEEKLY FOR 1 MONTH  1    escitalopram (LEXAPRO) 20 mg tablet Take 1 tablet (20 mg total) by mouth daily 90 tablet 2    LORazepam (ATIVAN) 1 mg tablet Take 1 tablet (1 mg total) by mouth 3 (three) times a day as needed for anxiety 90 tablet 0    memantine (NAMENDA) 5 mg tablet Take 5 mg by mouth 2 (two) times a day  3    metoprolol tartrate (LOPRESSOR) 25 mg tablet Take 1 tablet (25 mg total) by mouth 2 (two) times a day 180 tablet 3    tamsulosin (FLOMAX) 0 4 mg Take 1 capsule (0 4 mg total) by mouth daily with dinner 90 capsule 3    promethazine-codeine (PHENERGAN WITH CODEINE) 6 25-10 mg/5 mL syrup Take 5 mL by mouth every 4 (four) hours as needed for cough 240 mL 0     No current facility-administered medications for this visit        No Known Allergies   Immunizations:     Immunization History   Administered Date(s) Administered    INFLUENZA 10/21/2010, 10/18/2011, 10/12/2012, 10/08/2014, 10/01/2015, 11/06/2015, 09/13/2016, 10/31/2017, 10/09/2018    Influenza Split High Dose Preservative Free IM 09/13/2016, 10/31/2017, 09/24/2019    Influenza TIV (IM) 10/01/2015    Pneumococcal Conjugate 13-Valent 11/18/2015, 05/11/2016    Pneumococcal Polysaccharide PPV23 04/22/2013    Tdap 1945, 11/21/2018    Zoster 04/26/2014      Health Maintenance:         Topic Date Due    Hepatitis C Screening  1945    CRC Screening: Colonoscopy  04/04/2020     There are no preventive care reminders to display for this patient  Medicare Health Risk Assessment:     BP 92/62 (BP Location: Left arm, Patient Position: Sitting, Cuff Size: Standard)   Pulse 76   Temp 98 6 °F (37 °C) (Tympanic)   Resp 14   Ht 5' 10" (1 778 m)   Wt 78 1 kg (172 lb 3 2 oz)   BMI 24 71 kg/m²      Douglas Zuniga is here for his Subsequent Wellness visit  Health Risk Assessment:   Patient rates overall health as excellent  Patient feels that their physical health rating is same  Eyesight was rated as slightly worse  Hearing was rated as same  Patient feels that their emotional and mental health rating is same  Pain experienced in the last 7 days has been none  Patient states that he has experienced no weight loss or gain in last 6 months  Depression Screening:   PHQ-2 Score: 0      Fall Risk Screening: In the past year, patient has experienced: no history of falling in past year      Home Safety:  Patient does not have trouble with stairs inside or outside of their home  Patient has working smoke alarms and has working carbon monoxide detector  Home safety hazards include: none  Nutrition:   Current diet is Regular, Unhealthy and Frequent junk food  Medications:   Patient is currently taking over-the-counter supplements  OTC medications include: see medication list  Patient is able to manage medications  Activities of Daily Living (ADLs)/Instrumental Activities of Daily Living (IADLs):   Walk and transfer into and out of bed and chair?: Yes  Dress and groom yourself?: Yes    Bathe or shower yourself?: Yes    Feed yourself?  Yes  Do your laundry/housekeeping?: Yes  Manage your money, pay your bills and track your expenses?: Yes  Make your own meals?: Yes    Do your own shopping?: Yes    Previous Hospitalizations:   Any hospitalizations or ED visits within the last 12 months?: No      Advance Care Planning:   Living will: No    Advanced directive: No    Five wishes given: Yes      PREVENTIVE SCREENINGS      Cardiovascular Screening:    General: Screening Not Indicated and History Lipid Disorder      Diabetes Screening:     General: Screening Current      Colorectal Cancer Screening:     General: Screening Current      Abdominal Aortic Aneurysm (AAA) Screening:    Risk factors include: age between 73-69 yo        PA Ott

## 2020-02-21 NOTE — PROGRESS NOTES
Assessment/Plan:    Patient Instructions   Upper respiratory infection- no indication for antibioitcs  Recommend increasing fluids, rest, tylenol as needed, humidifcation  I expect you will continue with some degree of symptoms for the next 5-7 days but if symptoms are not improving contact us  Will send in promethazine with codeine and continue Mucinex DM         Diagnoses and all orders for this visit:    Vitamin B 12 deficiency  -     Vitamin B12; Future    Screening for prostate cancer  -     PSA, Total Screen; Future    Vitamin D deficiency  -     Vitamin D 25 hydroxy; Future    Need for hepatitis C screening test  -     Hepatitis C antibody; Future    Cough  -     promethazine-codeine (PHENERGAN WITH CODEINE) 6 25-10 mg/5 mL syrup; Take 5 mL by mouth every 4 (four) hours as needed for cough         Subjective:      Patient ID: Marcos Mixon is a 69 y o  male     Patient has not felt well since Wednesday started with postnasal drip and cough and sinus headache  His grandchildren were sick and he was around them over the weekend  Taking Mucinex DM  No shortness of breath no wheeze no fevers  Taking Tylenol as well, occasional sinus pressure    Not blowing nose        Current Outpatient Medications:     aspirin 325 mg tablet, Take 1 tablet by mouth daily, Disp: , Rfl:     atorvastatin (LIPITOR) 80 mg tablet, Take 1 tablet (80 mg total) by mouth daily, Disp: 90 tablet, Rfl: 3    buPROPion (WELLBUTRIN XL) 300 mg 24 hr tablet, Take 1 tablet (300 mg total) by mouth daily, Disp: 90 tablet, Rfl: 3    ergocalciferol (VITAMIN D2) 50,000 units, TAKE 1 CAPSULE BY MOUTH WEEKLY FOR 1 MONTH, Disp: , Rfl: 1    escitalopram (LEXAPRO) 20 mg tablet, Take 1 tablet (20 mg total) by mouth daily, Disp: 90 tablet, Rfl: 2    LORazepam (ATIVAN) 1 mg tablet, Take 1 tablet (1 mg total) by mouth 3 (three) times a day as needed for anxiety, Disp: 90 tablet, Rfl: 0    memantine (NAMENDA) 5 mg tablet, Take 5 mg by mouth 2 (two) times a day, Disp: , Rfl: 3    metoprolol tartrate (LOPRESSOR) 25 mg tablet, Take 1 tablet (25 mg total) by mouth 2 (two) times a day, Disp: 180 tablet, Rfl: 3    tamsulosin (FLOMAX) 0 4 mg, Take 1 capsule (0 4 mg total) by mouth daily with dinner, Disp: 90 capsule, Rfl: 3    promethazine-codeine (PHENERGAN WITH CODEINE) 6 25-10 mg/5 mL syrup, Take 5 mL by mouth every 4 (four) hours as needed for cough, Disp: 240 mL, Rfl: 0    No results found for this or any previous visit (from the past 1008 hour(s))  The following portions of the patient's history were reviewed and updated as appropriate: allergies, current medications, past family history, past medical history, past social history, past surgical history and problem list      Review of Systems   Constitutional: Negative for appetite change, chills, diaphoresis, fatigue and fever  HENT: Positive for postnasal drip  Negative for sore throat  Respiratory: Positive for cough  Negative for shortness of breath and wheezing  Cardiovascular: Negative  Negative for chest pain and leg swelling  Endocrine: Negative  Genitourinary: Negative  Musculoskeletal: Negative  Neurological: Negative  Hematological: Negative  Psychiatric/Behavioral: Negative  Objective:      BP 92/62 (BP Location: Left arm, Patient Position: Sitting, Cuff Size: Standard)   Pulse 76   Temp 98 6 °F (37 °C) (Tympanic)   Resp 14   Ht 5' 10" (1 778 m)   Wt 78 1 kg (172 lb 3 2 oz)   BMI 24 71 kg/m²        Physical Exam   Constitutional: He is oriented to person, place, and time  He appears well-developed  No distress  HENT:   Head: Normocephalic and atraumatic  Nose: Nose normal    Mouth/Throat: Oropharynx is clear and moist    Eyes: Pupils are equal, round, and reactive to light  Conjunctivae and EOM are normal    Neck: Normal range of motion  Neck supple  No JVD present  No tracheal deviation present  No thyromegaly present     Cardiovascular: Normal rate, regular rhythm, normal heart sounds and intact distal pulses  Exam reveals no gallop and no friction rub  No murmur heard  Pulmonary/Chest: Effort normal and breath sounds normal  No respiratory distress  He has no wheezes  He has no rales  Abdominal: Soft  Bowel sounds are normal  He exhibits no distension  There is no tenderness  Musculoskeletal: Normal range of motion  He exhibits no edema  Lymphadenopathy:     He has no cervical adenopathy  Neurological: He is alert and oriented to person, place, and time  No cranial nerve deficit  Skin: Skin is warm and dry  No rash noted  He is not diaphoretic  Psychiatric: He has a normal mood and affect   His behavior is normal  Judgment and thought content normal

## 2020-02-21 NOTE — PATIENT INSTRUCTIONS
Upper respiratory infection- no indication for antibioitcs  Recommend increasing fluids, rest, tylenol as needed, humidifcation  I expect you will continue with some degree of symptoms for the next 5-7 days but if symptoms are not improving contact us    Will send in promethazine with codeine and continue Mucinex DM

## 2020-03-04 ENCOUNTER — OFFICE VISIT (OUTPATIENT)
Dept: CARDIOLOGY CLINIC | Facility: CLINIC | Age: 75
End: 2020-03-04
Payer: MEDICARE

## 2020-03-04 VITALS
BODY MASS INDEX: 24.91 KG/M2 | SYSTOLIC BLOOD PRESSURE: 120 MMHG | WEIGHT: 174 LBS | HEIGHT: 70 IN | HEART RATE: 78 BPM | OXYGEN SATURATION: 96 % | DIASTOLIC BLOOD PRESSURE: 76 MMHG

## 2020-03-04 DIAGNOSIS — I65.21 ASYMPTOMATIC STENOSIS OF RIGHT CAROTID ARTERY: ICD-10-CM

## 2020-03-04 DIAGNOSIS — I10 ESSENTIAL HYPERTENSION: ICD-10-CM

## 2020-03-04 DIAGNOSIS — E78.2 MIXED HYPERLIPIDEMIA: ICD-10-CM

## 2020-03-04 DIAGNOSIS — I25.10 CORONARY ARTERY DISEASE INVOLVING NATIVE HEART WITHOUT ANGINA PECTORIS, UNSPECIFIED VESSEL OR LESION TYPE: Primary | ICD-10-CM

## 2020-03-04 PROCEDURE — 1170F FXNL STATUS ASSESSED: CPT | Performed by: INTERNAL MEDICINE

## 2020-03-04 PROCEDURE — 1160F RVW MEDS BY RX/DR IN RCRD: CPT | Performed by: INTERNAL MEDICINE

## 2020-03-04 PROCEDURE — 1036F TOBACCO NON-USER: CPT | Performed by: INTERNAL MEDICINE

## 2020-03-04 PROCEDURE — 4040F PNEUMOC VAC/ADMIN/RCVD: CPT | Performed by: INTERNAL MEDICINE

## 2020-03-04 PROCEDURE — 3078F DIAST BP <80 MM HG: CPT | Performed by: INTERNAL MEDICINE

## 2020-03-04 PROCEDURE — 3074F SYST BP LT 130 MM HG: CPT | Performed by: INTERNAL MEDICINE

## 2020-03-04 PROCEDURE — 3008F BODY MASS INDEX DOCD: CPT | Performed by: INTERNAL MEDICINE

## 2020-03-04 PROCEDURE — 99214 OFFICE O/P EST MOD 30 MIN: CPT | Performed by: INTERNAL MEDICINE

## 2020-03-04 NOTE — PROGRESS NOTES
[facility] [place]  [id number]  Cardiology Follow Up    Vinita Blankenship  [birth year]  [id number]      1  Coronary artery disease involving native heart without angina pectoris, unspecified vessel or lesion type     2  Essential hypertension     3  Mixed hyperlipidemia     4  Asymptomatic stenosis of right carotid artery         Chief Complaint   Patient presents with    Follow-up    Coronary Artery Disease       Interval History:  Patient presents to establish for cardiac care  Patient used to see Dr Marcano in the past   Patient does have history of CAD status post CABG in 2006 at [facility]  Patient also has history of hypertension hyperlipidemia and asymptomatic carotid artery stenosis  Patient has been doing well  Patient denies any history of chest pain  No shortness of breath  No history of leg edema orthopnea PND  No history of presyncope syncope  He states that he has been compliant with all his present medications      Patient Active Problem List   Diagnosis    Seborrheic keratosis    Screening for skin condition    History of skin cancer    Psoriasiform dermatitis    Coronary artery disease involving native coronary artery of native heart without angina pectoris    Essential hypertension    Mixed hyperlipidemia    Anxiety    Benign prostatic hyperplasia    CKD (chronic kidney disease), stage III (HCC)    Impaired fasting glucose    Subclinical hypothyroidism    Nocturia    Chronic deep vein thrombosis (DVT) of lower extremity ([address] )    Right carotid bruit    Carotid stenosis, right     Past Medical History:   Diagnosis Date    Anxiety disorder     last assessed - [id number]    Basal cell carcinoma of face     BPH (benign prostatic hyperplasia)     benign prostatic hypertrophy; last assessed - 65GIY6192    Chronic kidney disease     Coronary atherosclerosis of native coronary artery     last assessed - 74ECJ9651    Deep vein thrombosis (DVT) ([place] [address] )     last assessed - 22VFN4281    Depression     last assessed - 09GBJ5659    History of colonoscopy     Fiberoptic; Resolved - 12/1/2011    Hyperlipemia     last assessed - 00VNS4830    Hypertension     last assessed - 86Zcw1642    Myocardial infarction ([place] [address] )     Nonmelanoma skin cancer     last assessed - 96Vbg1462    Osteoarthritis     last assessed - 37ZPC6572    Pneumonia     last assessed - 60FKY2820    Pulmonary HTN ([place] [address] )     PVD (peripheral vascular disease) ([place] [address] )     Seborrheic dermatitis      Social History     Socioeconomic History    Marital status: /Civil Union     Spouse name: Not on file    Number of children: Not on file    Years of education: Not on file    Highest education level: Not on file   Occupational History    Not on file   Social Needs    Financial resource strain: Not on file    Food insecurity:     Worry: Not on file     Inability: Not on file    Transportation needs:     Medical: Not on file     Non-medical: Not on file   Tobacco Use    Smoking status: Never Smoker    Smokeless tobacco: Never Used   Substance and Sexual Activity    Alcohol use: Not Currently    Drug use: Yes     Types: Marijuana    Sexual activity: Not Currently     Partners: Female   Lifestyle    Physical activity:     Days per week: 5 days     Minutes per session: 20 min    Stress: Not at all   Relationships    Social connections:     Talks on phone: Not on file     Gets together: Not on file     Attends Yarsanism service: Not on file     Active member of club or organization: Not on file     Attends meetings of clubs or organizations: Not on file     Relationship status: Not on file    Intimate partner violence:     Fear of current or ex partner: Not on file     Emotionally abused: Not on file     Physically abused: Not on file     Forced sexual activity: Not on file   Other Topics Concern    Not on file   Social History Narrative    Not on file      Family History   Problem Relation Age of Onset    Dementia Mother     Rectal cancer Mother     Coronary artery disease Father     Rectal cancer Sister      Past Surgical History:   Procedure Laterality Date    CARDIAC CATHETERIZATION      procedure outcome successful; last assessed - [id number]    CORONARY ARTERY BYPASS GRAFT      last assessed - 01FVH4594    IVC FILTER INSERTION      Jael    TOTAL HIP ARTHROPLASTY      last assessed - [id number]       Current Outpatient Medications:     aspirin 325 mg tablet, Take 1 tablet by mouth daily, Disp: , Rfl:     atorvastatin (LIPITOR) 80 mg tablet, Take 1 tablet (80 mg total) by mouth daily, Disp: 90 tablet, Rfl: 3    buPROPion (WELLBUTRIN XL) 300 mg 24 hr tablet, Take 1 tablet (300 mg total) by mouth daily, Disp: 90 tablet, Rfl: 3    ergocalciferol (VITAMIN D2) 50,000 units, TAKE 1 CAPSULE BY MOUTH WEEKLY FOR 1 MONTH, Disp: , Rfl: 1    escitalopram (LEXAPRO) 20 mg tablet, Take 1 tablet (20 mg total) by mouth daily, Disp: 90 tablet, Rfl: 2    LORazepam (ATIVAN) 1 mg tablet, Take 1 tablet (1 mg total) by mouth 3 (three) times a day as needed for anxiety, Disp: 90 tablet, Rfl: 0    memantine (NAMENDA) 5 mg tablet, Take 5 mg by mouth 2 (two) times a day, Disp: , Rfl: 3    metoprolol tartrate (LOPRESSOR) 25 mg tablet, Take 1 tablet (25 mg total) by mouth 2 (two) times a day, Disp: 180 tablet, Rfl: 3    tamsulosin (FLOMAX) 0 4 mg, Take 1 capsule (0 4 mg total) by mouth daily with dinner, Disp: 90 capsule, Rfl: 3  No Known Allergies    Labs:  No visits with results within 2 Month(s) from this visit     Latest known visit with results is:   Appointment on 11/05/2019   Component Date Value    WBC 11/05/2019 6 42     RBC 11/05/2019 5 30     Hemoglobin 11/05/2019 15 4     Hematocrit 11/05/2019 47 8     MCV 11/05/2019 90     MCH 11/05/2019 29 1     MCHC 11/05/2019 32 2     RDW 11/05/2019 13 2     MPV 11/05/2019 11 0     Platelets 11/05/2019 213     nRBC 11/05/2019 0     Neutrophils Relative 11/05/2019 61     Immat GRANS % 11/05/2019 1     Lymphocytes Relative 11/05/2019 25     Monocytes Relative 11/05/2019 8     Eosinophils Relative 11/05/2019 4     Basophils Relative 11/05/2019 1     Neutrophils Absolute 11/05/2019 3 98     Immature Grans Absolute 11/05/2019 0 03     Lymphocytes Absolute 11/05/2019 1 58     Monocytes Absolute 11/05/2019 0 52     Eosinophils Absolute 11/05/2019 0 27     Basophils Absolute 11/05/2019 0 04     Sodium 11/05/2019 141     Potassium 11/05/2019 4 7     Chloride 11/05/2019 106     CO2 11/05/2019 29     ANION GAP 11/05/2019 6     BUN 11/05/2019 21     Creatinine 11/05/2019 1 39*    Glucose, Fasting 11/05/2019 92     Calcium 11/05/2019 9 4     AST 11/05/2019 27     ALT 11/05/2019 33     Alkaline Phosphatase 11/05/2019 86     Total Protein 11/05/2019 6 9     Albumin 11/05/2019 3 7     Total Bilirubin 11/05/2019 0 90     eGFR 11/05/2019 50     Cholesterol 11/05/2019 137     Triglycerides 11/05/2019 91     HDL, Direct 11/05/2019 36*    LDL Calculated 11/05/2019 83     Non-HDL-Chol (CHOL-HDL) 11/05/2019 101     Hemoglobin A1C 11/05/2019 5 5     EAG 11/05/2019 111     TSH 3RD GENERATON 11/05/2019 2 569     Total CK 11/05/2019 245     CK-MB Index 11/05/2019 1 9     CK-MB 11/05/2019 4 7      Imaging: No results found      Review of Systems:  Review of Systems   REVIEW OF SYSTEMS:  Constitutional:  Denies fever or chills   Eyes:  Denies change in visual acuity   HENT:  Denies nasal congestion or sore throat   Respiratory:  Denies cough or shortness of breath   Cardiovascular:  Denies chest pain or edema   GI:  Denies abdominal pain, nausea, vomiting, bloody stools or diarrhea   :  Denies dysuria, frequency, difficulty in micturition and nocturia  Musculoskeletal:  Denies back pain or joint pain   Neurologic:  Denies headache, focal weakness or sensory changes   Endocrine:  Denies polyuria or polydipsia   Lymphatic:  Denies swollen glands   Psychiatric:  Denies depression or anxiety     Physical Exam:    /76   Pulse 78   Ht 5' 10" (1 778 m)   Wt 78 9 kg (174 lb)   SpO2 96%   BMI 24 97 kg/m²     Physical Exam   PHYSICAL EXAM:  General:  Patient is not in acute distress   Head: Normocephalic, Atraumatic  HEENT:  Both pupils normal-size atraumatic, normocephalic, nonicteric  Neck:  JVP not raised  Trachea central  No carotid bruit  Respiratory:  normal breath sounds no crackles  no rhonchi  Cardiovascular:  Regular rate and rhythm no S3 no murmurs  GI:  Abdomen soft nontender  No organomegaly  Lymphatic:  No cervical or inguinal lymphadenopathy  Neurologic:  Patient is awake alert, oriented   Grossly nonfocal  Extremities no edema    Discussion/Summary:  Patient with multiple medical problems who seems to be doing reasonably well from cardiac standpoint  Previous studies reviewed with patient  Medications reviewed and possible side effects discussed  concepts of cardiovascular disease , signs and symptoms of heart disease  Dietary and risk factor modification reinforced  All questions answered  Safety measures reviewed  Patient advised to report any problems prompting medical attention  Previous cardiovascular studies reviewed with patient and family  Symptoms to watch out from cardiac standpoint which would indicate the need for further cardiac evaluation also discussed  Patient and family had a few questions which were answered  Follow-up with primary care physician Dr Aceves  Follow-up in 6 months or earlier as needed

## 2020-04-24 ENCOUNTER — TELEMEDICINE (OUTPATIENT)
Dept: UROLOGY | Facility: CLINIC | Age: 75
End: 2020-04-24
Payer: MEDICARE

## 2020-04-24 DIAGNOSIS — N40.1 BENIGN PROSTATIC HYPERPLASIA WITH NOCTURIA: Primary | ICD-10-CM

## 2020-04-24 DIAGNOSIS — R35.1 BENIGN PROSTATIC HYPERPLASIA WITH NOCTURIA: Primary | ICD-10-CM

## 2020-04-24 PROCEDURE — 99443 PR PHYS/QHP TELEPHONE EVALUATION 21-30 MIN: CPT | Performed by: PHYSICIAN ASSISTANT

## 2020-05-12 DIAGNOSIS — F41.9 ANXIETY: ICD-10-CM

## 2020-05-12 RX ORDER — LORAZEPAM 1 MG/1
1 TABLET ORAL 3 TIMES DAILY PRN
Qty: 90 TABLET | Refills: 0 | Status: SHIPPED | OUTPATIENT
Start: 2020-05-12 | End: 2020-08-05

## 2020-05-18 ENCOUNTER — APPOINTMENT (OUTPATIENT)
Dept: LAB | Facility: HOSPITAL | Age: 75
End: 2020-05-18
Attending: INTERNAL MEDICINE
Payer: MEDICARE

## 2020-05-18 ENCOUNTER — TELEPHONE (OUTPATIENT)
Dept: INTERNAL MEDICINE CLINIC | Facility: CLINIC | Age: 75
End: 2020-05-18

## 2020-05-18 DIAGNOSIS — Z12.5 SCREENING FOR PROSTATE CANCER: ICD-10-CM

## 2020-05-18 DIAGNOSIS — E03.8 SUBCLINICAL HYPOTHYROIDISM: ICD-10-CM

## 2020-05-18 DIAGNOSIS — E53.8 VITAMIN B 12 DEFICIENCY: ICD-10-CM

## 2020-05-18 DIAGNOSIS — E55.9 VITAMIN D DEFICIENCY: ICD-10-CM

## 2020-05-18 DIAGNOSIS — I10 ESSENTIAL HYPERTENSION: ICD-10-CM

## 2020-05-18 DIAGNOSIS — Z11.59 NEED FOR HEPATITIS C SCREENING TEST: ICD-10-CM

## 2020-05-18 DIAGNOSIS — R73.01 IMPAIRED FASTING GLUCOSE: ICD-10-CM

## 2020-05-18 DIAGNOSIS — E78.2 MIXED HYPERLIPIDEMIA: ICD-10-CM

## 2020-05-18 LAB
25(OH)D3 SERPL-MCNC: 64.2 NG/ML (ref 30–100)
ALBUMIN SERPL BCP-MCNC: 3.5 G/DL (ref 3.5–5)
ALP SERPL-CCNC: 84 U/L (ref 46–116)
ALT SERPL W P-5'-P-CCNC: 40 U/L (ref 12–78)
ANION GAP SERPL CALCULATED.3IONS-SCNC: 6 MMOL/L (ref 4–13)
AST SERPL W P-5'-P-CCNC: 27 U/L (ref 5–45)
BASOPHILS # BLD AUTO: 0.03 THOUSANDS/ΜL (ref 0–0.1)
BASOPHILS NFR BLD AUTO: 0 % (ref 0–1)
BILIRUB SERPL-MCNC: 0.9 MG/DL (ref 0.2–1)
BUN SERPL-MCNC: 26 MG/DL (ref 5–25)
CALCIUM SERPL-MCNC: 9.2 MG/DL (ref 8.3–10.1)
CHLORIDE SERPL-SCNC: 108 MMOL/L (ref 100–108)
CHOLEST SERPL-MCNC: 126 MG/DL (ref 50–200)
CO2 SERPL-SCNC: 28 MMOL/L (ref 21–32)
CREAT SERPL-MCNC: 1.32 MG/DL (ref 0.6–1.3)
EOSINOPHIL # BLD AUTO: 0.28 THOUSAND/ΜL (ref 0–0.61)
EOSINOPHIL NFR BLD AUTO: 4 % (ref 0–6)
ERYTHROCYTE [DISTWIDTH] IN BLOOD BY AUTOMATED COUNT: 13 % (ref 11.6–15.1)
EST. AVERAGE GLUCOSE BLD GHB EST-MCNC: 111 MG/DL
GFR SERPL CREATININE-BSD FRML MDRD: 53 ML/MIN/1.73SQ M
GLUCOSE SERPL-MCNC: 110 MG/DL (ref 65–140)
HBA1C MFR BLD: 5.5 %
HCT VFR BLD AUTO: 44 % (ref 36.5–49.3)
HCV AB SER QL: NORMAL
HDLC SERPL-MCNC: 33 MG/DL
HGB BLD-MCNC: 14.5 G/DL (ref 12–17)
IMM GRANULOCYTES # BLD AUTO: 0.02 THOUSAND/UL (ref 0–0.2)
IMM GRANULOCYTES NFR BLD AUTO: 0 % (ref 0–2)
LDLC SERPL CALC-MCNC: 22 MG/DL (ref 0–100)
LYMPHOCYTES # BLD AUTO: 1.54 THOUSANDS/ΜL (ref 0.6–4.47)
LYMPHOCYTES NFR BLD AUTO: 19 % (ref 14–44)
MCH RBC QN AUTO: 29.7 PG (ref 26.8–34.3)
MCHC RBC AUTO-ENTMCNC: 33 G/DL (ref 31.4–37.4)
MCV RBC AUTO: 90 FL (ref 82–98)
MONOCYTES # BLD AUTO: 0.61 THOUSAND/ΜL (ref 0.17–1.22)
MONOCYTES NFR BLD AUTO: 8 % (ref 4–12)
NEUTROPHILS # BLD AUTO: 5.62 THOUSANDS/ΜL (ref 1.85–7.62)
NEUTS SEG NFR BLD AUTO: 69 % (ref 43–75)
NONHDLC SERPL-MCNC: 93 MG/DL
NRBC BLD AUTO-RTO: 0 /100 WBCS
PLATELET # BLD AUTO: 203 THOUSANDS/UL (ref 149–390)
PMV BLD AUTO: 10.9 FL (ref 8.9–12.7)
POTASSIUM SERPL-SCNC: 4.6 MMOL/L (ref 3.5–5.3)
PROT SERPL-MCNC: 6.7 G/DL (ref 6.4–8.2)
PSA SERPL-MCNC: 1.3 NG/ML (ref 0–4)
RBC # BLD AUTO: 4.89 MILLION/UL (ref 3.88–5.62)
SODIUM SERPL-SCNC: 142 MMOL/L (ref 136–145)
TRIGL SERPL-MCNC: 357 MG/DL
TSH SERPL DL<=0.05 MIU/L-ACNC: 1.83 UIU/ML (ref 0.36–3.74)
VIT B12 SERPL-MCNC: 3062 PG/ML (ref 100–900)
WBC # BLD AUTO: 8.1 THOUSAND/UL (ref 4.31–10.16)

## 2020-05-18 PROCEDURE — G0103 PSA SCREENING: HCPCS

## 2020-05-18 PROCEDURE — 85025 COMPLETE CBC W/AUTO DIFF WBC: CPT

## 2020-05-18 PROCEDURE — 82607 VITAMIN B-12: CPT

## 2020-05-18 PROCEDURE — 86803 HEPATITIS C AB TEST: CPT

## 2020-05-18 PROCEDURE — 36415 COLL VENOUS BLD VENIPUNCTURE: CPT

## 2020-05-18 PROCEDURE — 80053 COMPREHEN METABOLIC PANEL: CPT

## 2020-05-18 PROCEDURE — 80061 LIPID PANEL: CPT

## 2020-05-18 PROCEDURE — 82306 VITAMIN D 25 HYDROXY: CPT

## 2020-05-18 PROCEDURE — 84443 ASSAY THYROID STIM HORMONE: CPT

## 2020-05-18 PROCEDURE — 83036 HEMOGLOBIN GLYCOSYLATED A1C: CPT

## 2020-05-19 ENCOUNTER — OFFICE VISIT (OUTPATIENT)
Dept: INTERNAL MEDICINE CLINIC | Facility: CLINIC | Age: 75
End: 2020-05-19
Payer: MEDICARE

## 2020-05-19 VITALS
SYSTOLIC BLOOD PRESSURE: 118 MMHG | OXYGEN SATURATION: 98 % | HEIGHT: 70 IN | TEMPERATURE: 99 F | BODY MASS INDEX: 25.45 KG/M2 | HEART RATE: 62 BPM | WEIGHT: 177.8 LBS | DIASTOLIC BLOOD PRESSURE: 66 MMHG

## 2020-05-19 DIAGNOSIS — R73.01 IMPAIRED FASTING GLUCOSE: ICD-10-CM

## 2020-05-19 DIAGNOSIS — N18.30 CKD (CHRONIC KIDNEY DISEASE), STAGE III (HCC): ICD-10-CM

## 2020-05-19 DIAGNOSIS — E78.2 MIXED HYPERLIPIDEMIA: ICD-10-CM

## 2020-05-19 DIAGNOSIS — N40.1 BENIGN PROSTATIC HYPERPLASIA WITH NOCTURIA: ICD-10-CM

## 2020-05-19 DIAGNOSIS — I25.10 CORONARY ARTERY DISEASE INVOLVING NATIVE CORONARY ARTERY OF NATIVE HEART WITHOUT ANGINA PECTORIS: ICD-10-CM

## 2020-05-19 DIAGNOSIS — I10 ESSENTIAL HYPERTENSION: ICD-10-CM

## 2020-05-19 DIAGNOSIS — R07.9 CHEST PAIN, UNSPECIFIED TYPE: Primary | ICD-10-CM

## 2020-05-19 DIAGNOSIS — E03.8 SUBCLINICAL HYPOTHYROIDISM: ICD-10-CM

## 2020-05-19 DIAGNOSIS — R35.1 BENIGN PROSTATIC HYPERPLASIA WITH NOCTURIA: ICD-10-CM

## 2020-05-19 DIAGNOSIS — I82.509 CHRONIC DEEP VEIN THROMBOSIS (DVT) OF LOWER EXTREMITY, UNSPECIFIED LATERALITY, UNSPECIFIED VEIN (HCC): ICD-10-CM

## 2020-05-19 PROCEDURE — 1036F TOBACCO NON-USER: CPT | Performed by: INTERNAL MEDICINE

## 2020-05-19 PROCEDURE — 3008F BODY MASS INDEX DOCD: CPT | Performed by: INTERNAL MEDICINE

## 2020-05-19 PROCEDURE — 4040F PNEUMOC VAC/ADMIN/RCVD: CPT | Performed by: INTERNAL MEDICINE

## 2020-05-19 PROCEDURE — 3078F DIAST BP <80 MM HG: CPT | Performed by: INTERNAL MEDICINE

## 2020-05-19 PROCEDURE — 93000 ELECTROCARDIOGRAM COMPLETE: CPT | Performed by: INTERNAL MEDICINE

## 2020-05-19 PROCEDURE — 99215 OFFICE O/P EST HI 40 MIN: CPT | Performed by: INTERNAL MEDICINE

## 2020-05-19 PROCEDURE — 3074F SYST BP LT 130 MM HG: CPT | Performed by: INTERNAL MEDICINE

## 2020-05-19 PROCEDURE — 1160F RVW MEDS BY RX/DR IN RCRD: CPT | Performed by: INTERNAL MEDICINE

## 2020-05-20 ENCOUNTER — TELEPHONE (OUTPATIENT)
Dept: INTERNAL MEDICINE CLINIC | Facility: CLINIC | Age: 75
End: 2020-05-20

## 2020-06-17 ENCOUNTER — TELEPHONE (OUTPATIENT)
Dept: INTERNAL MEDICINE CLINIC | Facility: CLINIC | Age: 75
End: 2020-06-17

## 2020-06-17 ENCOUNTER — OFFICE VISIT (OUTPATIENT)
Dept: INTERNAL MEDICINE CLINIC | Facility: CLINIC | Age: 75
End: 2020-06-17
Payer: MEDICARE

## 2020-06-17 ENCOUNTER — APPOINTMENT (OUTPATIENT)
Dept: LAB | Facility: CLINIC | Age: 75
End: 2020-06-17
Payer: MEDICARE

## 2020-06-17 VITALS
RESPIRATION RATE: 14 BRPM | WEIGHT: 172.4 LBS | HEIGHT: 70 IN | TEMPERATURE: 98.4 F | DIASTOLIC BLOOD PRESSURE: 72 MMHG | HEART RATE: 72 BPM | BODY MASS INDEX: 24.68 KG/M2 | SYSTOLIC BLOOD PRESSURE: 108 MMHG

## 2020-06-17 DIAGNOSIS — M10.9 PODAGRA: Primary | ICD-10-CM

## 2020-06-17 DIAGNOSIS — M10.9 PODAGRA: ICD-10-CM

## 2020-06-17 DIAGNOSIS — Z12.11 SCREENING FOR COLON CANCER: ICD-10-CM

## 2020-06-17 DIAGNOSIS — N40.0 BPH WITHOUT URINARY OBSTRUCTION: ICD-10-CM

## 2020-06-17 DIAGNOSIS — N18.30 CKD (CHRONIC KIDNEY DISEASE), STAGE III (HCC): ICD-10-CM

## 2020-06-17 DIAGNOSIS — N40.0 BENIGN PROSTATIC HYPERPLASIA, UNSPECIFIED WHETHER LOWER URINARY TRACT SYMPTOMS PRESENT: ICD-10-CM

## 2020-06-17 LAB
ALBUMIN SERPL BCP-MCNC: 4 G/DL (ref 3.5–5)
ALP SERPL-CCNC: 89 U/L (ref 46–116)
ALT SERPL W P-5'-P-CCNC: 46 U/L (ref 12–78)
ANION GAP SERPL CALCULATED.3IONS-SCNC: 4 MMOL/L (ref 4–13)
AST SERPL W P-5'-P-CCNC: 27 U/L (ref 5–45)
BASOPHILS # BLD AUTO: 0.04 THOUSANDS/ΜL (ref 0–0.1)
BASOPHILS NFR BLD AUTO: 1 % (ref 0–1)
BILIRUB SERPL-MCNC: 0.67 MG/DL (ref 0.2–1)
BUN SERPL-MCNC: 22 MG/DL (ref 5–25)
CALCIUM SERPL-MCNC: 9.3 MG/DL (ref 8.3–10.1)
CHLORIDE SERPL-SCNC: 111 MMOL/L (ref 100–108)
CO2 SERPL-SCNC: 25 MMOL/L (ref 21–32)
CREAT SERPL-MCNC: 1.23 MG/DL (ref 0.6–1.3)
CRP SERPL QL: <3 MG/L
EOSINOPHIL # BLD AUTO: 0.2 THOUSAND/ΜL (ref 0–0.61)
EOSINOPHIL NFR BLD AUTO: 2 % (ref 0–6)
ERYTHROCYTE [DISTWIDTH] IN BLOOD BY AUTOMATED COUNT: 13.2 % (ref 11.6–15.1)
GFR SERPL CREATININE-BSD FRML MDRD: 57 ML/MIN/1.73SQ M
GLUCOSE SERPL-MCNC: 92 MG/DL (ref 65–140)
HCT VFR BLD AUTO: 45.3 % (ref 36.5–49.3)
HGB BLD-MCNC: 14.6 G/DL (ref 12–17)
IMM GRANULOCYTES # BLD AUTO: 0.03 THOUSAND/UL (ref 0–0.2)
IMM GRANULOCYTES NFR BLD AUTO: 0 % (ref 0–2)
LYMPHOCYTES # BLD AUTO: 1.73 THOUSANDS/ΜL (ref 0.6–4.47)
LYMPHOCYTES NFR BLD AUTO: 20 % (ref 14–44)
MCH RBC QN AUTO: 29.2 PG (ref 26.8–34.3)
MCHC RBC AUTO-ENTMCNC: 32.2 G/DL (ref 31.4–37.4)
MCV RBC AUTO: 91 FL (ref 82–98)
MONOCYTES # BLD AUTO: 0.8 THOUSAND/ΜL (ref 0.17–1.22)
MONOCYTES NFR BLD AUTO: 9 % (ref 4–12)
NEUTROPHILS # BLD AUTO: 5.99 THOUSANDS/ΜL (ref 1.85–7.62)
NEUTS SEG NFR BLD AUTO: 68 % (ref 43–75)
NRBC BLD AUTO-RTO: 0 /100 WBCS
PLATELET # BLD AUTO: 224 THOUSANDS/UL (ref 149–390)
PMV BLD AUTO: 11.3 FL (ref 8.9–12.7)
POTASSIUM SERPL-SCNC: 4.2 MMOL/L (ref 3.5–5.3)
PROT SERPL-MCNC: 7.1 G/DL (ref 6.4–8.2)
RBC # BLD AUTO: 5 MILLION/UL (ref 3.88–5.62)
SODIUM SERPL-SCNC: 140 MMOL/L (ref 136–145)
URATE SERPL-MCNC: 5.7 MG/DL (ref 4.2–8)
WBC # BLD AUTO: 8.79 THOUSAND/UL (ref 4.31–10.16)

## 2020-06-17 PROCEDURE — 84550 ASSAY OF BLOOD/URIC ACID: CPT

## 2020-06-17 PROCEDURE — 4040F PNEUMOC VAC/ADMIN/RCVD: CPT | Performed by: INTERNAL MEDICINE

## 2020-06-17 PROCEDURE — 85025 COMPLETE CBC W/AUTO DIFF WBC: CPT

## 2020-06-17 PROCEDURE — 99213 OFFICE O/P EST LOW 20 MIN: CPT | Performed by: INTERNAL MEDICINE

## 2020-06-17 PROCEDURE — 36415 COLL VENOUS BLD VENIPUNCTURE: CPT

## 2020-06-17 PROCEDURE — 80053 COMPREHEN METABOLIC PANEL: CPT

## 2020-06-17 PROCEDURE — 86140 C-REACTIVE PROTEIN: CPT

## 2020-06-17 PROCEDURE — 1036F TOBACCO NON-USER: CPT | Performed by: INTERNAL MEDICINE

## 2020-06-17 PROCEDURE — 3074F SYST BP LT 130 MM HG: CPT | Performed by: INTERNAL MEDICINE

## 2020-06-17 PROCEDURE — 1160F RVW MEDS BY RX/DR IN RCRD: CPT | Performed by: INTERNAL MEDICINE

## 2020-06-17 PROCEDURE — 3078F DIAST BP <80 MM HG: CPT | Performed by: INTERNAL MEDICINE

## 2020-06-17 PROCEDURE — 3008F BODY MASS INDEX DOCD: CPT | Performed by: INTERNAL MEDICINE

## 2020-06-17 RX ORDER — COLCHICINE 0.6 MG/1
TABLET ORAL
Qty: 30 TABLET | Refills: 5 | Status: SHIPPED | OUTPATIENT
Start: 2020-06-17 | End: 2020-09-08

## 2020-06-17 RX ORDER — MEMANTINE HYDROCHLORIDE 5 MG/1
5 TABLET ORAL 2 TIMES DAILY
COMMUNITY
Start: 2020-06-15 | End: 2021-03-15 | Stop reason: SDUPTHER

## 2020-06-17 RX ORDER — TAMSULOSIN HYDROCHLORIDE 0.4 MG/1
0.4 CAPSULE ORAL
Qty: 90 CAPSULE | Refills: 3 | Status: SHIPPED | OUTPATIENT
Start: 2020-06-17 | End: 2021-01-25 | Stop reason: SDUPTHER

## 2020-06-18 ENCOUNTER — TELEPHONE (OUTPATIENT)
Dept: INTERNAL MEDICINE CLINIC | Facility: CLINIC | Age: 75
End: 2020-06-18

## 2020-06-22 ENCOUNTER — TELEPHONE (OUTPATIENT)
Dept: INTERNAL MEDICINE CLINIC | Facility: CLINIC | Age: 75
End: 2020-06-22

## 2020-06-22 DIAGNOSIS — E78.2 MIXED HYPERLIPIDEMIA: Primary | ICD-10-CM

## 2020-06-22 RX ORDER — ATORVASTATIN CALCIUM 40 MG/1
40 TABLET, FILM COATED ORAL DAILY
Qty: 90 TABLET | Refills: 0 | Status: SHIPPED | OUTPATIENT
Start: 2020-06-22 | End: 2020-09-14

## 2020-06-22 NOTE — TELEPHONE ENCOUNTER
CALLED PT   AND WAS NOTIFIED AND STATED THE PILLS HE HAS  ARE NOT SCORED AND CAN NOT CUT THEM, WOULD PREFER 40'S BE SENT IN PENDED TO YOU

## 2020-06-22 NOTE — TELEPHONE ENCOUNTER
Patient is asking Dr Berhane Thomas to refer him to a kidney specialist  Also he wants his cholesterol  medication (Lipitor) changed from 80 mg to 40 mg

## 2020-06-22 NOTE — TELEPHONE ENCOUNTER
FORWARDING MSG  TO DR Evens Sarah, CALLED PT  AND STATED DR BURK TOLD HIM HIS CHOLESTEROL READINGS  ARE GOOD AND ASKED WHY HE IS NOT TAKING 40MG OF LIPITOR AND STATED HE HAS STAGE 3 KIDNEY DIEASE AND IS WONDERING IF THIS MAT BE COMING FROM LIPITOR AND IS ALSO LOOKING FOR REFERRAL FOR KIDNEY SPEACLIST

## 2020-06-24 ENCOUNTER — TELEPHONE (OUTPATIENT)
Dept: INTERNAL MEDICINE CLINIC | Facility: CLINIC | Age: 75
End: 2020-06-24

## 2020-06-24 NOTE — TELEPHONE ENCOUNTER
CALLED PT  AND WAS NOTIFIED THIS IS FOR A COLONOSCOPY AND HE STATED HE SEES DR Sujey Kinney AND WILL BE CALLING HIM TODAY FOR APPT

## 2020-06-24 NOTE — TELEPHONE ENCOUNTER
Patient rec'ed something from Dr Aceves's office having to do with Roberto Tomlinson and he's not sure what this is about ??? Is it an order for colonoscopy ? ?? Nothing was mentioned to him, so he's not sure what it's about

## 2020-06-30 ENCOUNTER — TELEPHONE (OUTPATIENT)
Dept: INTERNAL MEDICINE CLINIC | Facility: CLINIC | Age: 75
End: 2020-06-30

## 2020-06-30 NOTE — TELEPHONE ENCOUNTER
Patient states that he would be willing to go to [facility] in order to be seen sooner, do you have any recommendations?

## 2020-06-30 NOTE — TELEPHONE ENCOUNTER
FORWARDING MSG  TO DR Roe Lizarraga Initiate Treatment: . \\nHydrocortisone 2% lotion. To be used nightly for 7 days.  ( samples were given) \\nThen, Eletone cream daily ( samples were given) Detail Level: Zone

## 2020-06-30 NOTE — TELEPHONE ENCOUNTER
Can see one of the other doctors in that practice sooner?  He would have to go to [facility] or down to [place]

## 2020-06-30 NOTE — TELEPHONE ENCOUNTER
[facility]  DR JOSE   BUT  HE  CAN'T SEE  HIM TILL  END  OF  AUGUST  WANTS  TO  SEE  SOMEONE  ELSE   SOONER  ASKING  FOR  ANOTHER  DR   TO   SEE     CALL PT  [phone]

## 2020-07-01 ENCOUNTER — TELEPHONE (OUTPATIENT)
Dept: NEPHROLOGY | Facility: CLINIC | Age: 75
End: 2020-07-01

## 2020-07-01 NOTE — TELEPHONE ENCOUNTER
I believe there is a Dr Tres Ramirez- but could you look at [address] last note - see why the referral was place and see maybe if you call our guys you could get him in sooner- Ketan would prefer he stick with our providers

## 2020-07-01 NOTE — TELEPHONE ENCOUNTER
NEPHROLOGY CALLED BACK AND HE WILL BE SEEING DR Ramona Dow 7/22/2020 AT 8:30AM, CALLED PT   AND WAS NOTIFIED

## 2020-07-01 NOTE — TELEPHONE ENCOUNTER
Bella from Dr Aceves's office called to see if we have a sooner appt for pt  Appt was changed from 8/28/22 to 7/22/2020 with Dr Eddie Blanco accepted the appt and stated she will contact the patient to give him the update on appt date and time  Bella can be reached at [phone] [facility]

## 2020-07-15 ENCOUNTER — TELEPHONE (OUTPATIENT)
Dept: INTERNAL MEDICINE CLINIC | Facility: CLINIC | Age: 75
End: 2020-07-15

## 2020-07-15 ENCOUNTER — OFFICE VISIT (OUTPATIENT)
Dept: INTERNAL MEDICINE CLINIC | Facility: CLINIC | Age: 75
End: 2020-07-15
Payer: MEDICARE

## 2020-07-15 VITALS
RESPIRATION RATE: 14 BRPM | HEART RATE: 72 BPM | SYSTOLIC BLOOD PRESSURE: 102 MMHG | HEIGHT: 70 IN | WEIGHT: 170.6 LBS | BODY MASS INDEX: 24.42 KG/M2 | TEMPERATURE: 97.7 F | DIASTOLIC BLOOD PRESSURE: 72 MMHG

## 2020-07-15 DIAGNOSIS — L24.9 IRRITANT CONTACT DERMATITIS, UNSPECIFIED TRIGGER: Primary | ICD-10-CM

## 2020-07-15 PROCEDURE — 99213 OFFICE O/P EST LOW 20 MIN: CPT | Performed by: INTERNAL MEDICINE

## 2020-07-15 PROCEDURE — 4040F PNEUMOC VAC/ADMIN/RCVD: CPT | Performed by: INTERNAL MEDICINE

## 2020-07-15 PROCEDURE — 1036F TOBACCO NON-USER: CPT | Performed by: INTERNAL MEDICINE

## 2020-07-15 PROCEDURE — 1160F RVW MEDS BY RX/DR IN RCRD: CPT | Performed by: INTERNAL MEDICINE

## 2020-07-15 PROCEDURE — 3078F DIAST BP <80 MM HG: CPT | Performed by: INTERNAL MEDICINE

## 2020-07-15 PROCEDURE — 3008F BODY MASS INDEX DOCD: CPT | Performed by: INTERNAL MEDICINE

## 2020-07-15 PROCEDURE — 3074F SYST BP LT 130 MM HG: CPT | Performed by: INTERNAL MEDICINE

## 2020-07-15 NOTE — PATIENT INSTRUCTIONS
Rash consistent with contact dermatitis most likely poison ivy-patient is minimally symptomatic therefore no specific treatment necessary  Use fluocinonide cream twice daily as needed for itch or increase inflammation  Contact me if symptoms worsen or fail to improve

## 2020-07-15 NOTE — TELEPHONE ENCOUNTER
Just noticed shingles both sides of the chest  He had the vaccine couple years ago   never had shingles before & is asking what  recommends ? ??

## 2020-07-15 NOTE — TELEPHONE ENCOUNTER
Patient has been transferred to [facility] at the  to schedule an appointment for evaluation

## 2020-07-15 NOTE — TELEPHONE ENCOUNTER
Is exceedingly rare that shingles with the fact both sides of the midline    He should be evaluated for his rash

## 2020-07-15 NOTE — PROGRESS NOTES
Assessment/Plan:    Diagnoses and all orders for this visit:    Irritant contact dermatitis, unspecified trigger  -     fluocinonide (LIDEX) 0 05 % cream; Apply topically 2 (two) times a day         Patient Instructions   Rash consistent with contact dermatitis most likely poison ivy-patient is minimally symptomatic therefore no specific treatment necessary  Use fluocinonide cream twice daily as needed for itch or increase inflammation  Contact me if symptoms worsen or fail to improve  Subjective:      Patient ID: Jennifer Lorenz is a 77 y o  male     Patient presents with rash with concern for shingles  He notes rashes on his left arm, left chest, midline abdomen as well as right lower quadrant abdomen  He does admit he has been out of doors trimming hedges  He did have the shingles vaccine  The rash is not terribly itchy  He had a similar rash with poison ivy in the past         Current Outpatient Medications:     aspirin 325 mg tablet, Take 1 tablet by mouth daily, Disp: , Rfl:     atorvastatin (LIPITOR) 40 mg tablet, Take 1 tablet (40 mg total) by mouth daily, Disp: 90 tablet, Rfl: 0    buPROPion (WELLBUTRIN XL) 300 mg 24 hr tablet, Take 1 tablet (300 mg total) by mouth daily, Disp: 90 tablet, Rfl: 3    colchicine (COLCRYS) 0 6 mg tablet, 2 tabs p o  X1 at onset of gout flare then 1 tab p o  T i d  P r n  (Patient taking differently: as needed 2 tabs p o   X1 at onset of gout flare then 1 tab p o  T i d  P r n ), Disp: 30 tablet, Rfl: 5    ergocalciferol (VITAMIN D2) 50,000 units, TAKE 1 CAPSULE BY MOUTH WEEKLY FOR 1 MONTH, Disp: , Rfl: 1    escitalopram (LEXAPRO) 20 mg tablet, Take 1 tablet (20 mg total) by mouth daily, Disp: 90 tablet, Rfl: 2    LORazepam (ATIVAN) 1 mg tablet, TAKE 1 TABLET (1 MG TOTAL) BY MOUTH 3 (THREE) TIMES A DAY AS NEEDED FOR ANXIETY, Disp: 90 tablet, Rfl: 0    memantine (NAMENDA) 5 mg tablet, , Disp: , Rfl:     metoprolol tartrate (LOPRESSOR) 25 mg tablet, Take 1 tablet (25 mg total) by mouth 2 (two) times a day, Disp: 180 tablet, Rfl: 3    tamsulosin (FLOMAX) 0 4 mg, Take 1 capsule (0 4 mg total) by mouth daily with dinner, Disp: 90 capsule, Rfl: 3    atorvastatin (LIPITOR) 80 mg tablet, Take 1 tablet (80 mg total) by mouth daily (Patient not taking: Reported on 7/15/2020), Disp: 90 tablet, Rfl: 3    fluocinonide (LIDEX) 0 05 % cream, Apply topically 2 (two) times a day, Disp: 30 g, Rfl: 0    Recent Results (from the past 1008 hour(s))   C-reactive protein    Collection Time: 06/17/20  1:40 PM   Result Value Ref Range    CRP <3 0 <3 0 mg/L   CBC and differential    Collection Time: 06/17/20  1:40 PM   Result Value Ref Range    WBC 8 79 4 31 - 10 16 Thousand/uL    RBC 5 00 3 88 - 5 62 Million/uL    Hemoglobin 14 6 12 0 - 17 0 g/dL    Hematocrit 45 3 36 5 - 49 3 %    MCV 91 82 - 98 fL    MCH 29 2 26 8 - 34 3 pg    MCHC 32 2 31 4 - 37 4 g/dL    RDW 13 2 11 6 - 15 1 %    MPV 11 3 8 9 - 12 7 fL    Platelets 294 317 - 817 Thousands/uL    nRBC 0 /100 WBCs    Neutrophils Relative 68 43 - 75 %    Immat GRANS % 0 0 - 2 %    Lymphocytes Relative 20 14 - 44 %    Monocytes Relative 9 4 - 12 %    Eosinophils Relative 2 0 - 6 %    Basophils Relative 1 0 - 1 %    Neutrophils Absolute 5 99 1 85 - 7 62 Thousands/µL    Immature Grans Absolute 0 03 0 00 - 0 20 Thousand/uL    Lymphocytes Absolute 1 73 0 60 - 4 47 Thousands/µL    Monocytes Absolute 0 80 0 17 - 1 22 Thousand/µL    Eosinophils Absolute 0 20 0 00 - 0 61 Thousand/µL    Basophils Absolute 0 04 0 00 - 0 10 Thousands/µL   Comprehensive metabolic panel    Collection Time: 06/17/20  1:40 PM   Result Value Ref Range    Sodium 140 136 - 145 mmol/L    Potassium 4 2 3 5 - 5 3 mmol/L    Chloride 111 (H) 100 - 108 mmol/L    CO2 25 21 - 32 mmol/L    ANION GAP 4 4 - 13 mmol/L    BUN 22 5 - 25 mg/dL    Creatinine 1 23 0 60 - 1 30 mg/dL    Glucose 92 65 - 140 mg/dL    Calcium 9 3 8 3 - 10 1 mg/dL    AST 27 5 - 45 U/L    ALT 46 12 - 78 U/L Alkaline Phosphatase 89 46 - 116 U/L    Total Protein 7 1 6 4 - 8 2 g/dL    Albumin 4 0 3 5 - 5 0 g/dL    Total Bilirubin 0 67 0 20 - 1 00 mg/dL    eGFR 57 ml/min/1 73sq m   Uric acid    Collection Time: 06/17/20  1:40 PM   Result Value Ref Range    Uric Acid 5 7 4 2 - 8 0 mg/dL       The following portions of the patient's history were reviewed and updated as appropriate: allergies, current medications, past family history, past medical history, past social history, past surgical history and problem list      Review of Systems   Constitutional: Negative for appetite change, chills, diaphoresis, fatigue, fever and unexpected weight change  HENT: Negative for congestion, hearing loss and rhinorrhea  Eyes: Negative for visual disturbance  Respiratory: Negative for cough, chest tightness, shortness of breath and wheezing  Cardiovascular: Negative for chest pain, palpitations and leg swelling  Gastrointestinal: Negative for abdominal pain and blood in stool  Endocrine: Negative for cold intolerance, heat intolerance, polydipsia and polyuria  Genitourinary: Negative for difficulty urinating, dysuria, frequency and urgency  Musculoskeletal: Negative for arthralgias and myalgias  Skin: Positive for rash  Neurological: Negative for dizziness, weakness, light-headedness and headaches  Hematological: Does not bruise/bleed easily  Psychiatric/Behavioral: Negative for dysphoric mood and sleep disturbance           Objective:      Vitals:    07/15/20 1643   BP: 102/72   Pulse: 72   Resp: 14   Temp: 97 7 °F (36 5 °C)          Physical Exam   Skin:     Erythematous papular rash in clusters in streaks on left upper chest, left upper arm, midline lower abdomen and right lower quadrant small vesicles are present in various areas

## 2020-07-20 ENCOUNTER — TELEPHONE (OUTPATIENT)
Dept: NEPHROLOGY | Facility: CLINIC | Age: 75
End: 2020-07-20

## 2020-07-20 NOTE — TELEPHONE ENCOUNTER
Lm for patient to call back to confirm appt on 7/22/2020 and go over registration and appt details   [address] [facility]

## 2020-07-22 ENCOUNTER — CONSULT (OUTPATIENT)
Dept: NEPHROLOGY | Facility: CLINIC | Age: 75
End: 2020-07-22
Payer: MEDICARE

## 2020-07-22 VITALS
HEIGHT: 68 IN | WEIGHT: 166.4 LBS | HEART RATE: 66 BPM | SYSTOLIC BLOOD PRESSURE: 110 MMHG | DIASTOLIC BLOOD PRESSURE: 66 MMHG | BODY MASS INDEX: 25.22 KG/M2 | RESPIRATION RATE: 16 BRPM | TEMPERATURE: 97.6 F

## 2020-07-22 DIAGNOSIS — N18.30 CKD (CHRONIC KIDNEY DISEASE) STAGE 3, GFR 30-59 ML/MIN (HCC): Primary | ICD-10-CM

## 2020-07-22 PROCEDURE — 3078F DIAST BP <80 MM HG: CPT | Performed by: INTERNAL MEDICINE

## 2020-07-22 PROCEDURE — 3074F SYST BP LT 130 MM HG: CPT | Performed by: INTERNAL MEDICINE

## 2020-07-22 PROCEDURE — 4040F PNEUMOC VAC/ADMIN/RCVD: CPT | Performed by: INTERNAL MEDICINE

## 2020-07-22 PROCEDURE — 3008F BODY MASS INDEX DOCD: CPT | Performed by: INTERNAL MEDICINE

## 2020-07-22 PROCEDURE — 1160F RVW MEDS BY RX/DR IN RCRD: CPT | Performed by: INTERNAL MEDICINE

## 2020-07-22 PROCEDURE — 1036F TOBACCO NON-USER: CPT | Performed by: INTERNAL MEDICINE

## 2020-07-22 PROCEDURE — 99205 OFFICE O/P NEW HI 60 MIN: CPT | Performed by: INTERNAL MEDICINE

## 2020-07-22 NOTE — PROGRESS NOTES
NEPHROLOGY OFFICE CONSULT  Daniel Singh 77 y o  male MRN: [id number]    Encounter: [id number] DATE: 7/22/2020    REASON FOR VISIT: Daniel Singh is a 77 y o male who was referred by Jina Bejarano MD for evaluation  No Black[place] HPI:    This is a 71-year-old male with past medical history of CAD status post CABG in the year 2006, hypertension, gout, vitamin-D deficiency, dyslipidemia, benign prostatic hyperplasia, chronic kidney disease stage 3 who is referred to [facility] by his PCP due to having elevated renal parameters  Patient is aware of having stage III CKD as early as 2018  Upon review of medical records patient has evidence of chronic kidney disease since the year 2016  His serum creatinine has peaked at 1 5 approximately 2 years ago  He denies taking any NSAIDs or any other nephrotoxic medications  He does admit to having occasional low blood pressures with systolic blood pressure being less than 100 mmHg  He denies any dizziness chest pain or shortness of breath  Denies any gross blood in the urine either  Denies having further coronary artery stenting since his CABG in the year 2006  States that he has now started to drink adequate amounts of fluids when in the past his p o  Liquid intake was suboptimal   Admits to having episodes of nocturia          PAST MEDICAL HISTORY:  Past Medical History:   Diagnosis Date    Anxiety disorder     last assessed - [id number]    Basal cell carcinoma of face     BPH (benign prostatic hyperplasia)     benign prostatic hypertrophy; last assessed - [id number]    Chronic kidney disease     Coronary atherosclerosis of native coronary artery     last assessed - [id number]    Deep vein thrombosis (DVT) ([facility] )     last assessed - [id number]    Depression     last assessed - [id number]    History of colonoscopy     Fiberoptic; Resolved - 12/1/2011    Hyperlipemia     last assessed - [id number]    Hypertension     last assessed - [id number]    Myocardial infarction ([facility] )  Nonmelanoma skin cancer     last assessed - [id number]    Osteoarthritis     last assessed - [id number]    Pneumonia     last assessed - [id number]    [facility]monary HTN [facility] [address] [facility]     [facility]peripheral vascular disease) ([facility]     Seborrheic dermatitis        PAST SURGICAL HISTORY:  Past Surgical History:   Procedure Laterality Date    CARDIAC CATHETERIZATION      procedure outcome successful; last assessed - [id number]    CORONARY ARTERY BYPASS GRAFT      last assessed - 69RJX3115    IVC FILTER INSERTION      Jael    TOTAL HIP ARTHROPLASTY      last assessed - [id number]       SOCIAL HISTORY:  Social History     Substance and Sexual Activity   Alcohol Use Yes    Frequency: 2-3 times a week    Drinks per session: 1 or 2    Binge frequency: Never    Comment: socially     Social History     Substance and Sexual Activity   Drug Use Yes    Types: Marijuana     Social History     Tobacco Use   Smoking Status Never Smoker   Smokeless Tobacco Never Used       FAMILY HISTORY:  Family History   Problem Relation Age of Onset    Dementia Mother     Rectal cancer Mother     Coronary artery disease Father     Rectal cancer Sister        ALLERGY:  No Known Allergies    MEDICATIONS:    Current Outpatient Medications:     aspirin 325 mg tablet, Take 1 tablet by mouth daily, Disp: , Rfl:     atorvastatin (LIPITOR) 40 mg tablet, Take 1 tablet (40 mg total) by mouth daily, Disp: 90 tablet, Rfl: 0    buPROPion (WELLBUTRIN XL) 300 mg 24 hr tablet, Take 1 tablet (300 mg total) by mouth daily, Disp: 90 tablet, Rfl: 3    ergocalciferol (VITAMIN D2) 50,000 units, TAKE 1 CAPSULE BY MOUTH WEEKLY FOR 1 MONTH, Disp: , Rfl: 1    escitalopram (LEXAPRO) 20 mg tablet, Take 1 tablet (20 mg total) by mouth daily, Disp: 90 tablet, Rfl: 2    fluocinonide (LIDEX) 0 05 % cream, Apply topically 2 (two) times a day (Patient taking differently: Apply topically as needed ), Disp: 30 g, Rfl: 0    LORazepam (ATIVAN) 1 mg tablet, TAKE 1 TABLET (1 MG TOTAL) BY MOUTH 3 (THREE) TIMES A DAY AS NEEDED FOR ANXIETY, Disp: 90 tablet, Rfl: 0    memantine (NAMENDA) 5 mg tablet, , Disp: , Rfl:     metoprolol tartrate (LOPRESSOR) 25 mg tablet, Take 1 tablet (25 mg total) by mouth 2 (two) times a day, Disp: 180 tablet, Rfl: 3    tamsulosin (FLOMAX) 0 4 mg, Take 1 capsule (0 4 mg total) by mouth daily with dinner, Disp: 90 capsule, Rfl: 3    atorvastatin (LIPITOR) 80 mg tablet, Take 1 tablet (80 mg total) by mouth daily (Patient not taking: Reported on 7/15/2020), Disp: 90 tablet, Rfl: 3    colchicine (COLCRYS) 0 6 mg tablet, 2 tabs p o  X1 at onset of gout flare then 1 tab p o  T i d  P r n  (Patient not taking: Reported on 7/22/2020), Disp: 30 tablet, Rfl: 5    REVIEW OF SYSTEMS:    Review of Systems   Constitutional: Negative  HENT: Negative  Eyes: Negative  Respiratory: Negative  Cardiovascular: Negative  Gastrointestinal: Negative  Endocrine: Negative  Genitourinary: Negative  Nocturia   Musculoskeletal: Negative  Skin: Negative  Allergic/Immunologic: Negative  Neurological: Negative  Hematological: Negative  All other systems reviewed and are negative  PHYSICAL EXAM:  Vitals:    07/22/20 0831   BP: 110/66   BP Location: Left arm   Patient Position: Sitting   Cuff Size: Standard   Pulse: 66   Resp: 16   Temp: 97 6 °F (36 4 °C)   TempSrc: Tympanic   Weight: 75 5 kg (166 lb 6 4 oz)   Height: 5' 8" (1 727 m)     Body mass index is 25 3 kg/m²  Physical Exam   Constitutional: He is oriented to person, place, and time  HENT:   Head: Normocephalic and atraumatic  Eyes: Pupils are equal, round, and reactive to light  Neck: Neck supple  No JVD present  Cardiovascular: Normal rate, regular rhythm and normal heart sounds  Exam reveals no friction rub  No murmur heard  Pulmonary/Chest: Effort normal and breath sounds normal    Abdominal: Soft  Bowel sounds are normal  He exhibits no distension   There is no tenderness  There is no rebound  Musculoskeletal: He exhibits no edema or tenderness  Neurological: He is alert and oriented to person, place, and time  Skin: Skin is dry  No rash noted  Psychiatric: He has a normal mood and affect         LAB RESULTS:  Results for orders placed or performed in visit on 06/17/20   C-reactive protein   Result Value Ref Range    CRP <3 0 <3 0 mg/L   CBC and differential   Result Value Ref Range    WBC 8 79 4 31 - 10 16 Thousand/uL    RBC 5 00 3 88 - 5 62 Million/uL    Hemoglobin 14 6 12 0 - 17 0 g/dL    Hematocrit 45 3 36 5 - 49 3 %    MCV 91 82 - 98 fL    MCH 29 2 26 8 - 34 3 pg    MCHC 32 2 31 4 - 37 4 g/dL    RDW 13 2 11 6 - 15 1 %    MPV 11 3 8 9 - 12 7 fL    Platelets 231 087 - 528 Thousands/uL    nRBC 0 /100 WBCs    Neutrophils Relative 68 43 - 75 %    Immat GRANS % 0 0 - 2 %    Lymphocytes Relative 20 14 - 44 %    Monocytes Relative 9 4 - 12 %    Eosinophils Relative 2 0 - 6 %    Basophils Relative 1 0 - 1 %    Neutrophils Absolute 5 99 1 85 - 7 62 Thousands/µL    Immature Grans Absolute 0 03 0 00 - 0 20 Thousand/uL    Lymphocytes Absolute 1 73 0 60 - 4 47 Thousands/µL    Monocytes Absolute 0 80 0 17 - 1 22 Thousand/µL    Eosinophils Absolute 0 20 0 00 - 0 61 Thousand/µL    Basophils Absolute 0 04 0 00 - 0 10 Thousands/µL   Comprehensive metabolic panel   Result Value Ref Range    Sodium 140 136 - 145 mmol/L    Potassium 4 2 3 5 - 5 3 mmol/L    Chloride 111 (H) 100 - 108 mmol/L    CO2 25 21 - 32 mmol/L    ANION GAP 4 4 - 13 mmol/L    BUN 22 5 - 25 mg/dL    Creatinine 1 23 0 60 - 1 30 mg/dL    Glucose 92 65 - 140 mg/dL    Calcium 9 3 8 3 - 10 1 mg/dL    AST 27 5 - 45 U/L    ALT 46 12 - 78 U/L    Alkaline Phosphatase 89 46 - 116 U/L    Total Protein 7 1 6 4 - 8 2 g/dL    Albumin 4 0 3 5 - 5 0 g/dL    Total Bilirubin 0 67 0 20 - 1 00 mg/dL    eGFR 57 ml/min/1 73sq m   Uric acid   Result Value Ref Range    Uric Acid 5 7 4 2 - 8 0 mg/dL         ASSESSMENT and PLAN:  Marcos Carrera was seen today for chronic kidney disease and consult  Diagnoses and all orders for this visit:    CKD (chronic kidney disease) stage 3, GFR 30-59 ml/min ([facility])  -     Albumin; Standing  -     Calcium; Standing  -     CBC and differential; Standing  -     Comprehensive metabolic panel; Standing  -     Phosphorus; Standing  -     Protein / creatinine ratio, urine; Standing  -     PTH, intact; Standing  -     Urinalysis with microscopic; Standing  -     Vitamin D 25 hydroxy; Standing  -     US kidney and bladder; Future  -     Albumin  -     Calcium  -     CBC and differential  -     Comprehensive metabolic panel  -     Phosphorus  -     Protein / creatinine ratio, urine  -     PTH, intact  -     Urinalysis with microscopic  -     Vitamin D 25 hydroxy    62-year-old male with past medical history of chronic kidney disease stage 3, CAD status post CABG, dyslipidemia, benign prostatic hyperplasia, dyslipidemia, hypertension presents for management of CKD  1  Chronic kidney disease stage 3:  Etiology likely age, renovascular disease, hypertensive nephrosclerosis  His baseline serum creatinine ranges between 1 2-1 5  Last set of labs performed in the month of June 2020 revealed serum creatinine 1 2 and estimated GFR 57 which is his baseline   -avoidance of nephrotoxic medications such as NSAIDs, contrast agents recommended   -will obtain renal ultrasound to evaluate patient's renal parenchyma  2  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 stage 3:  Patient's office blood pressure noted to be 886 systolic  On metoprolol on twice daily basis  Recommended patient check his blood pressure and to avoid blood pressure medication if his systolic blood pressures below 105  Target blood pressure is 130/80  Avoidance of salt recommended  3  Secondary hyperparathyroidism of renal origin:  Will check patient's PTH intact, 25 hydroxy vitamin-D and phosphorus levels    His calcium levels have been within normal limits in the past     4  Benign prostatic hyperplasia:  Patient noted to be on Flomax stable symptoms  Present nocturia noted with probable etiology being sleep apnea and therefore patient recommended to undergo sleep study  Patient does admit to have chronic snoring as reported by his partner  5  Dyslipidemia:  Noted to be on statin with dose changes noted to be at 40 mg at bedtime  6  CAD status post CABG:  Occurred in the year 2006 with no symptoms of angina  7  Gout:  Recent exacerbation of the same and cured by colchicine  No signs of hyperuricemia as uric acid level was 7 and within normal limits  8  Anemia due to CKD:  Hemoglobin is 14 6 latest and within acceptable range  Target hemoglobin for this patient is 9 5-11 grams/deciliter  9  Nutrition:  Low-salt, low-potassium, protein intake up to 50 g per day recommended  Follow-up in 3 5 months with labs to be obtained prior  Boy Gerard

## 2020-08-05 DIAGNOSIS — F41.9 ANXIETY: ICD-10-CM

## 2020-08-05 RX ORDER — LORAZEPAM 1 MG/1
1 TABLET ORAL 3 TIMES DAILY PRN
Qty: 90 TABLET | Refills: 0 | Status: SHIPPED | OUTPATIENT
Start: 2020-08-05 | End: 2020-08-29

## 2020-08-17 ENCOUNTER — OFFICE VISIT (OUTPATIENT)
Dept: CARDIOLOGY CLINIC | Facility: CLINIC | Age: 75
End: 2020-08-17
Payer: MEDICARE

## 2020-08-17 VITALS
HEART RATE: 72 BPM | BODY MASS INDEX: 25.16 KG/M2 | SYSTOLIC BLOOD PRESSURE: 110 MMHG | HEIGHT: 68 IN | WEIGHT: 166 LBS | OXYGEN SATURATION: 97 % | DIASTOLIC BLOOD PRESSURE: 68 MMHG

## 2020-08-17 DIAGNOSIS — I10 ESSENTIAL HYPERTENSION: ICD-10-CM

## 2020-08-17 DIAGNOSIS — I25.10 CORONARY ARTERY DISEASE INVOLVING NATIVE HEART WITHOUT ANGINA PECTORIS, UNSPECIFIED VESSEL OR LESION TYPE: Primary | ICD-10-CM

## 2020-08-17 DIAGNOSIS — E78.2 MIXED HYPERLIPIDEMIA: ICD-10-CM

## 2020-08-17 DIAGNOSIS — I65.21 CAROTID STENOSIS, RIGHT: ICD-10-CM

## 2020-08-17 PROCEDURE — 1160F RVW MEDS BY RX/DR IN RCRD: CPT | Performed by: INTERNAL MEDICINE

## 2020-08-17 PROCEDURE — 3074F SYST BP LT 130 MM HG: CPT | Performed by: INTERNAL MEDICINE

## 2020-08-17 PROCEDURE — 3008F BODY MASS INDEX DOCD: CPT | Performed by: INTERNAL MEDICINE

## 2020-08-17 PROCEDURE — 4040F PNEUMOC VAC/ADMIN/RCVD: CPT | Performed by: INTERNAL MEDICINE

## 2020-08-17 PROCEDURE — 99214 OFFICE O/P EST MOD 30 MIN: CPT | Performed by: INTERNAL MEDICINE

## 2020-08-17 PROCEDURE — 1036F TOBACCO NON-USER: CPT | Performed by: INTERNAL MEDICINE

## 2020-08-17 PROCEDURE — 3078F DIAST BP <80 MM HG: CPT | Performed by: INTERNAL MEDICINE

## 2020-08-17 NOTE — PROGRESS NOTES
[facility] [place]  [address] [id number]  Cardiology Follow Up    Penny Langley  [birth year]  [id number]      1  Coronary artery disease involving native heart without angina pectoris, unspecified vessel or lesion type     2  Essential hypertension     3  Mixed hyperlipidemia     4  Carotid stenosis, right         Chief Complaint   Patient presents with    Follow-up       Interval History:  Patient presents for follow-up visit  Patient denies any history of chest pain shortness of breath  Patient denies any history of leg edema or orthopnea PND  No history of presyncope syncope  Patient states compliance with the present list of medications      Patient Active Problem List   Diagnosis    Seborrheic keratosis    Screening for skin condition    History of skin cancer    Psoriasiform dermatitis    Coronary artery disease involving native coronary artery of native heart without angina pectoris    Essential hypertension    Mixed hyperlipidemia    Anxiety    Benign prostatic hyperplasia    CKD (chronic kidney disease), stage III (HCC)    Impaired fasting glucose    Subclinical hypothyroidism    Nocturia    Chronic deep vein thrombosis (DVT) of lower extremity ([address] )    Right carotid bruit    Carotid stenosis, right     Past Medical History:   Diagnosis Date    Anxiety disorder     last assessed - [id number]    Basal cell carcinoma of face     BPH (benign prostatic hyperplasia)     benign prostatic hypertrophy; last assessed - [id number]    Chronic kidney disease     Coronary atherosclerosis of native coronary artery     last assessed - [id number]    Deep vein thrombosis (DVT) ([address] )     last assessed - [id number]    Depression     last assessed - [id number]    History of colonoscopy     Fiberoptic; Resolved - 12/1/2011    Hyperlipemia     last assessed - [id number]    Hypertension     last assessed - 51IOO2217    Myocardial infarction ([facility] [address] )     Nonmelanoma skin cancer     last assessed - [id number]    Osteoarthritis     last assessed - [id number]    Pneumonia     last assessed - [id number]    Pulmonary HTN [facility] [address] [facility]     PVD [facility]peripheral vascular disease) ([facility] )     Seborrheic dermatitis      Social History     Socioeconomic History    Marital status: /Civil Union     Spouse name: Not on file    Number of children: Not on file    Years of education: Not on file    Highest education level: Not on file   Occupational History    Not on file   Social Needs    Financial resource strain: Not on file    Food insecurity     Worry: Not on file     Inability: Not on file    Transportation needs     Medical: Not on file     Non-medical: Not on file   Tobacco Use    Smoking status: Never Smoker    Smokeless tobacco: Never Used   Substance and Sexual Activity    Alcohol use: Yes     Frequency: 2-3 times a week     Drinks per session: 1 or 2     Binge frequency: Never     Comment: socially    Drug use: Yes     Types: Marijuana    Sexual activity: Not Currently     Partners: Female   Lifestyle    Physical activity     Days per week: 5 days     Minutes per session: 20 min    Stress: Not at all   Relationships    Social connections     Talks on phone: Not on file     Gets together: Not on file     Attends [employer] service: Not on file     Active member of club or organization: Not on file     Attends meetings of clubs or organizations: Not on file     Relationship status: Not on file    Intimate partner violence     Fear of current or ex partner: Not on file     Emotionally abused: Not on file     Physically abused: Not on file     Forced sexual activity: Not on file   Other Topics Concern    Not on file   Social History Narrative    Not on file      Family History   Problem Relation Age of Onset    Dementia Mother     Rectal cancer Mother     Coronary artery disease Father     Rectal cancer Sister      Past Surgical History:   Procedure Laterality Date    CARDIAC CATHETERIZATION      procedure outcome successful; last assessed - [id number]    CORONARY ARTERY BYPASS GRAFT      last assessed - [id number]    IVC FILTER INSERTION      Jael    TOTAL HIP ARTHROPLASTY      last assessed - [id number]       Current Outpatient Medications:     aspirin 81 mg chewable tablet, Chew 1 tablet daily , Disp: , Rfl:     atorvastatin (LIPITOR) 40 mg tablet, Take 1 tablet (40 mg total) by mouth daily, Disp: 90 tablet, Rfl: 0    buPROPion (WELLBUTRIN XL) 300 mg 24 hr tablet, Take 1 tablet (300 mg total) by mouth daily, Disp: 90 tablet, Rfl: 3    colchicine (COLCRYS) 0 6 mg tablet, 2 tabs p o  X1 at onset of gout flare then 1 tab p o  T i d  P r n , Disp: 30 tablet, Rfl: 5    escitalopram (LEXAPRO) 20 mg tablet, Take 1 tablet (20 mg total) by mouth daily, Disp: 90 tablet, Rfl: 2    fluocinonide (LIDEX) 0 05 % cream, Apply topically 2 (two) times a day (Patient taking differently: Apply topically as needed ), Disp: 30 g, Rfl: 0    LORazepam (ATIVAN) 1 mg tablet, TAKE 1 TABLET (1 MG TOTAL) BY MOUTH 3 (THREE) TIMES A DAY AS NEEDED FOR ANXIETY, Disp: 90 tablet, Rfl: 0    memantine (NAMENDA) 5 mg tablet, , Disp: , Rfl:     metoprolol tartrate (LOPRESSOR) 25 mg tablet, Take 1 tablet (25 mg total) by mouth 2 (two) times a day, Disp: 180 tablet, Rfl: 3    tamsulosin (FLOMAX) 0 4 mg, Take 1 capsule (0 4 mg total) by mouth daily with dinner, Disp: 90 capsule, Rfl: 3  No Known Allergies    Labs:  No visits with results within 2 Month(s) from this visit     Latest known visit with results is:   Appointment on 06/17/2020   Component Date Value    CRP 06/17/2020 <3 0     WBC 06/17/2020 8 79     RBC 06/17/2020 5 00     Hemoglobin 06/17/2020 14 6     Hematocrit 06/17/2020 45 3     MCV 06/17/2020 91     MCH 06/17/2020 29 2     MCHC 06/17/2020 32 2     RDW 06/17/2020 13 2     MPV 06/17/2020 11 3     Platelets 11/22/4326 224     nRBC 06/17/2020 0     Neutrophils Relative 06/17/2020 68     Immat GRANS % 06/17/2020 0     Lymphocytes Relative 06/17/2020 20     Monocytes Relative 06/17/2020 9     Eosinophils Relative 06/17/2020 2     Basophils Relative 06/17/2020 1     Neutrophils Absolute 06/17/2020 5 99     Immature Grans Absolute 06/17/2020 0 03     Lymphocytes Absolute 06/17/2020 1 73     Monocytes Absolute 06/17/2020 0 80     Eosinophils Absolute 06/17/2020 0 20     Basophils Absolute 06/17/2020 0 04     Sodium 06/17/2020 140     Potassium 06/17/2020 4 2     Chloride 06/17/2020 111*    CO2 06/17/2020 25     ANION GAP 06/17/2020 4     BUN 06/17/2020 22     Creatinine 06/17/2020 1 23     Glucose 06/17/2020 92     Calcium 06/17/2020 9 3     AST 06/17/2020 27     ALT 06/17/2020 46     Alkaline Phosphatase 06/17/2020 89     Total Protein 06/17/2020 7 1     Albumin 06/17/2020 4 0     Total Bilirubin 06/17/2020 0 67     eGFR 06/17/2020 57     Uric Acid 06/17/2020 5 7      Imaging: No results found  Review of Systems:  Review of Systems   REVIEW OF SYSTEMS:  Constitutional:  Denies fever or chills   Eyes:  Denies change in visual acuity   HENT:  Denies nasal congestion or sore throat   Respiratory:  Denies cough or shortness of breath   Cardiovascular:  Denies chest pain or edema   GI:  Denies abdominal pain, nausea, vomiting, bloody stools or diarrhea   :  Denies dysuria, frequency, difficulty in micturition and nocturia  Musculoskeletal:  Denies back pain or joint pain   Neurologic:  Denies headache, focal weakness or sensory changes   Endocrine:  Denies polyuria or polydipsia   Lymphatic:  Denies swollen glands   Psychiatric:  Denies depression or anxiety     Physical Exam:    /68   Pulse 72   Ht 5' 8" (1 727 m)   Wt 75 3 kg (166 lb)   SpO2 97%   BMI 25 24 kg/m²     Physical Exam   PHYSICAL EXAM:  General:  Patient is not in acute distress   Head: Normocephalic, Atraumatic    HEENT:  Both pupils normal-size atraumatic, normocephalic, nonicteric  Neck:  JVP not raised  Trachea central   carotid bruit  Respiratory:  normal breath sounds no crackles  no rhonchi  Cardiovascular:  Regular rate and rhythm no S3 no murmurs  GI:  Abdomen soft nontender  No organomegaly  Lymphatic:  No cervical or inguinal lymphadenopathy  Neurologic:  Patient is awake alert, oriented   Grossly nonfocal  Extremities no edema    Discussion/Summary:  Patient with multiple medical problems who seems to be doing reasonably well from cardiac standpoint  Previous studies reviewed with patient  Medications reviewed and possible side effects discussed  concepts of cardiovascular disease , signs and symptoms of heart disease  Dietary and risk factor modification reinforced  All questions answered  Safety measures reviewed  Patient advised to report any problems prompting medical attention  Patient getting blood work in November  Patient and family had a lot of questions which were answered  Patient's blood pressures have been well-controlled at home  Importance of adequate hydration reinforced  Carotid ultrasound to follow up with history of mild to  moderate carotid artery stenosis  Follow-up with primary care physician

## 2020-08-22 ENCOUNTER — HOSPITAL ENCOUNTER (OUTPATIENT)
Dept: ULTRASOUND IMAGING | Facility: HOSPITAL | Age: 75
Discharge: HOME | End: 2020-08-22
Payer: MEDICARE

## 2020-08-22 ENCOUNTER — APPOINTMENT (OUTPATIENT)
Dept: LAB | Facility: HOSPITAL | Age: 75
End: 2020-08-22
Attending: INTERNAL MEDICINE
Payer: MEDICARE

## 2020-08-22 DIAGNOSIS — R73.01 IMPAIRED FASTING GLUCOSE: ICD-10-CM

## 2020-08-22 DIAGNOSIS — E03.8 SUBCLINICAL HYPOTHYROIDISM: ICD-10-CM

## 2020-08-22 DIAGNOSIS — N18.30 CKD (CHRONIC KIDNEY DISEASE) STAGE 3, GFR 30-59 ML/MIN (HCC): ICD-10-CM

## 2020-08-22 LAB
25(OH)D3 SERPL-MCNC: 95.6 NG/ML (ref 30–100)
ALBUMIN SERPL BCP-MCNC: 3.9 G/DL (ref 3.5–5)
ALP SERPL-CCNC: 86 U/L (ref 46–116)
ALT SERPL W P-5'-P-CCNC: 35 U/L (ref 12–78)
ANION GAP SERPL CALCULATED.3IONS-SCNC: 7 MMOL/L (ref 4–13)
AST SERPL W P-5'-P-CCNC: 36 U/L (ref 5–45)
BACTERIA UR QL AUTO: ABNORMAL /HPF
BASOPHILS # BLD AUTO: 0.03 THOUSANDS/ΜL (ref 0–0.1)
BASOPHILS NFR BLD AUTO: 1 % (ref 0–1)
BILIRUB SERPL-MCNC: 0.9 MG/DL (ref 0.2–1)
BILIRUB UR QL STRIP: NEGATIVE
BUN SERPL-MCNC: 19 MG/DL (ref 5–25)
CALCIUM SERPL-MCNC: 9.6 MG/DL (ref 8.3–10.1)
CHLORIDE SERPL-SCNC: 103 MMOL/L (ref 100–108)
CHOLEST SERPL-MCNC: 132 MG/DL (ref 50–200)
CLARITY UR: CLEAR
CO2 SERPL-SCNC: 28 MMOL/L (ref 21–32)
COLOR UR: YELLOW
CREAT SERPL-MCNC: 1.51 MG/DL (ref 0.6–1.3)
EOSINOPHIL # BLD AUTO: 0.18 THOUSAND/ΜL (ref 0–0.61)
EOSINOPHIL NFR BLD AUTO: 3 % (ref 0–6)
ERYTHROCYTE [DISTWIDTH] IN BLOOD BY AUTOMATED COUNT: 12.9 % (ref 11.6–15.1)
GFR SERPL CREATININE-BSD FRML MDRD: 45 ML/MIN/1.73SQ M
GLUCOSE P FAST SERPL-MCNC: 92 MG/DL (ref 65–99)
GLUCOSE UR STRIP-MCNC: NEGATIVE MG/DL
HCT VFR BLD AUTO: 43.3 % (ref 36.5–49.3)
HDLC SERPL-MCNC: 38 MG/DL
HGB BLD-MCNC: 14.2 G/DL (ref 12–17)
HGB UR QL STRIP.AUTO: NEGATIVE
IMM GRANULOCYTES # BLD AUTO: 0.02 THOUSAND/UL (ref 0–0.2)
IMM GRANULOCYTES NFR BLD AUTO: 0 % (ref 0–2)
KETONES UR STRIP-MCNC: NEGATIVE MG/DL
LDLC SERPL CALC-MCNC: 70 MG/DL (ref 0–100)
LEUKOCYTE ESTERASE UR QL STRIP: NEGATIVE
LYMPHOCYTES # BLD AUTO: 1.23 THOUSANDS/ΜL (ref 0.6–4.47)
LYMPHOCYTES NFR BLD AUTO: 21 % (ref 14–44)
MCH RBC QN AUTO: 29.3 PG (ref 26.8–34.3)
MCHC RBC AUTO-ENTMCNC: 32.8 G/DL (ref 31.4–37.4)
MCV RBC AUTO: 90 FL (ref 82–98)
MONOCYTES # BLD AUTO: 0.56 THOUSAND/ΜL (ref 0.17–1.22)
MONOCYTES NFR BLD AUTO: 9 % (ref 4–12)
NEUTROPHILS # BLD AUTO: 3.97 THOUSANDS/ΜL (ref 1.85–7.62)
NEUTS SEG NFR BLD AUTO: 66 % (ref 43–75)
NITRITE UR QL STRIP: NEGATIVE
NON-SQ EPI CELLS URNS QL MICRO: ABNORMAL /HPF
NONHDLC SERPL-MCNC: 94 MG/DL
NRBC BLD AUTO-RTO: 0 /100 WBCS
PH UR STRIP.AUTO: 5.5 [PH]
PHOSPHATE SERPL-MCNC: 3.1 MG/DL (ref 2.3–4.1)
PLATELET # BLD AUTO: 197 THOUSANDS/UL (ref 149–390)
PMV BLD AUTO: 10.7 FL (ref 8.9–12.7)
POTASSIUM SERPL-SCNC: 4.1 MMOL/L (ref 3.5–5.3)
PROT SERPL-MCNC: 7.1 G/DL (ref 6.4–8.2)
PROT UR STRIP-MCNC: ABNORMAL MG/DL
PTH-INTACT SERPL-MCNC: 41.5 PG/ML (ref 18.4–80.1)
RBC # BLD AUTO: 4.84 MILLION/UL (ref 3.88–5.62)
RBC #/AREA URNS AUTO: ABNORMAL /HPF
SODIUM SERPL-SCNC: 138 MMOL/L (ref 136–145)
SP GR UR STRIP.AUTO: 1.01 (ref 1–1.03)
TRIGL SERPL-MCNC: 118 MG/DL
TSH SERPL DL<=0.05 MIU/L-ACNC: 1.47 UIU/ML (ref 0.36–3.74)
UROBILINOGEN UR QL STRIP.AUTO: 0.2 E.U./DL
WBC # BLD AUTO: 5.99 THOUSAND/UL (ref 4.31–10.16)
WBC #/AREA URNS AUTO: ABNORMAL /HPF

## 2020-08-22 PROCEDURE — 84156 ASSAY OF PROTEIN URINE: CPT | Performed by: INTERNAL MEDICINE

## 2020-08-22 PROCEDURE — 82306 VITAMIN D 25 HYDROXY: CPT | Performed by: INTERNAL MEDICINE

## 2020-08-22 PROCEDURE — 82570 ASSAY OF URINE CREATININE: CPT | Performed by: INTERNAL MEDICINE

## 2020-08-22 PROCEDURE — 83970 ASSAY OF PARATHORMONE: CPT | Performed by: INTERNAL MEDICINE

## 2020-08-22 PROCEDURE — 84443 ASSAY THYROID STIM HORMONE: CPT

## 2020-08-22 PROCEDURE — 80061 LIPID PANEL: CPT | Performed by: INTERNAL MEDICINE

## 2020-08-22 PROCEDURE — 85025 COMPLETE CBC W/AUTO DIFF WBC: CPT | Performed by: INTERNAL MEDICINE

## 2020-08-22 PROCEDURE — 36415 COLL VENOUS BLD VENIPUNCTURE: CPT | Performed by: INTERNAL MEDICINE

## 2020-08-22 PROCEDURE — 80053 COMPREHEN METABOLIC PANEL: CPT | Performed by: INTERNAL MEDICINE

## 2020-08-22 PROCEDURE — 76770 US EXAM ABDO BACK WALL COMP: CPT

## 2020-08-22 PROCEDURE — 84100 ASSAY OF PHOSPHORUS: CPT | Performed by: INTERNAL MEDICINE

## 2020-08-22 PROCEDURE — 81001 URINALYSIS AUTO W/SCOPE: CPT | Performed by: INTERNAL MEDICINE

## 2020-08-23 LAB
CREAT UR-MCNC: 73.3 MG/DL
PROT UR-MCNC: <6 MG/DL
PROT/CREAT UR: <0.08 MG/G{CREAT} (ref 0–0.1)

## 2020-08-24 ENCOUNTER — TELEPHONE (OUTPATIENT)
Dept: INTERNAL MEDICINE CLINIC | Facility: CLINIC | Age: 75
End: 2020-08-24

## 2020-08-24 ENCOUNTER — TELEPHONE (OUTPATIENT)
Dept: CARDIOLOGY CLINIC | Facility: CLINIC | Age: 75
End: 2020-08-24

## 2020-08-24 NOTE — TELEPHONE ENCOUNTER
Spoke with patient wife and gave her the results to the patient lab test   Antonio Riggins understood      Thank you

## 2020-08-24 NOTE — TELEPHONE ENCOUNTER
----- Message from Arnoldo Estrada MD sent at 8/23/2020  6:48 PM EDT -----  Please call  Lipid panel overall good

## 2020-08-24 NOTE — TELEPHONE ENCOUNTER
Wife is thinking Dr wants patient to get f/u labs  Ricardo Mathews and is asking why when he just had them done  ???

## 2020-08-25 ENCOUNTER — TELEPHONE (OUTPATIENT)
Dept: NEPHROLOGY | Facility: CLINIC | Age: 75
End: 2020-08-25

## 2020-08-25 NOTE — TELEPHONE ENCOUNTER
Patient of Dr Major Brown wife called stating that she and the patient would like the results of  his the blood, urine test and Ultra Sound Results  Patient and his wife does not want to wait until his appointment on 11/4/2020 to go over the results  Please call patient wife Ortega Harrison at [phone] to advise them with the results   Keily Sanchez,

## 2020-08-26 ENCOUNTER — TELEPHONE (OUTPATIENT)
Dept: NEPHROLOGY | Facility: CLINIC | Age: 75
End: 2020-08-26

## 2020-08-26 NOTE — TELEPHONE ENCOUNTER
Called patient's wife and spoke to her  Went over lab and ultrasound results  Told to cut Metoprolol in half and take 12 5 mg twice daily instead     Dr Christianson Shank

## 2020-08-28 DIAGNOSIS — F41.9 ANXIETY: ICD-10-CM

## 2020-08-29 RX ORDER — LORAZEPAM 1 MG/1
1 TABLET ORAL 3 TIMES DAILY PRN
Qty: 90 TABLET | Refills: 0 | Status: SHIPPED | OUTPATIENT
Start: 2020-08-29 | End: 2020-12-20

## 2020-09-08 ENCOUNTER — OFFICE VISIT (OUTPATIENT)
Dept: INTERNAL MEDICINE CLINIC | Facility: CLINIC | Age: 75
End: 2020-09-08
Payer: MEDICARE

## 2020-09-08 VITALS
TEMPERATURE: 97.9 F | SYSTOLIC BLOOD PRESSURE: 116 MMHG | DIASTOLIC BLOOD PRESSURE: 72 MMHG | HEIGHT: 68 IN | RESPIRATION RATE: 14 BRPM | BODY MASS INDEX: 24.95 KG/M2 | WEIGHT: 164.6 LBS | HEART RATE: 78 BPM

## 2020-09-08 DIAGNOSIS — E03.8 SUBCLINICAL HYPOTHYROIDISM: Primary | ICD-10-CM

## 2020-09-08 DIAGNOSIS — N40.1 BENIGN PROSTATIC HYPERPLASIA WITH NOCTURIA: ICD-10-CM

## 2020-09-08 DIAGNOSIS — I82.509 CHRONIC DEEP VEIN THROMBOSIS (DVT) OF LOWER EXTREMITY, UNSPECIFIED LATERALITY, UNSPECIFIED VEIN (HCC): ICD-10-CM

## 2020-09-08 DIAGNOSIS — E78.2 MIXED HYPERLIPIDEMIA: ICD-10-CM

## 2020-09-08 DIAGNOSIS — R73.01 IMPAIRED FASTING GLUCOSE: ICD-10-CM

## 2020-09-08 DIAGNOSIS — N18.30 CKD (CHRONIC KIDNEY DISEASE), STAGE III (HCC): ICD-10-CM

## 2020-09-08 DIAGNOSIS — R35.1 BENIGN PROSTATIC HYPERPLASIA WITH NOCTURIA: ICD-10-CM

## 2020-09-08 DIAGNOSIS — F41.9 ANXIETY: ICD-10-CM

## 2020-09-08 DIAGNOSIS — I25.10 CORONARY ARTERY DISEASE INVOLVING NATIVE CORONARY ARTERY OF NATIVE HEART WITHOUT ANGINA PECTORIS: ICD-10-CM

## 2020-09-08 LAB — SL AMB POCT HEMOGLOBIN AIC: 5.4 (ref ?–6.5)

## 2020-09-08 PROCEDURE — 99214 OFFICE O/P EST MOD 30 MIN: CPT | Performed by: NURSE PRACTITIONER

## 2020-09-08 PROCEDURE — 83036 HEMOGLOBIN GLYCOSYLATED A1C: CPT | Performed by: NURSE PRACTITIONER

## 2020-09-08 RX ORDER — ESCITALOPRAM OXALATE 20 MG/1
20 TABLET ORAL DAILY
Qty: 90 TABLET | Refills: 2 | Status: SHIPPED | OUTPATIENT
Start: 2020-09-08 | End: 2021-03-15 | Stop reason: SDUPTHER

## 2020-09-08 NOTE — PROGRESS NOTES
Assessment/Plan:    Patient Instructions    Coronary artery disease stable without angina continue to modify risk factors  Patient monitoring blood pressures at home sometimes will hold metoprolol if blood pressure is borderline discuss he is better off cutting in half versus holding secondary to possible rebound effective missing too many doses     depression anxiety stable on Wellbutrin and Lexapro     BPH stable on Flomax     hyperlipidemia noted to have elevated triglycerides at last visit markedly improved with dietary modifications continue statin     stage 3 kidney disease following with Nephrology     recommend new shingles vaccine         Diagnoses and all orders for this visit:    Subclinical hypothyroidism  -     TSH, 3rd generation with Free T4 reflex; Future    Chronic deep vein thrombosis (DVT) of lower extremity, unspecified laterality, unspecified vein (HCC)    CKD (chronic kidney disease), stage III (HCC)  -     CBC and differential; Future  -     Comprehensive metabolic panel; Future    Benign prostatic hyperplasia with nocturia    Mixed hyperlipidemia  -     Lipid panel; Future    Impaired fasting glucose  -     POCT hemoglobin A1c  -     Hemoglobin A1C; Future    Anxiety  -     escitalopram (LEXAPRO) 20 mg tablet; Take 1 tablet (20 mg total) by mouth daily    Coronary artery disease involving native coronary artery of native heart without angina pectoris    Other orders  -     Cancel: CBC and differential; Future  -     Cancel: Comprehensive metabolic panel; Future  -     Cancel: Lipid panel; Future  -     Cancel: Hemoglobin A1C; Future  -     Cancel: TSH, 3rd generation with Free T4 reflex;  Future         Subjective:      Patient ID: Pascale Richey is a 77 y o  male     Here for 6 month follow-up   had recent gout flare to colchicine x3 days then symptoms resolved now following with Nephrology trying to increase fluids monitor diet low protein moods have been stable, monitoring blood pressures sometimes running in the 100s will hold metoprolol if that occurs   active no formal exercise   cutting out chocolate has lost weight tolerating cholesterol medication        Current Outpatient Medications:     aspirin 81 mg chewable tablet, Chew 1 tablet daily , Disp: , Rfl:     atorvastatin (LIPITOR) 40 mg tablet, Take 1 tablet (40 mg total) by mouth daily, Disp: 90 tablet, Rfl: 0    buPROPion (WELLBUTRIN XL) 300 mg 24 hr tablet, Take 1 tablet (300 mg total) by mouth daily, Disp: 90 tablet, Rfl: 3    escitalopram (LEXAPRO) 20 mg tablet, Take 1 tablet (20 mg total) by mouth daily, Disp: 90 tablet, Rfl: 2    fluocinonide (LIDEX) 0 05 % cream, Apply topically 2 (two) times a day (Patient taking differently: Apply topically as needed ), Disp: 30 g, Rfl: 0    LORazepam (ATIVAN) 1 mg tablet, TAKE 1 TABLET (1 MG TOTAL) BY MOUTH 3 (THREE) TIMES A DAY AS NEEDED FOR ANXIETY, Disp: 90 tablet, Rfl: 0    memantine (NAMENDA) 5 mg tablet, , Disp: , Rfl:     metoprolol tartrate (LOPRESSOR) 25 mg tablet, Take 1 tablet (25 mg total) by mouth 2 (two) times a day (Patient taking differently: Take 25 mg by mouth daily Patient only takes his blood pressure medication when it is high), Disp: 180 tablet, Rfl: 3    tamsulosin (FLOMAX) 0 4 mg, Take 1 capsule (0 4 mg total) by mouth daily with dinner, Disp: 90 capsule, Rfl: 3    Recent Results (from the past 1008 hour(s))   CBC and differential    Collection Time: 08/22/20 11:40 AM   Result Value Ref Range    WBC 5 99 4 31 - 10 16 Thousand/uL    RBC 4 84 3 88 - 5 62 Million/uL    Hemoglobin 14 2 12 0 - 17 0 g/dL    Hematocrit 43 3 36 5 - 49 3 %    MCV 90 82 - 98 fL    MCH 29 3 26 8 - 34 3 pg    MCHC 32 8 31 4 - 37 4 g/dL    RDW 12 9 11 6 - 15 1 %    MPV 10 7 8 9 - 12 7 fL    Platelets 027 278 - 449 Thousands/uL    nRBC 0 /100 WBCs    Neutrophils Relative 66 43 - 75 %    Immat GRANS % 0 0 - 2 %    Lymphocytes Relative 21 14 - 44 %    Monocytes Relative 9 4 - 12 %    Eosinophils Relative 3 0 - 6 %    Basophils Relative 1 0 - 1 %    Neutrophils Absolute 3 97 1 85 - 7 62 Thousands/µL    Immature Grans Absolute 0 02 0 00 - 0 20 Thousand/uL    Lymphocytes Absolute 1 23 0 60 - 4 47 Thousands/µL    Monocytes Absolute 0 56 0 17 - 1 22 Thousand/µL    Eosinophils Absolute 0 18 0 00 - 0 61 Thousand/µL    Basophils Absolute 0 03 0 00 - 0 10 Thousands/µL   Comprehensive metabolic panel    Collection Time: 08/22/20 11:40 AM   Result Value Ref Range    Sodium 138 136 - 145 mmol/L    Potassium 4 1 3 5 - 5 3 mmol/L    Chloride 103 100 - 108 mmol/L    CO2 28 21 - 32 mmol/L    ANION GAP 7 4 - 13 mmol/L    BUN 19 5 - 25 mg/dL    Creatinine 1 51 (H) 0 60 - 1 30 mg/dL    Glucose, Fasting 92 65 - 99 mg/dL    Calcium 9 6 8 3 - 10 1 mg/dL    AST 36 5 - 45 U/L    ALT 35 12 - 78 U/L    Alkaline Phosphatase 86 46 - 116 U/L    Total Protein 7 1 6 4 - 8 2 g/dL    Albumin 3 9 3 5 - 5 0 g/dL    Total Bilirubin 0 90 0 20 - 1 00 mg/dL    eGFR 45 ml/min/1 73sq m   Phosphorus    Collection Time: 08/22/20 11:40 AM   Result Value Ref Range    Phosphorus 3 1 2 3 - 4 1 mg/dL   PTH, intact    Collection Time: 08/22/20 11:40 AM   Result Value Ref Range    PTH 41 5 18 4 - 80 1 pg/mL   Vitamin D 25 hydroxy    Collection Time: 08/22/20 11:40 AM   Result Value Ref Range    Vit D, 25-Hydroxy 95 6 30 0 - 100 0 ng/mL   Lipid panel    Collection Time: 08/22/20 11:40 AM   Result Value Ref Range    Cholesterol 132 50 - 200 mg/dL    Triglycerides 118 <=150 mg/dL    HDL, Direct 38 (L) >=40 mg/dL    LDL Calculated 70 0 - 100 mg/dL    Non-HDL-Chol (CHOL-HDL) 94 mg/dl   TSH, 3rd generation with Free T4 reflex    Collection Time: 08/22/20 11:40 AM   Result Value Ref Range    TSH 3RD GENERATON 1 465 0 358 - 3 740 uIU/mL   Protein / creatinine ratio, urine    Collection Time: 08/22/20 11:49 AM   Result Value Ref Range    Creatinine, Ur 73 3 mg/dL    Protein Urine Random <6 mg/dL    Prot/Creat Ratio, Ur <0 08 0 00 - 0 10   Urinalysis with microscopic    Collection Time: 08/22/20 11:49 AM   Result Value Ref Range    Clarity, UA Clear     Color, UA Yellow     Specific Triangle, UA 1 010 1 003 - 1 030    pH, UA 5 5 4 5, 5 0, 5 5, 6 0, 6 5, 7 0, 7 5, 8 0    Glucose, UA Negative Negative mg/dl    Ketones, UA Negative Negative mg/dl    Blood, UA Negative Negative    Protein, UA Trace (A) Negative mg/dl    Nitrite, UA Negative Negative    Bilirubin, UA Negative Negative    Urobilinogen, UA 0 2 0 2, 1 0 E U /dl E U /dl    Leukocytes, UA Negative Negative    WBC, UA None Seen None Seen, 0-5, 5-55, 5-65 /hpf    RBC, UA None Seen None Seen, 0-5 /hpf    Bacteria, UA None Seen None Seen, Occasional /hpf    Epithelial Cells None Seen None Seen, Occasional /hpf       The following portions of the patient's history were reviewed and updated as appropriate: allergies, current medications, past family history, past medical history, past social history, past surgical history and problem list      Review of Systems   Constitutional: Negative for appetite change, chills, diaphoresis, fatigue, fever and unexpected weight change  HENT: Negative for postnasal drip and sneezing  Eyes: Negative for visual disturbance  Respiratory: Negative for chest tightness and shortness of breath  Cardiovascular: Negative for chest pain, palpitations and leg swelling  Gastrointestinal: Negative for abdominal pain and blood in stool  Endocrine: Negative for cold intolerance, heat intolerance, polydipsia, polyphagia and polyuria  Genitourinary: Negative for difficulty urinating, dysuria, frequency and urgency  Musculoskeletal: Negative for arthralgias and myalgias  Skin: Negative for rash and wound  Neurological: Negative for dizziness, weakness, light-headedness and headaches  Hematological: Negative for adenopathy  Psychiatric/Behavioral: Negative for confusion, dysphoric mood and sleep disturbance  The patient is not nervous/anxious            Objective:      BP 116/72 (BP Location: Left arm, Patient Position: Sitting, Cuff Size: Standard)   Pulse 78   Temp 97 9 °F (36 6 °C) (Temporal) Comment: no nsaids  Resp 14   Ht 5' 8" (1 727 m)   Wt 74 7 kg (164 lb 9 6 oz)   BMI 25 03 kg/m²        Physical Exam  Constitutional:       General: He is not in acute distress  Appearance: He is well-developed  He is not diaphoretic  HENT:      Head: Normocephalic and atraumatic  Nose: Nose normal    Eyes:      Conjunctiva/sclera: Conjunctivae normal       Pupils: Pupils are equal, round, and reactive to light  Neck:      Musculoskeletal: Normal range of motion and neck supple  Thyroid: No thyromegaly  Vascular: No JVD  Trachea: No tracheal deviation  Cardiovascular:      Rate and Rhythm: Normal rate and regular rhythm  Heart sounds: Normal heart sounds  No murmur  No friction rub  No gallop  Pulmonary:      Effort: Pulmonary effort is normal  No respiratory distress  Breath sounds: Normal breath sounds  No wheezing or rales  Abdominal:      General: Bowel sounds are normal  There is no distension  Palpations: Abdomen is soft  Tenderness: There is no abdominal tenderness  Musculoskeletal: Normal range of motion  Lymphadenopathy:      Cervical: No cervical adenopathy  Skin:     General: Skin is warm and dry  Findings: No rash  Neurological:      Mental Status: He is alert and oriented to person, place, and time  Cranial Nerves: No cranial nerve deficit  Psychiatric:         Behavior: Behavior normal          Thought Content: Thought content normal          Judgment: Judgment normal      BMI Counseling: There is no height or weight on file to calculate BMI  The BMI is above normal  Nutrition recommendations include decreasing portion sizes and limiting drinks that contain sugar  Exercise recommendations include exercising 3-5 times per week  No pharmacotherapy was ordered

## 2020-09-08 NOTE — PATIENT INSTRUCTIONS
Coronary artery disease stable without angina continue to modify risk factors    Patient monitoring blood pressures at home sometimes will hold metoprolol if blood pressure is borderline discuss he is better off cutting in half versus holding secondary to possible rebound effective missing too many doses     depression anxiety stable on Wellbutrin and Lexapro     BPH stable on Flomax     hyperlipidemia noted to have elevated triglycerides at last visit markedly improved with dietary modifications continue statin     stage 3 kidney disease following with Nephrology     recommend new shingles vaccine

## 2020-09-14 DIAGNOSIS — E78.2 MIXED HYPERLIPIDEMIA: ICD-10-CM

## 2020-09-14 RX ORDER — ATORVASTATIN CALCIUM 40 MG/1
TABLET, FILM COATED ORAL
Qty: 90 TABLET | Refills: 0 | Status: SHIPPED | OUTPATIENT
Start: 2020-09-14 | End: 2020-12-17

## 2020-09-22 ENCOUNTER — OFFICE VISIT (OUTPATIENT)
Dept: INTERNAL MEDICINE CLINIC | Facility: CLINIC | Age: 75
End: 2020-09-22
Payer: MEDICARE

## 2020-09-22 VITALS
HEIGHT: 68 IN | BODY MASS INDEX: 25.04 KG/M2 | WEIGHT: 165.2 LBS | OXYGEN SATURATION: 94 % | DIASTOLIC BLOOD PRESSURE: 64 MMHG | TEMPERATURE: 97.4 F | HEART RATE: 69 BPM | SYSTOLIC BLOOD PRESSURE: 114 MMHG
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
## 2020-09-22 PROCEDURE — 99213 OFFICE O/P EST LOW 20 MIN: CPT | Performed by: INTERNAL MEDICINE
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
## 2020-09-22 NOTE — PATIENT INSTRUCTIONS
Please report  If you have persistent headache, vision changes, disorientation, sleep changes to this office immediately or to go the ED      Follow up in this office as per original schedule

## 2020-10-21 ENCOUNTER — TELEPHONE (OUTPATIENT)
Dept: NEPHROLOGY | Facility: CLINIC | Age: 75
End: 2020-10-21

## 2020-10-21 DIAGNOSIS — N18.31 STAGE 3A CHRONIC KIDNEY DISEASE (HCC): Primary | ICD-10-CM

## 2020-10-22 ENCOUNTER — CLINICAL SUPPORT (OUTPATIENT)
Dept: NEPHROLOGY | Facility: CLINIC | Age: 75
End: 2020-10-22
Payer: MEDICARE

## 2020-10-22 DIAGNOSIS — N18.31 STAGE 3A CHRONIC KIDNEY DISEASE (HCC): ICD-10-CM

## 2020-10-22 PROCEDURE — 97802 MEDICAL NUTRITION INDIV IN: CPT | Performed by: DIETITIAN, REGISTERED

## 2020-10-27 ENCOUNTER — TELEPHONE (OUTPATIENT)
Dept: NEPHROLOGY | Facility: CLINIC | Age: 75
End: 2020-10-27

## 2020-11-02 ENCOUNTER — LAB (OUTPATIENT)
Dept: LAB | Facility: HOSPITAL | Age: 75
End: 2020-11-02
Attending: INTERNAL MEDICINE
Payer: MEDICARE

## 2020-11-02 DIAGNOSIS — N18.30 CKD (CHRONIC KIDNEY DISEASE) STAGE 3, GFR 30-59 ML/MIN (HCC): ICD-10-CM

## 2020-11-02 DIAGNOSIS — E03.8 SUBCLINICAL HYPOTHYROIDISM: ICD-10-CM

## 2020-11-02 LAB — PHOSPHATE SERPL-MCNC: 3.5 MG/DL (ref 2.3–4.1)

## 2020-11-02 PROCEDURE — 84100 ASSAY OF PHOSPHORUS: CPT

## 2020-11-03 ENCOUNTER — TELEPHONE (OUTPATIENT)
Dept: NEPHROLOGY | Facility: CLINIC | Age: 75
End: 2020-11-03

## 2020-11-04 ENCOUNTER — OFFICE VISIT (OUTPATIENT)
Dept: NEPHROLOGY | Facility: CLINIC | Age: 75
End: 2020-11-04
Payer: MEDICARE

## 2020-11-04 VITALS
SYSTOLIC BLOOD PRESSURE: 130 MMHG | BODY MASS INDEX: 25.25 KG/M2 | HEART RATE: 83 BPM | DIASTOLIC BLOOD PRESSURE: 78 MMHG | TEMPERATURE: 96.6 F | WEIGHT: 166.6 LBS | RESPIRATION RATE: 16 BRPM | HEIGHT: 68 IN

## 2020-11-04 DIAGNOSIS — N18.31 STAGE 3A CHRONIC KIDNEY DISEASE (HCC): Primary | ICD-10-CM

## 2020-11-04 PROCEDURE — 99215 OFFICE O/P EST HI 40 MIN: CPT | Performed by: INTERNAL MEDICINE

## 2020-11-14 DIAGNOSIS — L24.9 IRRITANT CONTACT DERMATITIS, UNSPECIFIED TRIGGER: ICD-10-CM

## 2020-12-17 DIAGNOSIS — E78.2 MIXED HYPERLIPIDEMIA: ICD-10-CM

## 2020-12-17 RX ORDER — ATORVASTATIN CALCIUM 40 MG/1
TABLET, FILM COATED ORAL
Qty: 90 TABLET | Refills: 0 | Status: SHIPPED | OUTPATIENT
Start: 2020-12-17 | End: 2021-03-15 | Stop reason: SDUPTHER

## 2020-12-19 DIAGNOSIS — F41.9 ANXIETY: ICD-10-CM

## 2020-12-20 RX ORDER — LORAZEPAM 1 MG/1
1 TABLET ORAL 3 TIMES DAILY PRN
Qty: 90 TABLET | Refills: 0 | Status: SHIPPED | OUTPATIENT
Start: 2020-12-20 | End: 2021-09-23 | Stop reason: SDUPTHER

## 2021-01-01 ENCOUNTER — NURSE TRIAGE (OUTPATIENT)
Dept: OTHER | Facility: OTHER | Age: 76
End: 2021-01-01

## 2021-01-01 NOTE — TELEPHONE ENCOUNTER
Regarding: Chest cold/congestion  ----- Message from George Andrews sent at 1/1/2021  9:10 AM EST -----  " I have a really bad bad chest cold I've been taking T3 and Mucinex and it's not helping   It seems to be getting worse not sure if I need antibiotics"

## 2021-01-01 NOTE — TELEPHONE ENCOUNTER
Reason for Disposition   Cough with cold symptoms (e g , runny nose, postnasal drip, throat clearing)    Answer Assessment - Initial Assessment Questions  1  ONSET: "When did the nasal discharge start?"       10 days ago     2  AMOUNT: "How much discharge is there?"       None     3  COUGH: "Do you have a cough?" If yes, ask: "Describe the color of your sputum" (clear, white, yellow, green)      Yes, small dark phlegm     4  RESPIRATORY DISTRESS: "Describe your breathing "       Denies any trouble breathing     5  FEVER: "Do you have a fever?" If so, ask: "What is your temperature, how was it measured, and when did it start?"      Denies     6   SEVERITY: "Overall, how bad are you feeling right now?" (e g , doesn't interfere with normal activities, staying home from school/work, staying in bed)       *No Answer*    7  OTHER SYMPTOMS: "Do you have any other symptoms?" (e g , sore throat, earache, wheezing, vomiting)      Denies    Protocols used: COUGH - ACUTE PRODUCTIVE-ADULT-AH, COMMON COLD-ADULT-AH

## 2021-01-13 ENCOUNTER — TELEMEDICINE (OUTPATIENT)
Dept: INTERNAL MEDICINE CLINIC | Facility: CLINIC | Age: 76
End: 2021-01-13
Payer: MEDICARE

## 2021-01-13 DIAGNOSIS — J01.10 ACUTE NON-RECURRENT FRONTAL SINUSITIS: Primary | ICD-10-CM

## 2021-01-13 DIAGNOSIS — B34.9 VIRAL INFECTION, UNSPECIFIED: ICD-10-CM

## 2021-01-13 PROCEDURE — 99214 OFFICE O/P EST MOD 30 MIN: CPT | Performed by: NURSE PRACTITIONER

## 2021-01-13 RX ORDER — AZITHROMYCIN 250 MG/1
TABLET, FILM COATED ORAL
Qty: 6 TABLET | Refills: 0 | Status: SHIPPED | OUTPATIENT
Start: 2021-01-13 | End: 2021-01-18

## 2021-01-13 NOTE — PATIENT INSTRUCTIONS
Sinusitis   AMBULATORY CARE:   Sinusitis  is inflammation or infection of your sinuses  It is most often caused by a virus  Acute sinusitis may last up to 12 weeks  Chronic sinusitis lasts longer than 12 weeks  Recurrent sinusitis means you have 4 or more times in 1 year  Common symptoms include the following:   · Fever    · Pain, pressure, redness, or swelling around the forehead, cheeks, or eyes    · Thick yellow or green discharge from your nose    · Tenderness when you touch your face over your sinuses    · Dry cough that happens mostly at night or when you lie down    · Headache and face pain that is worse when you lean forward    · Tooth pain, or pain when you chew    Seek care immediately if:   · Your eye and eyelid are red, swollen, and painful  · You cannot open your eye  · You have vision changes, such as double vision  · Your eyeball bulges out or you cannot move your eye  · You are more sleepy than normal, or you notice changes in your ability to think, move, or talk  · You have a stiff neck, a fever, or a bad headache  · You have swelling of your forehead or scalp  Contact your healthcare provider if:   · Your symptoms do not improve after 3 days  · Your symptoms do not go away after 10 days  · You have nausea and are vomiting  · Your nose is bleeding  · You have questions or concerns about your condition or care  Treatment for sinusitis:  Your symptoms may go away on their own  Your healthcare provider may recommend watchful waiting for up to 10 days before starting antibiotics  You may  need any of the following:  · Acetaminophen  decreases pain and fever  It is available without a doctor's order  Ask how much to take and how often to take it  Follow directions  Read the labels of all other medicines you are using to see if they also contain acetaminophen, or ask your doctor or pharmacist  Acetaminophen can cause liver damage if not taken correctly   Do not use more than 4 grams (4,000 milligrams) total of acetaminophen in one day  · NSAIDs , such as ibuprofen, help decrease swelling, pain, and fever  This medicine is available with or without a doctor's order  NSAIDs can cause stomach bleeding or kidney problems in certain people  If you take blood thinner medicine, always ask your healthcare provider if NSAIDs are safe for you  Always read the medicine label and follow directions  · Nasal steroid sprays  may help decrease inflammation in your nose and sinuses  · Decongestants  help reduce swelling and drain mucus in the nose and sinuses  They may help you breathe easier  · Antihistamines  help dry mucus in the nose and relieve sneezing  · Antibiotics  help treat or prevent a bacterial infection  · Take your medicine as directed  Contact your healthcare provider if you think your medicine is not helping or if you have side effects  Tell him or her if you are allergic to any medicine  Keep a list of the medicines, vitamins, and herbs you take  Include the amounts, and when and why you take them  Bring the list or the pill bottles to follow-up visits  Carry your medicine list with you in case of an emergency  Self-care:   · Rinse your sinuses  Use a sinus rinse device to rinse your nasal passages with a saline (salt water) solution or distilled water  Do not use tap water  This will help thin the mucus in your nose and rinse away pollen and dirt  It will also help reduce swelling so you can breathe normally  Ask your healthcare provider how often to do this  · Breathe in steam   Heat a bowl of water until you see steam  Lean over the bowl and make a tent over your head with a large towel  Breathe deeply for about 20 minutes  Be careful not to get too close to the steam or burn yourself  Do this 3 times a day  You can also breathe deeply when you take a hot shower  · Sleep with your head elevated    Place an extra pillow under your head before you go to sleep to help your sinuses drain  · Drink liquids as directed  Ask your healthcare provider how much liquid to drink each day and which liquids are best for you  Liquids will thin the mucus in your nose and help it drain  Avoid drinks that contain alcohol or caffeine  · Do not smoke, and avoid secondhand smoke  Nicotine and other chemicals in cigarettes and cigars can make your symptoms worse  Ask your healthcare provider for information if you currently smoke and need help to quit  E-cigarettes or smokeless tobacco still contain nicotine  Talk to your healthcare provider before you use these products  Prevent the spread of germs that cause sinusitis:  Wash your hands often with soap and water  Wash your hands after you use the bathroom, change a child's diaper, or sneeze  Wash your hands before you prepare or eat food  Follow up with your healthcare provider as directed: You may be referred to an ear, nose, and throat specialist  Write down your questions so you remember to ask them during your visits  © Copyright [address] Information is for End User's use only and may not be sold, redistributed or otherwise used for commercial purposes  All illustrations and images included in CareNotes® are the copyrighted property of [employer]  or [facility] [employer]   The above information is an  only  It is not intended as medical advice for individual conditions or treatments  Talk to your doctor, nurse or pharmacist before following any medical regimen to see if it is safe and effective for you

## 2021-01-13 NOTE — PROGRESS NOTES
COVID-19 Virtual Visit     Assessment/Plan:  Patient will come to office for covid test to rule out infection  Most likely sinusitis and will treat with azithromycin  Advised that he can continue to use mucinex if desired  Advised not to use the cough medication because he should be coughing to help expectorate the mucous in the chest      Problem List Items Addressed This Visit     None      Visit Diagnoses     Acute non-recurrent frontal sinusitis    -  Primary    Relevant Medications    azithromycin (Zithromax) 250 mg tablet    Viral infection, unspecified        Relevant Orders    Novel Coronavirus (COVID-19), PCR [facility] - Collected in Office         Disposition:     After clarifying the patient's history, my suspicion for COVID-19 infection is very low  I recommended the patient to come to our office to perform PCR testing for COVID-19  I have spent 15 minutes directly with the patient  Greater than 50% of this time was spent in counseling/coordination of care regarding: instructions for management, patient and family education, importance of treatment compliance, risk factor reductions and impressions  Encounter provider PA Romero    Provider located at [address] [place] [address]    Recent Visits  No visits were found meeting these conditions  Showing recent visits within past 7 days and meeting all other requirements     Today's Visits  Date Type Provider Dept   01/13/21 [facility], [address] today's visits and meeting all other requirements     Future Appointments  No visits were found meeting these conditions  Showing future appointments within next 150 days and meeting all other requirements      This virtual check-in was done via [employer] and patient was informed that this is not a secure, HIPAA-compliant platform  He agrees to proceed      Patient agrees to participate in a virtual check in via telephone or video visit instead of presenting to the office to address urgent/immediate medical needs  Patient is aware this is a billable service  After connecting through [facility], the patient was identified by name and date of birth  Carlos Daniels was informed that this was a telemedicine visit and that the exam was being conducted confidentially over secure lines  My office door was closed  No one else was in the room  Carlos Daniels acknowledged consent and understanding of privacy and security of the telemedicine visit  I informed the patient that I have reviewed his record in Epic and presented the opportunity for him to ask any questions regarding the visit today  The patient agreed to participate  Subjective:   Carlos Daniels is a 77 y o  male who is concerned about COVID-19  Patient's symptoms include nasal congestion, rhinorrhea and cough  Patient denies fever, fatigue, malaise, sore throat, anosmia, loss of taste, shortness of breath, chest tightness, abdominal pain, nausea, vomiting, diarrhea, myalgias and headaches  Date of symptom onset: 12/20/2020    Exposure:   Contact with a person who is under investigation (PUI) for or who is positive for COVID-19 within the last 14 days?: No    Hospitalized recently for fever and/or lower respiratory symptoms?: No      Currently a healthcare worker that is involved in direct patient care?: No      Works in a special setting where the risk of COVID-19 transmission may be high? (this may include long-term care, correctional and half-way facilities; homeless shelters; assisted-living facilities and group homes ): No      Resident in a special setting where the risk of COVID-19 transmission may be high? (this may include long-term care, correctional and half-way facilities; homeless shelters; assisted-living facilities and group homes ): No      Patient states he has been sick for almost a month now   Has been having nasal congestion and runny nose  Thought he was starting to feel better but then developed chest congestion with a productive cough  He was using an old cough medicine with codeine which he thinks was controlling the cough initially  He started taking mucinex about 1 week ago  He is able to cough up green mucous  Has had bronchitis in the past and feels like he does not want this to get any worse and lead to that  He does stay home and goes out for groceries during senior citizen shopping hours  Does wear a mask  No known covid contacts, but does she he daughter and granddaughter often  States his granddaughter had a cold for a few days, whereas his has lasted over one month  Wants to make sure it is not covid related       No results found for: Vlad Lao, [address] [facility], [address] [facility], [place] [address]  Past Medical History:   Diagnosis Date    Anxiety disorder     last assessed - [id number]    Basal cell carcinoma of face     BPH (benign prostatic hyperplasia)     benign prostatic hypertrophy; last assessed - [id number]    Chronic kidney disease     Coronary atherosclerosis of native coronary artery     last assessed - [id number]    Deep [facility]DV[facility]) [facility] )     last assessed - [id number]    Depression     last assessed - 70GAY3708    History of colonoscopy     Fiberoptic; Resolved - 12/1/2011    Hyperlipemia     last assessed - 68BIA1010    Hypertension     last assessed - 08DKZ1108    [facility]ocardial infarction [facility])     Nonmelanoma skin cancer     last assessed - [id number]    Osteoarthritis     last assessed - 64PGZ9467    Pneumonia     last assessed - 53TZJ4403    [facility] HTN [facility] )     PVD (peripheral vascular disease) ([facility] )     Seborrheic dermatitis      Past Surgical History:   Procedure Laterality Date    CARDIAC CATHETERIZATION      procedure outcome successful; last assessed - [id number]    CORONARY ARTERY BYPASS GRAFT      last assessed - 03KRM8790    IVC FILTER INSERTION [place]    TOTAL HIP ARTHROPLASTY      last assessed - 35QDZ5205     Current Outpatient Medications   Medication Sig Dispense Refill    aspirin 81 mg chewable tablet Chew 1 tablet daily       atorvastatin (LIPITOR) 40 mg tablet TAKE 1 TABLET BY MOUTH EVERY DAY 90 tablet 0    buPROPion (WELLBUTRIN XL) 300 mg 24 hr tablet Take 1 tablet (300 mg total) by mouth daily 90 tablet 3    escitalopram (LEXAPRO) 20 mg tablet Take 1 tablet (20 mg total) by mouth daily 90 tablet 2    LORazepam (ATIVAN) 1 mg tablet TAKE 1 TABLET (1 MG TOTAL) BY MOUTH 3 (THREE) TIMES A DAY AS NEEDED FOR ANXIETY 90 tablet 0    memantine (NAMENDA) 5 mg tablet 5 mg 2 (two) times a day       metoprolol tartrate (LOPRESSOR) 25 mg tablet Take 1 tablet (25 mg total) by mouth 2 (two) times a day (Patient taking differently: Take 25 mg by mouth daily Patient only takes his blood pressure medication when it is high) 180 tablet 3    azithromycin (Zithromax) 250 mg tablet Take 2 tablets (500 mg total) by mouth daily for 1 day, THEN 1 tablet (250 mg total) daily for 4 days  6 tablet 0    fluocinonide (LIDEX) 0 05 % cream APPLY TO AFFECTED AREA TWICE A DAY (Patient not taking: Reported on 1/13/2021) 30 g 0    tamsulosin (FLOMAX) 0 4 mg Take 1 capsule (0 4 mg total) by mouth daily with dinner 90 capsule 3     No current facility-administered medications for this visit  No Known Allergies    Review of Systems   Constitutional: Negative for fatigue and fever  HENT: Positive for congestion and rhinorrhea  Negative for sore throat  Respiratory: Positive for cough  Negative for chest tightness and shortness of breath  Gastrointestinal: Negative for abdominal pain, diarrhea, nausea and vomiting  Musculoskeletal: Negative for myalgias  Neurological: Negative for headaches  Objective: There were no vitals filed for this visit  Physical Exam  Constitutional:       General: He is not in acute distress       Appearance: Normal appearance  He is well-developed and well-groomed  He is not ill-appearing  HENT:      Head: Normocephalic and atraumatic  Right Ear: Hearing normal       Left Ear: Hearing normal       Nose: Congestion present  Pulmonary:      Effort: No tachypnea or respiratory distress  Neurological:      Mental Status: He is alert and oriented to person, place, and time  Psychiatric:         Attention and Perception: Attention normal          Mood and Affect: Mood normal          Speech: Speech normal          Behavior: Behavior normal  Behavior is cooperative  Thought Content: Thought content normal        VIRTUAL VISIT DISCLAIMER    Ada Harrison acknowledges that he has consented to an online visit or consultation  He understands that the online visit is based solely on information provided by him, and that, in the absence of a face-to-face physical evaluation by the physician, the diagnosis he receives is both limited and provisional in terms of accuracy and completeness  This is not intended to replace a full medical face-to-face evaluation by the physician  Ada Harrison understands and accepts these terms

## 2021-01-14 PROCEDURE — U0003 INFECTIOUS AGENT DETECTION BY NUCLEIC ACID (DNA OR RNA); SEVERE ACUTE RESPIRATORY SYNDROME CORONAVIRUS 2 (SARS-COV-2) (CORONAVIRUS DISEASE [COVID-19]), AMPLIFIED PROBE TECHNIQUE, MAKING USE OF HIGH THROUGHPUT TECHNOLOGIES AS DESCRIBED BY CMS-2020-01-R: HCPCS | Performed by: NURSE PRACTITIONER

## 2021-01-14 PROCEDURE — U0005 INFEC AGEN DETEC AMPLI PROBE: HCPCS | Performed by: NURSE PRACTITIONER

## 2021-01-15 ENCOUNTER — TELEPHONE (OUTPATIENT)
Dept: INTERNAL MEDICINE CLINIC | Facility: CLINIC | Age: 76
End: 2021-01-15

## 2021-01-15 LAB — SARS-COV-2 RNA SPEC QL NAA+PROBE: NOT DETECTED

## 2021-01-15 NOTE — TELEPHONE ENCOUNTER
Patient should be receiving a message/survey through my chart to assess who is interested in getting a covid vaccine  Once we go to the next phase of vaccine administration he will be eligible  At this time we do not have a specific date, perhaps in the next month or two  Keep checking emails or my chart for updates  Thanks

## 2021-01-15 NOTE — TELEPHONE ENCOUNTER
----- Message from Evelia Ramirez sent at 1/15/2021  3:12 PM EST -----  Please let patient know covid test was negative  Continue with antibiotics and mucinex as needed  Thanks

## 2021-01-18 ENCOUNTER — TELEPHONE (OUTPATIENT)
Dept: INTERNAL MEDICINE CLINIC | Facility: CLINIC | Age: 76
End: 2021-01-18

## 2021-01-18 NOTE — TELEPHONE ENCOUNTER
Yesenia Hoffmann called stating that her  couldn't get in his My Chart today  He's in the phase that he'll be called to schedule his vaccine  I gave her the [address] p# to check with them to see if they can help    IF, they can't help with getting in the My Chart; she'll call the office back  It says in his chart that the My Chart is active

## 2021-01-21 ENCOUNTER — TELEPHONE (OUTPATIENT)
Dept: CARDIOLOGY CLINIC | Facility: CLINIC | Age: 76
End: 2021-01-21

## 2021-01-21 DIAGNOSIS — N13.8 BPH WITH URINARY OBSTRUCTION: ICD-10-CM

## 2021-01-21 DIAGNOSIS — N40.1 BENIGN PROSTATIC HYPERPLASIA WITH NOCTURIA: Primary | ICD-10-CM

## 2021-01-21 DIAGNOSIS — R35.1 BENIGN PROSTATIC HYPERPLASIA WITH NOCTURIA: Primary | ICD-10-CM

## 2021-01-21 DIAGNOSIS — I25.10 CORONARY ARTERY DISEASE INVOLVING NATIVE CORONARY ARTERY OF NATIVE HEART WITHOUT ANGINA PECTORIS: ICD-10-CM

## 2021-01-21 DIAGNOSIS — N18.31 STAGE 3A CHRONIC KIDNEY DISEASE (HCC): ICD-10-CM

## 2021-01-21 DIAGNOSIS — Z12.5 SCREENING FOR PROSTATE CANCER: ICD-10-CM

## 2021-01-21 DIAGNOSIS — N40.1 BENIGN PROSTATIC HYPERPLASIA WITH LOWER URINARY TRACT SYMPTOMS, SYMPTOM DETAILS UNSPECIFIED: ICD-10-CM

## 2021-01-21 DIAGNOSIS — N40.1 BPH WITH URINARY OBSTRUCTION: ICD-10-CM

## 2021-01-21 NOTE — TELEPHONE ENCOUNTER
Patient called requesting lab order for his visit with yo on 2/2  Spoke with patient and he is asking for labs to check his cholesterol, kidney, prostate and liver

## 2021-01-21 NOTE — TELEPHONE ENCOUNTER
S/w patient, he was made aware he just had CMP done 11/2/20 and cholesterol 8/20 and all were within normal range  He requested to have blood work done regardless, including PSA  Will order now

## 2021-01-25 ENCOUNTER — TELEPHONE (OUTPATIENT)
Dept: INTERNAL MEDICINE CLINIC | Facility: CLINIC | Age: 76
End: 2021-01-25

## 2021-01-25 DIAGNOSIS — N40.0 BENIGN PROSTATIC HYPERPLASIA, UNSPECIFIED WHETHER LOWER URINARY TRACT SYMPTOMS PRESENT: ICD-10-CM

## 2021-01-25 DIAGNOSIS — N40.0 BPH WITHOUT URINARY OBSTRUCTION: ICD-10-CM

## 2021-01-25 RX ORDER — TAMSULOSIN HYDROCHLORIDE 0.4 MG/1
0.4 CAPSULE ORAL
Qty: 90 CAPSULE | Refills: 1 | Status: SHIPPED | OUTPATIENT
Start: 2021-01-25 | End: 2021-03-15 | Stop reason: SDUPTHER

## 2021-01-30 ENCOUNTER — LAB (OUTPATIENT)
Dept: LAB | Facility: HOSPITAL | Age: 76
End: 2021-01-30
Attending: INTERNAL MEDICINE
Payer: MEDICARE

## 2021-01-30 DIAGNOSIS — I25.10 CORONARY ARTERY DISEASE INVOLVING NATIVE CORONARY ARTERY OF NATIVE HEART WITHOUT ANGINA PECTORIS: ICD-10-CM

## 2021-01-30 LAB
25(OH)D3 SERPL-MCNC: 32.5 NG/ML (ref 30–100)
ALBUMIN SERPL BCP-MCNC: 4 G/DL (ref 3.5–5)
ALP SERPL-CCNC: 106 U/L (ref 46–116)
ALT SERPL W P-5'-P-CCNC: 46 U/L (ref 12–78)
ANION GAP SERPL CALCULATED.3IONS-SCNC: 7 MMOL/L (ref 4–13)
AST SERPL W P-5'-P-CCNC: 29 U/L (ref 5–45)
BACTERIA UR QL AUTO: ABNORMAL /HPF
BASOPHILS # BLD AUTO: 0.03 THOUSANDS/ΜL (ref 0–0.1)
BASOPHILS NFR BLD AUTO: 1 % (ref 0–1)
BILIRUB SERPL-MCNC: 0.8 MG/DL (ref 0.2–1)
BILIRUB UR QL STRIP: NEGATIVE
BUN SERPL-MCNC: 18 MG/DL (ref 5–25)
CALCIUM SERPL-MCNC: 9.6 MG/DL (ref 8.3–10.1)
CHLORIDE SERPL-SCNC: 104 MMOL/L (ref 100–108)
CHOLEST SERPL-MCNC: 151 MG/DL (ref 50–200)
CLARITY UR: CLEAR
CO2 SERPL-SCNC: 31 MMOL/L (ref 21–32)
COLOR UR: YELLOW
CREAT SERPL-MCNC: 1.39 MG/DL (ref 0.6–1.3)
EOSINOPHIL # BLD AUTO: 0.27 THOUSAND/ΜL (ref 0–0.61)
EOSINOPHIL NFR BLD AUTO: 4 % (ref 0–6)
ERYTHROCYTE [DISTWIDTH] IN BLOOD BY AUTOMATED COUNT: 13.4 % (ref 11.6–15.1)
EST. AVERAGE GLUCOSE BLD GHB EST-MCNC: 108 MG/DL
GFR SERPL CREATININE-BSD FRML MDRD: 50 ML/MIN/1.73SQ M
GLUCOSE P FAST SERPL-MCNC: 97 MG/DL (ref 65–99)
GLUCOSE UR STRIP-MCNC: NEGATIVE MG/DL
HBA1C MFR BLD: 5.4 %
HCT VFR BLD AUTO: 47.7 % (ref 36.5–49.3)
HDLC SERPL-MCNC: 36 MG/DL
HGB BLD-MCNC: 15.6 G/DL (ref 12–17)
HGB UR QL STRIP.AUTO: NEGATIVE
IMM GRANULOCYTES # BLD AUTO: 0.01 THOUSAND/UL (ref 0–0.2)
IMM GRANULOCYTES NFR BLD AUTO: 0 % (ref 0–2)
KETONES UR STRIP-MCNC: NEGATIVE MG/DL
LDLC SERPL CALC-MCNC: 70 MG/DL (ref 0–100)
LEUKOCYTE ESTERASE UR QL STRIP: NEGATIVE
LYMPHOCYTES # BLD AUTO: 1.38 THOUSANDS/ΜL (ref 0.6–4.47)
LYMPHOCYTES NFR BLD AUTO: 21 % (ref 14–44)
MCH RBC QN AUTO: 28.8 PG (ref 26.8–34.3)
MCHC RBC AUTO-ENTMCNC: 32.7 G/DL (ref 31.4–37.4)
MCV RBC AUTO: 88 FL (ref 82–98)
MONOCYTES # BLD AUTO: 0.5 THOUSAND/ΜL (ref 0.17–1.22)
MONOCYTES NFR BLD AUTO: 8 % (ref 4–12)
MUCOUS THREADS UR QL AUTO: ABNORMAL
NEUTROPHILS # BLD AUTO: 4.36 THOUSANDS/ΜL (ref 1.85–7.62)
NEUTS SEG NFR BLD AUTO: 66 % (ref 43–75)
NITRITE UR QL STRIP: NEGATIVE
NON-SQ EPI CELLS URNS QL MICRO: ABNORMAL /HPF
NRBC BLD AUTO-RTO: 0 /100 WBCS
PH UR STRIP.AUTO: 5.5 [PH]
PHOSPHATE SERPL-MCNC: 3.3 MG/DL (ref 2.3–4.1)
PLATELET # BLD AUTO: 201 THOUSANDS/UL (ref 149–390)
PMV BLD AUTO: 10.4 FL (ref 8.9–12.7)
POTASSIUM SERPL-SCNC: 4.3 MMOL/L (ref 3.5–5.3)
PROT SERPL-MCNC: 7.4 G/DL (ref 6.4–8.2)
PROT UR STRIP-MCNC: NEGATIVE MG/DL
PSA SERPL-MCNC: 1.9 NG/ML (ref 0–4)
PTH-INTACT SERPL-MCNC: 46.9 PG/ML (ref 18.4–80.1)
RBC # BLD AUTO: 5.41 MILLION/UL (ref 3.88–5.62)
RBC #/AREA URNS AUTO: ABNORMAL /HPF
SODIUM SERPL-SCNC: 142 MMOL/L (ref 136–145)
SP GR UR STRIP.AUTO: 1.02 (ref 1–1.03)
TRIGL SERPL-MCNC: 226 MG/DL
TSH SERPL DL<=0.05 MIU/L-ACNC: 3.06 UIU/ML (ref 0.36–3.74)
UROBILINOGEN UR QL STRIP.AUTO: 0.2 E.U./DL
WBC # BLD AUTO: 6.55 THOUSAND/UL (ref 4.31–10.16)
WBC #/AREA URNS AUTO: ABNORMAL /HPF

## 2021-01-30 PROCEDURE — 36415 COLL VENOUS BLD VENIPUNCTURE: CPT

## 2021-01-30 PROCEDURE — 80061 LIPID PANEL: CPT

## 2021-01-30 PROCEDURE — 84100 ASSAY OF PHOSPHORUS: CPT | Performed by: INTERNAL MEDICINE

## 2021-01-30 PROCEDURE — 82570 ASSAY OF URINE CREATININE: CPT | Performed by: INTERNAL MEDICINE

## 2021-01-30 PROCEDURE — 82306 VITAMIN D 25 HYDROXY: CPT | Performed by: INTERNAL MEDICINE

## 2021-01-30 PROCEDURE — 81001 URINALYSIS AUTO W/SCOPE: CPT | Performed by: INTERNAL MEDICINE

## 2021-01-30 PROCEDURE — 83036 HEMOGLOBIN GLYCOSYLATED A1C: CPT

## 2021-01-30 PROCEDURE — 84443 ASSAY THYROID STIM HORMONE: CPT

## 2021-01-30 PROCEDURE — 83970 ASSAY OF PARATHORMONE: CPT | Performed by: INTERNAL MEDICINE

## 2021-01-30 PROCEDURE — 84156 ASSAY OF PROTEIN URINE: CPT | Performed by: INTERNAL MEDICINE

## 2021-01-30 PROCEDURE — 85025 COMPLETE CBC W/AUTO DIFF WBC: CPT | Performed by: INTERNAL MEDICINE

## 2021-01-30 PROCEDURE — 80053 COMPREHEN METABOLIC PANEL: CPT | Performed by: INTERNAL MEDICINE

## 2021-01-30 PROCEDURE — G0103 PSA SCREENING: HCPCS | Performed by: NURSE PRACTITIONER

## 2021-01-31 LAB
CREAT UR-MCNC: 270 MG/DL
PROT UR-MCNC: 15 MG/DL
PROT/CREAT UR: 0.06 MG/G{CREAT} (ref 0–0.1)

## 2021-02-24 ENCOUNTER — OFFICE VISIT (OUTPATIENT)
Dept: CARDIOLOGY CLINIC | Facility: CLINIC | Age: 76
End: 2021-02-24
Payer: MEDICARE

## 2021-02-24 VITALS
HEART RATE: 79 BPM | SYSTOLIC BLOOD PRESSURE: 118 MMHG | WEIGHT: 163 LBS | DIASTOLIC BLOOD PRESSURE: 74 MMHG | HEIGHT: 68 IN | OXYGEN SATURATION: 98 % | BODY MASS INDEX: 24.71 KG/M2

## 2021-02-24 DIAGNOSIS — I25.10 CORONARY ARTERY DISEASE INVOLVING NATIVE CORONARY ARTERY OF NATIVE HEART WITHOUT ANGINA PECTORIS: Primary | ICD-10-CM

## 2021-02-24 DIAGNOSIS — I65.21 ASYMPTOMATIC STENOSIS OF RIGHT CAROTID ARTERY: ICD-10-CM

## 2021-02-24 DIAGNOSIS — I25.10 CORONARY ARTERY DISEASE INVOLVING NATIVE CORONARY ARTERY OF NATIVE HEART WITHOUT ANGINA PECTORIS: ICD-10-CM

## 2021-02-24 DIAGNOSIS — E78.2 MIXED HYPERLIPIDEMIA: ICD-10-CM

## 2021-02-24 DIAGNOSIS — I10 ESSENTIAL HYPERTENSION: ICD-10-CM

## 2021-02-24 PROCEDURE — 99214 OFFICE O/P EST MOD 30 MIN: CPT | Performed by: INTERNAL MEDICINE

## 2021-02-24 NOTE — PROGRESS NOTES
[facility]  [address] [id number]  Cardiology Follow Up    [facility]  [birth year]  [id number]      1  Coronary artery disease involving native coronary artery of native heart without angina pectoris     2  Essential hypertension     3  Mixed hyperlipidemia     4  Asymptomatic stenosis of right carotid artery         Chief Complaint   Patient presents with    Follow-up    Coronary Artery Disease       Interval History:   Patient presents for follow-up visit  Patient denies any history of chest pain shortness of breath  Patient denies any history of leg edema or orthopnea PND  No history of presyncope syncope  Patient states compliance with the present list of medications      Patient Active Problem List   Diagnosis    Seborrheic keratosis    Screening for skin condition    History of skin cancer    Psoriasiform dermatitis    Coronary artery disease involving native coronary artery of native heart without angina pectoris    Essential hypertension    Mixed hyperlipidemia    Anxiety    Benign prostatic hyperplasia    CKD (chronic kidney disease), stage III    Impaired fasting glucose    Subclinical hypothyroidism    Nocturia    Chronic deep vein thrombosis (DVT) of lower extremity ([address] )    Right carotid bruit    Carotid stenosis, right     Past Medical History:   Diagnosis Date    Anxiety disorder     last assessed - [id number]    Basal cell carcinoma of face     BPH (benign prostatic hyperplasia)     benign prostatic hypertrophy; last assessed - 69VLT4269    Chronic kidney disease     Coronary atherosclerosis of native coronary artery     last assessed - [id number]    Deep vein thrombosis (DVT) ([address] )     last assessed - 23RHX5040    Depression     last assessed - 78BKE8489    History of colonoscopy     Fiberoptic; Resolved - 12/1/2011    Hyperlipemia     last assessed - 74ZUT3215    Hypertension     last assessed - 09FJG5177    Myocardial infarction ([facility] )     Nonmelanoma skin cancer     last assessed - 27Zce9909    Osteoarthritis     last assessed - 88MUG4430    Pneumonia     last assessed - 41WJA6263    Pulmonary HTN ([place] [address] )     PVD (peripheral vascular disease) ([place] [address] )     Seborrheic dermatitis      Social History     Socioeconomic History    Marital status: /Civil Union     Spouse name: Not on file    Number of children: Not on file    Years of education: Not on file    Highest education level: Not on file   Occupational History    Not on file   Social Needs    Financial resource strain: Not on file    Food insecurity     Worry: Not on file     Inability: Not on file    Transportation needs     Medical: Not on file     Non-medical: Not on file   Tobacco Use    Smoking status: Never Smoker    Smokeless tobacco: Never Used   Substance and Sexual Activity    Alcohol use: Yes     Frequency: 2-3 times a week     Drinks per session: 1 or 2     Binge frequency: Never     Comment: socially    Drug use: Yes     Types: Marijuana    Sexual activity: Not Currently     Partners: Female   Lifestyle    Physical activity     Days per week: 5 days     Minutes per session: 20 min    Stress: Not at all   Relationships    Social connections     Talks on phone: Not on file     Gets together: Not on file     Attends Christianity service: Not on file     Active member of club or organization: Not on file     Attends meetings of clubs or organizations: Not on file     Relationship status: Not on file    Intimate partner violence     Fear of current or ex partner: Not on file     Emotionally abused: Not on file     Physically abused: Not on file     Forced sexual activity: Not on file   Other Topics Concern    Not on file   Social History Narrative    Not on file      Family History   Problem Relation Age of Onset    Dementia Mother     Rectal cancer Mother     Coronary artery disease Father     Rectal cancer Sister      Past Surgical History:   Procedure Laterality Date    CARDIAC CATHETERIZATION      procedure outcome successful; last assessed - [id number]    CORONARY ARTERY BYPASS GRAFT      last assessed - [id number]    IVC FILTER INSERTION      Jael    TOTAL HIP ARTHROPLASTY      last assessed - [id number]       Current Outpatient Medications:     aspirin 81 mg chewable tablet, Chew 1 tablet daily , Disp: , Rfl:     atorvastatin (LIPITOR) 40 mg tablet, TAKE 1 TABLET BY MOUTH EVERY DAY, Disp: 90 tablet, Rfl: 0    buPROPion (WELLBUTRIN XL) 300 mg 24 hr tablet, Take 1 tablet (300 mg total) by mouth daily, Disp: 90 tablet, Rfl: 3    escitalopram (LEXAPRO) 20 mg tablet, Take 1 tablet (20 mg total) by mouth daily, Disp: 90 tablet, Rfl: 2    fluocinonide (LIDEX) 0 05 % cream, APPLY TO AFFECTED AREA TWICE A DAY, Disp: 30 g, Rfl: 0    LORazepam (ATIVAN) 1 mg tablet, TAKE 1 TABLET (1 MG TOTAL) BY MOUTH 3 (THREE) TIMES A DAY AS NEEDED FOR ANXIETY, Disp: 90 tablet, Rfl: 0    memantine (NAMENDA) 5 mg tablet, 5 mg 2 (two) times a day , Disp: , Rfl:     metoprolol tartrate (LOPRESSOR) 25 mg tablet, Take 1 tablet (25 mg total) by mouth 2 (two) times a day (Patient taking differently: Take 25 mg by mouth daily Patient only takes his blood pressure medication when it is high), Disp: 180 tablet, Rfl: 3    tamsulosin (FLOMAX) 0 4 mg, Take 1 capsule (0 4 mg total) by mouth daily with dinner, Disp: 90 capsule, Rfl: 1  No Known Allergies    Labs:  Lab on 01/30/2021   Component Date Value    Cholesterol 01/30/2021 151     Triglycerides 01/30/2021 226*    HDL, Direct 01/30/2021 36*    LDL Calculated 01/30/2021 70    Orders Only on 01/21/2021   Component Date Value    PSA 01/30/2021 1 9    Telemedicine on 01/13/2021   Component Date Value    SARS-CoV-2  01/14/2021 Not Detected      Imaging: No results found      Review of Systems:  Review of Systems     REVIEW OF SYSTEMS:  Constitutional:  Denies fever or chills   Eyes:  Denies change in visual acuity   HENT:  Denies nasal congestion or sore throat   Respiratory:  Denies cough or shortness of breath   Cardiovascular:  Denies chest pain or edema   GI:  Denies abdominal pain, nausea, vomiting, bloody stools or diarrhea   :  Denies dysuria, frequency, difficulty in micturition and nocturia  Musculoskeletal:  Denies back pain or joint pain   Neurologic:  Denies headache, focal weakness or sensory changes   Endocrine:  Denies polyuria or polydipsia   Lymphatic:  Denies swollen glands   Psychiatric:  Denies depression or anxiety     Physical Exam:    /74   Pulse 79   Ht 5' 8 25" (1 734 m)   Wt 73 9 kg (163 lb)   SpO2 98%   BMI 24 60 kg/m²     Physical Exam   PHYSICAL EXAM:  General:  Patient is not in acute distress   Head: Normocephalic, Atraumatic  HEENT:  Both pupils normal-size atraumatic, normocephalic, nonicteric  Neck:  JVP not raised  Trachea central   carotid bruit  Respiratory:  normal breath sounds no crackles  no rhonchi  Cardiovascular:  Regular rate and rhythm no S3 no murmurs  GI:  Abdomen soft nontender  No organomegaly  Lymphatic:  No cervical or inguinal lymphadenopathy  Neurologic:  Patient is awake alert, oriented   Grossly nonfocal    Extremities no edema    Discussion/Summary:   Patient with multiple medical problems who seems to be doing reasonably well from cardiac standpoint  Previous studies reviewed with patient  Medications reviewed and possible side effects discussed  concepts of cardiovascular disease , signs and symptoms of heart disease  Dietary and risk factor modification reinforced  All questions answered  Safety measures reviewed  Patient advised to report any problems prompting medical attention  carotid ultrasound to assess for bruit  Symptoms to watch out from cardiac standpoint which would indicate the need for further cardiac evaluation also discussed  Previous cardiovascular studies reviewed  Follow-up in 6 months or earlier as needed  Follow-up with primary care physician  Patient is agreeable with the plan of care

## 2021-03-05 DIAGNOSIS — Z23 ENCOUNTER FOR IMMUNIZATION: ICD-10-CM

## 2021-03-15 ENCOUNTER — OFFICE VISIT (OUTPATIENT)
Dept: INTERNAL MEDICINE CLINIC | Facility: CLINIC | Age: 76
End: 2021-03-15
Payer: MEDICARE

## 2021-03-15 VITALS
HEIGHT: 68 IN | HEART RATE: 76 BPM | WEIGHT: 166.4 LBS | SYSTOLIC BLOOD PRESSURE: 110 MMHG | RESPIRATION RATE: 12 BRPM | DIASTOLIC BLOOD PRESSURE: 78 MMHG | TEMPERATURE: 98.3 F | BODY MASS INDEX: 25.22 KG/M2

## 2021-03-15 DIAGNOSIS — N40.0 BENIGN PROSTATIC HYPERPLASIA, UNSPECIFIED WHETHER LOWER URINARY TRACT SYMPTOMS PRESENT: ICD-10-CM

## 2021-03-15 DIAGNOSIS — N40.1 BENIGN PROSTATIC HYPERPLASIA WITH NOCTURIA: Primary | ICD-10-CM

## 2021-03-15 DIAGNOSIS — R73.01 IMPAIRED FASTING BLOOD SUGAR: ICD-10-CM

## 2021-03-15 DIAGNOSIS — E78.5 DYSLIPIDEMIA: ICD-10-CM

## 2021-03-15 DIAGNOSIS — R35.1 BENIGN PROSTATIC HYPERPLASIA WITH NOCTURIA: Primary | ICD-10-CM

## 2021-03-15 DIAGNOSIS — I10 ESSENTIAL HYPERTENSION: ICD-10-CM

## 2021-03-15 DIAGNOSIS — F09 MILD COGNITIVE DISORDER: ICD-10-CM

## 2021-03-15 DIAGNOSIS — G47.33 OSA (OBSTRUCTIVE SLEEP APNEA): ICD-10-CM

## 2021-03-15 DIAGNOSIS — N40.0 BPH WITHOUT URINARY OBSTRUCTION: ICD-10-CM

## 2021-03-15 DIAGNOSIS — E78.2 MIXED HYPERLIPIDEMIA: ICD-10-CM

## 2021-03-15 DIAGNOSIS — F41.9 ANXIETY: ICD-10-CM

## 2021-03-15 PROCEDURE — 99215 OFFICE O/P EST HI 40 MIN: CPT | Performed by: INTERNAL MEDICINE

## 2021-03-15 RX ORDER — BUPROPION HYDROCHLORIDE 300 MG/1
300 TABLET ORAL DAILY
Qty: 90 TABLET | Refills: 3 | Status: SHIPPED | OUTPATIENT
Start: 2021-03-15 | End: 2021-06-13 | Stop reason: SDUPTHER

## 2021-03-15 RX ORDER — ESCITALOPRAM OXALATE 20 MG/1
20 TABLET ORAL DAILY
Qty: 90 TABLET | Refills: 2 | Status: SHIPPED | OUTPATIENT
Start: 2021-03-15 | End: 2021-12-17 | Stop reason: SDUPTHER

## 2021-03-15 RX ORDER — TAMSULOSIN HYDROCHLORIDE 0.4 MG/1
0.4 CAPSULE ORAL
Qty: 90 CAPSULE | Refills: 1 | Status: SHIPPED | OUTPATIENT
Start: 2021-03-15 | End: 2021-08-16

## 2021-03-15 RX ORDER — MEMANTINE HYDROCHLORIDE 5 MG/1
5 TABLET ORAL 2 TIMES DAILY
Qty: 60 TABLET | Refills: 3 | Status: SHIPPED | OUTPATIENT
Start: 2021-03-15 | End: 2021-08-22

## 2021-03-15 RX ORDER — FINASTERIDE 5 MG/1
5 TABLET, FILM COATED ORAL DAILY
Qty: 90 TABLET | Refills: 3 | Status: SHIPPED | OUTPATIENT
Start: 2021-03-15 | End: 2022-02-22 | Stop reason: CLARIF

## 2021-03-15 RX ORDER — ATORVASTATIN CALCIUM 40 MG/1
40 TABLET, FILM COATED ORAL DAILY
Qty: 90 TABLET | Refills: 0 | Status: SHIPPED | OUTPATIENT
Start: 2021-03-15 | End: 2021-06-23 | Stop reason: SDUPTHER

## 2021-03-15 NOTE — PROGRESS NOTES
Assessment/Plan:  1  CAD s/p CABG x3 (15 yrs ago) - sees Dr Nasrin Lopez; no issues  On Metoprolol 12 5 or 25mg once daily vs twice daily; having hypotensive issues at time; will hold metoprolol tartrate for now and monitor; will run it by his cardiologist    2  HTN - well controlled; see diagnosis #1  3  Dyslipidemia - well controlled on high intensity Atorvastatin  No S/Es  4  XFT2R - sees Dr Dante Liao; well controlled, avoid nephrotoxic agents such as NSAIDs  5  BPH - on Flomax 0 4mg qHS; will add Finasteride 5mg qd  Symptomatic with frequency, hesitancy and nocturia  Monitor for improvement  Recent psa WNL  6  Anxiety - Ativan prn  7  Suspected HENRIETTA - polysomnography ordered; snores loudly, unrefreshing sleep/insomnia    Screening -  *Colonoscopy - 07/2020: no polyp found;   *HCV - completed      Subjective:      Patient ID: Paris Mendes is a 77 y o  male  Getting up to urinate 4-5 times per night, also c/o urgency and hesitancy  Wife complains of loud snoring with observed apneic episodes at times althoug she doesn't sleep with him lately due to snoring  Blood work reviewed, no significant abnormalities  Educated on exercise and healthy diet  No other complaints at this time  Review of Systems   Constitutional: Negative for activity change, appetite change, chills, diaphoresis, fatigue and fever  HENT: Negative for rhinorrhea and sore throat  Eyes: Negative for visual disturbance  Respiratory: Negative for cough and shortness of breath  Cardiovascular: Negative for chest pain, palpitations and leg swelling  Gastrointestinal: Negative for abdominal pain, blood in stool, constipation, diarrhea, nausea and vomiting  Endocrine: Positive for polyuria  Negative for polydipsia and polyphagia  Genitourinary: Positive for frequency  Negative for decreased urine volume, difficulty urinating, dysuria, hematuria and urgency  Musculoskeletal: Negative for arthralgias     Skin: Negative for pallor, rash and wound  Neurological: Negative for dizziness, syncope, weakness, light-headedness, numbness and headaches  Psychiatric/Behavioral: Positive for sleep disturbance  Negative for confusion  Objective:      /78 (BP Location: Left arm, Patient Position: Sitting)   Pulse 76   Temp 98 3 °F (36 8 °C) (Tympanic)   Resp 12   Ht 5' 8 25" (1 734 m)   Wt 75 5 kg (166 lb 6 4 oz)   BMI 25 12 kg/m²          Physical Exam  Vitals signs reviewed  Constitutional:       Appearance: Normal appearance  He is normal weight  He is not ill-appearing  HENT:      Head: Normocephalic and atraumatic  Nose: Nose normal       Mouth/Throat:      Mouth: Mucous membranes are moist       Pharynx: Oropharynx is clear  Eyes:      Conjunctiva/sclera: Conjunctivae normal       Pupils: Pupils are equal, round, and reactive to light  Neck:      Vascular: No carotid bruit  Cardiovascular:      Rate and Rhythm: Normal rate and regular rhythm  Pulses: Normal pulses  Heart sounds: Normal heart sounds  No murmur  Pulmonary:      Breath sounds: Normal breath sounds  Abdominal:      General: Abdomen is flat  Bowel sounds are normal  There is no distension  Palpations: Abdomen is soft  Tenderness: There is no abdominal tenderness  Musculoskeletal:         General: No swelling  Right lower leg: No edema  Left lower leg: No edema  Skin:     General: Skin is warm and dry  Neurological:      Mental Status: He is alert and oriented to person, place, and time  Mental status is at baseline     Psychiatric:         Mood and Affect: Mood normal          Behavior: Behavior normal

## 2021-03-18 ENCOUNTER — HOSPITAL ENCOUNTER (OUTPATIENT)
Dept: NON INVASIVE DIAGNOSTICS | Facility: CLINIC | Age: 76
Discharge: HOME | End: 2021-03-18
Payer: MEDICARE

## 2021-03-18 DIAGNOSIS — I65.21 ASYMPTOMATIC STENOSIS OF RIGHT CAROTID ARTERY: ICD-10-CM

## 2021-03-18 PROCEDURE — 93880 EXTRACRANIAL BILAT STUDY: CPT | Performed by: INTERNAL MEDICINE

## 2021-03-18 PROCEDURE — 93880 EXTRACRANIAL BILAT STUDY: CPT

## 2021-03-19 ENCOUNTER — IMMUNIZATIONS (OUTPATIENT)
Dept: FAMILY MEDICINE CLINIC | Facility: HOSPITAL | Age: 76
End: 2021-03-19

## 2021-03-19 DIAGNOSIS — Z23 ENCOUNTER FOR IMMUNIZATION: Primary | ICD-10-CM

## 2021-03-19 PROCEDURE — 0001A SARS-COV-2 / COVID-19 MRNA VACCINE (PFIZER-BIONTECH) 30 MCG: CPT

## 2021-03-19 PROCEDURE — 91300 SARS-COV-2 / COVID-19 MRNA VACCINE (PFIZER-BIONTECH) 30 MCG: CPT

## 2021-03-29 ENCOUNTER — TELEPHONE (OUTPATIENT)
Dept: SLEEP CENTER | Facility: CLINIC | Age: 76
End: 2021-03-29

## 2021-03-29 NOTE — TELEPHONE ENCOUNTER
----- Message from Brigitte Del Toro DO sent at 3/28/2021 10:32 PM EDT -----  approved  ----- Message -----  From: Gavin Polanco  Sent: 3/25/2021  12:41 PM EDT  To: Sleep Medicine [facility] Provider    This sleep study needs approval      If approved please sign and return to clerical pool  If denied please include reasons why  Also provide alternative testing if warranted  Please sign and return to clerical pool

## 2021-04-01 DIAGNOSIS — L24.9 IRRITANT CONTACT DERMATITIS, UNSPECIFIED TRIGGER: ICD-10-CM

## 2021-04-09 ENCOUNTER — IMMUNIZATIONS (OUTPATIENT)
Dept: FAMILY MEDICINE CLINIC | Facility: HOSPITAL | Age: 76
End: 2021-04-09

## 2021-04-09 DIAGNOSIS — Z23 ENCOUNTER FOR IMMUNIZATION: Primary | ICD-10-CM

## 2021-04-09 PROCEDURE — 91300 SARS-COV-2 / COVID-19 MRNA VACCINE (PFIZER-BIONTECH) 30 MCG: CPT

## 2021-04-09 PROCEDURE — 0002A SARS-COV-2 / COVID-19 MRNA VACCINE (PFIZER-BIONTECH) 30 MCG: CPT

## 2021-04-23 ENCOUNTER — TELEPHONE (OUTPATIENT)
Dept: INTERNAL MEDICINE CLINIC | Facility: CLINIC | Age: 76
End: 2021-04-23

## 2021-04-23 NOTE — TELEPHONE ENCOUNTER
cb # [phone]    Wants to know what you think of him taking a pro biotic-bio complete 3-supposed to clean out your gut

## 2021-04-27 ENCOUNTER — TELEPHONE (OUTPATIENT)
Dept: NEPHROLOGY | Facility: CLINIC | Age: 76
End: 2021-04-27

## 2021-05-01 ENCOUNTER — APPOINTMENT (OUTPATIENT)
Dept: LAB | Facility: HOSPITAL | Age: 76
End: 2021-05-01
Attending: INTERNAL MEDICINE
Payer: MEDICARE

## 2021-05-01 DIAGNOSIS — R73.01 IMPAIRED FASTING BLOOD SUGAR: ICD-10-CM

## 2021-05-01 LAB
EST. AVERAGE GLUCOSE BLD GHB EST-MCNC: 105 MG/DL
HBA1C MFR BLD: 5.3 %

## 2021-05-01 PROCEDURE — 83036 HEMOGLOBIN GLYCOSYLATED A1C: CPT

## 2021-05-03 ENCOUNTER — TELEPHONE (OUTPATIENT)
Dept: NEPHROLOGY | Facility: CLINIC | Age: 76
End: 2021-05-03

## 2021-05-03 NOTE — TELEPHONE ENCOUNTER
Appointment was confirmed and went over registration details with patient wife Capriprincess Esquivel,

## 2021-05-04 ENCOUNTER — OFFICE VISIT (OUTPATIENT)
Dept: NEPHROLOGY | Facility: CLINIC | Age: 76
End: 2021-05-04
Payer: MEDICARE

## 2021-05-04 VITALS
SYSTOLIC BLOOD PRESSURE: 126 MMHG | HEART RATE: 78 BPM | TEMPERATURE: 96.8 F | RESPIRATION RATE: 16 BRPM | HEIGHT: 68 IN | BODY MASS INDEX: 25.19 KG/M2 | WEIGHT: 166.2 LBS | DIASTOLIC BLOOD PRESSURE: 84 MMHG

## 2021-05-04 DIAGNOSIS — R80.1 PERSISTENT PROTEINURIA: Primary | ICD-10-CM

## 2021-05-04 PROCEDURE — 99215 OFFICE O/P EST HI 40 MIN: CPT | Performed by: INTERNAL MEDICINE

## 2021-05-04 NOTE — LETTER
May 4, 2021     Manny Rowe MD  [address],  [address] [address]    Patient: Pascale Montgomery   YOB: 1945   Date of Visit: 5/4/2021       Dear Dr Kevin Ferreira: Thank you for referring Pascale Montgomery to me for evaluation  Below are my notes for this consultation  If you have questions, please do not hesitate to call me  I look forward to following your patient along with you  Sincerely,        Horacio Dempsey MD        CC: No Recipients  Horacio Dempsey MD  5/4/2021  9:58 AM  Incomplete  NEPHROLOGY PROGRESS NOTE    Patient: Pascale Montgomery               Sex: male          DOA: No admission date for patient encounter  YOB: 1945        Age:  77 y o         LOS: [unfilled]  5/4/2021        BACKGROUND     71-year-old male with past medical history of CAD status post CABG, dyslipidemia, chronic kidney disease stage 3, hypertension, depression who presents to renal clinic for follow-up  SUBJECTIVE     Patient has been seen after 6 months  Offers no complaints  REVIEW OF SYSTEMS     Review of Systems   Constitutional: Negative  HENT: Negative  Eyes: Negative  Respiratory: Negative  Cardiovascular: Negative  Gastrointestinal: Negative  Endocrine: Negative  Genitourinary: Negative  Musculoskeletal: Negative  Skin: Negative  Allergic/Immunologic: Negative  Neurological: Negative  Hematological: Negative  All other systems reviewed and are negative  OBJECTIVE     Current Weight: Weight - Scale: 75 4 kg (166 lb 3 2 oz)  Vitals:    05/04/21 0939   BP: 126/84   Pulse: 78   Resp: 16   Temp: (!) 96 8 °F (36 °C)     Body mass index is 25 27 kg/m²        CURRENT MEDICATIONS       Current Outpatient Medications:     aspirin 81 mg chewable tablet, Chew 1 tablet daily , Disp: , Rfl:     atorvastatin (LIPITOR) 40 mg tablet, Take 1 tablet (40 mg total) by mouth daily, Disp: 90 tablet, Rfl: 0    buPROPion [facility] XL) 300 mg 24 hr tablet, Take 1 tablet (300 mg total) by mouth daily, Disp: 90 tablet, Rfl: 3    escitalopram (LEXAPRO) 20 mg tablet, Take 1 tablet (20 mg total) by mouth daily, Disp: 90 tablet, Rfl: 2    finasteride (PROSCAR) 5 mg tablet, Take 1 tablet (5 mg total) by mouth daily, Disp: 90 tablet, Rfl: 3    LORazepam (ATIVAN) 1 mg tablet, TAKE 1 TABLET (1 MG TOTAL) BY MOUTH 3 (THREE) TIMES A DAY AS NEEDED FOR ANXIETY, Disp: 90 tablet, Rfl: 0    memantine (NAMENDA) 5 mg tablet, Take 1 tablet (5 mg total) by mouth 2 (two) times a day, Disp: 60 tablet, Rfl: 3    metoprolol tartrate (LOPRESSOR) 25 mg tablet, TAKE 1 TABLET BY MOUTH TWICE A DAY (Patient taking differently: as needed ), Disp: 180 tablet, Rfl: 3    tamsulosin (FLOMAX) 0 4 mg, Take 1 capsule (0 4 mg total) by mouth daily with dinner, Disp: 90 capsule, Rfl: 1    fluocinonide (LIDEX) 0 05 % cream, APPLY TO AFFECTED AREA TWICE A DAY (Patient not taking: Reported on 5/4/2021), Disp: 30 g, Rfl: 0      PHYSICAL EXAMINATION     Physical Exam  HENT:      Head: Normocephalic and atraumatic  Eyes:      Pupils: Pupils are equal, round, and reactive to light  Neck:      Musculoskeletal: Neck supple  Vascular: No JVD  Cardiovascular:      Rate and Rhythm: Normal rate and regular rhythm  Heart sounds: Normal heart sounds  No murmur  No friction rub  Pulmonary:      Effort: Pulmonary effort is normal       Breath sounds: Normal breath sounds  Abdominal:      General: Bowel sounds are normal  There is no distension  Palpations: Abdomen is soft  Tenderness: There is no abdominal tenderness  There is no rebound  Musculoskeletal:         General: No tenderness  Skin:     General: Skin is dry  Findings: No rash  Neurological:      Mental Status: He is alert and oriented to person, place, and time             LAB RESULTS     Results from last 7 days   Lab Units 05/01/21  0844   WBC Thousand/uL 6 05   HEMOGLOBIN g/dL 15 2 HEMATOCRIT % 47 4   PLATELETS Thousands/uL 203   POTASSIUM mmol/L 4 8   CHLORIDE mmol/L 104   CO2 mmol/L 29   BUN mg/dL 15   CREATININE mg/dL 1 16   EGFR ml/min/1 73sq m 61   CALCIUM mg/dL 9 2   PHOSPHORUS mg/dL 3 3           RADIOLOGY RESULTS      No results found for this or any previous visit  Results for orders placed during the hospital encounter of 05/31/18   XR chest pa & lateral    Narrative CHEST     INDICATION:   R05: Cough  COMPARISON:  11/15/2017    EXAM PERFORMED/VIEWS:  XR CHEST PA & LATERAL  The frontal view was performed utilizing dual energy radiographic technique  FINDINGS:    Cardiomediastinal silhouette appears unremarkable  Status post median sternotomy and CABG  The lungs are clear  Mild eventration of the right diaphragmatic apex  No pneumothorax or pleural effusion  Osseous structures appear within normal limits for patient age  Old left lower rib fracture  Impression No acute cardiopulmonary disease  Workstation performed: [id number]         ASSESSMENT/PLAN       23-year-old male with past medical history of CAD status post CABG, dyslipidemia, chronic kidney disease stage 3, hypertension, BPH who presents to renal clinic for follow-up  1  Chronic kidney disease stage 3a:  Baseline serum creatinine 1 2-1 5 that existed over the past 4 years  -current serum creatinine is 1 1 mg/dL and below baseline    -avoidance of nephrotoxic agents such as NSAIDs reiterated  2  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:  Blood pressure is excellent range while on metoprolol 12 5 mg twice daily  3  Secondary hyperparathyroidism of renal origin:  PTH intact, 25 hydroxy vitamin-D, calcium and phosphorus are all in good range  4  Anemia due to CKD:  Hemoglobin is stable at 15 2 and on target for his level of CKD  5  CAD status post CABG:  Stable while on aspirin 81 mg once daily      6  Benign prostatic hyperplasia:  Stable urinary symptoms while on Flomax 0 4 mg     7  Dyslipidemia:  On Lipitor 40 mg at bedtime  8  Nutrition:  Low-potassium, low phosphorus, moderate protein diet recommended  9  Proteinuria: Noted 160 mg of proteinuria  Will repeat             Saranya Officer, MD  Nephrology  5/4/2021

## 2021-05-04 NOTE — PROGRESS NOTES
NEPHROLOGY PROGRESS NOTE    Patient: Pascale Montgomery               Sex: male          DOA: No admission date for patient encounter  YOB: 1945        Age:  77 y o         LOS: @KIT@  5/4/2021        BACKGROUND     71-year-old male with past medical history of CAD status post CABG, dyslipidemia, chronic kidney disease stage 3, hypertension, depression who presents to renal clinic for follow-up  SUBJECTIVE     Patient has been seen after 6 months  Offers no complaints  REVIEW OF SYSTEMS     Review of Systems   Constitutional: Negative  HENT: Negative  Eyes: Negative  Respiratory: Negative  Cardiovascular: Negative  Gastrointestinal: Negative  Endocrine: Negative  Genitourinary: Negative  Musculoskeletal: Negative  Skin: Negative  Allergic/Immunologic: Negative  Neurological: Negative  Hematological: Negative  All other systems reviewed and are negative  OBJECTIVE     Current Weight: Weight - Scale: 75 4 kg (166 lb 3 2 oz)  Vitals:    05/04/21 0939   BP: 126/84   Pulse: 78   Resp: 16   Temp: (!) 96 8 °F (36 °C)     Body mass index is 25 27 kg/m²        CURRENT MEDICATIONS       Current Outpatient Medications:     aspirin 81 mg chewable tablet, Chew 1 tablet daily , Disp: , Rfl:     atorvastatin (LIPITOR) 40 mg tablet, Take 1 tablet (40 mg total) by mouth daily, Disp: 90 tablet, Rfl: 0    buPROPion (WELLBUTRIN XL) 300 mg 24 hr tablet, Take 1 tablet (300 mg total) by mouth daily, Disp: 90 tablet, Rfl: 3    escitalopram (LEXAPRO) 20 mg tablet, Take 1 tablet (20 mg total) by mouth daily, Disp: 90 tablet, Rfl: 2    finasteride (PROSCAR) 5 mg tablet, Take 1 tablet (5 mg total) by mouth daily, Disp: 90 tablet, Rfl: 3    LORazepam (ATIVAN) 1 mg tablet, TAKE 1 TABLET (1 MG TOTAL) BY MOUTH 3 (THREE) TIMES A DAY AS NEEDED FOR ANXIETY, Disp: 90 tablet, Rfl: 0    memantine (NAMENDA) 5 mg tablet, Take 1 tablet (5 mg total) by mouth 2 (two) times a day, Disp: 60 tablet, Rfl: 3    metoprolol tartrate (LOPRESSOR) 25 mg tablet, TAKE 1 TABLET BY MOUTH TWICE A DAY (Patient taking differently: as needed ), Disp: 180 tablet, Rfl: 3    tamsulosin (FLOMAX) 0 4 mg, Take 1 capsule (0 4 mg total) by mouth daily with dinner, Disp: 90 capsule, Rfl: 1    fluocinonide (LIDEX) 0 05 % cream, APPLY TO AFFECTED AREA TWICE A DAY (Patient not taking: Reported on 5/4/2021), Disp: 30 g, Rfl: 0      PHYSICAL EXAMINATION     Physical Exam  HENT:      Head: Normocephalic and atraumatic  Eyes:      Pupils: Pupils are equal, round, and reactive to light  Neck:      Musculoskeletal: Neck supple  Vascular: No JVD  Cardiovascular:      Rate and Rhythm: Normal rate and regular rhythm  Heart sounds: Normal heart sounds  No murmur  No friction rub  Pulmonary:      Effort: Pulmonary effort is normal       Breath sounds: Normal breath sounds  Abdominal:      General: Bowel sounds are normal  There is no distension  Palpations: Abdomen is soft  Tenderness: There is no abdominal tenderness  There is no rebound  Musculoskeletal:         General: No tenderness  Skin:     General: Skin is dry  Findings: No rash  Neurological:      Mental Status: He is alert and oriented to person, place, and time  LAB RESULTS     Results from last 7 days   Lab Units 05/01/21  0844   WBC Thousand/uL 6 05   HEMOGLOBIN g/dL 15 2   HEMATOCRIT % 47 4   PLATELETS Thousands/uL 203   POTASSIUM mmol/L 4 8   CHLORIDE mmol/L 104   CO2 mmol/L 29   BUN mg/dL 15   CREATININE mg/dL 1 16   EGFR ml/min/1 73sq m 61   CALCIUM mg/dL 9 2   PHOSPHORUS mg/dL 3 3           RADIOLOGY RESULTS      No results found for this or any previous visit  Results for orders placed during the hospital encounter of 05/31/18   XR chest pa & lateral    Narrative CHEST     INDICATION:   R05: Cough      COMPARISON:  11/15/2017    EXAM PERFORMED/VIEWS:  XR CHEST PA & LATERAL  The frontal view was performed utilizing dual energy radiographic technique  FINDINGS:    Cardiomediastinal silhouette appears unremarkable  Status post median sternotomy and CABG  The lungs are clear  Mild eventration of the right diaphragmatic apex  No pneumothorax or pleural effusion  Osseous structures appear within normal limits for patient age  Old left lower rib fracture  Impression No acute cardiopulmonary disease  Workstation performed: [id number]         ASSESSMENT/PLAN       73-year-old male with past medical history of CAD status post CABG, dyslipidemia, chronic kidney disease stage 3, hypertension, BPH who presents to renal clinic for follow-up  1  Chronic kidney disease stage 3a:  Baseline serum creatinine 1 2-1 5 that existed over the past 4 years  -current serum creatinine is 1 1 mg/dL and below baseline    -avoidance of nephrotoxic agents such as NSAIDs reiterated  2  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:  Blood pressure is excellent range while on metoprolol 12 5 mg twice daily  3  Secondary hyperparathyroidism of renal origin:  PTH intact, 25 hydroxy vitamin-D, calcium and phosphorus are all in good range  4  Anemia due to CKD:  Hemoglobin is stable at 15 2 and on target for his level of CKD  5  CAD status post CABG:  Stable while on aspirin 81 mg once daily  6  Benign prostatic hyperplasia:  Stable urinary symptoms while on Flomax 0 4 mg     7  Dyslipidemia:  On Lipitor 40 mg at bedtime  8  Nutrition:  Low-potassium, low phosphorus, moderate protein diet recommended  9  Proteinuria: Noted 160 mg of proteinuria  Will repeat             David Saini MD  Nephrology  5/4/2021

## 2021-05-08 ENCOUNTER — APPOINTMENT (OUTPATIENT)
Dept: LAB | Facility: HOSPITAL | Age: 76
End: 2021-05-08
Attending: INTERNAL MEDICINE
Payer: MEDICARE

## 2021-05-08 LAB — PHOSPHATE SERPL-MCNC: 3.1 MG/DL (ref 2.3–4.1)

## 2021-05-08 PROCEDURE — 84100 ASSAY OF PHOSPHORUS: CPT | Performed by: INTERNAL MEDICINE

## 2021-06-08 DIAGNOSIS — E78.2 MIXED HYPERLIPIDEMIA: ICD-10-CM

## 2021-06-11 DIAGNOSIS — F41.9 ANXIETY: ICD-10-CM

## 2021-06-11 DIAGNOSIS — N40.0 BENIGN PROSTATIC HYPERPLASIA, UNSPECIFIED WHETHER LOWER URINARY TRACT SYMPTOMS PRESENT: ICD-10-CM

## 2021-06-11 DIAGNOSIS — N40.0 BPH WITHOUT URINARY OBSTRUCTION: ICD-10-CM

## 2021-06-11 RX ORDER — TAMSULOSIN HYDROCHLORIDE 0.4 MG/1
0.4 CAPSULE ORAL
Qty: 90 CAPSULE | Refills: 1 | Status: CANCELLED | OUTPATIENT
Start: 2021-06-11 | End: 2021-10-26

## 2021-06-11 RX ORDER — BUPROPION HYDROCHLORIDE 300 MG/1
300 TABLET ORAL DAILY
Qty: 90 TABLET | Refills: 3 | Status: CANCELLED | OUTPATIENT
Start: 2021-06-11

## 2021-06-11 NOTE — TELEPHONE ENCOUNTER
Would like script for TAMSULOSIN changed to 2 times daily    states last instructions were incorrect and were changed   : Take 1 capsule (0 4 mg total) by mouth daily with dinner, Starting Mon 3/15/2021, Until Fri 7/30/2021, Normal   Dose, Route, Frequency: 0 4 mg, Oral, Daily with dinner    [phone]

## 2021-06-11 NOTE — TELEPHONE ENCOUNTER
In addition to tamsulin pt would like to refill  buPROPion (WELLBUTRIN XL) 300 mg 24 hr tablet     Called in 0 [facility], [address] [place] [address]

## 2021-06-13 DIAGNOSIS — F41.9 ANXIETY: ICD-10-CM

## 2021-06-13 RX ORDER — BUPROPION HYDROCHLORIDE 300 MG/1
300 TABLET ORAL DAILY
Qty: 90 TABLET | Refills: 3 | Status: SHIPPED | OUTPATIENT
Start: 2021-06-13 | End: 2022-02-22 | Stop reason: CLARIF

## 2021-06-22 RX ORDER — ATORVASTATIN CALCIUM 40 MG/1
TABLET, FILM COATED ORAL
Qty: 90 TABLET | Refills: 0 | OUTPATIENT
Start: 2021-06-22

## 2021-08-16 DIAGNOSIS — N40.0 BENIGN PROSTATIC HYPERPLASIA, UNSPECIFIED WHETHER LOWER URINARY TRACT SYMPTOMS PRESENT: ICD-10-CM

## 2021-08-16 DIAGNOSIS — N40.0 BPH WITHOUT URINARY OBSTRUCTION: ICD-10-CM

## 2021-08-16 RX ORDER — TAMSULOSIN HYDROCHLORIDE 0.4 MG/1
CAPSULE ORAL
Qty: 90 CAPSULE | Refills: 1 | Status: SHIPPED | OUTPATIENT
Start: 2021-08-16 | End: 2021-12-30

## 2021-08-22 DIAGNOSIS — F09 MILD COGNITIVE DISORDER: ICD-10-CM

## 2021-08-22 RX ORDER — MEMANTINE HYDROCHLORIDE 5 MG/1
TABLET ORAL
Qty: 180 TABLET | Refills: 1 | Status: SHIPPED | OUTPATIENT
Start: 2021-08-22 | End: 2021-09-23 | Stop reason: SDUPTHER

## 2021-08-30 DIAGNOSIS — L24.9 IRRITANT CONTACT DERMATITIS, UNSPECIFIED TRIGGER: ICD-10-CM

## 2021-08-30 DIAGNOSIS — E78.2 MIXED HYPERLIPIDEMIA: ICD-10-CM

## 2021-08-30 RX ORDER — ATORVASTATIN CALCIUM 40 MG/1
TABLET, FILM COATED ORAL
Qty: 90 TABLET | Refills: 0 | Status: SHIPPED | OUTPATIENT
Start: 2021-08-30 | End: 2021-09-23 | Stop reason: SDUPTHER

## 2021-09-17 ENCOUNTER — APPOINTMENT (OUTPATIENT)
Dept: LAB | Facility: HOSPITAL | Age: 76
End: 2021-09-17
Payer: MEDICARE

## 2021-09-17 DIAGNOSIS — R80.1 PERSISTENT PROTEINURIA: ICD-10-CM

## 2021-09-17 DIAGNOSIS — I10 ESSENTIAL HYPERTENSION: ICD-10-CM

## 2021-09-17 DIAGNOSIS — R35.1 BENIGN PROSTATIC HYPERPLASIA WITH NOCTURIA: ICD-10-CM

## 2021-09-17 DIAGNOSIS — N40.1 BENIGN PROSTATIC HYPERPLASIA WITH NOCTURIA: ICD-10-CM

## 2021-09-17 DIAGNOSIS — E78.5 DYSLIPIDEMIA: ICD-10-CM

## 2021-09-17 LAB
ALBUMIN SERPL BCP-MCNC: 4.3 G/DL (ref 3.5–5)
ALP SERPL-CCNC: 94 U/L (ref 46–116)
ALT SERPL W P-5'-P-CCNC: 31 U/L (ref 12–78)
ANION GAP SERPL CALCULATED.3IONS-SCNC: 10 MMOL/L (ref 4–13)
AST SERPL W P-5'-P-CCNC: 29 U/L (ref 5–45)
BASOPHILS # BLD AUTO: 0.04 THOUSANDS/ΜL (ref 0–0.1)
BASOPHILS NFR BLD AUTO: 0 % (ref 0–1)
BILIRUB SERPL-MCNC: 1.44 MG/DL (ref 0.2–1)
BUN SERPL-MCNC: 25 MG/DL (ref 5–25)
CALCIUM SERPL-MCNC: 10.2 MG/DL (ref 8.3–10.1)
CHLORIDE SERPL-SCNC: 103 MMOL/L (ref 100–108)
CHOLEST SERPL-MCNC: 195 MG/DL (ref 50–200)
CO2 SERPL-SCNC: 28 MMOL/L (ref 21–32)
CREAT SERPL-MCNC: 1.28 MG/DL (ref 0.6–1.3)
EOSINOPHIL # BLD AUTO: 0.26 THOUSAND/ΜL (ref 0–0.61)
EOSINOPHIL NFR BLD AUTO: 3 % (ref 0–6)
ERYTHROCYTE [DISTWIDTH] IN BLOOD BY AUTOMATED COUNT: 13.1 % (ref 11.6–15.1)
GFR SERPL CREATININE-BSD FRML MDRD: 54 ML/MIN/1.73SQ M
GLUCOSE P FAST SERPL-MCNC: 95 MG/DL (ref 65–99)
HCT VFR BLD AUTO: 49.2 % (ref 36.5–49.3)
HDLC SERPL-MCNC: 44 MG/DL
HGB BLD-MCNC: 16.2 G/DL (ref 12–17)
IMM GRANULOCYTES # BLD AUTO: 0.04 THOUSAND/UL (ref 0–0.2)
IMM GRANULOCYTES NFR BLD AUTO: 0 % (ref 0–2)
LDLC SERPL CALC-MCNC: 100 MG/DL (ref 0–100)
LYMPHOCYTES # BLD AUTO: 2.07 THOUSANDS/ΜL (ref 0.6–4.47)
LYMPHOCYTES NFR BLD AUTO: 22 % (ref 14–44)
MCH RBC QN AUTO: 28.7 PG (ref 26.8–34.3)
MCHC RBC AUTO-ENTMCNC: 32.9 G/DL (ref 31.4–37.4)
MCV RBC AUTO: 87 FL (ref 82–98)
MONOCYTES # BLD AUTO: 0.67 THOUSAND/ΜL (ref 0.17–1.22)
MONOCYTES NFR BLD AUTO: 7 % (ref 4–12)
NEUTROPHILS # BLD AUTO: 6.43 THOUSANDS/ΜL (ref 1.85–7.62)
NEUTS SEG NFR BLD AUTO: 68 % (ref 43–75)
NRBC BLD AUTO-RTO: 0 /100 WBCS
PLATELET # BLD AUTO: 213 THOUSANDS/UL (ref 149–390)
PMV BLD AUTO: 11.1 FL (ref 8.9–12.7)
POTASSIUM SERPL-SCNC: 4.4 MMOL/L (ref 3.5–5.3)
PROT SERPL-MCNC: 8 G/DL (ref 6.4–8.2)
RBC # BLD AUTO: 5.64 MILLION/UL (ref 3.88–5.62)
SODIUM SERPL-SCNC: 141 MMOL/L (ref 136–145)
TRIGL SERPL-MCNC: 253 MG/DL
TSH SERPL DL<=0.05 MIU/L-ACNC: 1.97 UIU/ML (ref 0.36–3.74)
WBC # BLD AUTO: 9.51 THOUSAND/UL (ref 4.31–10.16)

## 2021-09-17 PROCEDURE — 84443 ASSAY THYROID STIM HORMONE: CPT

## 2021-09-17 PROCEDURE — 84153 ASSAY OF PSA TOTAL: CPT

## 2021-09-17 PROCEDURE — 85025 COMPLETE CBC W/AUTO DIFF WBC: CPT

## 2021-09-17 PROCEDURE — 84165 PROTEIN E-PHORESIS SERUM: CPT | Performed by: PATHOLOGY

## 2021-09-17 PROCEDURE — 80061 LIPID PANEL: CPT

## 2021-09-17 PROCEDURE — 36415 COLL VENOUS BLD VENIPUNCTURE: CPT

## 2021-09-17 PROCEDURE — 84154 ASSAY OF PSA FREE: CPT

## 2021-09-17 PROCEDURE — 84165 PROTEIN E-PHORESIS SERUM: CPT

## 2021-09-17 PROCEDURE — 80053 COMPREHEN METABOLIC PANEL: CPT

## 2021-09-18 ENCOUNTER — APPOINTMENT (OUTPATIENT)
Dept: LAB | Facility: HOSPITAL | Age: 76
End: 2021-09-18
Attending: INTERNAL MEDICINE
Payer: MEDICARE

## 2021-09-18 DIAGNOSIS — R80.1 PERSISTENT PROTEINURIA: ICD-10-CM

## 2021-09-19 LAB
PSA FREE MFR SERPL: 20.5 %
PSA FREE SERPL-MCNC: 0.41 NG/ML
PSA SERPL-MCNC: 2 NG/ML (ref 0–4)

## 2021-09-20 LAB
ALBUMIN SERPL ELPH-MCNC: 4.87 G/DL (ref 3.5–5)
ALBUMIN SERPL ELPH-MCNC: 64.1 % (ref 52–65)
ALPHA1 GLOB SERPL ELPH-MCNC: 0.33 G/DL (ref 0.1–0.4)
ALPHA1 GLOB SERPL ELPH-MCNC: 4.3 % (ref 2.5–5)
ALPHA2 GLOB SERPL ELPH-MCNC: 0.74 G/DL (ref 0.4–1.2)
ALPHA2 GLOB SERPL ELPH-MCNC: 9.8 % (ref 7–13)
BETA GLOB ABNORMAL SERPL ELPH-MCNC: 0.46 G/DL (ref 0.4–0.8)
BETA1 GLOB SERPL ELPH-MCNC: 6.1 % (ref 5–13)
BETA2 GLOB SERPL ELPH-MCNC: 3.7 % (ref 2–8)
BETA2+GAMMA GLOB SERPL ELPH-MCNC: 0.28 G/DL (ref 0.2–0.5)
GAMMA GLOB ABNORMAL SERPL ELPH-MCNC: 0.91 G/DL (ref 0.5–1.6)
GAMMA GLOB SERPL ELPH-MCNC: 12 % (ref 12–22)
IGG/ALB SER: 1.79 {RATIO} (ref 1.1–1.8)
PROT PATTERN SERPL ELPH-IMP: NORMAL
PROT SERPL-MCNC: 7.6 G/DL (ref 6.4–8.2)

## 2021-09-23 ENCOUNTER — OFFICE VISIT (OUTPATIENT)
Dept: INTERNAL MEDICINE CLINIC | Facility: CLINIC | Age: 76
End: 2021-09-23
Payer: MEDICARE

## 2021-09-23 VITALS
DIASTOLIC BLOOD PRESSURE: 70 MMHG | HEIGHT: 68 IN | BODY MASS INDEX: 25.82 KG/M2 | SYSTOLIC BLOOD PRESSURE: 110 MMHG | OXYGEN SATURATION: 98 % | WEIGHT: 170.4 LBS | HEART RATE: 69 BPM | TEMPERATURE: 98.6 F

## 2021-09-23 DIAGNOSIS — I25.10 CORONARY ARTERY DISEASE INVOLVING NATIVE CORONARY ARTERY OF NATIVE HEART WITHOUT ANGINA PECTORIS: ICD-10-CM

## 2021-09-23 DIAGNOSIS — F09 MILD COGNITIVE DISORDER: ICD-10-CM

## 2021-09-23 DIAGNOSIS — F41.9 ANXIETY: ICD-10-CM

## 2021-09-23 DIAGNOSIS — E03.8 SUBCLINICAL HYPOTHYROIDISM: ICD-10-CM

## 2021-09-23 DIAGNOSIS — E78.2 MIXED HYPERLIPIDEMIA: ICD-10-CM

## 2021-09-23 DIAGNOSIS — I10 ESSENTIAL HYPERTENSION: ICD-10-CM

## 2021-09-23 DIAGNOSIS — Z23 ENCOUNTER FOR IMMUNIZATION: Primary | ICD-10-CM

## 2021-09-23 DIAGNOSIS — N18.31 STAGE 3A CHRONIC KIDNEY DISEASE (HCC): ICD-10-CM

## 2021-09-23 DIAGNOSIS — R73.01 IMPAIRED FASTING GLUCOSE: ICD-10-CM

## 2021-09-23 DIAGNOSIS — I65.21 CAROTID STENOSIS, RIGHT: ICD-10-CM

## 2021-09-23 PROCEDURE — G0008 ADMIN INFLUENZA VIRUS VAC: HCPCS | Performed by: INTERNAL MEDICINE

## 2021-09-23 PROCEDURE — 99214 OFFICE O/P EST MOD 30 MIN: CPT | Performed by: INTERNAL MEDICINE

## 2021-09-23 PROCEDURE — 90662 IIV NO PRSV INCREASED AG IM: CPT | Performed by: INTERNAL MEDICINE

## 2021-09-23 RX ORDER — ATORVASTATIN CALCIUM 40 MG/1
40 TABLET, FILM COATED ORAL DAILY
Qty: 90 TABLET | Refills: 3 | Status: SHIPPED | OUTPATIENT
Start: 2021-09-23

## 2021-09-23 RX ORDER — LORAZEPAM 1 MG/1
1 TABLET ORAL 3 TIMES DAILY PRN
Qty: 90 TABLET | Refills: 0 | Status: SHIPPED | OUTPATIENT
Start: 2021-09-23 | End: 2022-03-24 | Stop reason: SDUPTHER

## 2021-09-23 RX ORDER — MEMANTINE HYDROCHLORIDE 5 MG/1
5 TABLET ORAL 2 TIMES DAILY
Qty: 180 TABLET | Refills: 1 | Status: SHIPPED | OUTPATIENT
Start: 2021-09-23 | End: 2022-05-20

## 2021-09-23 NOTE — PATIENT INSTRUCTIONS
Lab data reviewed in detail and compared prior    Coronary artery disease is stable without angina-continue aggressive risk factor modification as below    Hyperlipidemia-LDL at goal on atorvastatin however triglycerides elevated, get back to regular aerobic exercise and low saturated fat diet    Impaired fasting glucose-check A1c prior to follow-up, low carb diet advised    Mild cognitive impairment following with Neurology on Namenda    Depression anxiety stable coming off of Wellbutrin, continue Lexapro, refill lorazepam    BPH-continue on Flomax    Routine follow-up after labs in 6 months, sooner as needed

## 2021-09-23 NOTE — PROGRESS NOTES
Assessment/Plan:    Diagnoses and all orders for this visit:    Encounter for immunization  -     influenza vaccine, high-dose, PF 0 7 mL (FLUZONE HIGH-DOSE)    Anxiety  -     LORazepam (ATIVAN) 1 mg tablet; Take 1 tablet (1 mg total) by mouth 3 (three) times a day as needed for anxiety    Mixed hyperlipidemia  -     atorvastatin (LIPITOR) 40 mg tablet; Take 1 tablet (40 mg total) by mouth daily  -     Lipid panel; Future    Mild cognitive disorder  -     memantine (NAMENDA) 5 mg tablet; Take 1 tablet (5 mg total) by mouth 2 (two) times a day    Impaired fasting glucose  -     Hemoglobin A1C; Future    Subclinical hypothyroidism  -     TSH, 3rd generation with Free T4 reflex; Future    Coronary artery disease involving native coronary artery of native heart without angina pectoris    Essential hypertension    Carotid stenosis, right    Stage 3a chronic kidney disease (HCC)  -     CBC and differential; Future  -     Comprehensive metabolic panel; Future         BMI Counseling: Body mass index is 25 91 kg/m²  The BMI is above normal  Nutrition recommendations include decreasing portion sizes, decreasing fast food intake, consuming healthier snacks, limiting drinks that contain sugar, moderation in carbohydrate intake and reducing intake of saturated and trans fat  Exercise recommendations include moderate physical activity 150 minutes/week  No pharmacotherapy was ordered  Rationale for BMI follow-up plan is due to patient being overweight or obese           Patient Instructions   Lab data reviewed in detail and compared prior    Coronary artery disease is stable without angina-continue aggressive risk factor modification as below    Hyperlipidemia-LDL at goal on atorvastatin however triglycerides elevated, get back to regular aerobic exercise and low saturated fat diet    Impaired fasting glucose-check A1c prior to follow-up, low carb diet advised    Mild cognitive impairment following with Neurology on Namenda    Depression anxiety stable coming off of Wellbutrin, continue Lexapro, refill lorazepam    BPH-continue on Flomax    Routine follow-up after labs in 6 months, sooner as needed  Subjective:      Patient ID: Iona Gibson is a 77 y o  male    F/u mmp and review labs  Feeling generally well  Exercising less lately but keeping active  No cp/sob  Rare calf pains  CAD-Taking full asa daily  HPL/HTN-stopped metoprolol, home bp's 110/70   Anxiety-stable on lexapro, ran out of wellbutrin and just stopped, using lorazepam sporadically  MCI-seen by Dr Gisel Sosa, taking Namenda, thinks its helping    BPH-improved w/ flomax, still w/ nocturia        Current Outpatient Medications:     aspirin 81 mg chewable tablet, Chew 1 tablet daily , Disp: , Rfl:     atorvastatin (LIPITOR) 40 mg tablet, Take 1 tablet (40 mg total) by mouth daily, Disp: 90 tablet, Rfl: 3    escitalopram (LEXAPRO) 20 mg tablet, Take 1 tablet (20 mg total) by mouth daily, Disp: 90 tablet, Rfl: 2    fluocinonide (LIDEX) 0 05 % cream, APPLY TO AFFECTED AREA TWICE A DAY, Disp: 30 g, Rfl: 0    LORazepam (ATIVAN) 1 mg tablet, Take 1 tablet (1 mg total) by mouth 3 (three) times a day as needed for anxiety, Disp: 90 tablet, Rfl: 0    memantine (NAMENDA) 5 mg tablet, Take 1 tablet (5 mg total) by mouth 2 (two) times a day, Disp: 180 tablet, Rfl: 1    tamsulosin (FLOMAX) 0 4 mg, TAKE 1 CAPSULE BY MOUTH EVERY DAY WITH DINNER, Disp: 90 capsule, Rfl: 1    buPROPion (WELLBUTRIN XL) 300 mg 24 hr tablet, Take 1 tablet (300 mg total) by mouth daily (Patient not taking: Reported on 9/23/2021), Disp: 90 tablet, Rfl: 3    finasteride (PROSCAR) 5 mg tablet, Take 1 tablet (5 mg total) by mouth daily (Patient not taking: Reported on 9/23/2021), Disp: 90 tablet, Rfl: 3    metoprolol tartrate (LOPRESSOR) 25 mg tablet, TAKE 1 TABLET BY MOUTH TWICE A DAY (Patient not taking: Reported on 9/23/2021), Disp: 180 tablet, Rfl: 3    Recent Results (from the past 1008 hour(s))   Phosphorus    Collection Time: 09/17/21 12:05 PM   Result Value Ref Range    Phosphorus 3 7 2 3 - 4 1 mg/dL   CBC and differential    Collection Time: 09/17/21 12:05 PM   Result Value Ref Range    WBC 9 51 4 31 - 10 16 Thousand/uL    RBC 5 64 (H) 3 88 - 5 62 Million/uL    Hemoglobin 16 2 12 0 - 17 0 g/dL    Hematocrit 49 2 36 5 - 49 3 %    MCV 87 82 - 98 fL    MCH 28 7 26 8 - 34 3 pg    MCHC 32 9 31 4 - 37 4 g/dL    RDW 13 1 11 6 - 15 1 %    MPV 11 1 8 9 - 12 7 fL    Platelets 665 384 - 916 Thousands/uL    nRBC 0 /100 WBCs    Neutrophils Relative 68 43 - 75 %    Immat GRANS % 0 0 - 2 %    Lymphocytes Relative 22 14 - 44 %    Monocytes Relative 7 4 - 12 %    Eosinophils Relative 3 0 - 6 %    Basophils Relative 0 0 - 1 %    Neutrophils Absolute 6 43 1 85 - 7 62 Thousands/µL    Immature Grans Absolute 0 04 0 00 - 0 20 Thousand/uL    Lymphocytes Absolute 2 07 0 60 - 4 47 Thousands/µL    Monocytes Absolute 0 67 0 17 - 1 22 Thousand/µL    Eosinophils Absolute 0 26 0 00 - 0 61 Thousand/µL    Basophils Absolute 0 04 0 00 - 0 10 Thousands/µL   Comprehensive metabolic panel    Collection Time: 09/17/21 12:05 PM   Result Value Ref Range    Sodium 141 136 - 145 mmol/L    Potassium 4 4 3 5 - 5 3 mmol/L    Chloride 103 100 - 108 mmol/L    CO2 28 21 - 32 mmol/L    ANION GAP 10 4 - 13 mmol/L    BUN 25 5 - 25 mg/dL    Creatinine 1 28 0 60 - 1 30 mg/dL    Glucose, Fasting 95 65 - 99 mg/dL    Calcium 10 2 (H) 8 3 - 10 1 mg/dL    AST 29 5 - 45 U/L    ALT 31 12 - 78 U/L    Alkaline Phosphatase 94 46 - 116 U/L    Total Protein 8 0 6 4 - 8 2 g/dL    Albumin 4 3 3 5 - 5 0 g/dL    Total Bilirubin 1 44 (H) 0 20 - 1 00 mg/dL    eGFR 54 ml/min/1 73sq m   Lipid Panel with Direct LDL reflex    Collection Time: 09/17/21 12:05 PM   Result Value Ref Range    Cholesterol 195 50 - 200 mg/dL    Triglycerides 253 (H) <=150 mg/dL    HDL, Direct 44 >=40 mg/dL    LDL Calculated 100 0 - 100 mg/dL   TSH, 3rd generation with Free T4 reflex    Collection Time: 09/17/21 12:05 PM   Result Value Ref Range    TSH 3RD GENERATON 1 965 0 358 - 3 740 uIU/mL   Protein electrophoresis, serum    Collection Time: 09/17/21 12:05 PM   Result Value Ref Range    A/G Ratio 1 79 1 10 - 1 80    Albumin Electrophoresis 64 1 52 0 - 65 0 %    Albumin CONC 4 87 3 50 - 5 00 g/dl    Alpha 1 4 3 2 5 - 5 0 %    ALPHA 1 CONC 0 33 0 10 - 0 40 g/dL    Alpha 2 9 8 7 0 - 13 0 %    ALPHA 2 CONC 0 74 0 40 - 1 20 g/dL    Beta-1 6 1 5 0 - 13 0 %    BETA 1 CONC 0 46 0 40 - 0 80 g/dL    Beta-2 3 7 2 0 - 8 0 %    BETA 2 CONC 0 28 0 20 - 0 50 g/dL    Gamma Globulin 12 0 12 0 - 22 0 %    GAMMA CONC 0 91 0 50 - 1 60 g/dL    Total Protein 7 6 6 4 - 8 2 g/dL    SPEP Interpretation See Comment    PSA, total and free    Collection Time: 09/17/21 12:11 PM   Result Value Ref Range    Prostate Specific Antigen Total 2 0 0 0 - 4 0 ng/mL    PSA, Free 0 41 N/A ng/mL    PSA, Free Pct 20 5 %   Urinalysis with microscopic    Collection Time: 09/18/21  7:22 AM   Result Value Ref Range    Clarity, UA Clear     Color, UA Yellow     Specific [place], UA 1 020 1 003 - 1 030    pH, UA 5 5 4 5, 5 0, 5 5, 6 0, 6 5, 7 0, 7 5, 8 0    Glucose, UA Negative Negative mg/dl    Ketones, UA Negative Negative mg/dl    Blood, UA Negative Negative    Protein, UA Negative Negative mg/dl    Nitrite, UA Negative Negative    Bilirubin, UA Negative Negative    Urobilinogen, UA 0 2 0 2, 1 0 E U /dl E U /dl    Leukocytes, UA Negative Negative    WBC, UA None Seen None Seen, 2-4, 5-60 /hpf    RBC, UA None Seen None Seen, 2-4 /hpf    Bacteria, UA None Seen None Seen, Occasional /hpf    Epithelial Cells None Seen None Seen, Occasional /hpf   Protein / creatinine ratio, urine    Collection Time: 09/18/21  7:22 AM   Result Value Ref Range    Creatinine, Ur 95 6 mg/dL    Protein Urine Random <6 mg/dL    Prot/Creat Ratio, Ur <0 06 0 00 - 0 10       The following portions of the patient's history were reviewed and updated as appropriate: allergies, current medications, past family history, past medical history, past social history, past surgical history and problem list      Review of Systems   Constitutional: Negative for appetite change, chills, diaphoresis, fatigue, fever and unexpected weight change  HENT: Negative for congestion, hearing loss and rhinorrhea  Eyes: Negative for visual disturbance  Respiratory: Negative for cough, chest tightness, shortness of breath and wheezing  Cardiovascular: Negative for chest pain, palpitations and leg swelling  Gastrointestinal: Negative for abdominal pain and blood in stool  Endocrine: Negative for cold intolerance, heat intolerance, polydipsia and polyuria  Genitourinary: Negative for difficulty urinating, dysuria, frequency and urgency  Musculoskeletal: Negative for arthralgias and myalgias  Skin: Negative for rash  Neurological: Negative for dizziness, weakness, light-headedness and headaches  Hematological: Does not bruise/bleed easily  Psychiatric/Behavioral: Negative for dysphoric mood and sleep disturbance  Objective:      Vitals:    09/23/21 1405   BP: 110/70   Pulse: 69   Temp: 98 6 °F (37 °C)   SpO2: 98%          Physical Exam  Constitutional:       Appearance: He is well-developed  HENT:      Head: Normocephalic and atraumatic  Nose: Nose normal    Eyes:      General: No scleral icterus  Conjunctiva/sclera: Conjunctivae normal       Pupils: Pupils are equal, round, and reactive to light  Neck:      Thyroid: No thyromegaly  Vascular: No JVD  Trachea: No tracheal deviation  Cardiovascular:      Rate and Rhythm: Normal rate and regular rhythm  Heart sounds: No murmur heard  No friction rub  No gallop  Pulmonary:      Effort: Pulmonary effort is normal  No respiratory distress  Breath sounds: Normal breath sounds  No wheezing or rales  Musculoskeletal:         General: No deformity        Cervical back: Normal range of motion and neck supple  Lymphadenopathy:      Cervical: No cervical adenopathy  Skin:     General: Skin is warm and dry  Coloration: Skin is not pale  Findings: No erythema or rash  Neurological:      Mental Status: He is alert and oriented to person, place, and time  Cranial Nerves: No cranial nerve deficit  Psychiatric:         Behavior: Behavior normal          Thought Content:  Thought content normal          Judgment: Judgment normal

## 2021-10-04 ENCOUNTER — TELEPHONE (OUTPATIENT)
Dept: NEPHROLOGY | Facility: CLINIC | Age: 76
End: 2021-10-04

## 2021-10-05 ENCOUNTER — OFFICE VISIT (OUTPATIENT)
Dept: NEPHROLOGY | Facility: CLINIC | Age: 76
End: 2021-10-05
Payer: MEDICARE

## 2021-10-05 VITALS
BODY MASS INDEX: 24.82 KG/M2 | HEART RATE: 77 BPM | RESPIRATION RATE: 16 BRPM | DIASTOLIC BLOOD PRESSURE: 80 MMHG | TEMPERATURE: 97.4 F | HEIGHT: 69 IN | WEIGHT: 167.6 LBS | SYSTOLIC BLOOD PRESSURE: 132 MMHG

## 2021-10-05 DIAGNOSIS — N18.31 STAGE 3A CHRONIC KIDNEY DISEASE (HCC): Primary | ICD-10-CM

## 2021-10-05 PROCEDURE — 99215 OFFICE O/P EST HI 40 MIN: CPT | Performed by: INTERNAL MEDICINE

## 2021-10-05 PROCEDURE — 1123F ACP DISCUSS/DSCN MKR DOCD: CPT | Performed by: INTERNAL MEDICINE

## 2021-12-17 DIAGNOSIS — F41.9 ANXIETY: ICD-10-CM

## 2021-12-17 RX ORDER — ESCITALOPRAM OXALATE 20 MG/1
20 TABLET ORAL DAILY
Qty: 90 TABLET | Refills: 0 | Status: SHIPPED | OUTPATIENT
Start: 2021-12-17 | End: 2021-12-30

## 2021-12-30 DIAGNOSIS — N40.0 BPH WITHOUT URINARY OBSTRUCTION: ICD-10-CM

## 2021-12-30 DIAGNOSIS — F41.9 ANXIETY: ICD-10-CM

## 2021-12-30 DIAGNOSIS — N40.0 BENIGN PROSTATIC HYPERPLASIA, UNSPECIFIED WHETHER LOWER URINARY TRACT SYMPTOMS PRESENT: ICD-10-CM

## 2021-12-30 RX ORDER — ESCITALOPRAM OXALATE 20 MG/1
TABLET ORAL
Qty: 90 TABLET | Refills: 0 | Status: SHIPPED | OUTPATIENT
Start: 2021-12-30 | End: 2022-05-23

## 2021-12-30 RX ORDER — TAMSULOSIN HYDROCHLORIDE 0.4 MG/1
CAPSULE ORAL
Qty: 90 CAPSULE | Refills: 1 | Status: SHIPPED | OUTPATIENT
Start: 2021-12-30 | End: 2022-05-23

## 2022-01-14 ENCOUNTER — IMMUNIZATIONS (OUTPATIENT)
Dept: FAMILY MEDICINE CLINIC | Facility: HOSPITAL | Age: 77
End: 2022-01-14

## 2022-01-14 DIAGNOSIS — Z23 ENCOUNTER FOR IMMUNIZATION: Primary | ICD-10-CM

## 2022-01-14 PROCEDURE — 0001A COVID-19 PFIZER VACC 0.3 ML: CPT

## 2022-01-14 PROCEDURE — 91300 COVID-19 PFIZER VACC 0.3 ML: CPT

## 2022-02-22 ENCOUNTER — TELEPHONE (OUTPATIENT)
Dept: INTERNAL MEDICINE CLINIC | Facility: CLINIC | Age: 77
End: 2022-02-22

## 2022-02-22 ENCOUNTER — TELEMEDICINE (OUTPATIENT)
Dept: INTERNAL MEDICINE CLINIC | Facility: CLINIC | Age: 77
End: 2022-02-22
Payer: MEDICARE

## 2022-02-22 DIAGNOSIS — R05.9 COUGH: Primary | ICD-10-CM

## 2022-02-22 PROCEDURE — 99213 OFFICE O/P EST LOW 20 MIN: CPT | Performed by: NURSE PRACTITIONER

## 2022-02-22 RX ORDER — PROMETHAZINE HYDROCHLORIDE AND CODEINE PHOSPHATE 6.25; 1 MG/5ML; MG/5ML
5 SYRUP ORAL EVERY 4 HOURS PRN
Qty: 240 ML | Refills: 0 | Status: SHIPPED | OUTPATIENT
Start: 2022-02-22 | End: 2022-03-01 | Stop reason: CLARIF

## 2022-02-22 RX ORDER — BENZONATATE 200 MG/1
200 CAPSULE ORAL 3 TIMES DAILY PRN
Qty: 20 CAPSULE | Refills: 0 | Status: SHIPPED | OUTPATIENT
Start: 2022-02-22

## 2022-02-22 RX ORDER — AZITHROMYCIN 250 MG/1
TABLET, FILM COATED ORAL
Qty: 6 TABLET | Refills: 0 | Status: SHIPPED | OUTPATIENT
Start: 2022-02-22 | End: 2022-02-22 | Stop reason: CLARIF

## 2022-02-22 NOTE — PROGRESS NOTES
Virtual Regular Visit    Verification of patient location:    Patient is located in the following state in which I hold an active license [place]      Assessment/Plan:   likely uri cannot rule out secondary infection start antibiotic and continue antihistamines  Problem List Items Addressed This Visit     None      Visit Diagnoses     Cough    -  Primary    Relevant Medications    azithromycin (ZITHROMAX) 250 mg tablet    promethazine-codeine (PHENERGAN WITH CODEINE) 6 25-10 mg/5 mL syrup    benzonatate (TESSALON) 200 MG capsule          BMI Counseling: There is no height or weight on file to calculate BMI  The BMI is above normal  Nutrition recommendations include decreasing portion sizes and consuming healthier snacks  Exercise recommendations include exercising 3-5 times per week  No pharmacotherapy was ordered  Rationale for BMI follow-up plan is due to patient being overweight or obese  Falls Plan of Care: balance, strength, and gait training instructions were provided  Home safety education provided  Reason for visit is   Chief Complaint   Patient presents with    Virtual Regular Visit        Encounter provider [place] Nguyen    Provider located at [address] [place] [address]      Recent Visits  No visits were found meeting these conditions  Showing recent visits within past 7 days and meeting all other requirements  Today's Visits  Date Type Provider Dept   02/22/22 Telemedicine Christina Nguyen [facility] Jeu De Paume today's visits and meeting all other requirements  Future Appointments  No visits were found meeting these conditions  Showing future appointments within next 150 days and meeting all other requirements       The patient was identified by name and date of birth   Patriziajoshua Stokes was informed that this is a telemedicine visit and that the visit is being conducted through [address] [place] [address] Now and patient was informed that this is a secure, HIPAA-compliant platform  He agrees to proceed     My office door was closed  No one else was in the room  He acknowledged consent and understanding of privacy and security of the video platform  The patient has agreed to participate and understands they can discontinue the visit at any time  Patient is aware this is a billable service  Subjective  Geraldine Rider is a 77 y o  male not feeling well for over 1 week    Pt has been feeling well for over 1 week  Sinus pressure, sore throat, cough, no fever or chills  Now in chest worried about pneumonia and or bronchitis   No sob or wheeze, using otc tylenol and allergy meds not getting better       Past Medical History:   Diagnosis Date    Anxiety disorder     last assessed - [id number]    Basal cell carcinoma of face     BPH (benign prostatic hyperplasia)     benign prostatic hypertrophy; last assessed - 77BVQ5976    Chronic kidney disease     Coronary atherosclerosis of native coronary artery     last assessed - [id number]    Deep [facility] thrombosis (DVT) ([facility]     last assessed - 43NAG9023    Depression     last assessed - 40LJO3213    History of colonoscopy     Fiberoptic; Resolved - 12/1/2011    Hyperlipemia     last assessed - 38YEP2295    Hypertension     last assessed - 54DLO5034    [facility]     Nonmelanoma skin cancer     last assessed - [id number]    Osteoarthritis     last assessed - 80COG9845    Pneumonia     last assessed - 45HEW0146    Pulmonary HTN ([facility]     PVD (peripheral vascular disease) ([facility]     Seborrheic dermatitis        Past Surgical History:   Procedure Laterality Date    CARDIAC CATHETERIZATION      procedure outcome successful; last assessed - [id number]    CORONARY ARTERY BYPASS GRAFT      last assessed - [id number]    IVC FILTER INSERTION      Martinez    TOTAL HIP ARTHROPLASTY      last assessed - 28LDQ5657       Current Outpatient Medications Medication Sig Dispense Refill    aspirin 81 mg chewable tablet Chew 1 tablet daily       atorvastatin (LIPITOR) 40 mg tablet Take 1 tablet (40 mg total) by mouth daily 90 tablet 3    azithromycin (ZITHROMAX) 250 mg tablet Take 2 tabs today then 1 tab each additional day until complete 6 tablet 0    benzonatate (TESSALON) 200 MG capsule Take 1 capsule (200 mg total) by mouth 3 (three) times a day as needed for cough 20 capsule 0    escitalopram (LEXAPRO) 20 mg tablet TAKE 1 TABLET BY MOUTH EVERY DAY 90 tablet 0    LORazepam (ATIVAN) 1 mg tablet Take 1 tablet (1 mg total) by mouth 3 (three) times a day as needed for anxiety 90 tablet 0    memantine (NAMENDA) 5 mg tablet Take 1 tablet (5 mg total) by mouth 2 (two) times a day 180 tablet 1    promethazine-codeine (PHENERGAN WITH CODEINE) 6 25-10 mg/5 mL syrup Take 5 mL by mouth every 4 (four) hours as needed for cough 240 mL 0    tamsulosin (FLOMAX) 0 4 mg TAKE 1 CAPSULE BY MOUTH EVERY DAY WITH DINNER 90 capsule 1     No current facility-administered medications for this visit  No Known Allergies    Review of Systems   Constitutional: Positive for activity change, chills and fatigue  HENT: Positive for congestion, ear pain, postnasal drip, sinus pressure and sinus pain  Respiratory: Positive for cough  Negative for shortness of breath and wheezing  Cardiovascular: Negative for chest pain  Gastrointestinal: Negative for abdominal pain and nausea  Musculoskeletal: Positive for arthralgias  Neurological: Positive for headaches  Video Exam    There were no vitals filed for this visit  Physical Exam  Constitutional:       Appearance: He is well-developed  Pulmonary:      Effort: Pulmonary effort is normal    Musculoskeletal:      Cervical back: Normal range of motion  Neurological:      Mental Status: He is alert  Psychiatric:         Behavior: Behavior is cooperative            I spent 10 minutes directly with the patient during this visit    VIRTUAL VISIT DISCLAIMER      Eloise Benton verbally agrees to participate in [employer]  Pt is aware that [employer] could be limited without vital signs or the ability to perform a full hands-on physical Eva Urias understands he or the provider may request at any time to terminate the video visit and request the patient to seek care or treatment in person

## 2022-02-23 ENCOUNTER — TELEPHONE (OUTPATIENT)
Dept: NEPHROLOGY | Facility: CLINIC | Age: 77
End: 2022-02-23

## 2022-02-23 NOTE — TELEPHONE ENCOUNTER
I called and spoke to the patient about scheduling his follow up appointment from the recall list with Dr Delroy Cardona  The patient appointment was schedule for 5/11/2022 with Dr Delroy Cardona  The patient understood and was okay with his day and time of his next appointment   Francisco Weller,

## 2022-03-01 ENCOUNTER — OFFICE VISIT (OUTPATIENT)
Dept: INTERNAL MEDICINE CLINIC | Facility: CLINIC | Age: 77
End: 2022-03-01
Payer: MEDICARE

## 2022-03-01 ENCOUNTER — TELEPHONE (OUTPATIENT)
Dept: INTERNAL MEDICINE CLINIC | Facility: CLINIC | Age: 77
End: 2022-03-01

## 2022-03-01 ENCOUNTER — HOSPITAL ENCOUNTER (OUTPATIENT)
Dept: RADIOLOGY | Facility: HOSPITAL | Age: 77
Discharge: HOME | End: 2022-03-01
Payer: MEDICARE

## 2022-03-01 ENCOUNTER — APPOINTMENT (OUTPATIENT)
Dept: LAB | Facility: CLINIC | Age: 77
End: 2022-03-01
Payer: MEDICARE

## 2022-03-01 VITALS
TEMPERATURE: 98 F | WEIGHT: 169.8 LBS | SYSTOLIC BLOOD PRESSURE: 124 MMHG | DIASTOLIC BLOOD PRESSURE: 82 MMHG | HEIGHT: 69 IN | BODY MASS INDEX: 25.15 KG/M2 | HEART RATE: 80 BPM | RESPIRATION RATE: 18 BRPM

## 2022-03-01 DIAGNOSIS — E78.2 MIXED HYPERLIPIDEMIA: ICD-10-CM

## 2022-03-01 DIAGNOSIS — R05.9 COUGH: ICD-10-CM

## 2022-03-01 DIAGNOSIS — N18.31 STAGE 3A CHRONIC KIDNEY DISEASE (HCC): ICD-10-CM

## 2022-03-01 DIAGNOSIS — R05.9 COUGH: Primary | ICD-10-CM

## 2022-03-01 DIAGNOSIS — I27.20 PULMONARY HYPERTENSION (HCC): ICD-10-CM

## 2022-03-01 DIAGNOSIS — E03.8 SUBCLINICAL HYPOTHYROIDISM: ICD-10-CM

## 2022-03-01 DIAGNOSIS — R73.01 IMPAIRED FASTING GLUCOSE: ICD-10-CM

## 2022-03-01 DIAGNOSIS — I82.509 CHRONIC DEEP VEIN THROMBOSIS (DVT) OF LOWER EXTREMITY, UNSPECIFIED LATERALITY, UNSPECIFIED VEIN (HCC): ICD-10-CM

## 2022-03-01 LAB
ALBUMIN SERPL BCP-MCNC: 3.8 G/DL (ref 3.5–5)
ALP SERPL-CCNC: 74 U/L (ref 46–116)
ALT SERPL W P-5'-P-CCNC: 29 U/L (ref 12–78)
ANION GAP SERPL CALCULATED.3IONS-SCNC: 6 MMOL/L (ref 4–13)
AST SERPL W P-5'-P-CCNC: 24 U/L (ref 5–45)
BASOPHILS # BLD AUTO: 0.04 THOUSANDS/ΜL (ref 0–0.1)
BASOPHILS NFR BLD AUTO: 0 % (ref 0–1)
BILIRUB SERPL-MCNC: 0.92 MG/DL (ref 0.2–1)
BUN SERPL-MCNC: 25 MG/DL (ref 5–25)
CALCIUM SERPL-MCNC: 9.9 MG/DL (ref 8.3–10.1)
CHLORIDE SERPL-SCNC: 109 MMOL/L (ref 100–108)
CHOLEST SERPL-MCNC: 146 MG/DL
CO2 SERPL-SCNC: 23 MMOL/L (ref 21–32)
CREAT SERPL-MCNC: 1.17 MG/DL (ref 0.6–1.3)
EOSINOPHIL # BLD AUTO: 0.44 THOUSAND/ΜL (ref 0–0.61)
EOSINOPHIL NFR BLD AUTO: 4 % (ref 0–6)
ERYTHROCYTE [DISTWIDTH] IN BLOOD BY AUTOMATED COUNT: 13.2 % (ref 11.6–15.1)
GFR SERPL CREATININE-BSD FRML MDRD: 60 ML/MIN/1.73SQ M
GLUCOSE SERPL-MCNC: 98 MG/DL (ref 65–140)
HCT VFR BLD AUTO: 46.5 % (ref 36.5–49.3)
HDLC SERPL-MCNC: 27 MG/DL
HGB BLD-MCNC: 15.6 G/DL (ref 12–17)
IMM GRANULOCYTES # BLD AUTO: 0.03 THOUSAND/UL (ref 0–0.2)
IMM GRANULOCYTES NFR BLD AUTO: 0 % (ref 0–2)
LDLC SERPL CALC-MCNC: 51 MG/DL (ref 0–100)
LYMPHOCYTES # BLD AUTO: 2.18 THOUSANDS/ΜL (ref 0.6–4.47)
LYMPHOCYTES NFR BLD AUTO: 21 % (ref 14–44)
MCH RBC QN AUTO: 28.9 PG (ref 26.8–34.3)
MCHC RBC AUTO-ENTMCNC: 33.5 G/DL (ref 31.4–37.4)
MCV RBC AUTO: 86 FL (ref 82–98)
MONOCYTES # BLD AUTO: 0.8 THOUSAND/ΜL (ref 0.17–1.22)
MONOCYTES NFR BLD AUTO: 8 % (ref 4–12)
NEUTROPHILS # BLD AUTO: 6.73 THOUSANDS/ΜL (ref 1.85–7.62)
NEUTS SEG NFR BLD AUTO: 67 % (ref 43–75)
NONHDLC SERPL-MCNC: 119 MG/DL
NRBC BLD AUTO-RTO: 0 /100 WBCS
PLATELET # BLD AUTO: 228 THOUSANDS/UL (ref 149–390)
PMV BLD AUTO: 10.9 FL (ref 8.9–12.7)
POTASSIUM SERPL-SCNC: 4 MMOL/L (ref 3.5–5.3)
PROT SERPL-MCNC: 7.6 G/DL (ref 6.4–8.2)
RBC # BLD AUTO: 5.4 MILLION/UL (ref 3.88–5.62)
SODIUM SERPL-SCNC: 138 MMOL/L (ref 136–145)
TRIGL SERPL-MCNC: 341 MG/DL
TSH SERPL DL<=0.05 MIU/L-ACNC: 2.39 UIU/ML (ref 0.36–3.74)
WBC # BLD AUTO: 10.22 THOUSAND/UL (ref 4.31–10.16)

## 2022-03-01 PROCEDURE — 80061 LIPID PANEL: CPT

## 2022-03-01 PROCEDURE — 84443 ASSAY THYROID STIM HORMONE: CPT

## 2022-03-01 PROCEDURE — 85025 COMPLETE CBC W/AUTO DIFF WBC: CPT

## 2022-03-01 PROCEDURE — 99213 OFFICE O/P EST LOW 20 MIN: CPT | Performed by: NURSE PRACTITIONER

## 2022-03-01 PROCEDURE — 36415 COLL VENOUS BLD VENIPUNCTURE: CPT

## 2022-03-01 PROCEDURE — 83036 HEMOGLOBIN GLYCOSYLATED A1C: CPT

## 2022-03-01 PROCEDURE — 80053 COMPREHEN METABOLIC PANEL: CPT

## 2022-03-01 PROCEDURE — 71046 X-RAY EXAM CHEST 2 VIEWS: CPT

## 2022-03-01 RX ORDER — FLUTICASONE PROPIONATE 50 MCG
2 SPRAY, SUSPENSION (ML) NASAL DAILY
Qty: 18 ML | Refills: 2 | Status: SHIPPED | OUTPATIENT
Start: 2022-03-01 | End: 2022-05-23

## 2022-03-01 NOTE — TELEPHONE ENCOUNTER
Patient saw Sanjiv Newman last week and was given an antibiotic  The medication is finished but patient is still coughing up green mucus and is still feeling sick  Please let Maegan Larson know if there is something else that can be prescribed for him  CB# [phone], ask Sanjiv Newman to leave a detailed message

## 2022-03-01 NOTE — TELEPHONE ENCOUNTER
Can he go and get a cxr now then see me today at 3pm negative No evidence of:/Withdrawal/Aggression/Psychosis/Depression/Patient-parent interaction appropriate/Self destructive behavior

## 2022-03-01 NOTE — PATIENT INSTRUCTIONS
Cough he completed his antibiotics I believe he just has residual post nasal drip start claritin and flonase

## 2022-03-01 NOTE — PROGRESS NOTES
Assessment/Plan:    Patient Instructions   Cough he completed his antibiotics I believe he just has residual post nasal drip start claritin and flonase         Diagnoses and all orders for this visit:    Cough  -     fluticasone (FLONASE) 50 mcg/act nasal spray; 2 sprays into each nostril daily    Pulmonary hypertension (HCC)    Chronic deep vein thrombosis (DVT) of lower extremity, unspecified laterality, unspecified vein (HCC)    Stage 3a chronic kidney disease (HCC)         Subjective:      Patient ID: Dariana Mclean is a 77 y o  male    Pt has been sick for 2 weeks was on abx but this am woke up and coughed up mucus  Otherwise feels ok   No otcs        Current Outpatient Medications:     aspirin 81 mg chewable tablet, Chew 1 tablet daily , Disp: , Rfl:     atorvastatin (LIPITOR) 40 mg tablet, Take 1 tablet (40 mg total) by mouth daily, Disp: 90 tablet, Rfl: 3    benzonatate (TESSALON) 200 MG capsule, Take 1 capsule (200 mg total) by mouth 3 (three) times a day as needed for cough, Disp: 20 capsule, Rfl: 0    escitalopram (LEXAPRO) 20 mg tablet, TAKE 1 TABLET BY MOUTH EVERY DAY, Disp: 90 tablet, Rfl: 0    LORazepam (ATIVAN) 1 mg tablet, Take 1 tablet (1 mg total) by mouth 3 (three) times a day as needed for anxiety, Disp: 90 tablet, Rfl: 0    memantine (NAMENDA) 5 mg tablet, Take 1 tablet (5 mg total) by mouth 2 (two) times a day, Disp: 180 tablet, Rfl: 1    tamsulosin (FLOMAX) 0 4 mg, TAKE 1 CAPSULE BY MOUTH EVERY DAY WITH DINNER, Disp: 90 capsule, Rfl: 1    fluticasone (FLONASE) 50 mcg/act nasal spray, 2 sprays into each nostril daily, Disp: 18 mL, Rfl: 2    No results found for this or any previous visit (from the past 1008 hour(s))      The following portions of the patient's history were reviewed and updated as appropriate: allergies, current medications, past family history, past medical history, past social history, past surgical history and problem list      Review of Systems   Constitutional: Negative for appetite change, chills, diaphoresis, fatigue, fever and unexpected weight change  HENT: Positive for postnasal drip  Negative for sneezing  Eyes: Negative for visual disturbance  Respiratory: Positive for cough  Negative for chest tightness and shortness of breath  Cardiovascular: Negative for chest pain, palpitations and leg swelling  Gastrointestinal: Negative for abdominal pain and blood in stool  Endocrine: Negative for cold intolerance, heat intolerance, polydipsia, polyphagia and polyuria  Genitourinary: Negative for difficulty urinating, dysuria, frequency and urgency  Musculoskeletal: Negative for arthralgias and myalgias  Skin: Negative for rash and wound  Neurological: Negative for dizziness, weakness, light-headedness and headaches  Hematological: Negative for adenopathy  Psychiatric/Behavioral: Negative for confusion, dysphoric mood and sleep disturbance  The patient is not nervous/anxious  Objective:      /82 (BP Location: Left arm, Patient Position: Sitting, Cuff Size: Standard)   Pulse 80   Temp 98 °F (36 7 °C) (Tympanic)   Resp 18   Ht 5' 8 5" (1 74 m)   Wt 77 kg (169 lb 12 8 oz)   BMI 25 44 kg/m²        Physical Exam  Constitutional:       General: He is not in acute distress  Appearance: He is well-developed  He is not diaphoretic  HENT:      Head: Normocephalic and atraumatic  Nose: Nose normal    Eyes:      Conjunctiva/sclera: Conjunctivae normal       Pupils: Pupils are equal, round, and reactive to light  Neck:      Thyroid: No thyromegaly  Vascular: No JVD  Trachea: No tracheal deviation  Cardiovascular:      Rate and Rhythm: Normal rate and regular rhythm  Heart sounds: Normal heart sounds  No murmur heard  No friction rub  No gallop  Pulmonary:      Effort: Pulmonary effort is normal  No respiratory distress  Breath sounds: Normal breath sounds  No wheezing or rales     Abdominal:      General: Bowel sounds are normal  There is no distension  Palpations: Abdomen is soft  Tenderness: There is no abdominal tenderness  Musculoskeletal:         General: Normal range of motion  Cervical back: Normal range of motion and neck supple  Lymphadenopathy:      Cervical: No cervical adenopathy  Skin:     General: Skin is warm and dry  Findings: No rash  Neurological:      Mental Status: He is alert and oriented to person, place, and time  Cranial Nerves: No cranial nerve deficit  Psychiatric:         Behavior: Behavior normal          Thought Content:  Thought content normal          Judgment: Judgment normal

## 2022-03-02 LAB
EST. AVERAGE GLUCOSE BLD GHB EST-MCNC: 114 MG/DL
HBA1C MFR BLD: 5.6 %

## 2022-03-24 ENCOUNTER — OFFICE VISIT (OUTPATIENT)
Dept: INTERNAL MEDICINE CLINIC | Facility: CLINIC | Age: 77
End: 2022-03-24
Payer: MEDICARE

## 2022-03-24 VITALS
TEMPERATURE: 98.4 F | DIASTOLIC BLOOD PRESSURE: 70 MMHG | WEIGHT: 173.2 LBS | HEART RATE: 77 BPM | OXYGEN SATURATION: 99 % | SYSTOLIC BLOOD PRESSURE: 118 MMHG | BODY MASS INDEX: 25.95 KG/M2

## 2022-03-24 DIAGNOSIS — E03.8 SUBCLINICAL HYPOTHYROIDISM: ICD-10-CM

## 2022-03-24 DIAGNOSIS — I25.10 CORONARY ARTERY DISEASE INVOLVING NATIVE CORONARY ARTERY OF NATIVE HEART WITHOUT ANGINA PECTORIS: ICD-10-CM

## 2022-03-24 DIAGNOSIS — N40.1 BENIGN PROSTATIC HYPERPLASIA WITH NOCTURIA: ICD-10-CM

## 2022-03-24 DIAGNOSIS — R35.1 BENIGN PROSTATIC HYPERPLASIA WITH NOCTURIA: ICD-10-CM

## 2022-03-24 DIAGNOSIS — I65.21 CAROTID STENOSIS, RIGHT: ICD-10-CM

## 2022-03-24 DIAGNOSIS — N18.31 STAGE 3A CHRONIC KIDNEY DISEASE (HCC): ICD-10-CM

## 2022-03-24 DIAGNOSIS — Z00.00 WELL ADULT EXAM: ICD-10-CM

## 2022-03-24 DIAGNOSIS — E78.2 MIXED HYPERLIPIDEMIA: Primary | ICD-10-CM

## 2022-03-24 DIAGNOSIS — F41.9 ANXIETY: ICD-10-CM

## 2022-03-24 DIAGNOSIS — I10 ESSENTIAL HYPERTENSION: ICD-10-CM

## 2022-03-24 PROCEDURE — G0439 PPPS, SUBSEQ VISIT: HCPCS | Performed by: INTERNAL MEDICINE

## 2022-03-24 PROCEDURE — 99214 OFFICE O/P EST MOD 30 MIN: CPT | Performed by: INTERNAL MEDICINE

## 2022-03-24 RX ORDER — LORAZEPAM 1 MG/1
1 TABLET ORAL 3 TIMES DAILY PRN
Qty: 90 TABLET | Refills: 0 | Status: CANCELLED | OUTPATIENT
Start: 2022-03-24 | End: 2022-09-06

## 2022-03-24 RX ORDER — LORAZEPAM 1 MG/1
1 TABLET ORAL 3 TIMES DAILY PRN
Qty: 90 TABLET | Refills: 0 | Status: SHIPPED | OUTPATIENT
Start: 2022-03-24 | End: 2022-09-06

## 2022-03-24 RX ORDER — DUTASTERIDE 0.5 MG/1
0.5 CAPSULE, LIQUID FILLED ORAL DAILY
Qty: 30 CAPSULE | Refills: 0 | Status: SHIPPED | OUTPATIENT
Start: 2022-03-24 | End: 2022-04-27 | Stop reason: SDUPTHER

## 2022-03-24 NOTE — ASSESSMENT & PLAN NOTE
Continue Tamsulosin   Nocturia wakes up at night 4-5 times to urinate  Started on Dutasteride  Counseled on risks and benefits

## 2022-03-24 NOTE — PROGRESS NOTES
Assessment and Plan:     Problem List Items Addressed This Visit        Other    Anxiety           Preventive health issues were discussed with patient, and age appropriate screening tests were ordered as noted in patient's After Visit Summary  Personalized health advice and appropriate referrals for health education or preventive services given if needed, as noted in patient's After Visit Summary       History of Present Illness:     Patient presents for Medicare Annual Wellness visit    Patient Care Team:  Joe Martinez MD as PCP - General Franck Rosenberg MD     Problem List:     Patient Active Problem List   Diagnosis    Seborrheic keratosis    Screening for skin condition    History of skin cancer    Psoriasiform dermatitis    Coronary artery disease involving native coronary artery of native heart without angina pectoris    Essential hypertension    Mixed hyperlipidemia    Anxiety    Benign prostatic hyperplasia    CKD (chronic kidney disease), stage III ([facility]    Impaired fasting glucose    Subclinical hypothyroidism    Nocturia    Chronic deep vein thrombosis (DVT) of lower extremity ([facility]    Right carotid bruit    Carotid stenosis, right    Pulmonary hypertension ([facility]      Past Medical and Surgical History:     Past Medical History:   Diagnosis Date    Anxiety disorder     last assessed - [id number]    Basal cell carcinoma of face     BPH (benign prostatic hyperplasia)     benign prostatic hypertrophy; last assessed - [id number]    Chronic kidney disease     Coronary atherosclerosis of native coronary artery     last assessed - 86EVY5626    Deep vein thrombosis (DVT) ([facility]     last assessed - 32JQK0447    Depression     last assessed - 06YRT8645    History of colonoscopy     Fiberoptic; Resolved - 12/1/2011    Hyperlipemia     last assessed - 89APU6910    Hypertension     last assessed - 04HYG2938    Myocardial infarction ([facility]     Nonmelanoma skin cancer     last assessed - 36PSQ4224    Osteoarthritis     last assessed - 52WLU2613    Pneumonia     last assessed - 28MIS4567    Pul[facility] HTN [facility]     [facility]) [facility]     Seborrheic dermatitis      Past Surgical History:   Procedure Laterality Date    CARDIAC CATHETERIZATION      procedure outcome successful; last assessed - 87QVB8349    CORONARY ARTERY BYPASS GRAFT      last assessed - 53JAM0207    IVC FILTER INSERTION      [place]    TOTAL HIP ARTHROPLASTY      last assessed - [id number]      Family History:     Family History   Problem Relation Age of Onset    Dementia Mother     Rectal cancer Mother     Coronary artery disease Father     Rectal cancer Sister       Social History:     Social History     Socioeconomic History    Marital status: /Civil Union     Spouse name: None    Number of children: None    Years of education: None    Highest education level: None   Occupational History    None   Tobacco Use    Smoking status: Never Smoker    Smokeless tobacco: Never Used   Vaping Use    Vaping Use: Never used   Substance and Sexual Activity    Alcohol use: Not Currently     Comment: socially    Drug use: Yes     Types: Marijuana    Sexual activity: Not Currently     Partners: Female   Other Topics Concern    None   Social History Narrative    None     Social Determinants of Health     Financial Resource Strain: Not on file   Food Insecurity: Not on file   Transportation Needs: Not on file   Physical Activity: Not on file   Stress: Not on file   Social Connections: Not on file   Intimate Partner Violence: Not on file   Housing Stability: Not on file      Medications and Allergies:     Current Outpatient Medications   Medication Sig Dispense Refill    aspirin 81 mg chewable tablet Chew 1 tablet daily       atorvastatin (LIPITOR) 40 mg tablet Take 1 tablet (40 mg total) by mouth daily 90 tablet 3    benzonatate (TESSALON) 200 MG capsule Take 1 capsule (200 mg total) by mouth 3 (three) times a day as needed for cough 20 capsule 0    escitalopram (LEXAPRO) 20 mg tablet TAKE 1 TABLET BY MOUTH EVERY DAY 90 tablet 0    fluticasone (FLONASE) 50 mcg/act nasal spray 2 sprays into each nostril daily 18 mL 2    LORazepam (ATIVAN) 1 mg tablet Take 1 tablet (1 mg total) by mouth 3 (three) times a day as needed for anxiety 90 tablet 0    memantine (NAMENDA) 5 mg tablet Take 1 tablet (5 mg total) by mouth 2 (two) times a day 180 tablet 1    tamsulosin (FLOMAX) 0 4 mg TAKE 1 CAPSULE BY MOUTH EVERY DAY WITH DINNER 90 capsule 1     No current facility-administered medications for this visit  No Known Allergies   Immunizations:     Immunization History   Administered Date(s) Administered    COVID-19 PFIZER VACCINE 0 3 ML IM 03/19/2021, 04/09/2021, 01/14/2022    INFLUENZA 10/21/2010, 10/18/2011, 10/12/2012, 10/08/2014, 10/01/2015, 11/06/2015, 09/13/2016, 10/31/2017, 10/09/2018    Influenza Split High Dose Preservative Free IM 09/13/2016, 10/31/2017, 09/24/2019    Influenza, high dose seasonal 0 7 mL 08/29/2020, 09/23/2021    Influenza, seasonal, injectable 10/01/2015    Pneumococcal Conjugate 13-Valent 11/18/2015, 05/11/2016    Pneumococcal Polysaccharide PPV23 04/22/2013    Tdap 1946, 11/21/2018    Zoster 04/26/2014      Health Maintenance:         Topic Date Due    Colorectal Cancer Screening  07/24/2030    Hepatitis C Screening  Completed     There are no preventive care reminders to display for this patient  Medicare Health Risk Assessment:     /70 (BP Location: Left arm, Patient Position: Sitting, Cuff Size: Standard) Comment: bp  Pulse 77   Temp 98 4 °F (36 9 °C) (Temporal) Comment: no  Wt 78 6 kg (173 lb 3 2 oz)   SpO2 99%   BMI 25 95 kg/m²      Chanelle Morrow is here for his Subsequent Wellness visit  Health Risk Assessment:   Patient rates overall health as very good  Patient feels that their physical health rating is same  Patient is satisfied with their life  Eyesight was rated as same  Hearing was rated as same  Patient feels that their emotional and mental health rating is same  Patients states they are often angry  Patient states they are sometimes unusually tired/fatigued  Pain experienced in the last 7 days has been some  Patient's pain rating has been 4/10  Patient states that he has experienced weight loss or gain in last 6 months  Fall Risk Screening: In the past year, patient has experienced: no history of falling in past year      Home Safety:  Patient does not have trouble with stairs inside or outside of their home  Patient has working smoke alarms and has working carbon monoxide detector  Home safety hazards include: none  Nutrition:   Current diet is No Added Salt  Medications:   Patient is currently taking over-the-counter supplements  OTC medications include: see medication list  Patient is able to manage medications  Activities of Daily Living (ADLs)/Instrumental Activities of Daily Living (IADLs):   Walk and transfer into and out of bed and chair?: Yes  Dress and groom yourself?: Yes    Bathe or shower yourself?: Yes    Feed yourself? Yes  Do your laundry/housekeeping?: No  Manage your money, pay your bills and track your expenses?: No  Make your own meals?: Yes    Do your own shopping?: Yes    Previous Hospitalizations:   Any hospitalizations or ED visits within the last 12 months?: No      Advance Care Planning:   Living will: Yes    Durable POA for healthcare:  Yes    Advanced directive: Yes      PREVENTIVE SCREENINGS      Cardiovascular Screening:    General: Screening Not Indicated and History Lipid Disorder      Diabetes Screening:     General: Screening Current      Colorectal Cancer Screening:     General: Screening Current      Prostate Cancer Screening:    General: Screening Not Indicated      Abdominal Aortic Aneurysm (AAA) Screening:    Risk factors include: age between 73-69 yo        Lung Cancer Screening:     General: Screening Not Indicated      Hepatitis C Screening:    General: Screening Current    Screening, Brief Intervention, and Referral to Treatment (SBIRT)    Screening  Typical number of drinks in a day: 0  Typical number of drinks in a week: 0  Interpretation: Low risk drinking behavior      Single Item Drug Screening:  How often have you used an illegal drug (including marijuana) or a prescription medication for non-medical reasons in the past year? never    Single Item Drug Screen Score: 0  Interpretation: Negative screen for possible drug use disorder      Valentina Rossi MD

## 2022-03-24 NOTE — PROGRESS NOTES
Assessment/Plan:    Benign prostatic hyperplasia  Continue Tamsulosin   Nocturia wakes up at night 4-5 times to urinate  Started on Dutasteride  Counseled on risks and benefits    CKD (chronic kidney disease), stage III  Lab Results   Component Value Date    EGFR 60 03/01/2022    EGFR 54 09/17/2021    EGFR 59 05/08/2021    CREATININE 1 17 03/01/2022    CREATININE 1 28 09/17/2021    CREATININE 1 19 05/08/2021   Follows with Nephro   Stable      Mixed hyperlipidemia  Lipid panel wnl   Continue Atorvastatin 40mg     Anxiety  Continue Lexapro   Takes lorazepam TID as needed      Subclinical hypothyroidism  TSH wnl not on medication     Coronary artery disease involving native coronary artery of native heart without angina pectoris  Follows with cardiology  S/p CABG    Essential hypertension  Controlled with out medication     Carotid stenosis, right  Ultrasound showed less than 50% right internal carotid artery stenosis  No further imaging for now         Problem List Items Addressed This Visit        Endocrine    Subclinical hypothyroidism     TSH wnl not on medication             Cardiovascular and Mediastinum    Coronary artery disease involving native coronary artery of native heart without angina pectoris     Follows with cardiology  S/p CABG         Essential hypertension     Controlled with out medication          Carotid stenosis, right     Ultrasound showed less than 50% right internal carotid artery stenosis  No further imaging for now            Genitourinary    Benign prostatic hyperplasia     Continue Tamsulosin   Nocturia wakes up at night 4-5 times to urinate  Started on Dutasteride  Counseled on risks and benefits         Relevant Medications    dutasteride (AVODART) 0 5 mg capsule    CKD (chronic kidney disease), stage III ([facility] [address] )     Lab Results   Component Value Date    EGFR 60 03/01/2022    EGFR 54 09/17/2021    EGFR 59 05/08/2021    CREATININE 1 17 03/01/2022    CREATININE 1 28 09/17/2021 CREATININE 1 19 05/08/2021   Follows with Nephro   Stable              Other    Mixed hyperlipidemia - Primary     Lipid panel wnl   Continue Atorvastatin 40mg          Anxiety     Continue Lexapro   Takes lorazepam TID as needed             Other Visit Diagnoses     Well adult exam        Relevant Orders    Lipid panel    TSH, 3rd generation with Free T4 reflex    HEMOGLOBIN A1C W/ EAG ESTIMATION    CBC and differential    Comprehensive metabolic panel            Subjective:      Patient ID: Brandon Lawrence is a 77 y o  male  81-year-old male past medical history coronary artery disease status post CABG, BPH presents for follow-up  Patient states he is doing well except for some left leg pain with activity  Improves with rest  Patient has no other complaints  He had a cold 1 month ago but it has resolved and he is doing well  Denies chest pain, SOB, palpitations, abdominal pain  The following portions of the patient's history were reviewed and updated as appropriate: allergies, current medications, past family history, past medical history, past social history, past surgical history and problem list     Review of Systems   Constitutional: Negative  HENT: Negative  Eyes: Negative  Respiratory: Negative  Cardiovascular: Negative  Gastrointestinal: Negative  Endocrine: Negative  Genitourinary: Negative  Musculoskeletal: Negative  Skin: Negative  Allergic/Immunologic: Negative  Neurological: Negative  Hematological: Negative  Psychiatric/Behavioral: Negative  Objective:      /70 (BP Location: Left arm, Patient Position: Sitting, Cuff Size: Standard) Comment: bp  Pulse 77   Temp 98 4 °F (36 9 °C) (Temporal) Comment: no  Wt 78 6 kg (173 lb 3 2 oz)   SpO2 99%   BMI 25 95 kg/m²          Physical Exam  HENT:      Head: Normocephalic and atraumatic        Nose: Nose normal       Mouth/Throat:      Mouth: Mucous membranes are moist    Eyes:      Extraocular Movements: Extraocular movements intact  Pupils: Pupils are equal, round, and reactive to light  Cardiovascular:      Rate and Rhythm: Normal rate and regular rhythm  Pulses: Normal pulses  Pulmonary:      Effort: Pulmonary effort is normal       Breath sounds: Normal breath sounds  Abdominal:      General: Abdomen is flat  Bowel sounds are normal       Palpations: Abdomen is soft  Musculoskeletal:         General: Normal range of motion  Cervical back: Normal range of motion  Skin:     General: Skin is warm  Neurological:      General: No focal deficit present  Mental Status: He is alert and oriented to person, place, and time  Mental status is at baseline     Psychiatric:         Mood and Affect: Mood normal

## 2022-03-24 NOTE — PATIENT INSTRUCTIONS
Follow up in 6 months  Continue diet modification as discussed  Repeat blood work prior to next visit     Take Dutasteride 0 5 mg daily

## 2022-03-24 NOTE — ASSESSMENT & PLAN NOTE
Lab Results   Component Value Date    EGFR 60 03/01/2022    EGFR 54 09/17/2021    EGFR 59 05/08/2021    CREATININE 1 17 03/01/2022    CREATININE 1 28 09/17/2021    CREATININE 1 19 05/08/2021   Follows with Nephro   Stable

## 2022-04-27 DIAGNOSIS — N40.1 BENIGN PROSTATIC HYPERPLASIA WITH NOCTURIA: ICD-10-CM

## 2022-04-27 DIAGNOSIS — R35.1 BENIGN PROSTATIC HYPERPLASIA WITH NOCTURIA: ICD-10-CM

## 2022-04-27 RX ORDER — DUTASTERIDE 0.5 MG/1
0.5 CAPSULE, LIQUID FILLED ORAL DAILY
Qty: 30 CAPSULE | Refills: 0 | Status: SHIPPED | OUTPATIENT
Start: 2022-04-27 | End: 2022-05-22

## 2022-05-02 ENCOUNTER — TELEPHONE (OUTPATIENT)
Dept: NEPHROLOGY | Facility: CLINIC | Age: 77
End: 2022-05-02

## 2022-05-10 ENCOUNTER — TELEPHONE (OUTPATIENT)
Dept: NEPHROLOGY | Facility: CLINIC | Age: 77
End: 2022-05-10

## 2022-05-10 ENCOUNTER — APPOINTMENT (OUTPATIENT)
Dept: LAB | Facility: HOSPITAL | Age: 77
End: 2022-05-10
Payer: MEDICARE

## 2022-05-10 LAB
25(OH)D3 SERPL-MCNC: 32.7 NG/ML (ref 30–100)
ALBUMIN SERPL BCP-MCNC: 3.8 G/DL (ref 3.5–5)
ALP SERPL-CCNC: 85 U/L (ref 46–116)
ALT SERPL W P-5'-P-CCNC: 31 U/L (ref 12–78)
ANION GAP SERPL CALCULATED.3IONS-SCNC: 9 MMOL/L (ref 4–13)
AST SERPL W P-5'-P-CCNC: 26 U/L (ref 5–45)
BACTERIA UR QL AUTO: NORMAL /HPF
BASOPHILS # BLD AUTO: 0.04 THOUSANDS/ΜL (ref 0–0.1)
BASOPHILS NFR BLD AUTO: 1 % (ref 0–1)
BILIRUB SERPL-MCNC: 1.04 MG/DL (ref 0.2–1)
BILIRUB UR QL STRIP: NEGATIVE
BUN SERPL-MCNC: 27 MG/DL (ref 5–25)
CALCIUM SERPL-MCNC: 9.6 MG/DL (ref 8.3–10.1)
CHLORIDE SERPL-SCNC: 102 MMOL/L (ref 100–108)
CLARITY UR: CLEAR
CO2 SERPL-SCNC: 28 MMOL/L (ref 21–32)
COLOR UR: YELLOW
CREAT SERPL-MCNC: 1.19 MG/DL (ref 0.6–1.3)
CREAT UR-MCNC: 153 MG/DL
EOSINOPHIL # BLD AUTO: 0.23 THOUSAND/ΜL (ref 0–0.61)
EOSINOPHIL NFR BLD AUTO: 3 % (ref 0–6)
ERYTHROCYTE [DISTWIDTH] IN BLOOD BY AUTOMATED COUNT: 13.2 % (ref 11.6–15.1)
GFR SERPL CREATININE-BSD FRML MDRD: 58 ML/MIN/1.73SQ M
GLUCOSE P FAST SERPL-MCNC: 103 MG/DL (ref 65–99)
GLUCOSE UR STRIP-MCNC: NEGATIVE MG/DL
HCT VFR BLD AUTO: 47.5 % (ref 36.5–49.3)
HGB BLD-MCNC: 15.8 G/DL (ref 12–17)
HGB UR QL STRIP.AUTO: NEGATIVE
IMM GRANULOCYTES # BLD AUTO: 0.01 THOUSAND/UL (ref 0–0.2)
IMM GRANULOCYTES NFR BLD AUTO: 0 % (ref 0–2)
KETONES UR STRIP-MCNC: NEGATIVE MG/DL
LEUKOCYTE ESTERASE UR QL STRIP: NEGATIVE
LYMPHOCYTES # BLD AUTO: 1.67 THOUSANDS/ΜL (ref 0.6–4.47)
LYMPHOCYTES NFR BLD AUTO: 22 % (ref 14–44)
MCH RBC QN AUTO: 29.5 PG (ref 26.8–34.3)
MCHC RBC AUTO-ENTMCNC: 33.3 G/DL (ref 31.4–37.4)
MCV RBC AUTO: 89 FL (ref 82–98)
MONOCYTES # BLD AUTO: 0.5 THOUSAND/ΜL (ref 0.17–1.22)
MONOCYTES NFR BLD AUTO: 7 % (ref 4–12)
NEUTROPHILS # BLD AUTO: 5.07 THOUSANDS/ΜL (ref 1.85–7.62)
NEUTS SEG NFR BLD AUTO: 67 % (ref 43–75)
NITRITE UR QL STRIP: NEGATIVE
NON-SQ EPI CELLS URNS QL MICRO: NORMAL /HPF
NRBC BLD AUTO-RTO: 0 /100 WBCS
PH UR STRIP.AUTO: 6 [PH]
PHOSPHATE SERPL-MCNC: 3.2 MG/DL (ref 2.3–4.1)
PLATELET # BLD AUTO: 210 THOUSANDS/UL (ref 149–390)
PMV BLD AUTO: 10.6 FL (ref 8.9–12.7)
POTASSIUM SERPL-SCNC: 4.5 MMOL/L (ref 3.5–5.3)
PROT SERPL-MCNC: 7.1 G/DL (ref 6.4–8.2)
PROT UR STRIP-MCNC: NEGATIVE MG/DL
PROT UR-MCNC: 17 MG/DL
PROT/CREAT UR: 0.11 MG/G{CREAT} (ref 0–0.1)
PTH-INTACT SERPL-MCNC: 54.7 PG/ML (ref 18.4–80.1)
RBC # BLD AUTO: 5.35 MILLION/UL (ref 3.88–5.62)
RBC #/AREA URNS AUTO: NORMAL /HPF
SODIUM SERPL-SCNC: 139 MMOL/L (ref 136–145)
SP GR UR STRIP.AUTO: 1.02 (ref 1–1.03)
UROBILINOGEN UR QL STRIP.AUTO: 0.2 E.U./DL
WBC # BLD AUTO: 7.52 THOUSAND/UL (ref 4.31–10.16)
WBC #/AREA URNS AUTO: NORMAL /HPF

## 2022-05-10 PROCEDURE — 83970 ASSAY OF PARATHORMONE: CPT | Performed by: INTERNAL MEDICINE

## 2022-05-10 PROCEDURE — 85025 COMPLETE CBC W/AUTO DIFF WBC: CPT | Performed by: INTERNAL MEDICINE

## 2022-05-10 PROCEDURE — 84156 ASSAY OF PROTEIN URINE: CPT | Performed by: INTERNAL MEDICINE

## 2022-05-10 PROCEDURE — 82306 VITAMIN D 25 HYDROXY: CPT | Performed by: INTERNAL MEDICINE

## 2022-05-10 PROCEDURE — 80053 COMPREHEN METABOLIC PANEL: CPT | Performed by: INTERNAL MEDICINE

## 2022-05-10 PROCEDURE — 36415 COLL VENOUS BLD VENIPUNCTURE: CPT | Performed by: INTERNAL MEDICINE

## 2022-05-10 PROCEDURE — 81001 URINALYSIS AUTO W/SCOPE: CPT | Performed by: INTERNAL MEDICINE

## 2022-05-10 PROCEDURE — 82570 ASSAY OF URINE CREATININE: CPT | Performed by: INTERNAL MEDICINE

## 2022-05-10 PROCEDURE — 84100 ASSAY OF PHOSPHORUS: CPT | Performed by: INTERNAL MEDICINE

## 2022-05-11 ENCOUNTER — OFFICE VISIT (OUTPATIENT)
Dept: NEPHROLOGY | Facility: CLINIC | Age: 77
End: 2022-05-11
Payer: MEDICARE

## 2022-05-11 VITALS
HEIGHT: 69 IN | DIASTOLIC BLOOD PRESSURE: 90 MMHG | SYSTOLIC BLOOD PRESSURE: 120 MMHG | HEART RATE: 82 BPM | WEIGHT: 171 LBS | TEMPERATURE: 97.6 F | OXYGEN SATURATION: 97 % | BODY MASS INDEX: 25.33 KG/M2 | RESPIRATION RATE: 16 BRPM

## 2022-05-11 DIAGNOSIS — N18.31 STAGE 3A CHRONIC KIDNEY DISEASE (HCC): ICD-10-CM

## 2022-05-11 DIAGNOSIS — E78.2 ELEVATED CHOLESTEROL WITH ELEVATED TRIGLYCERIDES: Primary | ICD-10-CM

## 2022-05-11 PROCEDURE — 99215 OFFICE O/P EST HI 40 MIN: CPT | Performed by: INTERNAL MEDICINE

## 2022-05-11 RX ORDER — LATANOPROST 50 UG/ML
SOLUTION/ DROPS OPHTHALMIC
COMMUNITY
Start: 2022-04-17

## 2022-05-11 NOTE — PROGRESS NOTES
NEPHROLOGY PROGRESS NOTE    Patient: Meghan Kim               Sex: male          DOA: No admission date for patient encounter  YOB: 1945        Age:  69 y o           5/11/2022        BACKGROUND     27-year-old male with past medical history of CAD status post CABG, dyslipidemia, chronic kidney disease stage 3, hypertension, depression who presents to renal clinic for follow-up  SUBJECTIVE     Patient is being seen after 6 months  Offers no complaints  States his consumption of sugar may have been up last year  Denies chest pain, shortness of breath, abdominal pain  REVIEW OF SYSTEMS     Review of Systems   Constitutional: Negative  HENT: Negative  Eyes: Negative  Respiratory: Negative  Cardiovascular: Negative  Gastrointestinal: Negative  Endocrine: Negative  Genitourinary: Negative  Musculoskeletal: Negative  Skin: Negative  Allergic/Immunologic: Negative  Neurological: Negative  Hematological: Negative  All other systems reviewed and are negative  OBJECTIVE     Current Weight: Weight - Scale: 77 6 kg (171 lb)  Vitals:    05/11/22 1112   BP: 120/90   Pulse: 82   Resp: 16   Temp: 97 6 °F (36 4 °C)   SpO2: 97%     Body mass index is 25 62 kg/m²        CURRENT MEDICATIONS       Current Outpatient Medications:     aspirin 81 mg chewable tablet, Chew 1 tablet daily , Disp: , Rfl:     atorvastatin (LIPITOR) 40 mg tablet, Take 1 tablet (40 mg total) by mouth daily, Disp: 90 tablet, Rfl: 3    benzonatate (TESSALON) 200 MG capsule, Take 1 capsule (200 mg total) by mouth 3 (three) times a day as needed for cough, Disp: 20 capsule, Rfl: 0    escitalopram (LEXAPRO) 20 mg tablet, TAKE 1 TABLET BY MOUTH EVERY DAY, Disp: 90 tablet, Rfl: 0    fluticasone (FLONASE) 50 mcg/act nasal spray, 2 sprays into each nostril daily, Disp: 18 mL, Rfl: 2    latanoprost (XALATAN) 0 005 % ophthalmic solution, INSTILL 1 DROP INTO BOTH EYES AT BEDTIME, Disp: , Rfl:    LORazepam (ATIVAN) 1 mg tablet, Take 1 tablet (1 mg total) by mouth 3 (three) times a day as needed for anxiety, Disp: 90 tablet, Rfl: 0    memantine (NAMENDA) 5 mg tablet, Take 1 tablet (5 mg total) by mouth 2 (two) times a day, Disp: 180 tablet, Rfl: 1    tamsulosin (FLOMAX) 0 4 mg, TAKE 1 CAPSULE BY MOUTH EVERY DAY WITH DINNER, Disp: 90 capsule, Rfl: 1    dutasteride (AVODART) 0 5 mg capsule, Take 1 capsule (0 5 mg total) by mouth daily, Disp: 30 capsule, Rfl: 0      PHYSICAL EXAMINATION     Physical Exam  HENT:      Head: Normocephalic and atraumatic  Eyes:      Pupils: Pupils are equal, round, and reactive to light  Neck:      Vascular: No JVD  Cardiovascular:      Rate and Rhythm: Normal rate and regular rhythm  Heart sounds: Murmur heard  No friction rub  Pulmonary:      Effort: Pulmonary effort is normal       Breath sounds: Normal breath sounds  Abdominal:      General: Bowel sounds are normal  There is no distension  Palpations: Abdomen is soft  Tenderness: There is no abdominal tenderness  There is no rebound  Musculoskeletal:         General: No tenderness  Cervical back: Neck supple  Skin:     General: Skin is dry  Findings: No rash  Neurological:      Mental Status: He is alert and oriented to person, place, and time  LAB RESULTS       Results from last 6 Months   Lab Units 05/10/22  0831 03/01/22  1525   WBC Thousand/uL 7 52 10 22*   HEMOGLOBIN g/dL 15 8 15 6   HEMATOCRIT % 47 5 46 5   PLATELETS Thousands/uL 210 228   POTASSIUM mmol/L 4 5 4 0   CHLORIDE mmol/L 102 109*   CO2 mmol/L 28 23   BUN mg/dL 27* 25   CREATININE mg/dL 1 19 1 17   CALCIUM mg/dL 9 6 9 9   PHOSPHORUS mg/dL 3 2  --          RADIOLOGY RESULTS      No results found for this or any previous visit  Results for orders placed during the hospital encounter of 05/31/18   XR chest pa & lateral    Narrative CHEST     INDICATION:   R05: Cough      COMPARISON:  11/15/2017    EXAM PERFORMED/VIEWS:  XR CHEST PA & LATERAL  The frontal view was performed utilizing dual energy radiographic technique  FINDINGS:    Cardiomediastinal silhouette appears unremarkable  Status post median sternotomy and CABG  The lungs are clear  Mild eventration of the right diaphragmatic apex  No pneumothorax or pleural effusion  Osseous structures appear within normal limits for patient age  Old left lower rib fracture  Impression No acute cardiopulmonary disease  Workstation performed: [id number]         ASSESSMENT/PLAN       71-year-old male with past medical history of CAD status post CABG, dyslipidemia, chronic kidney disease stage 3, hypertension, BPH who presents to renal clinic for follow-up  1  Chronic kidney disease stage 3a:  Baseline serum creatinine 1 2-1 5 that existed over the past 6 years  -current serum creatinine is 1 1 mg/dL and at baseline with eGFR of 58   -avoidance of nephrotoxic agents such as NSAIDs reiterated  2  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:  Blood pressure is in excellent range while on no anti hypertensive medications  3  Secondary hyperparathyroidism of renal origin:  PTH intact, 25 hydroxy vitamin-D, calcium and phosphorus are all in good range  4  Anemia due to CKD:  Hemoglobin is stable at 15 8 g/dl and on target for his level of CKD  5  CAD status post CABG:  Stable while on aspirin 81 mg once daily  No chest pain reported  6  Benign prostatic hyperplasia:  Stable urinary symptoms while on Flomax 0 4 mg and Avodart    7  Dyslipidemia:  On Lipitor 40 mg at bedtime  Persistently elevated triglycerides and despite being on Fish oil two tablets daily  Will repeat lipid profile in July 2022 to consider need to introduce Tricor in his regimen  8  Nutrition:  Low-potassium, low phosphorus, moderate protein diet recommended  9  Proteinuria: Noted ~ 110 mg of proteinuria per UPC ratio which is acceptable           Windy Roberts MD  Nephrology  5/11/2022

## 2022-05-11 NOTE — LETTER
May 11, 2022     Graciela Estrada MD  [address]  [place], [place] [facility] [address] [place] [address]    Patient: Lisa Barrera   YOB: 1945   Date of Visit: 5/11/2022       Dear Dr De Leon Dangelo: Thank you for referring Lisa Barrera to me for evaluation  Below are my notes for this consultation  If you have questions, please do not hesitate to call me  I look forward to following your patient along with you  Sincerely,        Dean Meza MD        CC: No Recipients  Dean Meza MD  5/11/2022 12:27 PM  Incomplete  NEPHROLOGY PROGRESS NOTE    Patient: Lisa Barrera               Sex: male          DOA: No admission date for patient encounter  YOB: 1945        Age:  69 y o           5/11/2022        BACKGROUND     71-year-old male with past medical history of CAD status post CABG, dyslipidemia, chronic kidney disease stage 3, hypertension, depression who presents to renal clinic for follow-up  SUBJECTIVE     Patient is being seen after 6 months  Offers no complaints  States his consumption of sugar may have been up last year  Denies chest pain, shortness of breath, abdominal pain  REVIEW OF SYSTEMS     Review of Systems   Constitutional: Negative  HENT: Negative  Eyes: Negative  Respiratory: Negative  Cardiovascular: Negative  Gastrointestinal: Negative  Endocrine: Negative  Genitourinary: Negative  Musculoskeletal: Negative  Skin: Negative  Allergic/Immunologic: Negative  Neurological: Negative  Hematological: Negative  All other systems reviewed and are negative  OBJECTIVE     Current Weight: Weight - Scale: 77 6 kg (171 lb)  Vitals:    05/11/22 1112   BP: 120/90   Pulse: 82   Resp: 16   Temp: 97 6 °F (36 4 °C)   SpO2: 97%     Body mass index is 25 62 kg/m²        CURRENT MEDICATIONS       Current Outpatient Medications:     aspirin 81 mg chewable tablet, Chew 1 tablet daily , Disp: , Rfl:     atorvastatin (LIPITOR) 40 mg tablet, Take 1 tablet (40 mg total) by mouth daily, Disp: 90 tablet, Rfl: 3    benzonatate (TESSALON) 200 MG capsule, Take 1 capsule (200 mg total) by mouth 3 (three) times a day as needed for cough, Disp: 20 capsule, Rfl: 0    escitalopram (LEXAPRO) 20 mg tablet, TAKE 1 TABLET BY MOUTH EVERY DAY, Disp: 90 tablet, Rfl: 0    fluticasone (FLONASE) 50 mcg/act nasal spray, 2 sprays into each nostril daily, Disp: 18 mL, Rfl: 2    latanoprost (XALATAN) 0 005 % ophthalmic solution, INSTILL 1 DROP INTO BOTH EYES AT BEDTIME, Disp: , Rfl:     LORazepam (ATIVAN) 1 mg tablet, Take 1 tablet (1 mg total) by mouth 3 (three) times a day as needed for anxiety, Disp: 90 tablet, Rfl: 0    memantine (NAMENDA) 5 mg tablet, Take 1 tablet (5 mg total) by mouth 2 (two) times a day, Disp: 180 tablet, Rfl: 1    tamsulosin (FLOMAX) 0 4 mg, TAKE 1 CAPSULE BY MOUTH EVERY DAY WITH DINNER, Disp: 90 capsule, Rfl: 1    dutasteride (AVODART) 0 5 mg capsule, Take 1 capsule (0 5 mg total) by mouth daily, Disp: 30 capsule, Rfl: 0      PHYSICAL EXAMINATION     Physical Exam  HENT:      Head: Normocephalic and atraumatic  Eyes:      Pupils: Pupils are equal, round, and reactive to light  Neck:      Vascular: No JVD  Cardiovascular:      Rate and Rhythm: Normal rate and regular rhythm  Heart sounds: Murmur heard  No friction rub  Pulmonary:      Effort: Pulmonary effort is normal       Breath sounds: Normal breath sounds  Abdominal:      General: Bowel sounds are normal  There is no distension  Palpations: Abdomen is soft  Tenderness: There is no abdominal tenderness  There is no rebound  Musculoskeletal:         General: No tenderness  Cervical back: Neck supple  Skin:     General: Skin is dry  Findings: No rash  Neurological:      Mental Status: He is alert and oriented to person, place, and time             LAB RESULTS       Results from last 6 Months   Lab Units 05/10/22  0803 03/01/22  1525   WBC Thousand/uL 7 52 10 22*   HEMOGLOBIN g/dL 15 8 15 6   HEMATOCRIT % 47 5 46 5   PLATELETS Thousands/uL 210 228   POTASSIUM mmol/L 4 5 4 0   CHLORIDE mmol/L 102 109*   CO2 mmol/L 28 23   BUN mg/dL 27* 25   CREATININE mg/dL 1 19 1 17   CALCIUM mg/dL 9 6 9 9   PHOSPHORUS mg/dL 3 2  --          RADIOLOGY RESULTS      No results found for this or any previous visit  Results for orders placed during the hospital encounter of 05/31/18   XR chest pa & lateral    Narrative CHEST     INDICATION:   R05: Cough  COMPARISON:  11/15/2017    EXAM PERFORMED/VIEWS:  XR CHEST PA & LATERAL  The frontal view was performed utilizing dual energy radiographic technique  FINDINGS:    Cardiomediastinal silhouette appears unremarkable  Status post median sternotomy and CABG  The lungs are clear  Mild eventration of the right diaphragmatic apex  No pneumothorax or pleural effusion  Osseous structures appear within normal limits for patient age  Old left lower rib fracture  Impression No acute cardiopulmonary disease  Workstation performed: [id number]         ASSESSMENT/PLAN       78-year-old male with past medical history of CAD status post CABG, dyslipidemia, chronic kidney disease stage 3, hypertension, BPH who presents to renal clinic for follow-up  1  Chronic kidney disease stage 3a:  Baseline serum creatinine 1 2-1 5 that existed over the past 6 years  -current serum creatinine is 1 1 mg/dL and at baseline with eGFR of 58   -avoidance of nephrotoxic agents such as NSAIDs reiterated  2  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:  Blood pressure is in excellent range while on no anti hypertensive medications  3  Secondary hyperparathyroidism of renal origin:  PTH intact, 25 hydroxy vitamin-D, calcium and phosphorus are all in good range  4  Anemia due to CKD:  Hemoglobin is stable at 15 8 g/dl and on target for his level of CKD      5  CAD status post CABG:  Stable while on aspirin 81 mg once daily  No chest pain reported  6  Benign prostatic hyperplasia:  Stable urinary symptoms while on Flomax 0 4 mg and Avodart    7  Dyslipidemia:  On Lipitor 40 mg at bedtime  Persistently elevated triglycerides and despite being on Fish oil two tablets daily  Will repeat lipid profile in July 2022 to consider need to introduce Tricor in his regimen  8  Nutrition:  Low-potassium, low phosphorus, moderate protein diet recommended  9  Proteinuria: Noted ~ 110 mg of proteinuria per UPC ratio which is acceptable           Eddie Peña MD  Nephrology  5/11/2022

## 2022-05-20 DIAGNOSIS — F09 MILD COGNITIVE DISORDER: ICD-10-CM

## 2022-05-20 RX ORDER — MEMANTINE HYDROCHLORIDE 5 MG/1
TABLET ORAL
Qty: 180 TABLET | Refills: 1 | Status: SHIPPED | OUTPATIENT
Start: 2022-05-20

## 2022-05-22 DIAGNOSIS — N40.1 BENIGN PROSTATIC HYPERPLASIA WITH NOCTURIA: ICD-10-CM

## 2022-05-22 DIAGNOSIS — R35.1 BENIGN PROSTATIC HYPERPLASIA WITH NOCTURIA: ICD-10-CM

## 2022-05-22 RX ORDER — DUTASTERIDE 0.5 MG/1
CAPSULE, LIQUID FILLED ORAL
Qty: 30 CAPSULE | Refills: 0 | Status: SHIPPED | OUTPATIENT
Start: 2022-05-22 | End: 2022-06-16

## 2022-05-23 DIAGNOSIS — N40.0 BPH WITHOUT URINARY OBSTRUCTION: ICD-10-CM

## 2022-05-23 DIAGNOSIS — F41.9 ANXIETY: ICD-10-CM

## 2022-05-23 DIAGNOSIS — R05.9 COUGH: ICD-10-CM

## 2022-05-23 DIAGNOSIS — N40.0 BENIGN PROSTATIC HYPERPLASIA, UNSPECIFIED WHETHER LOWER URINARY TRACT SYMPTOMS PRESENT: ICD-10-CM

## 2022-05-23 RX ORDER — TAMSULOSIN HYDROCHLORIDE 0.4 MG/1
CAPSULE ORAL
Qty: 90 CAPSULE | Refills: 1 | Status: SHIPPED | OUTPATIENT
Start: 2022-05-23

## 2022-05-23 RX ORDER — ESCITALOPRAM OXALATE 20 MG/1
TABLET ORAL
Qty: 90 TABLET | Refills: 0 | Status: SHIPPED | OUTPATIENT
Start: 2022-05-23

## 2022-05-23 RX ORDER — FLUTICASONE PROPIONATE 50 MCG
SPRAY, SUSPENSION (ML) NASAL
Qty: 48 ML | Refills: 0 | Status: SHIPPED | OUTPATIENT
Start: 2022-05-23

## 2022-06-16 DIAGNOSIS — R35.1 BENIGN PROSTATIC HYPERPLASIA WITH NOCTURIA: ICD-10-CM

## 2022-06-16 DIAGNOSIS — N40.1 BENIGN PROSTATIC HYPERPLASIA WITH NOCTURIA: ICD-10-CM

## 2022-06-16 RX ORDER — DUTASTERIDE 0.5 MG/1
CAPSULE, LIQUID FILLED ORAL
Qty: 90 CAPSULE | Refills: 1 | Status: SHIPPED | OUTPATIENT
Start: 2022-06-16

## 2022-06-22 ENCOUNTER — OFFICE VISIT (OUTPATIENT)
Dept: CARDIOLOGY CLINIC | Facility: CLINIC | Age: 77
End: 2022-06-22
Payer: MEDICARE

## 2022-06-22 VITALS
WEIGHT: 168.7 LBS | HEART RATE: 76 BPM | DIASTOLIC BLOOD PRESSURE: 76 MMHG | SYSTOLIC BLOOD PRESSURE: 122 MMHG | BODY MASS INDEX: 24.99 KG/M2 | OXYGEN SATURATION: 96 % | HEIGHT: 69 IN

## 2022-06-22 DIAGNOSIS — I25.10 CORONARY ARTERY DISEASE INVOLVING NATIVE CORONARY ARTERY OF NATIVE HEART WITHOUT ANGINA PECTORIS: Primary | ICD-10-CM

## 2022-06-22 DIAGNOSIS — E78.2 MIXED HYPERLIPIDEMIA: ICD-10-CM

## 2022-06-22 DIAGNOSIS — I10 ESSENTIAL HYPERTENSION: ICD-10-CM

## 2022-06-22 DIAGNOSIS — I65.21 CAROTID STENOSIS, RIGHT: ICD-10-CM

## 2022-06-22 DIAGNOSIS — R06.02 SHORTNESS OF BREATH: ICD-10-CM

## 2022-06-22 PROCEDURE — 99213 OFFICE O/P EST LOW 20 MIN: CPT | Performed by: INTERNAL MEDICINE

## 2022-06-22 NOTE — PROGRESS NOTES
[facility] [place]  [facility] [place] [facility] [address] [id number]  Cardiology Follow Up    Joana Magdaleno  [birth year]  [id number]      1  Coronary artery disease involving native coronary artery of native heart without angina pectoris     2  Essential hypertension     3  Mixed hyperlipidemia         Chief Complaint   Patient presents with    Follow-up     Routine follow up       Interval History:  Patient presents for follow-up visit  Patient denies any chest pain  Does have some shortness of breath with heavy exertion  He tries to remain active  He recently saw his nephrologist for CKD  Recent blood work showed elevated triglycerides and low HDL  Patient is supposed to have lipid panel repeated again in a few months  Patient denies any history of orthopnea PND  No history of palpitations or dizziness  He states that he has been compliant all his present medications      Patient Active Problem List   Diagnosis    Seborrheic keratosis    Screening for skin condition    History of skin cancer    Psoriasiform dermatitis    Coronary artery disease involving native coronary artery of native heart without angina pectoris    Essential hypertension    Mixed hyperlipidemia    Anxiety    Benign prostatic hyperplasia    CKD (chronic kidney disease), stage III (HCC)    Impaired fasting glucose    Subclinical hypothyroidism    Nocturia    Chronic deep vein thrombosis (DVT) of lower extremity ([address] )    Right carotid bruit    Carotid stenosis, right    Pulmonary hypertension ([address] )     Past Medical History:   Diagnosis Date    Anxiety disorder     last assessed - [id number]    Basal cell carcinoma of face     BPH (benign prostatic hyperplasia)     benign prostatic hypertrophy; last assessed - 57OOS3538    Chronic kidney disease     Coronary atherosclerosis of native coronary artery     last assessed - [id number]    Deep vein thrombosis (DVT) ([address] )     last assessed - 25JCN3599    Depression     last assessed - 74ROH0835    History of colonoscopy     Fiberoptic; Resolved - 12/1/2011    Hyperlipemia     last assessed - 41ZSF7873    Hypertension     last assessed - 92NQC1857    Myocardial infarction [facility])     Nonmelanoma skin cancer     last assessed - 35Hqk2763    Osteoarthritis     last assessed - 97HZT8097    Pneumonia     last assessed - 82CGW1138    Pulmonary HTN [facility]     PVD (peripheral vascular disease) ([facility] )     Seborrheic dermatitis      Social History     Socioeconomic History    Marital status: /Civil Union     Spouse name: Not on file    Number of children: Not on file    Years of education: Not on file    Highest education level: Not on file   Occupational History    Not on file   Tobacco Use    Smoking status: Never Smoker    Smokeless tobacco: Never Used   Vaping Use    Vaping Use: Never used   Substance and Sexual Activity    Alcohol use: Not Currently     Comment: socially    Drug use: Yes     Types: Marijuana    Sexual activity: Yes     Partners: Female   Other Topics Concern    Not on file   Social History Narrative    Not on file     Social Determinants of Health     Financial Resource Strain: Not on file   Food Insecurity: Not on file   Transportation Needs: Not on file   Physical Activity: Not on file   Stress: Not on file   Social Connections: Not on file   Intimate Partner Violence: Not on file   Housing Stability: Not on file      Family History   Problem Relation Age of Onset    Dementia Mother     Rectal cancer Mother     Coronary artery disease Father     Rectal cancer Sister      Past Surgical History:   Procedure Laterality Date    CARDIAC CATHETERIZATION      procedure outcome successful; last assessed - [id number]    CORONARY ARTERY BYPASS GRAFT      last assessed - [id number]    IVC FILTER INSERTION      [place]    TOTAL HIP ARTHROPLASTY      last assessed - 69ZIU6570       Current Outpatient Medications:     aspirin 81 mg chewable tablet, Chew 1 tablet daily , Disp: , Rfl:     atorvastatin (LIPITOR) 40 mg tablet, Take 1 tablet (40 mg total) by mouth daily, Disp: 90 tablet, Rfl: 3    benzonatate (TESSALON) 200 MG capsule, Take 1 capsule (200 mg total) by mouth 3 (three) times a day as needed for cough, Disp: 20 capsule, Rfl: 0    dutasteride (AVODART) 0 5 mg capsule, TAKE 1 CAPSULE BY MOUTH EVERY DAY, Disp: 90 capsule, Rfl: 1    escitalopram (LEXAPRO) 20 mg tablet, TAKE 1 TABLET BY MOUTH EVERY DAY, Disp: 90 tablet, Rfl: 0    fluticasone (FLONASE) 50 mcg/act nasal spray, SPRAY 2 SPRAYS INTO EACH NOSTRIL EVERY DAY, Disp: 48 mL, Rfl: 0    latanoprost (XALATAN) 0 005 % ophthalmic solution, INSTILL 1 DROP INTO BOTH EYES AT BEDTIME, Disp: , Rfl:     LORazepam (ATIVAN) 1 mg tablet, Take 1 tablet (1 mg total) by mouth 3 (three) times a day as needed for anxiety, Disp: 90 tablet, Rfl: 0    memantine (NAMENDA) 5 mg tablet, TAKE 1 TABLET BY MOUTH TWICE A DAY, Disp: 180 tablet, Rfl: 1    tamsulosin (FLOMAX) 0 4 mg, TAKE 1 CAPSULE BY MOUTH EVERY DAY WITH DINNER, Disp: 90 capsule, Rfl: 1  No Known Allergies    Labs:  No visits with results within 2 Month(s) from this visit     Latest known visit with results is:   Appointment on 03/01/2022   Component Date Value    WBC 03/01/2022 10 22 (A)    RBC 03/01/2022 5 40     Hemoglobin 03/01/2022 15 6     Hematocrit 03/01/2022 46 5     MCV 03/01/2022 86     MCH 03/01/2022 28 9     MCHC 03/01/2022 33 5     RDW 03/01/2022 13 2     MPV 03/01/2022 10 9     Platelets 85/86/9308 228     nRBC 03/01/2022 0     Neutrophils Relative 03/01/2022 67     Immat GRANS % 03/01/2022 0     Lymphocytes Relative 03/01/2022 21     Monocytes Relative 03/01/2022 8     Eosinophils Relative 03/01/2022 4     Basophils Relative 03/01/2022 0     Neutrophils Absolute 03/01/2022 6 73     Immature Grans Absolute 03/01/2022 0 03     Lymphocytes Absolute 03/01/2022 2 18     Monocytes Absolute 03/01/2022 0 80  Eosinophils Absolute 03/01/2022 0 44     Basophils Absolute 03/01/2022 0 04     Sodium 03/01/2022 138     Potassium 03/01/2022 4 0     Chloride 03/01/2022 109 (A)    CO2 03/01/2022 23     ANION GAP 03/01/2022 6     BUN 03/01/2022 25     Creatinine 03/01/2022 1 17     Glucose 03/01/2022 98     Calcium 03/01/2022 9 9     AST 03/01/2022 24     ALT 03/01/2022 29     Alkaline Phosphatase 03/01/2022 74     Total Protein 03/01/2022 7 6     Albumin 03/01/2022 3 8     Total Bilirubin 03/01/2022 0 92     eGFR 03/01/2022 60     Cholesterol 03/01/2022 146     Triglycerides 03/01/2022 341 (A)    HDL, Direct 03/01/2022 27 (A)    LDL Calculated 03/01/2022 51     Non-HDL-Chol (CHOL-HDL) 03/01/2022 119     Hemoglobin A1C 03/01/2022 5 6     EAG 03/01/2022 114     TSH 3RD GENERATON 03/01/2022 2 390      Imaging: No results found  Review of Systems:  Review of Systems   REVIEW OF SYSTEMS:  Constitutional:  Denies fever or chills   Eyes:  Denies change in visual acuity   HENT:  Denies nasal congestion or sore throat   Respiratory:  shortness of breath   Cardiovascular:  Denies chest pain or edema   GI:  Denies abdominal pain, nausea, vomiting, bloody stools or diarrhea   :  Denies dysuria, frequency, difficulty in micturition and nocturia  Musculoskeletal:  Denies back pain or joint pain   Neurologic:  Denies headache, focal weakness or sensory changes   Endocrine:  Denies polyuria or polydipsia   Lymphatic:  Denies swollen glands   Psychiatric:  Denies depression or anxiety     Physical Exam:    /76 (BP Location: Left arm, Patient Position: Sitting, Cuff Size: Standard)   Pulse 76   Ht 5' 8 5" (1 74 m)   Wt 76 5 kg (168 lb 11 2 oz)   SpO2 96%   BMI 25 28 kg/m²     Physical Exam   PHYSICAL EXAM:  General:  Patient is not in acute distress   Head: Normocephalic, Atraumatic  HEENT:  Both pupils normal-size atraumatic, normocephalic, nonicteric  Neck:  JVP not raised   Trachea central  No carotid bruit  Respiratory:  normal breath sounds no crackles  no rhonchi  Cardiovascular:  Regular rate and rhythm no S3 no murmurs  GI:  Abdomen soft nontender  No organomegaly  Lymphatic:  No cervical or inguinal lymphadenopathy  Neurologic:  Patient is awake alert, oriented   Grossly nonfocal  Extremities no edema        Discussion/Summary:  Patient with multiple medical problems who seems to be doing reasonably well from cardiac standpoint  Previous studies reviewed with patient  Medications reviewed and possible side effects discussed  concepts of cardiovascular disease , signs and symptoms of heart disease  Dietary and risk factor modification reinforced  All questions answered  Safety measures reviewed  Patient advised to report any problems prompting medical attention  Patient will be scheduled for a stress echocardiogram to assess for exercise capacity as well as ischemia  Last stress test was 4 years ago  Symptoms to watch out from cardiac standpoint which would indicate the need for further cardiac evaluation also discussed  Medications reviewed  Patient had a few questions which were answered  Follow-up in 6 months or earlier as needed  Follow-up with primary care physician  Patient is agreeable with the plan of care

## 2022-09-22 ENCOUNTER — TELEPHONE (OUTPATIENT)
Dept: NEPHROLOGY | Facility: CLINIC | Age: 77
End: 2022-09-22

## 2022-09-22 NOTE — TELEPHONE ENCOUNTER
I called and spoke to the patient wife Marcelina Vyas and schedule the patient for his nephrology follow up with Marcelina Vyas patient wife  Patient wife Marcelina Vyas understood and was okay with the day and time of the patient next appointment

## 2022-09-23 DIAGNOSIS — F41.9 ANXIETY: ICD-10-CM

## 2022-09-24 RX ORDER — ESCITALOPRAM OXALATE 20 MG/1
TABLET ORAL
Qty: 90 TABLET | Refills: 0 | Status: SHIPPED | OUTPATIENT
Start: 2022-09-24 | End: 2022-10-20 | Stop reason: SDUPTHER

## 2022-10-19 ENCOUNTER — APPOINTMENT (OUTPATIENT)
Dept: LAB | Facility: HOSPITAL | Age: 77
End: 2022-10-19
Payer: MEDICARE

## 2022-10-19 DIAGNOSIS — Z00.00 WELL ADULT EXAM: ICD-10-CM

## 2022-10-19 DIAGNOSIS — E78.2 ELEVATED CHOLESTEROL WITH ELEVATED TRIGLYCERIDES: ICD-10-CM

## 2022-10-19 LAB
25(OH)D3 SERPL-MCNC: 71.3 NG/ML (ref 30–100)
ALBUMIN SERPL BCP-MCNC: 3.8 G/DL (ref 3.5–5)
ALP SERPL-CCNC: 73 U/L (ref 46–116)
ALT SERPL W P-5'-P-CCNC: 30 U/L (ref 12–78)
ANION GAP SERPL CALCULATED.3IONS-SCNC: 7 MMOL/L (ref 4–13)
AST SERPL W P-5'-P-CCNC: 29 U/L (ref 5–45)
BACTERIA UR QL AUTO: ABNORMAL /HPF
BASOPHILS # BLD AUTO: 0.05 THOUSANDS/ΜL (ref 0–0.1)
BASOPHILS NFR BLD AUTO: 1 % (ref 0–1)
BILIRUB SERPL-MCNC: 1.5 MG/DL (ref 0.2–1)
BILIRUB UR QL STRIP: NEGATIVE
BUN SERPL-MCNC: 22 MG/DL (ref 5–25)
CALCIUM SERPL-MCNC: 9.5 MG/DL (ref 8.3–10.1)
CHLORIDE SERPL-SCNC: 106 MMOL/L (ref 96–108)
CHOLEST SERPL-MCNC: 150 MG/DL
CLARITY UR: CLEAR
CO2 SERPL-SCNC: 29 MMOL/L (ref 21–32)
COLOR UR: YELLOW
CREAT SERPL-MCNC: 1.11 MG/DL (ref 0.6–1.3)
CREAT UR-MCNC: 235 MG/DL
EOSINOPHIL # BLD AUTO: 0.24 THOUSAND/ΜL (ref 0–0.61)
EOSINOPHIL NFR BLD AUTO: 4 % (ref 0–6)
ERYTHROCYTE [DISTWIDTH] IN BLOOD BY AUTOMATED COUNT: 12.9 % (ref 11.6–15.1)
EST. AVERAGE GLUCOSE BLD GHB EST-MCNC: 114 MG/DL
GFR SERPL CREATININE-BSD FRML MDRD: 64 ML/MIN/1.73SQ M
GLUCOSE P FAST SERPL-MCNC: 90 MG/DL (ref 65–99)
GLUCOSE UR STRIP-MCNC: NEGATIVE MG/DL
HBA1C MFR BLD: 5.6 %
HCT VFR BLD AUTO: 45.7 % (ref 36.5–49.3)
HDLC SERPL-MCNC: 46 MG/DL
HGB BLD-MCNC: 15.4 G/DL (ref 12–17)
HGB UR QL STRIP.AUTO: NEGATIVE
IMM GRANULOCYTES # BLD AUTO: 0.01 THOUSAND/UL (ref 0–0.2)
IMM GRANULOCYTES NFR BLD AUTO: 0 % (ref 0–2)
KETONES UR STRIP-MCNC: NEGATIVE MG/DL
LDLC SERPL CALC-MCNC: 84 MG/DL (ref 0–100)
LEUKOCYTE ESTERASE UR QL STRIP: NEGATIVE
LYMPHOCYTES # BLD AUTO: 1.79 THOUSANDS/ΜL (ref 0.6–4.47)
LYMPHOCYTES NFR BLD AUTO: 27 % (ref 14–44)
MCH RBC QN AUTO: 30.3 PG (ref 26.8–34.3)
MCHC RBC AUTO-ENTMCNC: 33.7 G/DL (ref 31.4–37.4)
MCV RBC AUTO: 90 FL (ref 82–98)
MONOCYTES # BLD AUTO: 0.47 THOUSAND/ΜL (ref 0.17–1.22)
MONOCYTES NFR BLD AUTO: 7 % (ref 4–12)
MUCOUS THREADS UR QL AUTO: ABNORMAL
NEUTROPHILS # BLD AUTO: 4.08 THOUSANDS/ΜL (ref 1.85–7.62)
NEUTS SEG NFR BLD AUTO: 61 % (ref 43–75)
NITRITE UR QL STRIP: NEGATIVE
NON-SQ EPI CELLS URNS QL MICRO: ABNORMAL /HPF
NONHDLC SERPL-MCNC: 104 MG/DL
NRBC BLD AUTO-RTO: 0 /100 WBCS
PH UR STRIP.AUTO: 6 [PH]
PHOSPHATE SERPL-MCNC: 3.1 MG/DL (ref 2.3–4.1)
PLATELET # BLD AUTO: 205 THOUSANDS/UL (ref 149–390)
PMV BLD AUTO: 10.6 FL (ref 8.9–12.7)
POTASSIUM SERPL-SCNC: 4.5 MMOL/L (ref 3.5–5.3)
PROT SERPL-MCNC: 7 G/DL (ref 6.4–8.4)
PROT UR STRIP-MCNC: NEGATIVE MG/DL
PROT UR-MCNC: 14 MG/DL
PROT/CREAT UR: 0.06 MG/G{CREAT} (ref 0–0.1)
PTH-INTACT SERPL-MCNC: 48.2 PG/ML (ref 18.4–80.1)
RBC # BLD AUTO: 5.09 MILLION/UL (ref 3.88–5.62)
RBC #/AREA URNS AUTO: ABNORMAL /HPF
SODIUM SERPL-SCNC: 142 MMOL/L (ref 135–147)
SP GR UR STRIP.AUTO: >=1.03 (ref 1–1.03)
TRIGL SERPL-MCNC: 98 MG/DL
TSH SERPL DL<=0.05 MIU/L-ACNC: 1.18 UIU/ML (ref 0.45–4.5)
UROBILINOGEN UR QL STRIP.AUTO: 0.2 E.U./DL
WBC # BLD AUTO: 6.64 THOUSAND/UL (ref 4.31–10.16)
WBC #/AREA URNS AUTO: ABNORMAL /HPF

## 2022-10-19 PROCEDURE — 83036 HEMOGLOBIN GLYCOSYLATED A1C: CPT

## 2022-10-19 PROCEDURE — 84443 ASSAY THYROID STIM HORMONE: CPT

## 2022-10-19 PROCEDURE — 80061 LIPID PANEL: CPT

## 2022-10-20 ENCOUNTER — OFFICE VISIT (OUTPATIENT)
Dept: INTERNAL MEDICINE CLINIC | Facility: CLINIC | Age: 77
End: 2022-10-20
Payer: MEDICARE

## 2022-10-20 VITALS
TEMPERATURE: 97.5 F | HEIGHT: 69 IN | HEART RATE: 79 BPM | OXYGEN SATURATION: 99 % | BODY MASS INDEX: 24.53 KG/M2 | WEIGHT: 165.6 LBS | SYSTOLIC BLOOD PRESSURE: 110 MMHG | DIASTOLIC BLOOD PRESSURE: 72 MMHG

## 2022-10-20 DIAGNOSIS — N40.1 BENIGN PROSTATIC HYPERPLASIA WITH NOCTURIA: Primary | ICD-10-CM

## 2022-10-20 DIAGNOSIS — R35.1 BENIGN PROSTATIC HYPERPLASIA WITH NOCTURIA: Primary | ICD-10-CM

## 2022-10-20 DIAGNOSIS — R35.1 NOCTURIA: ICD-10-CM

## 2022-10-20 DIAGNOSIS — F41.9 ANXIETY: ICD-10-CM

## 2022-10-20 DIAGNOSIS — I25.10 CORONARY ARTERY DISEASE INVOLVING NATIVE CORONARY ARTERY OF NATIVE HEART WITHOUT ANGINA PECTORIS: ICD-10-CM

## 2022-10-20 DIAGNOSIS — I10 ESSENTIAL HYPERTENSION: ICD-10-CM

## 2022-10-20 DIAGNOSIS — Z00.00 WELL ADULT EXAM: ICD-10-CM

## 2022-10-20 DIAGNOSIS — Z23 ENCOUNTER FOR IMMUNIZATION: ICD-10-CM

## 2022-10-20 DIAGNOSIS — E78.2 MIXED HYPERLIPIDEMIA: ICD-10-CM

## 2022-10-20 DIAGNOSIS — N18.31 STAGE 3A CHRONIC KIDNEY DISEASE (HCC): ICD-10-CM

## 2022-10-20 DIAGNOSIS — N40.0 BPH WITHOUT URINARY OBSTRUCTION: ICD-10-CM

## 2022-10-20 PROCEDURE — 90662 IIV NO PRSV INCREASED AG IM: CPT | Performed by: INTERNAL MEDICINE

## 2022-10-20 PROCEDURE — 99214 OFFICE O/P EST MOD 30 MIN: CPT | Performed by: INTERNAL MEDICINE

## 2022-10-20 PROCEDURE — G0008 ADMIN INFLUENZA VIRUS VAC: HCPCS | Performed by: INTERNAL MEDICINE

## 2022-10-20 RX ORDER — TAMSULOSIN HYDROCHLORIDE 0.4 MG/1
0.4 CAPSULE ORAL
Qty: 90 CAPSULE | Refills: 1 | Status: SHIPPED | OUTPATIENT
Start: 2022-10-20 | End: 2022-10-20

## 2022-10-20 RX ORDER — LORAZEPAM 1 MG/1
1 TABLET ORAL 3 TIMES DAILY PRN
Qty: 90 TABLET | Refills: 0 | Status: CANCELLED | OUTPATIENT
Start: 2022-10-20 | End: 2023-04-04

## 2022-10-20 RX ORDER — ESCITALOPRAM OXALATE 20 MG/1
20 TABLET ORAL DAILY
Qty: 90 TABLET | Refills: 1 | Status: SHIPPED | OUTPATIENT
Start: 2022-10-20

## 2022-10-20 RX ORDER — DUTASTERIDE 0.5 MG/1
0.5 CAPSULE, LIQUID FILLED ORAL DAILY
Qty: 90 CAPSULE | Refills: 1 | Status: SHIPPED | OUTPATIENT
Start: 2022-10-20

## 2022-10-20 RX ORDER — LORAZEPAM 1 MG/1
1 TABLET ORAL 3 TIMES DAILY PRN
Qty: 90 TABLET | Refills: 0 | Status: SHIPPED | OUTPATIENT
Start: 2022-10-20 | End: 2023-04-04

## 2022-10-20 RX ORDER — TAMSULOSIN HYDROCHLORIDE 0.4 MG/1
0.8 CAPSULE ORAL
Qty: 90 CAPSULE | Refills: 1 | Status: SHIPPED | OUTPATIENT
Start: 2022-10-20

## 2022-10-20 NOTE — PROGRESS NOTES
Assessment/Plan:    1  HLD   -significantly improved currently under good control  -continue doing diet and regularly  -continue atorvastatin 40 mg  Will repeat lipid panel in 6 months  2  CAD s/p CABG  -continue aspirin, statin  -continue seeing cardiologist  -getting stress test next week    3  HTN  -under good control    4  BPH  -continues to have nocturia  -completed six-month course of dutasteride still not having any changes  -will increase dose of Flomax to 0 8 mg daily or 0 4 mg twice daily as per patient's preference    5  CKD stage III  -continue following up with nephrologist  -avoid NSAIDs    6  Anxiety  -under good control  -continue Lexapro, Ativan as needed    Follow-up with us in 6 months and repeat labs prior to next visit  Problem List Items Addressed This Visit        Cardiovascular and Mediastinum    Coronary artery disease involving native coronary artery of native heart without angina pectoris    Essential hypertension    Relevant Orders    CBC and differential    TSH, 3rd generation with Free T4 reflex    Lipid panel       Genitourinary    Benign prostatic hyperplasia - Primary    Relevant Medications    dutasteride (AVODART) 0 5 mg capsule    tamsulosin (FLOMAX) 0 4 mg    CKD (chronic kidney disease), stage III (HCC)       Other    Mixed hyperlipidemia    Anxiety    Relevant Medications    escitalopram (LEXAPRO) 20 mg tablet    LORazepam (ATIVAN) 1 mg tablet    Nocturia      Other Visit Diagnoses     Encounter for immunization        Relevant Orders    influenza vaccine, high-dose, PF 0 7 mL (FLUZONE HIGH-DOSE)    BPH without urinary obstruction        Relevant Medications    tamsulosin (FLOMAX) 0 4 mg    Well adult exam        Relevant Orders    HEMOGLOBIN A1C W/ EAG ESTIMATION            Subjective:      Patient ID: Bret Bright is a 69 y o  male  Pt is here for regular follow-up visit  Pt is doing well  Cutting down his diet  Avoiding carbohydrate a lot    Continues to have nocturia  BP under good control  Anxiety under good control  exercising regularly  Cholesterol improved significantly  Thyroid is WNL  Stop taking dutasteride after 6 months of complete treatment  Continues to see cardiologist and is on aspirin  A getting flu shot today Pt      The following portions of the patient's history were reviewed and updated as appropriate:       Review of Systems:    Review of Systems   Constitutional: Negative for activity change, appetite change, chills, diaphoresis, fatigue, fever and unexpected weight change  HENT: Negative for rhinorrhea and sore throat  Eyes: Negative for visual disturbance  Respiratory: Negative for cough, chest tightness and shortness of breath  Cardiovascular: Negative for chest pain, palpitations and leg swelling  Gastrointestinal: Negative for abdominal distention, abdominal pain, blood in stool, constipation, diarrhea, nausea and vomiting  Genitourinary: Negative for difficulty urinating, dysuria, enuresis, flank pain, frequency, hematuria and urgency  Musculoskeletal: Negative for arthralgias  Neurological: Negative for dizziness, speech difficulty, light-headedness, numbness and headaches  Psychiatric/Behavioral: Negative for behavioral problems and sleep disturbance           Past Medical and Surgical History:     Past Medical History:   Diagnosis Date   • Anxiety disorder     last assessed - [id number]   • Basal cell carcinoma of face    • BPH (benign prostatic hyperplasia)     benign prostatic hypertrophy; last assessed - 33CHC1795   • Chronic kidney disease    • Coronary atherosclerosis of native coronary artery     last assessed - 65JLM7587   • Deep vein thrombosis (DVT) ([facility] )     last assessed - 81OFQ1067   • Depression     last assessed - 93UVT6760   • History of colonoscopy     Fiberoptic; Resolved - 12/1/2011   • Hyperlipemia     last assessed - 11HTS8067   • Hypertension     last assessed - 76MCK4319   • Myocardial infarction [facility])    • Nonmelanoma skin cancer     last assessed - 74IIO8502   • Osteoarthritis     last assessed - 12IZM1928   • Pneumonia     last assessed - 74HYL7882   • Pulmonary HTN ([facility] )    • PVD (peripheral vascular disease) ([facility] )    • Seborrheic dermatitis        Past Surgical History:   Procedure Laterality Date   • CARDIAC CATHETERIZATION      procedure outcome successful; last assessed - [id number]   • CORONARY ARTERY BYPASS GRAFT      last assessed - 50VUU7208   • IVC FILTER INSERTION      [place]   • TOTAL HIP ARTHROPLASTY      last assessed - 60JGL9172         Family History:    Family History   Problem Relation Age of Onset   • Dementia Mother    • Rectal cancer Mother    • Coronary artery disease Father    • Rectal cancer Sister           Social History:    Substance Use History:   Social History     Substance and Sexual Activity   Alcohol Use Not Currently    Comment: socially     Social History     Tobacco Use   Smoking Status Never Smoker   Smokeless Tobacco Never Used     Social History     Substance and Sexual Activity   Drug Use Yes   • Types: Marijuana         Meds/Allergies:    Prior to Admission medications    Medication Sig Start Date End Date Taking?  Authorizing Provider   aspirin 81 mg chewable tablet Chew 1 tablet daily    Yes Historical Provider, MD   atorvastatin (LIPITOR) 40 mg tablet Take 1 tablet (40 mg total) by mouth daily 9/23/21  Yes Yifan Low MD   escitalopram (LEXAPRO) 20 mg tablet TAKE 1 TABLET BY MOUTH EVERY DAY 9/24/22  Yes Yifan Low MD   fluticasone (FLONASE) 50 mcg/act nasal spray SPRAY 2 SPRAYS INTO EACH NOSTRIL EVERY DAY 5/23/22  Yes PA Owens   latanoprost (XALATAN) 0 005 % ophthalmic solution INSTILL 1 DROP INTO BOTH EYES AT BEDTIME 4/17/22  Yes Historical Provider, MD   memantine (NAMENDA) 5 mg tablet TAKE 1 TABLET BY MOUTH TWICE A DAY 5/20/22  Yes Yifan Low MD   tamsulosin (FLOMAX) 0 4 mg TAKE 1 CAPSULE BY MOUTH EVERY DAY WITH DINNER 5/23/22  Yes Wanda Mcdonald MD   benzonatate (TESSALON) 200 MG capsule Take 1 capsule (200 mg total) by mouth 3 (three) times a day as needed for cough  Patient not taking: Reported on 10/20/2022 2/22/22   Lestine Scheuermann, CRNP   dutasteride (AVODART) 0 5 mg capsule TAKE 1 CAPSULE BY MOUTH EVERY DAY  Patient not taking: Reported on 10/20/2022 6/16/22   Wanda Mcdonald MD   LORazepam (ATIVAN) 1 mg tablet Take 1 tablet (1 mg total) by mouth 3 (three) times a day as needed for anxiety 3/24/22 9/6/22  Wanda Mcdonald MD       Allergies: No Known Allergies    Objective:  Vitals:    10/20/22 1033   BP: 110/72   Pulse: 79   Temp: 97 5 °F (36 4 °C)   SpO2: 99%   Weight: 75 1 kg (165 lb 9 6 oz)   Height: 5' 8 5" (1 74 m)     Body mass index is 24 81 kg/m²  Physical Exam  Vitals reviewed  Constitutional:       General: He is not in acute distress  Appearance: He is well-developed  HENT:      Head: Normocephalic and atraumatic  Eyes:      General: No scleral icterus  Right eye: No discharge  Left eye: No discharge  Cardiovascular:      Rate and Rhythm: Normal rate and regular rhythm  Pulses: Normal pulses  Heart sounds: Normal heart sounds  Pulmonary:      Effort: Pulmonary effort is normal  No respiratory distress  Breath sounds: Normal breath sounds  No wheezing or rales  Chest:      Chest wall: No tenderness  Abdominal:      General: Bowel sounds are normal  There is no distension  Palpations: Abdomen is soft  Tenderness: There is no abdominal tenderness  Musculoskeletal:         General: No swelling or tenderness  Normal range of motion  Cervical back: Normal range of motion  Right lower leg: No edema  Left lower leg: No edema  Skin:     General: Skin is warm  Coloration: Skin is not pale  Neurological:      General: No focal deficit present  Mental Status: He is alert and oriented to person, place, and time   Mental status is at baseline  Cranial Nerves: No cranial nerve deficit  Sensory: No sensory deficit  Motor: No weakness     Psychiatric:         Mood and Affect: Mood normal          Behavior: Behavior normal

## 2022-10-25 ENCOUNTER — HOSPITAL ENCOUNTER (OUTPATIENT)
Dept: NON INVASIVE DIAGNOSTICS | Facility: HOSPITAL | Age: 77
Discharge: HOME | End: 2022-10-25
Attending: INTERNAL MEDICINE
Payer: MEDICARE

## 2022-10-25 VITALS — HEIGHT: 68 IN | BODY MASS INDEX: 25.01 KG/M2 | WEIGHT: 165 LBS

## 2022-10-25 DIAGNOSIS — I25.10 CORONARY ARTERY DISEASE INVOLVING NATIVE CORONARY ARTERY OF NATIVE HEART WITHOUT ANGINA PECTORIS: ICD-10-CM

## 2022-10-25 DIAGNOSIS — R06.02 SHORTNESS OF BREATH: ICD-10-CM

## 2022-10-25 LAB
ARRHY DURING EX: NORMAL
CHEST PAIN STATEMENT: NORMAL
MAX DIASTOLIC BP: 60 MMHG
MAX HEART RATE: 134 BPM
MAX HR PERCENT: 93 %
MAX HR: 134 BPM
MAX PREDICTED HEART RATE: 144 BPM
MAX. SYSTOLIC BP: 144 MMHG
PROTOCOL NAME: NORMAL
RATE PRESSURE PRODUCT: NORMAL
REASON FOR TERMINATION: NORMAL
SL CV LV EF: 55
SL CV STRESS RECOVERY BP: NORMAL MMHG
SL CV STRESS RECOVERY HR: 85 BPM
SL CV STRESS STAGE REACHED: 3
STRESS BASELINE BP: NORMAL MMHG
STRESS BASELINE HR: 71 BPM
STRESS O2 SAT REST: 97 %
STRESS PEAK HR: 134 BPM
STRESS POST ESTIMATED WORKLOAD: 10.1 METS
STRESS POST EXERCISE DUR MIN: 7 MIN
STRESS POST EXERCISE DUR SEC: 0 SEC
STRESS POST O2 SAT PEAK: 97 %
STRESS POST PEAK BP: 144 MMHG
TARGET HR FORMULA: NORMAL
TEST INDICATION: NORMAL
TIME IN EXERCISE PHASE: NORMAL

## 2022-10-25 PROCEDURE — 93350 STRESS TTE ONLY: CPT | Performed by: INTERNAL MEDICINE

## 2022-10-25 PROCEDURE — 93350 STRESS TTE ONLY: CPT

## 2022-10-27 ENCOUNTER — TELEPHONE (OUTPATIENT)
Dept: CARDIOLOGY CLINIC | Facility: CLINIC | Age: 77
End: 2022-10-27

## 2022-10-27 NOTE — TELEPHONE ENCOUNTER
----- Message from Megan Nguyen PA-C sent at 10/27/2022 11:04 AM EDT -----  PLEASE CALL STRESS TEST LOOKED GOOD  SHOWED AN OLD SCAR BUT NOTHING NEW

## 2022-11-13 DIAGNOSIS — E78.2 MIXED HYPERLIPIDEMIA: ICD-10-CM

## 2022-11-13 DIAGNOSIS — F09 MILD COGNITIVE DISORDER: ICD-10-CM

## 2022-11-13 RX ORDER — ATORVASTATIN CALCIUM 40 MG/1
TABLET, FILM COATED ORAL
Qty: 90 TABLET | Refills: 3 | Status: SHIPPED | OUTPATIENT
Start: 2022-11-13

## 2022-11-13 RX ORDER — MEMANTINE HYDROCHLORIDE 5 MG/1
TABLET ORAL
Qty: 180 TABLET | Refills: 1 | Status: SHIPPED | OUTPATIENT
Start: 2022-11-13

## 2022-11-14 ENCOUNTER — TELEPHONE (OUTPATIENT)
Dept: NEPHROLOGY | Facility: CLINIC | Age: 77
End: 2022-11-14

## 2022-11-15 ENCOUNTER — OFFICE VISIT (OUTPATIENT)
Dept: NEPHROLOGY | Facility: CLINIC | Age: 77
End: 2022-11-15

## 2022-11-15 VITALS
WEIGHT: 165 LBS | HEIGHT: 68 IN | DIASTOLIC BLOOD PRESSURE: 80 MMHG | SYSTOLIC BLOOD PRESSURE: 132 MMHG | HEART RATE: 74 BPM | OXYGEN SATURATION: 93 % | TEMPERATURE: 97 F | BODY MASS INDEX: 25.01 KG/M2 | RESPIRATION RATE: 16 BRPM

## 2022-11-15 DIAGNOSIS — N18.2 CKD (CHRONIC KIDNEY DISEASE) STAGE 2, GFR 60-89 ML/MIN: Primary | ICD-10-CM

## 2022-11-15 NOTE — PROGRESS NOTES
NEPHROLOGY PROGRESS NOTE    Patient: Geraldine Rider               Sex: male          DOA: No admission date for patient encounter  YOB: 1945        Age:  69 y o           11/15/2022        BACKGROUND     46-year-old male with past medical history of CAD status post CABG, dyslipidemia, chronic kidney disease stage 3, hypertension, depression who presents to renal clinic for follow-up  SUBJECTIVE     Patient is being seen after 6 months  Patient feels well  He was able to lower his triglycerides level on the latest lipid profile study  REVIEW OF SYSTEMS     Review of Systems   Constitutional: Negative  HENT: Negative  Eyes: Negative  Respiratory: Negative  Cardiovascular: Negative  Gastrointestinal: Negative  Endocrine: Negative  Genitourinary: Negative  Musculoskeletal: Negative  Skin: Negative  Allergic/Immunologic: Negative  Neurological: Negative  Hematological: Negative  All other systems reviewed and are negative  OBJECTIVE     Current Weight: Weight - Scale: 74 8 kg (165 lb)  Vitals:    11/15/22 1029   BP: 132/80   Pulse: 74   Resp: 16   Temp: (!) 97 °F (36 1 °C)   SpO2: 93%     Body mass index is 25 09 kg/m²        CURRENT MEDICATIONS       Current Outpatient Medications:   •  aspirin 81 mg chewable tablet, Chew 1 tablet daily , Disp: , Rfl:   •  atorvastatin (LIPITOR) 40 mg tablet, TAKE 1 TABLET BY MOUTH EVERY DAY, Disp: 90 tablet, Rfl: 3  •  escitalopram (LEXAPRO) 20 mg tablet, Take 1 tablet (20 mg total) by mouth daily, Disp: 90 tablet, Rfl: 1  •  fluticasone (FLONASE) 50 mcg/act nasal spray, SPRAY 2 SPRAYS INTO EACH NOSTRIL EVERY DAY, Disp: 48 mL, Rfl: 0  •  latanoprost (XALATAN) 0 005 % ophthalmic solution, INSTILL 1 DROP INTO BOTH EYES AT BEDTIME, Disp: , Rfl:   •  LORazepam (ATIVAN) 1 mg tablet, Take 1 tablet (1 mg total) by mouth 3 (three) times a day as needed for anxiety, Disp: 90 tablet, Rfl: 0  •  memantine (NAMENDA) 5 mg tablet, TAKE 1 TABLET BY MOUTH TWICE A DAY, Disp: 180 tablet, Rfl: 1  •  tamsulosin (FLOMAX) 0 4 mg, Take 2 capsules (0 8 mg total) by mouth daily with dinner, Disp: 90 capsule, Rfl: 1  •  dutasteride (AVODART) 0 5 mg capsule, Take 1 capsule (0 5 mg total) by mouth daily (Patient not taking: No sig reported), Disp: 90 capsule, Rfl: 1      PHYSICAL EXAMINATION     Physical Exam  HENT:      Head: Normocephalic and atraumatic  Eyes:      Pupils: Pupils are equal, round, and reactive to light  Neck:      Vascular: No JVD  Cardiovascular:      Rate and Rhythm: Normal rate and regular rhythm  Heart sounds: Normal heart sounds  No murmur heard  No friction rub  Pulmonary:      Effort: Pulmonary effort is normal       Breath sounds: Normal breath sounds  Abdominal:      General: Bowel sounds are normal  There is no distension  Palpations: Abdomen is soft  Tenderness: There is no abdominal tenderness  There is no rebound  Musculoskeletal:         General: No tenderness  Cervical back: Neck supple  Skin:     General: Skin is dry  Findings: No rash  Neurological:      Mental Status: He is alert and oriented to person, place, and time  LAB RESULTS       Results from last 6 Months   Lab Units 10/19/22  1049   WBC Thousand/uL 6 64   HEMOGLOBIN g/dL 15 4   HEMATOCRIT % 45 7   PLATELETS Thousands/uL 205   POTASSIUM mmol/L 4 5   CHLORIDE mmol/L 106   CO2 mmol/L 29   BUN mg/dL 22   CREATININE mg/dL 1 11   CALCIUM mg/dL 9 5   PHOSPHORUS mg/dL 3 1         RADIOLOGY RESULTS      No results found for this or any previous visit  Results for orders placed during the hospital encounter of 05/31/18   XR chest pa & lateral    Narrative CHEST     INDICATION:   R05: Cough  COMPARISON:  11/15/2017    EXAM PERFORMED/VIEWS:  XR CHEST PA & LATERAL  The frontal view was performed utilizing dual energy radiographic technique  FINDINGS:    Cardiomediastinal silhouette appears unremarkable    Status post median sternotomy and CABG  The lungs are clear  Mild eventration of the right diaphragmatic apex  No pneumothorax or pleural effusion  Osseous structures appear within normal limits for patient age  Old left lower rib fracture  Impression No acute cardiopulmonary disease  Workstation performed: [id number]         ASSESSMENT/PLAN       71-year-old male with past medical history of CAD status post CABG, dyslipidemia, chronic kidney disease stage 3, hypertension, BPH who presents to renal clinic for follow-up  1  Chronic kidney disease stage 2:  Baseline serum creatinine 1 2-1 5 that existed over the past 6 years  -current serum creatinine is 1 11 mg/dL and at baseline with eGFR of 64  -avoidance of nephrotoxic agents such as NSAIDs reiterated  2  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:  Blood pressure is in excellent range  Currently requiring no antihypertensive  3  Secondary hyperparathyroidism of renal origin:  PTH intact, 25 hydroxy vitamin-D, calcium and phosphorus are all in good range  4  Anemia due to CKD:  Hemoglobin is stable at 15 4 g/dl and on target for his level of CKD  5  CAD status post CABG:  Stable while on aspirin 81 mg once daily  No chest pain reported  6  Benign prostatic hyperplasia:  Stable urinary symptoms while on Flomax 0 4 mg and Avodart    7  Dyslipidemia:  On Lipitor 40 mg at bedtime  Noted significant improvement triglycerides level down to 98 and normal     8  Nutrition:  Low-potassium, low phosphorus, moderate protein diet recommended  9  Proteinuria: Noted less than 110 mg of protein and within normal limits    Blood pressure         Adeem Doloris Goldmann  Nephrology  11/15/2022

## 2022-11-17 ENCOUNTER — TELEPHONE (OUTPATIENT)
Dept: NEPHROLOGY | Facility: CLINIC | Age: 77
End: 2022-11-17

## 2022-11-17 NOTE — TELEPHONE ENCOUNTER
Patient has an appointment for Friday 05/16/2023 with Dr Shah Settler  I called and left message on patients answering machine to give the office a call back about rescheduling  appointment due to Dr Zavala Mins emeka be on Hospital Call

## 2022-11-23 ENCOUNTER — TELEPHONE (OUTPATIENT)
Dept: NEPHROLOGY | Facility: CLINIC | Age: 77
End: 2022-11-23

## 2022-11-23 NOTE — TELEPHONE ENCOUNTER
I called and spoke to the patient wife Meir Penny about rescheduling the patient appointment for 5/16/2023 nephrology follow up with Dr Oksana Gunderson because of Dr Oksana Gunderson on hospital call  The patient wife Meir Penny understood and was okay with the day and time of the patient next appointment

## 2022-11-30 DIAGNOSIS — N40.0 BPH WITHOUT URINARY OBSTRUCTION: ICD-10-CM

## 2022-12-03 RX ORDER — TAMSULOSIN HYDROCHLORIDE 0.4 MG/1
CAPSULE ORAL
Qty: 180 CAPSULE | Refills: 1 | Status: SHIPPED | OUTPATIENT
Start: 2022-12-03

## 2023-01-05 DIAGNOSIS — R05.9 COUGH: ICD-10-CM

## 2023-01-05 RX ORDER — FLUTICASONE PROPIONATE 50 MCG
SPRAY, SUSPENSION (ML) NASAL
Qty: 48 ML | Refills: 0 | Status: SHIPPED | OUTPATIENT
Start: 2023-01-05

## 2023-02-06 ENCOUNTER — OFFICE VISIT (OUTPATIENT)
Dept: INTERNAL MEDICINE CLINIC | Facility: CLINIC | Age: 78
End: 2023-02-06

## 2023-02-06 VITALS
HEIGHT: 68 IN | SYSTOLIC BLOOD PRESSURE: 138 MMHG | TEMPERATURE: 97.8 F | WEIGHT: 171.4 LBS | HEART RATE: 80 BPM | DIASTOLIC BLOOD PRESSURE: 80 MMHG | RESPIRATION RATE: 16 BRPM | BODY MASS INDEX: 25.98 KG/M2 | OXYGEN SATURATION: 99 %

## 2023-02-06 DIAGNOSIS — K40.90 LEFT INGUINAL HERNIA: Primary | ICD-10-CM

## 2023-02-06 NOTE — PROGRESS NOTES
Assessment/Plan:    Diagnoses and all orders for this visit:    Left inguinal hernia  -     Ambulatory Referral to General Surgery; Future            Patient Instructions   Reducible left inguinal hernia-pathophysiology discussed  Signs and symptoms of incarceration and bowel obstruction discussed  Patient advised that these conditions would require evaluation in the emergency department  Patient advised to avoid straining and heavy lifting  Referral to general surgery  Subjective:      Patient ID: Nat Bolden is a 69 y o  male    Patient presents acutely with concern for left groin pain  He first noticed the pain after he had been splitting wood and had to lift some heavy logs onto the splitter  Over the last 4 days the pain has dissipated almost completely  No change in bowel or bladder  No bulge notable  Current Outpatient Medications:   •  aspirin 81 mg chewable tablet, Chew 1 tablet daily , Disp: , Rfl:   •  atorvastatin (LIPITOR) 40 mg tablet, TAKE 1 TABLET BY MOUTH EVERY DAY, Disp: 90 tablet, Rfl: 3  •  dutasteride (AVODART) 0 5 mg capsule, Take 1 capsule (0 5 mg total) by mouth daily, Disp: 90 capsule, Rfl: 1  •  escitalopram (LEXAPRO) 20 mg tablet, Take 1 tablet (20 mg total) by mouth daily, Disp: 90 tablet, Rfl: 1  •  fluticasone (FLONASE) 50 mcg/act nasal spray, SPRAY 2 SPRAYS INTO EACH NOSTRIL EVERY DAY, Disp: 48 mL, Rfl: 0  •  latanoprost (XALATAN) 0 005 % ophthalmic solution, INSTILL 1 DROP INTO BOTH EYES AT BEDTIME, Disp: , Rfl:   •  LORazepam (ATIVAN) 1 mg tablet, Take 1 tablet (1 mg total) by mouth 3 (three) times a day as needed for anxiety, Disp: 90 tablet, Rfl: 0  •  memantine (NAMENDA) 5 mg tablet, TAKE 1 TABLET BY MOUTH TWICE A DAY, Disp: 180 tablet, Rfl: 1  •  tamsulosin (FLOMAX) 0 4 mg, TAKE 2 CAPSULES BY MOUTH DAILY WITH DINNER   , Disp: 180 capsule, Rfl: 1    No results found for this or any previous visit (from the past 1008 hour(s))      The following portions of the patient's history were reviewed and updated as appropriate: allergies, current medications, past family history, past medical history, past social history, past surgical history and problem list      Review of Systems   Constitutional: Negative for appetite change, chills, diaphoresis, fatigue, fever and unexpected weight change  HENT: Negative for congestion, hearing loss and rhinorrhea  Eyes: Negative for visual disturbance  Respiratory: Negative for cough, chest tightness, shortness of breath and wheezing  Cardiovascular: Negative for chest pain, palpitations and leg swelling  Gastrointestinal: Negative for abdominal pain and blood in stool  Endocrine: Negative for cold intolerance, heat intolerance, polydipsia and polyuria  Genitourinary: Negative for difficulty urinating, dysuria, frequency and urgency  Musculoskeletal: Negative for arthralgias and myalgias  Skin: Negative for rash  Neurological: Negative for dizziness, weakness, light-headedness and headaches  Hematological: Does not bruise/bleed easily  Psychiatric/Behavioral: Negative for dysphoric mood and sleep disturbance  Objective:      Vitals:    02/06/23 1201   BP: 138/80   Pulse: 80   Resp: 16   Temp: 97 8 °F (36 6 °C)   SpO2: 99%          Physical Exam  Constitutional:       Appearance: He is well-developed  HENT:      Head: Normocephalic and atraumatic  Pulmonary:      Effort: Pulmonary effort is normal    Abdominal:      General: Bowel sounds are normal  There is no distension  Palpations: Abdomen is soft  There is no mass  Tenderness: There is no abdominal tenderness  There is no guarding or rebound  Hernia: A hernia is present  Comments: Small reducible left inguinal hernia   Neurological:      Mental Status: He is alert and oriented to person, place, and time  Psychiatric:         Behavior: Behavior normal  Behavior is cooperative  Thought Content:  Thought content normal  Judgment: Judgment normal

## 2023-02-06 NOTE — PATIENT INSTRUCTIONS
Reducible left inguinal hernia-pathophysiology discussed  Signs and symptoms of incarceration and bowel obstruction discussed  Patient advised that these conditions would require evaluation in the emergency department  Patient advised to avoid straining and heavy lifting  Referral to general surgery

## 2023-02-23 ENCOUNTER — CONSULT (OUTPATIENT)
Dept: SURGERY | Facility: CLINIC | Age: 78
End: 2023-02-23

## 2023-02-23 VITALS
RESPIRATION RATE: 16 BRPM | TEMPERATURE: 98.6 F | HEIGHT: 68 IN | HEART RATE: 86 BPM | BODY MASS INDEX: 25.43 KG/M2 | WEIGHT: 167.8 LBS | SYSTOLIC BLOOD PRESSURE: 122 MMHG | OXYGEN SATURATION: 98 % | DIASTOLIC BLOOD PRESSURE: 84 MMHG

## 2023-02-23 DIAGNOSIS — K40.90 LEFT INGUINAL HERNIA: ICD-10-CM

## 2023-02-23 NOTE — PROGRESS NOTES
Assessment/Plan:  52-year-old male with reducible left inguinal hernia  -Patient presents for evaluation due to left groin pain  -He was helping to split wood noticed pain in his left groin, denies any obvious bulge  -Denies any prior groin pain or bulge  -On exam patient has a reducible left inguinal hernia, no obvious inguinal hernia on the right  -CBC and CMP from 10/19/2022 reviewed  -Nephrology note from 11/15/2022 reviewed  -Primary care physician note from 2/6/2022 reviewed  -Cardiology note from 6/22/2022 reviewed  -Discussed the risks and benefits of surgical intervention for the patient's left inguinal hernia at length  -Discussed the proposed procedure and recovery for inguinal hernia repair  -At this time patient would like to discuss with his wife in regards to surgery and monitor his symptoms  -If patient has increase in symptoms he will call the office  -Also discussed that if he develops significant left groin pain, hernia unable to be reduced, nausea, vomiting he should present to the emergency department  -Patient to follow-up in 1 month to discuss symptoms and make decision about potential surgery     1  Left inguinal hernia  -     Ambulatory Referral to General Surgery               Subjective:      Patient ID: Jennifer Lorenz is a 69 y o  male  Triage Notes:    Patient is a 52-year-old male who presents the office for evaluation due to left groin pain  Patient states he was helping to split wood and noticed pain in his left groin  This lasted 1 day and he denies any current pain  He denied any bulge in his left groin  He was seen by his primary care physician and it was noted that he had a left inguinal hernia  He has been tolerating a diet well and having normal bowel function        The following portions of the patient's history were reviewed and updated as appropriate:   He  has a past medical history of Anxiety disorder, Basal cell carcinoma of face, BPH (benign prostatic hyperplasia), Chronic kidney disease, Colon polyp, Coronary atherosclerosis of native coronary artery, Deep vein thrombosis (DVT) ([place] [address] ), Depression, History of colonoscopy, Hyperlipemia, Hypertension, Myocardial infarction ([place] [address] ), Nonmelanoma skin cancer, Osteoarthritis, Pneumonia, Pulmonary HTN ([place] [address] ), PVD (peripheral vascular disease) ([place] [address] ), and Seborrheic dermatitis  He   Patient Active Problem List    Diagnosis Date Noted   • Left inguinal hernia 02/23/2023   • Pulmonary hypertension ([place] [address] ) 03/01/2022   • Carotid stenosis, right 04/29/2019   • Chronic deep vein thrombosis (DVT) of lower extremity ([place] [address] ) 09/11/2018   • Right carotid bruit 09/11/2018   • Nocturia 08/21/2018   • Coronary artery disease involving native coronary artery of native heart without angina pectoris 05/07/2018   • Essential hypertension 05/07/2018   • Mixed hyperlipidemia 05/07/2018   • Screening for skin condition 04/12/2018   • History of skin cancer 04/12/2018   • CKD (chronic kidney disease), stage III ([place] [address] ) 04/11/2017   • Impaired fasting glucose 04/11/2017   • Psoriasiform dermatitis 03/08/2017   • Subclinical hypothyroidism 10/06/2016   • Benign prostatic hyperplasia 04/22/2016   • Seborrheic keratosis 02/27/2015   • Anxiety 02/19/2014     He  has a past surgical history that includes Coronary artery bypass graft; Cardiac catheterization; IVC FILTER INSERTION; and Total hip arthroplasty  His family history includes Coronary artery disease in his father; Dementia in his mother; Rectal cancer in his mother and sister  He  reports that he has never smoked  He has never used smokeless tobacco  He reports that he does not currently use alcohol  He reports current drug use  Drug: Marijuana    Current Outpatient Medications on File Prior to Visit   Medication Sig   • aspirin 81 mg chewable tablet Chew 1 tablet daily    • atorvastatin (LIPITOR) 40 mg tablet TAKE 1 TABLET BY MOUTH EVERY DAY   • escitalopram (LEXAPRO) 20 mg tablet Take 1 tablet (20 mg total) by mouth daily   • fluticasone (FLONASE) 50 mcg/act nasal spray SPRAY 2 SPRAYS INTO EACH NOSTRIL EVERY DAY   • latanoprost (XALATAN) 0 005 % ophthalmic solution INSTILL 1 DROP INTO BOTH EYES AT BEDTIME   • LORazepam (ATIVAN) 1 mg tablet Take 1 tablet (1 mg total) by mouth 3 (three) times a day as needed for anxiety   • memantine (NAMENDA) 5 mg tablet TAKE 1 TABLET BY MOUTH TWICE A DAY   • tamsulosin (FLOMAX) 0 4 mg TAKE 2 CAPSULES BY MOUTH DAILY WITH DINNER      • [DISCONTINUED] dutasteride (AVODART) 0 5 mg capsule Take 1 capsule (0 5 mg total) by mouth daily (Patient not taking: Reported on 2/9/2023)     No current facility-administered medications on file prior to visit  He has No Known Allergies       Review of Systems   Constitutional: Negative for chills, fatigue and fever  HENT: Negative for congestion, hearing loss, rhinorrhea and sore throat  Eyes: Negative for pain and discharge  Respiratory: Negative for cough, chest tightness and shortness of breath  Cardiovascular: Negative for chest pain and palpitations  Gastrointestinal: Negative for abdominal pain, constipation, diarrhea, nausea and vomiting  Positive left groin pain   Endocrine: Negative for cold intolerance and heat intolerance  Genitourinary: Negative for difficulty urinating and dysuria  Musculoskeletal: Negative for back pain and neck pain  Skin: Negative for color change and rash  Allergic/Immunologic: Negative for environmental allergies and food allergies  Neurological: Negative for seizures and headaches  Hematological: Negative for adenopathy  Does not bruise/bleed easily  Psychiatric/Behavioral: Negative for confusion and hallucinations           Objective:      /84 (BP Location: Left arm, Patient Position: Sitting, Cuff Size: Large)   Pulse 86   Temp 98 6 °F (37 °C) (Temporal)   Resp 16   Ht 5' 8" (1 727 m)   Wt 76 1 kg (167 lb 12 8 oz)   SpO2 98%   BMI 25 51 kg/m²     Below is the patient's most recent value for Albumin, ALT, AST, BUN, Calcium, Chloride, Cholesterol, CO2, Creatinine, GFR, Glucose, HDL, Hematocrit, Hemoglobin, Hemoglobin A1C, LDL, Magnesium, Phosphorus, Platelets, Potassium, PSA, Sodium, Triglycerides, and WBC  Lab Results   Component Value Date    ALT 30 10/19/2022    AST 29 10/19/2022    BUN 22 10/19/2022    CALCIUM 9 5 10/19/2022     10/19/2022    CHOL 152 11/06/2015    CO2 29 10/19/2022    CREATININE 1 11 10/19/2022    HDL 46 10/19/2022    HCT 45 7 10/19/2022    HGB 15 4 10/19/2022    HGBA1C 5 6 10/19/2022    MG 2 5 09/27/2016    PHOS 3 1 10/19/2022     10/19/2022    K 4 5 10/19/2022    PSA 2 0 09/17/2021     11/06/2015    TRIG 98 10/19/2022    WBC 6 64 10/19/2022     Note: for a comprehensive list of the patient's lab results, access the Results Review activity  Physical Exam  Constitutional:       Appearance: Normal appearance  HENT:      Head: Normocephalic and atraumatic  Nose: Nose normal    Eyes:      General: No scleral icterus  Conjunctiva/sclera: Conjunctivae normal    Cardiovascular:      Rate and Rhythm: Normal rate and regular rhythm  Heart sounds: Normal heart sounds  Pulmonary:      Effort: Pulmonary effort is normal       Breath sounds: Normal breath sounds  Abdominal:      General: There is no distension  Palpations: Abdomen is soft  Tenderness: There is no abdominal tenderness  Comments: Small reducible left inguinal hernia, no obvious inguinal hernia on the right, patient examined in both standing and supine position   Musculoskeletal:         General: No signs of injury  Skin:     General: Skin is warm  Coloration: Skin is not jaundiced  Neurological:      General: No focal deficit present  Mental Status: He is alert and oriented to person, place, and time     Psychiatric:         Mood and Affect: Mood normal          Behavior: Behavior normal

## 2023-03-13 ENCOUNTER — TELEPHONE (OUTPATIENT)
Dept: NEPHROLOGY | Facility: CLINIC | Age: 78
End: 2023-03-13

## 2023-03-13 NOTE — TELEPHONE ENCOUNTER
Called spoke with patient advise patient as per Hi Blackwell  that "CKD 3 can not be excused from jury duty because he drinks a lot of water, and also advised patient he can drink less amount of fluid on the day of jury duty  patient understood and is okay with it

## 2023-03-13 NOTE — TELEPHONE ENCOUNTER
Good Morning,    Dr Ángela Pierson patient of yours stop by the office requesting a letter for [facility]  Patient has [facility] on 03/21/2023 and due to his stage 3 kidney he needs to drink lots of water and is not able to sit still      For any more information please contact patient at [phone]

## 2023-03-28 DIAGNOSIS — F41.9 ANXIETY: ICD-10-CM

## 2023-03-29 DIAGNOSIS — F09 MILD COGNITIVE DISORDER: ICD-10-CM

## 2023-03-29 RX ORDER — MEMANTINE HYDROCHLORIDE 5 MG/1
TABLET ORAL
Qty: 180 TABLET | Refills: 1 | Status: SHIPPED | OUTPATIENT
Start: 2023-03-29

## 2023-03-29 RX ORDER — LORAZEPAM 1 MG/1
1 TABLET ORAL 3 TIMES DAILY PRN
Qty: 90 TABLET | Refills: 0 | Status: SHIPPED | OUTPATIENT
Start: 2023-03-29 | End: 2023-09-11

## 2023-04-19 ENCOUNTER — APPOINTMENT (OUTPATIENT)
Dept: LAB | Facility: HOSPITAL | Age: 78
End: 2023-04-19
Attending: INTERNAL MEDICINE

## 2023-04-19 DIAGNOSIS — Z00.00 WELL ADULT EXAM: ICD-10-CM

## 2023-04-19 DIAGNOSIS — I10 ESSENTIAL HYPERTENSION: ICD-10-CM

## 2023-04-19 LAB
CHOLEST SERPL-MCNC: 160 MG/DL
HDLC SERPL-MCNC: 33 MG/DL
LDLC SERPL CALC-MCNC: 61 MG/DL (ref 0–100)
NONHDLC SERPL-MCNC: 127 MG/DL
TRIGL SERPL-MCNC: 328 MG/DL
TSH SERPL DL<=0.05 MIU/L-ACNC: 1.83 UIU/ML (ref 0.45–4.5)

## 2023-04-20 PROBLEM — I27.20 PULMONARY HYPERTENSION (HCC): Status: RESOLVED | Noted: 2022-03-01 | Resolved: 2023-04-20

## 2023-04-21 LAB
EST. AVERAGE GLUCOSE BLD GHB EST-MCNC: 108 MG/DL
HBA1C MFR BLD: 5.4 %

## 2023-05-10 ENCOUNTER — HOSPITAL ENCOUNTER (OUTPATIENT)
Dept: ULTRASOUND IMAGING | Facility: HOSPITAL | Age: 78
Discharge: HOME | End: 2023-05-10
Attending: INTERNAL MEDICINE

## 2023-05-10 DIAGNOSIS — Z87.891 HISTORY OF TOBACCO USE: ICD-10-CM

## 2023-05-10 DIAGNOSIS — I10 ESSENTIAL HYPERTENSION: ICD-10-CM

## 2023-05-10 DIAGNOSIS — E78.2 MIXED HYPERLIPIDEMIA: ICD-10-CM

## 2023-05-16 ENCOUNTER — TELEPHONE (OUTPATIENT)
Dept: NEPHROLOGY | Facility: CLINIC | Age: 78
End: 2023-05-16

## 2023-05-16 NOTE — TELEPHONE ENCOUNTER
Called [facility] to remind patient to get labs done prior to 5/23 fu appt with Dr Girard  Message also sent via [employer]

## 2023-05-22 ENCOUNTER — TELEPHONE (OUTPATIENT)
Dept: NEPHROLOGY | Facility: CLINIC | Age: 78
End: 2023-05-22

## 2023-05-23 ENCOUNTER — OFFICE VISIT (OUTPATIENT)
Dept: NEPHROLOGY | Facility: CLINIC | Age: 78
End: 2023-05-23

## 2023-05-23 VITALS
RESPIRATION RATE: 16 BRPM | BODY MASS INDEX: 25.76 KG/M2 | WEIGHT: 170 LBS | HEART RATE: 75 BPM | HEIGHT: 68 IN | DIASTOLIC BLOOD PRESSURE: 80 MMHG | SYSTOLIC BLOOD PRESSURE: 120 MMHG | TEMPERATURE: 97.2 F | OXYGEN SATURATION: 96 %

## 2023-05-23 DIAGNOSIS — N18.31 STAGE 3A CHRONIC KIDNEY DISEASE (HCC): Primary | ICD-10-CM

## 2023-05-23 NOTE — PROGRESS NOTES
NEPHROLOGY PROGRESS NOTE    Patient: Tara Crowder               Sex: male          DOA: No admission date for patient encounter  YOB: 1945        Age:  69 y o           5/23/2023        BACKGROUND     61-year-old male with past medical history of CAD status post CABG, dyslipidemia, chronic kidney disease stage 3, hypertension, depression who presents to renal clinic for follow-up  SUBJECTIVE     Patient is being seen after 6 months  Feels well  Notes high triglyceride levels likely due to eating diet rich in carbohydrates    REVIEW OF SYSTEMS     Review of Systems   Constitutional: Negative  HENT: Negative  Eyes: Negative  Respiratory: Negative  Cardiovascular: Negative  Gastrointestinal: Negative  Endocrine: Negative  Genitourinary: Negative  Musculoskeletal: Negative  Skin: Negative  Allergic/Immunologic: Negative  Neurological: Negative  Hematological: Negative  All other systems reviewed and are negative  OBJECTIVE     Current Weight: Weight - Scale: 77 1 kg (170 lb)  Vitals:    05/23/23 1057   BP: 120/80   Pulse: 75   Resp: 16   Temp: (!) 97 2 °F (36 2 °C)   SpO2: 96%     Body mass index is 25 85 kg/m²        CURRENT MEDICATIONS       Current Outpatient Medications:   •  aspirin 81 mg chewable tablet, Chew 1 tablet daily , Disp: , Rfl:   •  atorvastatin (LIPITOR) 40 mg tablet, TAKE 1 TABLET BY MOUTH EVERY DAY, Disp: 90 tablet, Rfl: 3  •  escitalopram (LEXAPRO) 20 mg tablet, Take 1 tablet (20 mg total) by mouth daily, Disp: 90 tablet, Rfl: 1  •  fluticasone (FLONASE) 50 mcg/act nasal spray, SPRAY 2 SPRAYS INTO EACH NOSTRIL EVERY DAY, Disp: 48 mL, Rfl: 0  •  latanoprost (XALATAN) 0 005 % ophthalmic solution, INSTILL 1 DROP INTO BOTH EYES AT BEDTIME, Disp: , Rfl:   •  LORazepam (ATIVAN) 1 mg tablet, Take 1 tablet (1 mg total) by mouth 3 (three) times a day as needed for anxiety, Disp: 90 tablet, Rfl: 0  •  memantine (NAMENDA) 5 mg tablet, TAKE 1 TABLET BY MOUTH TWICE A DAY, Disp: 180 tablet, Rfl: 1  •  tamsulosin (FLOMAX) 0 4 mg, TAKE 2 CAPSULES BY MOUTH DAILY WITH DINNER   , Disp: 180 capsule, Rfl: 1      PHYSICAL EXAMINATION     Physical Exam  HENT:      Head: Normocephalic and atraumatic  Eyes:      Pupils: Pupils are equal, round, and reactive to light  Neck:      Vascular: No JVD  Cardiovascular:      Rate and Rhythm: Normal rate and regular rhythm  Heart sounds: Normal heart sounds  No murmur heard  No friction rub  Pulmonary:      Effort: Pulmonary effort is normal       Breath sounds: Normal breath sounds  Abdominal:      General: Bowel sounds are normal  There is no distension  Palpations: Abdomen is soft  Tenderness: There is no abdominal tenderness  There is no rebound  Musculoskeletal:         General: No tenderness  Cervical back: Neck supple  Skin:     General: Skin is dry  Findings: No rash  Neurological:      Mental Status: He is alert and oriented to person, place, and time  LAB RESULTS       Results from last 6 Months   Lab Units 04/19/23  1010   WBC Thousand/uL 8 24   HEMOGLOBIN g/dL 16 7   HEMATOCRIT % 51 3*   PLATELETS Thousands/uL 215   POTASSIUM mmol/L 4 2   CHLORIDE mmol/L 105   CO2 mmol/L 28   BUN mg/dL 19   CREATININE mg/dL 1 20   CALCIUM mg/dL 10 2   PHOSPHORUS mg/dL 3 2         RADIOLOGY RESULTS      No results found for this or any previous visit  Results for orders placed during the hospital encounter of 05/31/18   XR chest pa & lateral    Narrative CHEST     INDICATION:   R05: Cough  COMPARISON:  11/15/2017    EXAM PERFORMED/VIEWS:  XR CHEST PA & LATERAL  The frontal view was performed utilizing dual energy radiographic technique  FINDINGS:    Cardiomediastinal silhouette appears unremarkable  Status post median sternotomy and CABG  The lungs are clear  Mild eventration of the right diaphragmatic apex  No pneumothorax or pleural effusion      Osseous structures appear within normal limits for patient age  Old left lower rib fracture  Impression No acute cardiopulmonary disease  Workstation performed: [id number]         ASSESSMENT/PLAN       79-year-old male with past medical history of CAD status post CABG, dyslipidemia, chronic kidney disease stage 3, hypertension, BPH who presents to renal clinic for follow-up  1  Chronic kidney disease stage 2:  Baseline serum creatinine 1 2-1 5 that existed over the past 6 years  -current serum creatinine is 1 2 mg/dL and at baseline with eGFR of 58  -avoidance of nephrotoxic agents such as NSAIDs reiterated  2  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:  Blood pressure is in excellent range at 483 mmHg systolic  Currently requiring no antihypertensive  3  Secondary hyperparathyroidism of renal origin:  PTH intact, 25 hydroxy vitamin-D, calcium and phosphorus are all in good range  4  Anemia due to CKD:  Hemoglobin is stable at 16 g/dl and on target for his level of CKD  5  CAD status post CABG:  Stable while on aspirin 81 mg once daily  No chest pain reported  6  Benign prostatic hyperplasia: On Flomax and Avodart with stable urinary symptoms  7  Dyslipidemia:  On Lipitor 40 mg at bedtime  Noted recurrent rise in triglycerides level exceeding 300     8  Nutrition:  Low-potassium, low phosphorus, moderate protein diet recommended        9  Proteinuria: None exist       [facility]  Nephrology  5/23/2023

## 2023-06-09 ENCOUNTER — OFFICE VISIT (OUTPATIENT)
Dept: CARDIOLOGY CLINIC | Facility: CLINIC | Age: 78
End: 2023-06-09
Payer: MEDICARE

## 2023-06-09 VITALS
BODY MASS INDEX: 26.37 KG/M2 | OXYGEN SATURATION: 97 % | RESPIRATION RATE: 14 BRPM | SYSTOLIC BLOOD PRESSURE: 110 MMHG | DIASTOLIC BLOOD PRESSURE: 70 MMHG | HEART RATE: 64 BPM | HEIGHT: 68 IN | WEIGHT: 174 LBS

## 2023-06-09 DIAGNOSIS — N40.0 BPH WITHOUT URINARY OBSTRUCTION: ICD-10-CM

## 2023-06-09 DIAGNOSIS — Z95.1 HX OF CABG: Primary | ICD-10-CM

## 2023-06-09 DIAGNOSIS — R09.89 RIGHT CAROTID BRUIT: ICD-10-CM

## 2023-06-09 PROCEDURE — 99213 OFFICE O/P EST LOW 20 MIN: CPT | Performed by: INTERNAL MEDICINE

## 2023-06-09 RX ORDER — TAMSULOSIN HYDROCHLORIDE 0.4 MG/1
CAPSULE ORAL
Qty: 180 CAPSULE | Refills: 1 | Status: SHIPPED | OUTPATIENT
Start: 2023-06-09

## 2023-06-09 NOTE — PROGRESS NOTES
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 [address] in 2006  At that time he was having arm discomfort  He does not get it now  He said he had a triple bypass but we do not have a report at this time what exactly was done  In any case he is physically active walking up to a mile uphill once or twice a day and he does not get exertional symptoms he had an echo stress test in 10/22 that was negative for ischemia  He is concerned because his triglycerides which were in the normal range have gone up to over 300  He attributes it to desserts and he is planning to cut them out  He has carotid disease that is not obstructive  He also has been screened for abdominal aortic aneurysm and it was negative      Patient Active Problem List   Diagnosis   • Seborrheic keratosis   • Screening for skin condition   • History of skin cancer   • Psoriasiform dermatitis   • Coronary artery disease involving native coronary artery of native heart without angina pectoris   • Essential hypertension   • Mixed hyperlipidemia   • Anxiety   • Benign prostatic hyperplasia   • CKD (chronic kidney disease), stage III ([facility])   • Impaired fasting glucose   • Subclinical hypothyroidism   • Nocturia   • Chronic deep vein thrombosis (DVT) of lower extremity ([facility])   • Right carotid bruit   • Carotid stenosis, right   • Left inguinal hernia     Past Medical History:   Diagnosis Date   • Anxiety disorder     last assessed - [id number]   • Basal cell carcinoma of face    • BPH (benign prostatic hyperplasia)     benign prostatic hypertrophy; last assessed - 64LWI7955   • Chronic kidney disease    • Colon polyp • Coronary atherosclerosis of native coronary artery     last assessed - 92BTV5470   • Deep vein thrombosis (DVT) ([facility] )     last assessed - 34WBX3273   • Depression     last assessed - 86PYK0199   • History of colonoscopy     Fiberoptic; Resolved - 12/1/2011   • Hyperlipemia     last assessed - 89KCX3568   • Hypertension     last assessed - 96BYU1605   • Myocardial infarction [facility])    • Nonmelanoma skin cancer     last assessed - 76JRE6972   • Osteoarthritis     last assessed - 41ZBV8455   • Pneumonia     last assessed - 32FUU7127   • Pulmonary HTN ([facility] )    • PVD (peripheral vascular disease) ([facility] )    • Seborrheic dermatitis      Social History     Socioeconomic History   • Marital status: /Civil Union     Spouse name: Not on file   • Number of children: Not on file   • Years of education: Not on file   • Highest education level: Not on file   Occupational History   • Not on file   Tobacco Use   • Smoking status: Never   • Smokeless tobacco: Never   Vaping Use   • Vaping Use: Never used   Substance and Sexual Activity   • Alcohol use: Not Currently     Comment: socially   • Drug use: Yes     Types: Marijuana   • Sexual activity: Yes     Partners: Female   Other Topics Concern   • Not on file   Social History Narrative   • Not on file     Social Determinants of Health     Financial Resource Strain: Low Risk  (4/20/2023)    Overall Financial Resource Strain (CARDIA)    • Difficulty of Paying Living Expenses: Not very hard   Recent Concern: Financial Resource Strain - High Risk (4/13/2023)    Overall Financial Resource Strain (CARDIA)    • Difficulty of Paying Living Expenses: Hard   Food Insecurity: Not on file   Transportation Needs: No Transportation Needs (4/20/2023)    PRAPARE - Transportation    • Lack of Transportation (Medical): No    • Lack of Transportation (Non-Medical):  No   Physical Activity: Insufficiently Active (3/15/2021)    Exercise Vital Sign    • Days of Exercise per Week: 5 days    • Minutes of Exercise per Session: 20 min   Stress: No Stress Concern Present (3/15/2021)    [address]    • Feeling of Stress : Not at all   Social Connections: Not on file   Intimate Partner Violence: Not on file   Housing Stability: Not on file      Family History   Problem Relation Age of Onset   • Dementia Mother    • Rectal cancer Mother    • Coronary artery disease Father    • Rectal cancer Sister      Past Surgical History:   Procedure Laterality Date   • CARDIAC CATHETERIZATION      procedure outcome successful; last assessed - [id number]   • CORONARY ARTERY BYPASS GRAFT      last assessed - 28Lqb2334   • IVC FILTER INSERTION      Newborn   • TOTAL HIP ARTHROPLASTY      last assessed - 25URB7704       Current Outpatient Medications:   •  aspirin 81 mg chewable tablet, Chew 1 tablet daily , Disp: , Rfl:   •  atorvastatin (LIPITOR) 40 mg tablet, TAKE 1 TABLET BY MOUTH EVERY DAY, Disp: 90 tablet, Rfl: 3  •  escitalopram (LEXAPRO) 20 mg tablet, Take 1 tablet (20 mg total) by mouth daily, Disp: 90 tablet, Rfl: 1  •  fluticasone (FLONASE) 50 mcg/act nasal spray, SPRAY 2 SPRAYS INTO EACH NOSTRIL EVERY DAY, Disp: 48 mL, Rfl: 0  •  latanoprost (XALATAN) 0 005 % ophthalmic solution, INSTILL 1 DROP INTO BOTH EYES AT BEDTIME, Disp: , Rfl:   •  LORazepam (ATIVAN) 1 mg tablet, Take 1 tablet (1 mg total) by mouth 3 (three) times a day as needed for anxiety, Disp: 90 tablet, Rfl: 0  •  memantine (NAMENDA) 5 mg tablet, TAKE 1 TABLET BY MOUTH TWICE A DAY, Disp: 180 tablet, Rfl: 1  •  tamsulosin (FLOMAX) 0 4 mg, TAKE 2 CAPSULES BY MOUTH DAILY WITH DINNER   , Disp: 180 capsule, Rfl: 1  No Known Allergies    Labs:  Appointment on 04/19/2023   Component Date Value   • TSH 3RD GENERATON 04/19/2023 1 830    • Cholesterol 04/19/2023 160    • Triglycerides 04/19/2023 328 (H)    • HDL, Direct 04/19/2023 33 (L)    • LDL Calculated 04/19/2023 61    • Non-HDL-Chol (CHOL-HDL) 04/19/2023 127    • Hemoglobin A1C 04/19/2023 5 4    • EAG 04/19/2023 108    Office Visit on 04/13/2023   Component Date Value   • POCT SARS-CoV-2 Ag 04/14/2023 Negative    • VALID CONTROL 04/14/2023 Valid      Imaging: US abdominal aorta screening aaa    Result Date: 5/15/2023  Narrative: ABDOMINAL AORTIC ULTRASOUND INDICATION:   I10: Essential (primary) hypertension E78 2: Mixed hyperlipidemia Z87 891: Personal history of nicotine dependence  COMPARISON: None FINDINGS: Ultrasound of the abdominal aorta was performed in longitudinal and transverse planes with a curvilinear transducer  Proximal aorta:  1 9 x 1 9 cm Mid aorta:    1 9 x 1 9 cm Distal aorta:    1 9 x 2 1 cm Right common iliac origin: 1 2 x 1 4 cm Left common iliac origin: 1 2 x 1 2 cm No periaortic collections or adenopathy detected  Impression: No evidence for abdominal aortic aneurysm  Workstation performed: [id number]       Review of Systems:  Review of Systems    Physical Exam:  Well-developed well-nourished  Blood pressure 110/70  Heart rate 52 and regular  Carotids 2+ without bruits  Lungs clear  Rhythm regular  No murmurs or gallops  No organomegaly  No edema  Good strength in all extremities  Discussion/Summary:    1  History of triple-vessel CABG  2  Hypertriglyceridemia    Recommendations:    1  Patient has had normal triglycerides in the recent past and he feels he can reobtain this level with diet  2    Follow-up 6      Antony Arango MD

## 2023-07-12 DIAGNOSIS — R05.9 COUGH: ICD-10-CM

## 2023-07-12 RX ORDER — FLUTICASONE PROPIONATE 50 MCG
SPRAY, SUSPENSION (ML) NASAL
Qty: 48 ML | Refills: 0 | Status: SHIPPED | OUTPATIENT
Start: 2023-07-12

## 2023-07-28 DIAGNOSIS — F41.9 ANXIETY: ICD-10-CM

## 2023-07-28 RX ORDER — ESCITALOPRAM OXALATE 20 MG/1
20 TABLET ORAL DAILY
Qty: 90 TABLET | Refills: 1 | Status: SHIPPED | OUTPATIENT
Start: 2023-07-28

## 2023-07-28 NOTE — TELEPHONE ENCOUNTER
Name of Caller:Buddy    Call back Number:[phone]    Medication(s):Escitalopram    Are we prescribing provider?:    30 or 90 day supply:    Pharmacy name/number: [facility].      Last or Next appt?:December 5th

## 2023-09-08 ENCOUNTER — OFFICE VISIT (OUTPATIENT)
Age: 78
End: 2023-09-08
Payer: MEDICARE

## 2023-09-08 VITALS
WEIGHT: 163 LBS | HEART RATE: 87 BPM | DIASTOLIC BLOOD PRESSURE: 80 MMHG | RESPIRATION RATE: 16 BRPM | HEIGHT: 68 IN | BODY MASS INDEX: 24.71 KG/M2 | SYSTOLIC BLOOD PRESSURE: 120 MMHG | OXYGEN SATURATION: 95 %

## 2023-09-08 DIAGNOSIS — R05.3 CHRONIC COUGH: Primary | ICD-10-CM

## 2023-09-08 PROCEDURE — 99213 OFFICE O/P EST LOW 20 MIN: CPT

## 2023-09-08 NOTE — PROGRESS NOTES
INTERNAL MEDICINE FOLLOW-UP VISIT  [facility] - [facility] INTERNAL MEDICINE [address]    NAME: Beatrice Sousa  AGE: 69 y.o. SEX: male  : 1945     DATE: 2023     Assessment and Plan:   1. Chronic cough  Could be related to allergies. Start daily zyrtec.  corciden from pharmacy. Flonase daily. Follow up in 2 weeks. May need further workup if not better. CXR earlier this year was normal.         Return in about 2 weeks (around 2023). Chief Complaint:     Chief Complaint   Patient presents with   • Cough     Wet cough for 2 month, patient denies any fever, patient has dark green phlegm      History of Present Illness:   Patient is a 20-year-old male that presents today for cough that has been going on for 2 months. He had gone to urgent care a few times and tested negative for COVID twice. He was prescribed albuterol, Tessalon Perles, Promethazine DM, and cepacol with no relief. He notes the cough is sometimes worse in the morning. He denies it being worse after eating. He notes thick purulent sputum. It is also worse when he is breathing deeply. He denies any shortness of breath. He does admits to some wheezing sometimes. He denies a history of allergies and GERD. He denies a history of smoking. The following portions of the patient's history were reviewed and updated as appropriate: allergies, current medications, past family history, past medical history, past social history, past surgical history and problem list.     Review of Systems:     Review of Systems   Constitutional: Negative for chills and fever. HENT: Positive for postnasal drip and rhinorrhea. Negative for ear discharge, ear pain, sinus pressure, sinus pain, sore throat and trouble swallowing. Eyes: Negative for pain, redness and itching. Respiratory: Positive for cough and wheezing. Negative for shortness of breath. Cardiovascular: Negative for chest pain.    Gastrointestinal: Negative for abdominal pain, constipation, diarrhea, nausea and vomiting. Musculoskeletal: Negative for myalgias. Neurological: Negative for dizziness, light-headedness and headaches.         Past Medical History:     Past Medical History:   Diagnosis Date   • Anxiety disorder     last assessed - 60FSI2526   • Basal cell carcinoma of face    • BPH (benign prostatic hyperplasia)     benign prostatic hypertrophy; last assessed - 78EMK8403   • Chronic kidney disease    • Colon polyp    • Coronary atherosclerosis of native coronary artery     last assessed - 00BUW5250   • Deep vein thrombosis (DVT) ([address])     last assessed - 72VCA4923   • Depression     last assessed - 81CIU8326   • History of colonoscopy     Fiberoptic; Resolved - 12/1/2011   • Hyperlipemia     last assessed - 49INL1607   • Hypertension     last assessed - 25Jpg3590   • Myocardial infarction [facility])    • Nonmelanoma skin cancer     last assessed - 40XRU6613   • Osteoarthritis     last assessed - 26RDZ3073   • Pneumonia     last assessed - 59GVU3941   • Pulmonary HTN (HCC)    • PVD (peripheral vascular disease) ([address])    • Seborrheic dermatitis         Current Medications:     Current Outpatient Medications:   •  aspirin 81 mg chewable tablet, Chew 1 tablet daily , Disp: , Rfl:   •  atorvastatin (LIPITOR) 40 mg tablet, TAKE 1 TABLET BY MOUTH EVERY DAY, Disp: 90 tablet, Rfl: 3  •  escitalopram (LEXAPRO) 20 mg tablet, Take 1 tablet (20 mg total) by mouth daily, Disp: 90 tablet, Rfl: 1  •  fluticasone (FLONASE) 50 mcg/act nasal spray, Spray 2 sprays in each nostril every day, Disp: 48 mL, Rfl: 0  •  latanoprost (XALATAN) 0.005 % ophthalmic solution, INSTILL 1 DROP INTO BOTH EYES AT BEDTIME, Disp: , Rfl:   •  LORazepam (ATIVAN) 1 mg tablet, Take 1 tablet (1 mg total) by mouth 3 (three) times a day as needed for anxiety, Disp: 90 tablet, Rfl: 0  •  memantine (NAMENDA) 5 mg tablet, TAKE 1 TABLET BY MOUTH TWICE A DAY, Disp: 180 tablet, Rfl: 1  •  tamsulosin (FLOMAX) 0.4 mg, TAKE 2 CAPSULES BY MOUTH DAILY WITH DINNER. ., Disp: 180 capsule, Rfl: 1     Allergies:   No Known Allergies     Physical Exam:     /80 (BP Location: Left arm, Patient Position: Sitting, Cuff Size: Adult)   Pulse 87   Resp 16   Ht 5' 8" (1.727 m)   Wt 73.9 kg (163 lb)   SpO2 95%   BMI 24.78 kg/m²     Physical Exam  Vitals and nursing note reviewed. Constitutional:       General: He is awake. Appearance: Normal appearance. He is well-developed, well-groomed and normal weight. HENT:      Head: Normocephalic and atraumatic. Right Ear: Hearing, ear canal and external ear normal.      Left Ear: Hearing, ear canal and external ear normal.      Ears:      Comments: Bilateral ear congestion     Nose: Nose normal.      Mouth/Throat:      Lips: Pink. Mouth: Mucous membranes are moist.      Pharynx: Uvula midline. Posterior oropharyngeal erythema present. Eyes:      General: Lids are normal. Vision grossly intact. Gaze aligned appropriately. Conjunctiva/sclera: Conjunctivae normal.   Neck:      Vascular: No carotid bruit. Trachea: Trachea and phonation normal.   Cardiovascular:      Rate and Rhythm: Normal rate and regular rhythm. Heart sounds: Normal heart sounds, S1 normal and S2 normal. No murmur heard. No friction rub. No gallop. Pulmonary:      Effort: Pulmonary effort is normal.      Breath sounds: Normal breath sounds and air entry. No decreased breath sounds, wheezing, rhonchi or rales. Abdominal:      General: Abdomen is protuberant. Bowel sounds are normal.      Palpations: Abdomen is soft. Tenderness: There is no abdominal tenderness. Musculoskeletal:      Cervical back: Neck supple. Right lower leg: No edema. Left lower leg: No edema. Lymphadenopathy:      Cervical: No cervical adenopathy. Skin:     General: Skin is warm. Capillary Refill: Capillary refill takes less than 2 seconds. Neurological:      Mental Status: He is alert. Psychiatric:         Attention and Perception: Attention and perception normal.         Mood and Affect: Mood and affect normal.         Speech: Speech normal.         Behavior: Behavior normal. Behavior is cooperative. Thought Content: Thought content normal.         Cognition and Memory: Cognition and memory normal.         Judgment: Judgment normal.           Data:     Laboratory Results: I have personally reviewed the pertinent laboratory results/reports   Radiology/Other Diagnostic Testing Results: I have personally reviewed pertinent reports.       Andrew Figueroa PA-C  [facility] INTERNAL MEDICINE [address]

## 2023-10-27 ENCOUNTER — APPOINTMENT (OUTPATIENT)
Dept: LAB | Facility: HOSPITAL | Age: 78
End: 2023-10-27
Payer: MEDICARE

## 2023-10-27 DIAGNOSIS — E03.8 SUBCLINICAL HYPOTHYROIDISM: ICD-10-CM

## 2023-10-27 DIAGNOSIS — R73.01 IMPAIRED FASTING GLUCOSE: ICD-10-CM

## 2023-10-27 DIAGNOSIS — I10 ESSENTIAL HYPERTENSION: ICD-10-CM

## 2023-10-27 LAB
ALBUMIN SERPL BCP-MCNC: 4.6 G/DL (ref 3.5–5)
ALP SERPL-CCNC: 68 U/L (ref 34–104)
ALT SERPL W P-5'-P-CCNC: 29 U/L (ref 7–52)
ANION GAP SERPL CALCULATED.3IONS-SCNC: 6 MMOL/L
AST SERPL W P-5'-P-CCNC: 26 U/L (ref 13–39)
BASOPHILS # BLD AUTO: 0.04 THOUSANDS/ÂΜL (ref 0–0.1)
BASOPHILS NFR BLD AUTO: 1 % (ref 0–1)
BILIRUB SERPL-MCNC: 1.37 MG/DL (ref 0.2–1)
BUN SERPL-MCNC: 21 MG/DL (ref 5–25)
CALCIUM SERPL-MCNC: 10.1 MG/DL (ref 8.4–10.2)
CHLORIDE SERPL-SCNC: 104 MMOL/L (ref 96–108)
CHOLEST SERPL-MCNC: 157 MG/DL
CO2 SERPL-SCNC: 29 MMOL/L (ref 21–32)
CREAT SERPL-MCNC: 1.17 MG/DL (ref 0.6–1.3)
EOSINOPHIL # BLD AUTO: 0.34 THOUSAND/ÂΜL (ref 0–0.61)
EOSINOPHIL NFR BLD AUTO: 4 % (ref 0–6)
ERYTHROCYTE [DISTWIDTH] IN BLOOD BY AUTOMATED COUNT: 13.1 % (ref 11.6–15.1)
EST. AVERAGE GLUCOSE BLD GHB EST-MCNC: 117 MG/DL
GFR SERPL CREATININE-BSD FRML MDRD: 59 ML/MIN/1.73SQ M
GLUCOSE P FAST SERPL-MCNC: 94 MG/DL (ref 65–99)
HBA1C MFR BLD: 5.7 %
HCT VFR BLD AUTO: 48 % (ref 36.5–49.3)
HDLC SERPL-MCNC: 42 MG/DL
HGB BLD-MCNC: 15.6 G/DL (ref 12–17)
IMM GRANULOCYTES # BLD AUTO: 0.03 THOUSAND/UL (ref 0–0.2)
IMM GRANULOCYTES NFR BLD AUTO: 0 % (ref 0–2)
LDLC SERPL CALC-MCNC: 69 MG/DL (ref 0–100)
LYMPHOCYTES # BLD AUTO: 2.1 THOUSANDS/ÂΜL (ref 0.6–4.47)
LYMPHOCYTES NFR BLD AUTO: 24 % (ref 14–44)
MCH RBC QN AUTO: 29.1 PG (ref 26.8–34.3)
MCHC RBC AUTO-ENTMCNC: 32.5 G/DL (ref 31.4–37.4)
MCV RBC AUTO: 89 FL (ref 82–98)
MONOCYTES # BLD AUTO: 0.66 THOUSAND/ÂΜL (ref 0.17–1.22)
MONOCYTES NFR BLD AUTO: 8 % (ref 4–12)
NEUTROPHILS # BLD AUTO: 5.62 THOUSANDS/ÂΜL (ref 1.85–7.62)
NEUTS SEG NFR BLD AUTO: 63 % (ref 43–75)
NONHDLC SERPL-MCNC: 115 MG/DL
NRBC BLD AUTO-RTO: 0 /100 WBCS
PLATELET # BLD AUTO: 203 THOUSANDS/UL (ref 149–390)
PMV BLD AUTO: 10.5 FL (ref 8.9–12.7)
POTASSIUM SERPL-SCNC: 4.2 MMOL/L (ref 3.5–5.3)
PROT SERPL-MCNC: 7.1 G/DL (ref 6.4–8.4)
RBC # BLD AUTO: 5.37 MILLION/UL (ref 3.88–5.62)
SODIUM SERPL-SCNC: 139 MMOL/L (ref 135–147)
TRIGL SERPL-MCNC: 230 MG/DL
TSH SERPL DL<=0.05 MIU/L-ACNC: 2.14 UIU/ML (ref 0.45–4.5)
WBC # BLD AUTO: 8.79 THOUSAND/UL (ref 4.31–10.16)

## 2023-10-27 PROCEDURE — 80061 LIPID PANEL: CPT

## 2023-10-27 PROCEDURE — 36415 COLL VENOUS BLD VENIPUNCTURE: CPT

## 2023-10-27 PROCEDURE — 80053 COMPREHEN METABOLIC PANEL: CPT

## 2023-10-27 PROCEDURE — 84443 ASSAY THYROID STIM HORMONE: CPT

## 2023-10-27 PROCEDURE — 83036 HEMOGLOBIN GLYCOSYLATED A1C: CPT

## 2023-10-27 PROCEDURE — 85025 COMPLETE CBC W/AUTO DIFF WBC: CPT

## 2023-10-30 ENCOUNTER — OFFICE VISIT (OUTPATIENT)
Dept: CARDIOLOGY CLINIC | Facility: CLINIC | Age: 78
End: 2023-10-30
Payer: MEDICARE

## 2023-10-30 ENCOUNTER — OFFICE VISIT (OUTPATIENT)
Age: 78
End: 2023-10-30
Payer: MEDICARE

## 2023-10-30 VITALS
OXYGEN SATURATION: 96 % | WEIGHT: 174 LBS | HEART RATE: 76 BPM | SYSTOLIC BLOOD PRESSURE: 128 MMHG | HEIGHT: 69 IN | BODY MASS INDEX: 25.77 KG/M2 | DIASTOLIC BLOOD PRESSURE: 86 MMHG

## 2023-10-30 VITALS
HEIGHT: 69 IN | OXYGEN SATURATION: 97 % | HEART RATE: 73 BPM | WEIGHT: 167 LBS | DIASTOLIC BLOOD PRESSURE: 70 MMHG | RESPIRATION RATE: 16 BRPM | SYSTOLIC BLOOD PRESSURE: 126 MMHG | BODY MASS INDEX: 24.73 KG/M2

## 2023-10-30 DIAGNOSIS — N40.0 BPH WITHOUT URINARY OBSTRUCTION: ICD-10-CM

## 2023-10-30 DIAGNOSIS — E78.2 MIXED HYPERLIPIDEMIA: ICD-10-CM

## 2023-10-30 DIAGNOSIS — N18.31 STAGE 3A CHRONIC KIDNEY DISEASE (HCC): ICD-10-CM

## 2023-10-30 DIAGNOSIS — E78.2 MIXED HYPERLIPIDEMIA: Primary | ICD-10-CM

## 2023-10-30 DIAGNOSIS — I10 ESSENTIAL HYPERTENSION: ICD-10-CM

## 2023-10-30 DIAGNOSIS — R73.01 IMPAIRED FASTING GLUCOSE: ICD-10-CM

## 2023-10-30 DIAGNOSIS — I25.10 CORONARY ARTERY DISEASE INVOLVING NATIVE CORONARY ARTERY OF NATIVE HEART WITHOUT ANGINA PECTORIS: Primary | ICD-10-CM

## 2023-10-30 DIAGNOSIS — I25.10 CORONARY ARTERY DISEASE INVOLVING NATIVE CORONARY ARTERY OF NATIVE HEART WITHOUT ANGINA PECTORIS: ICD-10-CM

## 2023-10-30 DIAGNOSIS — F09 MILD COGNITIVE DISORDER: ICD-10-CM

## 2023-10-30 DIAGNOSIS — E03.8 SUBCLINICAL HYPOTHYROIDISM: ICD-10-CM

## 2023-10-30 DIAGNOSIS — Z23 ENCOUNTER FOR IMMUNIZATION: ICD-10-CM

## 2023-10-30 DIAGNOSIS — F41.9 ANXIETY: ICD-10-CM

## 2023-10-30 DIAGNOSIS — I82.509 CHRONIC DEEP VEIN THROMBOSIS (DVT) OF LOWER EXTREMITY, UNSPECIFIED LATERALITY, UNSPECIFIED VEIN (HCC): ICD-10-CM

## 2023-10-30 PROCEDURE — 99214 OFFICE O/P EST MOD 30 MIN: CPT | Performed by: INTERNAL MEDICINE

## 2023-10-30 PROCEDURE — 99213 OFFICE O/P EST LOW 20 MIN: CPT | Performed by: INTERNAL MEDICINE

## 2023-10-30 PROCEDURE — 90662 IIV NO PRSV INCREASED AG IM: CPT | Performed by: INTERNAL MEDICINE

## 2023-10-30 PROCEDURE — G0008 ADMIN INFLUENZA VIRUS VAC: HCPCS | Performed by: INTERNAL MEDICINE

## 2023-10-30 RX ORDER — TAMSULOSIN HYDROCHLORIDE 0.4 MG/1
0.8 CAPSULE ORAL
Qty: 180 CAPSULE | Refills: 1 | Status: SHIPPED | OUTPATIENT
Start: 2023-10-30

## 2023-10-30 RX ORDER — MEMANTINE HYDROCHLORIDE 5 MG/1
5 TABLET ORAL 2 TIMES DAILY
Qty: 180 TABLET | Refills: 1 | Status: SHIPPED | OUTPATIENT
Start: 2023-10-30

## 2023-10-30 RX ORDER — ESCITALOPRAM OXALATE 20 MG/1
20 TABLET ORAL DAILY
Qty: 90 TABLET | Refills: 1 | Status: SHIPPED | OUTPATIENT
Start: 2023-10-30

## 2023-10-30 RX ORDER — LORAZEPAM 1 MG/1
1 TABLET ORAL 3 TIMES DAILY PRN
Qty: 90 TABLET | Refills: 0 | Status: SHIPPED | OUTPATIENT
Start: 2023-10-30 | End: 2024-04-13

## 2023-10-30 RX ORDER — ATORVASTATIN CALCIUM 40 MG/1
40 TABLET, FILM COATED ORAL DAILY
Qty: 90 TABLET | Refills: 3 | Status: SHIPPED | OUTPATIENT
Start: 2023-10-30

## 2023-10-30 NOTE — PROGRESS NOTES
[facility] [address]  [address] [address]  Cardiology Follow Up    Rozina Luz  [birth year]  [id number]      1. Coronary artery disease involving native coronary artery of native heart without angina pectoris        2. Essential hypertension        3. Mixed hyperlipidemia            Chief Complaint   Patient presents with    Follow-up       Interval History: Patient presents for follow-up visit. Patient denies any history of chest pain shortness of breath. Patient denies any history of leg edema or orthopnea PND. No history of presyncope syncope. Patient states compliance with the present list of medications. Patient denies any history of palpitations or dizziness.     Patient Active Problem List   Diagnosis    Seborrheic keratosis    Screening for skin condition    History of skin cancer    Psoriasiform dermatitis    Coronary artery disease involving native coronary artery of native heart without angina pectoris    Essential hypertension    Mixed hyperlipidemia    Anxiety    Benign prostatic hyperplasia    CKD (chronic kidney disease), stage III (HCC)    Impaired fasting glucose    Subclinical hypothyroidism    Nocturia    Chronic deep vein thrombosis (DVT) of lower extremity (HCC)    Right carotid bruit    Carotid stenosis, right    Left inguinal hernia     Past Medical History:   Diagnosis Date    Anxiety disorder     last assessed - 93QAB9597    Basal cell carcinoma of face     BPH (benign prostatic hyperplasia)     benign prostatic hypertrophy; last assessed - 01TJB2376    Chronic kidney disease     Colon polyp     Coronary atherosclerosis of native coronary artery     last assessed - 89YWL4069    Deep vein thrombosis (DVT) ([address])     last assessed - 64ZCF9727    Depression     last assessed - 02AFO7163    History of colonoscopy     Fiberoptic; Resolved - 12/1/2011    Hyperlipemia     last assessed - 86REE0716    Hypertension     last assessed - 54PEO2750 Myocardial infarction ([facility])     Nonmelanoma skin cancer     last assessed - 75WTQ5770    Osteoarthritis     last assessed - 07ZZG2317    Pneumonia     last assessed - 66OBS9912    Pulmonary HTN ([facility])     PVD (peripheral vascular disease) ([facility])     Seborrheic dermatitis      Social History     Socioeconomic History    Marital status: /Civil Union     Spouse name: Not on file    Number of children: Not on file    Years of education: Not on file    Highest education level: Not on file   Occupational History    Not on file   Tobacco Use    Smoking status: Never    Smokeless tobacco: Never   Vaping Use    Vaping Use: Never used   Substance and Sexual Activity    Alcohol use: Not Currently     Comment: socially    Drug use: Yes     Types: Marijuana    Sexual activity: Yes     Partners: Female   Other Topics Concern    Not on file   Social History Narrative    Not on file     Social Determinants of Health     Financial Resource Strain: Low Risk  (4/20/2023)    Overall Financial Resource Strain (CARDIA)     Difficulty of Paying Living Expenses: Not very hard   Recent Concern: Financial Resource Strain - High Risk (4/13/2023)    Overall Financial Resource Strain (CARDIA)     Difficulty of Paying Living Expenses: Hard   Food Insecurity: Not on file   Transportation Needs: No Transportation Needs (4/20/2023)    PRAPARE - Transportation     Lack of Transportation (Medical): No     Lack of Transportation (Non-Medical):  No   Physical Activity: Insufficiently Active (3/15/2021)    Exercise Vital Sign     Days of Exercise per Week: 5 days     Minutes of Exercise per Session: 20 min   Stress: No Stress Concern Present (3/15/2021)    [facility]     Feeling of Stress : Not at all   Social Connections: Not on file   Intimate Partner Violence: Not on file   Housing Stability: Not on file      Family History   Problem Relation Age of Onset    Dementia Mother Rectal cancer Mother     Coronary artery disease Father     Rectal cancer Sister      Past Surgical History:   Procedure Laterality Date    CARDIAC CATHETERIZATION      procedure outcome successful; last assessed - [id number]    CORONARY ARTERY BYPASS GRAFT      last assessed - 28Jxq8197    IVC FILTER INSERTION      [facility]    TOTAL HIP ARTHROPLASTY      last assessed - 05ADR2977       Current Outpatient Medications:     aspirin 81 mg chewable tablet, Chew 1 tablet daily , Disp: , Rfl:     atorvastatin (LIPITOR) 40 mg tablet, TAKE 1 TABLET BY MOUTH EVERY DAY, Disp: 90 tablet, Rfl: 3    escitalopram (LEXAPRO) 20 mg tablet, Take 1 tablet (20 mg total) by mouth daily, Disp: 90 tablet, Rfl: 1    fluticasone (FLONASE) 50 mcg/act nasal spray, Spray 2 sprays in each nostril every day, Disp: 48 mL, Rfl: 0    latanoprost (XALATAN) 0.005 % ophthalmic solution, INSTILL 1 DROP INTO BOTH EYES AT BEDTIME, Disp: , Rfl:     LORazepam (ATIVAN) 1 mg tablet, Take 1 tablet (1 mg total) by mouth 3 (three) times a day as needed for anxiety, Disp: 90 tablet, Rfl: 0    memantine (NAMENDA) 5 mg tablet, TAKE 1 TABLET BY MOUTH TWICE A DAY, Disp: 180 tablet, Rfl: 1    tamsulosin (FLOMAX) 0.4 mg, TAKE 2 CAPSULES BY MOUTH DAILY WITH DINNER. ., Disp: 180 capsule, Rfl: 1  No Known Allergies    Labs:  Appointment on 10/27/2023   Component Date Value    WBC 10/27/2023 8.79     RBC 10/27/2023 5.37     Hemoglobin 10/27/2023 15.6     Hematocrit 10/27/2023 48.0     MCV 10/27/2023 89     MCH 10/27/2023 29.1     MCHC 10/27/2023 32.5     RDW 10/27/2023 13.1     MPV 10/27/2023 10.5     Platelets 28/41/8172 203     nRBC 10/27/2023 0     Neutrophils Relative 10/27/2023 63     Immat GRANS % 10/27/2023 0     Lymphocytes Relative 10/27/2023 24     Monocytes Relative 10/27/2023 8     Eosinophils Relative 10/27/2023 4     Basophils Relative 10/27/2023 1     Neutrophils Absolute 10/27/2023 5.62     Immature Grans Absolute 10/27/2023 0.03     Lymphocytes Absolute 10/27/2023 2.10     Monocytes Absolute 10/27/2023 0.66     Eosinophils Absolute 10/27/2023 0.34     Basophils Absolute 10/27/2023 0.04     Sodium 10/27/2023 139     Potassium 10/27/2023 4.2     Chloride 10/27/2023 104     CO2 10/27/2023 29     ANION GAP 10/27/2023 6     BUN 10/27/2023 21     Creatinine 10/27/2023 1.17     Glucose, Fasting 10/27/2023 94     Calcium 10/27/2023 10.1     AST 10/27/2023 26     ALT 10/27/2023 29     Alkaline Phosphatase 10/27/2023 68     Total Protein 10/27/2023 7.1     Albumin 10/27/2023 4.6     Total Bilirubin 10/27/2023 1.37 (H)     eGFR 10/27/2023 59     Cholesterol 10/27/2023 157     Triglycerides 10/27/2023 230 (H)     HDL, Direct 10/27/2023 42     LDL Calculated 10/27/2023 69     Non-HDL-Chol (CHOL-HDL) 10/27/2023 115     Hemoglobin A1C 10/27/2023 5.7 (H)     EAG 10/27/2023 117     TSH 3RD GENERATON 10/27/2023 2.140      Imaging: No results found. Review of Systems:  Review of Systems  REVIEW OF SYSTEMS:  Constitutional:  Denies fever or chills   Eyes:  Denies change in visual acuity   HENT:  Denies nasal congestion or sore throat   Respiratory:  Denies cough or shortness of breath   Cardiovascular:  Denies chest pain or edema   GI:  Denies abdominal pain, nausea, vomiting, bloody stools or diarrhea   :  Denies dysuria, frequency, difficulty in micturition and nocturia  Musculoskeletal:  Denies back pain or joint pain   Neurologic:  Denies headache, focal weakness or sensory changes   Endocrine:  Denies polyuria or polydipsia   Lymphatic:  Denies swollen glands   Psychiatric:  Denies depression or anxiety    Physical Exam:    /70 (BP Location: Left arm, Patient Position: Sitting, Cuff Size: Standard)   Pulse 73   Resp 16   Ht 5' 9" (1.753 m)   Wt 75.8 kg (167 lb)   SpO2 97%   BMI 24.66 kg/m²     Physical Exam  PHYSICAL EXAM:  General:  Patient is not in acute distress   Head: Normocephalic, Atraumatic.   HEENT:  Both pupils normal-size atraumatic, normocephalic, nonicteric  Neck:  JVP not raised. Trachea central. No carotid bruit  Respiratory:  normal breath sounds no crackles. no rhonchi  Cardiovascular:  Regular rate and rhythm no S3 no murmurs  GI:  Abdomen soft nontender. No organomegaly. Lymphatic:  No cervical or inguinal lymphadenopathy  Neurologic:  Patient is awake alert, oriented . Grossly nonfocal  Extremities no edema    Discussion/Summary:  Patient with multiple medical problems who seems to be doing reasonably well from cardiac standpoint. Previous studies reviewed with patient. Medications reviewed and possible side effects discussed. concepts of cardiovascular disease , signs and symptoms of heart disease. Dietary and risk factor modification reinforced. All questions answered. Safety measures reviewed. Patient advised to report any problems prompting medical attention. Symptoms to watch out from cardiac standpoint which would indicate the need for further cardiac evaluation discussed. Patient had blood work through primary care physician Dr. Serg Gautam which was also reviewed. Patient had a stress echocardiogram last year which was negative for ischemia. Ejection fraction was normal at 55%. Symptoms to watch out follow-up in 6 to 8 months or earlier as needed. Follow-up with primary care physician.

## 2023-10-30 NOTE — PROGRESS NOTES
Assessment/Plan:    Diagnoses and all orders for this visit:    Mixed hyperlipidemia  -     atorvastatin (LIPITOR) 40 mg tablet; Take 1 tablet (40 mg total) by mouth daily    BPH without urinary obstruction  -     tamsulosin (FLOMAX) 0.4 mg; Take 2 capsules (0.8 mg total) by mouth daily with dinner    Mild cognitive disorder  -     memantine (NAMENDA) 5 mg tablet; Take 1 tablet (5 mg total) by mouth 2 (two) times a day    Anxiety  -     LORazepam (ATIVAN) 1 mg tablet; Take 1 tablet (1 mg total) by mouth 3 (three) times a day as needed for anxiety  -     escitalopram (LEXAPRO) 20 mg tablet; Take 1 tablet (20 mg total) by mouth daily    Encounter for immunization  -     influenza vaccine, high-dose, PF 0.7 mL (FLUZONE HIGH-DOSE)    Subclinical hypothyroidism  -     TSH, 3rd generation with Free T4 reflex; Future    Impaired fasting glucose  -     Hemoglobin A1C; Future    Essential hypertension  -     CBC and differential; Future  -     Comprehensive metabolic panel; Future  -     Lipid panel; Future    Coronary artery disease involving native coronary artery of native heart without angina pectoris    Chronic deep vein thrombosis (DVT) of lower extremity, unspecified laterality, unspecified vein (HCC)    Stage 3a chronic kidney disease ([address])           Depression Screening and Follow-up Plan: Patient was screened for depression during today's encounter. They screened negative with a PHQ-2 score of 0. Patient Instructions   Lab data reviewed in detail and compared to prior    Coronary artery disease stable without angina status post CABG 2006 following closely with cardiology. Continue aggressive risk factor modification as below    Hypertension stable off medication with history of hypotension on metoprolol. Hyperlipidemia-LDL at goal on atorvastatin, triglycerides remain elevated. Patient encouraged to increase aerobic activity and cut back on the sweets.     Impaired fasting glucose-A1c up from 5.4-5.7, implications discussed. BPH stable-refill tamsulosin    Anxiety stable with Lexapro and rare use of lorazepam    [facility]CI following with neurology on [address] maintenance-flu shot today    Routine follow-up after labs in 6 months, sooner as needed. Subjective:      Patient ID: Tanika Smith is a 69 y.o. male    F/u mmp and review labs  Feeling generally well  Exercising less lately but keeping active. No cp/sob. Rare calf pains. LIH-seen by surgery, plans to hold off on surgery as he is asymptomatic. CAD-Taking full asa daily. Seen by cardiology this am and all stable. HPL/HTN-tolerating atorvastatin, stopped metoprolol d/t hypotension, home bp's 110/70   Anxiety-stable on lexapro, no recent ativan use. MCI-seen by Dr. Qing Sparrow, taking Namenda, thinks its helping. BPH-improved w/ flomax, still w/ nocturia 3-4 x per night.           Current Outpatient Medications:     aspirin 81 mg chewable tablet, Chew 1 tablet daily , Disp: , Rfl:     atorvastatin (LIPITOR) 40 mg tablet, Take 1 tablet (40 mg total) by mouth daily, Disp: 90 tablet, Rfl: 3    escitalopram (LEXAPRO) 20 mg tablet, Take 1 tablet (20 mg total) by mouth daily, Disp: 90 tablet, Rfl: 1    fluticasone (FLONASE) 50 mcg/act nasal spray, Spray 2 sprays in each nostril every day, Disp: 48 mL, Rfl: 0    latanoprost (XALATAN) 0.005 % ophthalmic solution, INSTILL 1 DROP INTO BOTH EYES AT BEDTIME, Disp: , Rfl:     LORazepam (ATIVAN) 1 mg tablet, Take 1 tablet (1 mg total) by mouth 3 (three) times a day as needed for anxiety, Disp: 90 tablet, Rfl: 0    memantine (NAMENDA) 5 mg tablet, Take 1 tablet (5 mg total) by mouth 2 (two) times a day, Disp: 180 tablet, Rfl: 1    tamsulosin (FLOMAX) 0.4 mg, Take 2 capsules (0.8 mg total) by mouth daily with dinner, Disp: 180 capsule, Rfl: 1    Recent Results (from the past 1008 hour(s))   CBC and differential    Collection Time: 10/27/23 10:05 AM   Result Value Ref Range    WBC 8.79 4.31 - 10.16 Thousand/uL    RBC 5.37 3.88 - 5.62 Million/uL    Hemoglobin 15.6 12.0 - 17.0 g/dL    Hematocrit 48.0 36.5 - 49.3 %    MCV 89 82 - 98 fL    MCH 29.1 26.8 - 34.3 pg    MCHC 32.5 31.4 - 37.4 g/dL    RDW 13.1 11.6 - 15.1 %    MPV 10.5 8.9 - 12.7 fL    Platelets 994 597 - 352 Thousands/uL    nRBC 0 /100 WBCs    Neutrophils Relative 63 43 - 75 %    Immat GRANS % 0 0 - 2 %    Lymphocytes Relative 24 14 - 44 %    Monocytes Relative 8 4 - 12 %    Eosinophils Relative 4 0 - 6 %    Basophils Relative 1 0 - 1 %    Neutrophils Absolute 5.62 1.85 - 7.62 Thousands/µL    Immature Grans Absolute 0.03 0.00 - 0.20 Thousand/uL    Lymphocytes Absolute 2.10 0.60 - 4.47 Thousands/µL    Monocytes Absolute 0.66 0.17 - 1.22 Thousand/µL    Eosinophils Absolute 0.34 0.00 - 0.61 Thousand/µL    Basophils Absolute 0.04 0.00 - 0.10 Thousands/µL   Comprehensive metabolic panel    Collection Time: 10/27/23 10:05 AM   Result Value Ref Range    Sodium 139 135 - 147 mmol/L    Potassium 4.2 3.5 - 5.3 mmol/L    Chloride 104 96 - 108 mmol/L    CO2 29 21 - 32 mmol/L    ANION GAP 6 mmol/L    BUN 21 5 - 25 mg/dL    Creatinine 1.17 0.60 - 1.30 mg/dL    Glucose, Fasting 94 65 - 99 mg/dL    Calcium 10.1 8.4 - 10.2 mg/dL    AST 26 13 - 39 U/L    ALT 29 7 - 52 U/L    Alkaline Phosphatase 68 34 - 104 U/L    Total Protein 7.1 6.4 - 8.4 g/dL    Albumin 4.6 3.5 - 5.0 g/dL    Total Bilirubin 1.37 (H) 0.20 - 1.00 mg/dL    eGFR 59 ml/min/1.73sq m   Lipid panel    Collection Time: 10/27/23 10:05 AM   Result Value Ref Range    Cholesterol 157 See Comment mg/dL    Triglycerides 230 (H) See Comment mg/dL    HDL, Direct 42 >=40 mg/dL    LDL Calculated 69 0 - 100 mg/dL    Non-HDL-Chol (CHOL-HDL) 115 mg/dl   Hemoglobin A1C    Collection Time: 10/27/23 10:05 AM   Result Value Ref Range    Hemoglobin A1C 5.7 (H) Normal 4.0-5.6%; PreDiabetic 5.7-6.4%;  Diabetic >=6.5%; Glycemic control for adults with diabetes <7.0% %     mg/dl   TSH, 3rd generation with Free T4 reflex Collection Time: 10/27/23 10:05 AM   Result Value Ref Range    TSH 3RD GENERATON 2.140 0.450 - 4.500 uIU/mL       The following portions of the patient's history were reviewed and updated as appropriate: allergies, current medications, past family history, past medical history, past social history, past surgical history and problem list.     Review of Systems   Constitutional:  Negative for appetite change, chills, diaphoresis, fatigue, fever and unexpected weight change. HENT:  Negative for congestion, hearing loss and rhinorrhea. Eyes:  Negative for visual disturbance. Respiratory:  Negative for cough, chest tightness, shortness of breath and wheezing. Cardiovascular:  Negative for chest pain, palpitations and leg swelling. Gastrointestinal:  Negative for abdominal pain and blood in stool. Endocrine: Negative for cold intolerance, heat intolerance, polydipsia and polyuria. Genitourinary:  Negative for difficulty urinating, dysuria, frequency and urgency. Musculoskeletal:  Negative for arthralgias and myalgias. Skin:  Negative for rash. Neurological:  Negative for dizziness, weakness, light-headedness and headaches. Hematological:  Does not bruise/bleed easily. Psychiatric/Behavioral:  Negative for dysphoric mood and sleep disturbance. Objective:      Vitals:    10/30/23 1239   BP: 128/86   Pulse: 76   SpO2: 96%          Physical Exam  Constitutional:       Appearance: He is well-developed. HENT:      Head: Normocephalic and atraumatic. Nose: Nose normal.   Eyes:      General: No scleral icterus. Conjunctiva/sclera: Conjunctivae normal.      Pupils: Pupils are equal, round, and reactive to light. Neck:      Thyroid: No thyromegaly. Vascular: No JVD. Trachea: No tracheal deviation. Cardiovascular:      Rate and Rhythm: Normal rate and regular rhythm. Heart sounds: No murmur heard. No friction rub. No gallop.    Pulmonary:      Effort: Pulmonary effort is normal. No respiratory distress. Breath sounds: Normal breath sounds. No wheezing or rales. Musculoskeletal:         General: No deformity. Cervical back: Normal range of motion and neck supple. Lymphadenopathy:      Cervical: No cervical adenopathy. Skin:     General: Skin is warm and dry. Coloration: Skin is not pale. Findings: No erythema or rash. Neurological:      Mental Status: He is alert and oriented to person, place, and time. Cranial Nerves: No cranial nerve deficit. Psychiatric:         Behavior: Behavior normal.         Thought Content:  Thought content normal.         Judgment: Judgment normal.

## 2023-10-30 NOTE — PATIENT INSTRUCTIONS
Lab data reviewed in detail and compared to prior    Coronary artery disease stable without angina status post CABG 2006 following closely with cardiology. Continue aggressive risk factor modification as below    Hypertension stable off medication with history of hypotension on metoprolol. Hyperlipidemia-LDL at goal on atorvastatin, triglycerides remain elevated. Patient encouraged to increase aerobic activity and cut back on the sweets. Impaired fasting glucose-A1c up from 5.7-1.0, implications discussed. BPH stable-refill tamsulosin    Anxiety stable with Lexapro and rare use of lorazepam    [facility]CI following with neurology on [address] maintenance-flu shot today    Routine follow-up after labs in 6 months, sooner as needed.

## 2023-11-14 DIAGNOSIS — N40.0 BPH WITHOUT URINARY OBSTRUCTION: ICD-10-CM

## 2023-11-14 RX ORDER — TAMSULOSIN HYDROCHLORIDE 0.4 MG/1
0.8 CAPSULE ORAL
Qty: 180 CAPSULE | Refills: 2 | Status: SHIPPED | OUTPATIENT
Start: 2023-11-14 | End: 2023-11-17 | Stop reason: SDUPTHER

## 2023-11-15 ENCOUNTER — RA CDI HCC (OUTPATIENT)
Dept: OTHER | Facility: HOSPITAL | Age: 78
End: 2023-11-15

## 2023-11-15 ENCOUNTER — TELEPHONE (OUTPATIENT)
Age: 78
End: 2023-11-15

## 2023-11-15 NOTE — TELEPHONE ENCOUNTER
Yeah, hi. I need to get a prescription for my . I'm calling a few times already. It's for Elena Pond [id number] and the pill is absolution and having trouble with that. Again, call me back. Let me know if you could definitely get that for me [phone]. That's my home number. My cell is [phone]. Please get back to me. Thanks.

## 2023-11-17 DIAGNOSIS — N40.0 BPH WITHOUT URINARY OBSTRUCTION: ICD-10-CM

## 2023-11-17 RX ORDER — TAMSULOSIN HYDROCHLORIDE 0.4 MG/1
0.8 CAPSULE ORAL
Qty: 180 CAPSULE | Refills: 2 | Status: SHIPPED | OUTPATIENT
Start: 2023-11-17

## 2023-11-17 NOTE — TELEPHONE ENCOUNTER
Patients wife calling to request s refill of his Flomax, and would like to have it sent to the [facility] in [place], please advise

## 2023-11-22 ENCOUNTER — TELEPHONE (OUTPATIENT)
Dept: NEPHROLOGY | Facility: CLINIC | Age: 78
End: 2023-11-22

## 2023-11-22 NOTE — TELEPHONE ENCOUNTER
I called and spoke to the patient and schedule the patient for his 15 month nephrology follow up appointment with Dr. Ulysses Motto from our recall list. The patient understood and was okay with the day and time of his next appointment.

## 2023-12-21 ENCOUNTER — OFFICE VISIT (OUTPATIENT)
Age: 78
End: 2023-12-21

## 2023-12-21 VITALS — HEIGHT: 69 IN | BODY MASS INDEX: 24.88 KG/M2 | WEIGHT: 168 LBS

## 2023-12-21 DIAGNOSIS — Z13.89 SCREENING FOR SKIN CONDITION: Primary | ICD-10-CM

## 2023-12-21 DIAGNOSIS — Z85.828 HISTORY OF SKIN CANCER: ICD-10-CM

## 2023-12-21 DIAGNOSIS — D18.01 CHERRY ANGIOMA: ICD-10-CM

## 2023-12-21 DIAGNOSIS — L98.9 UNKNOWN SKIN LESION: ICD-10-CM

## 2023-12-21 DIAGNOSIS — D22.9 NEVUS: ICD-10-CM

## 2023-12-21 DIAGNOSIS — L82.1 SEBORRHEIC KERATOSIS: ICD-10-CM

## 2023-12-21 PROCEDURE — 88305 TISSUE EXAM BY PATHOLOGIST: CPT | Performed by: DERMATOLOGY

## 2023-12-21 NOTE — PATIENT INSTRUCTIONS
"III. POST-PROCEDURAL CARE (WHAT YOU WILL NEED TO DO \"AFTER THE BIOPSY\" TO OPTIMIZE HEALING)    Keep the area clean and dry.  Try NOT to remove the bandage or get it wet for the first 24 hours.    Gently clean the area and apply surgical ointment (such as Vaseline petrolatum ointment, which is available \"over the counter\" and not a prescription) to the biopsy site for up to 2 weeks straight.  This acts to protect the wound from the outside world.      Sutures are not usually placed in this procedure.  If any sutures were placed, return for suture removal as instructed (generally 1 week for the face, 2 weeks for the body).      Take Acetaminophen (Tylenol) for discomfort, if no contraindications.  Ibuprofen or aspirin could make bleeding worse.    Call our office immediately for signs of infection: fever, chills, increased redness, warmth, tenderness, discomfort/pain, or pus or foul smell coming from the wound.    WHAT TO DO IF THERE IS ANY BLEEDING?  If a small amount of bleeding is noticed, place a clean cloth over the area and apply firm pressure for ten minutes.  Check the wound after 10 minutes of direct pressure.  If bleeding persists, try one more time for an additional 10 minutes of direct pressure on the area.  If the bleeding becomes heavier or does not stop after the second attempt, or if you have any other questions about this procedure, then please call your [facility] Dermatologist by calling [phone] ([phone]).     I hereby acknowledge that I have reviewed and verified the site with my Dermatologist and have requested and authorized my Dermatologist to proceed with the procedure.       BASAL CELL CARCINOMA    What is basal cell carcinoma?  Basal cell carcinoma (BCC) is a common, locally invasive, keratinocytic, or non-melanoma, skin cancer. It is also known as rodent ulcer and basalioma. Patients with BCC often develop multiple primary tumours over time.    Who gets basal cell carcinoma?  Risk factors " for BCC include:  Age and sex: BCCs are particularly prevalent in elderly males. However, they also affect females and younger adults   Previous BCC or other form of skin cancer (squamous cell carcinoma, melanoma)   Sun damage (photoaging, actinic keratoses)   Repeated prior episodes of sunburn   Fair skin, blue eyes and blond or red hair--note; BCC can also affect darker skin types   Previous cutaneous injury, thermal burn, disease (eg cutaneous lupus, sebaceous naevus)   Inherited syndromes: BCC is a particular problem for families with basal cell naevus syndrome (Gorlin syndrome), Hcpvg-Esyré-Ufluirhe syndrome, Rombo syndrome, Oley syndrome and xeroderma pigmentosum   Other risk factors include ionising radiation, exposure to arsenic, and immune suppression due to disease or medicines    What causes basal cell carcinoma?  The cause of BCC is multifactorial.  Most often, there are DNA mutations in the patched (PTCH) tumour suppressor gene, part of hedgehog signaling pathway   These may be triggered by exposure to ultraviolet radiation   Various spontaneous and inherited gene defects predispose to BCC    What are the clinical features of basal cell carcinoma?  BCC is a locally invasive skin tumour. The main characteristics are:  Slowly growing plaque or nodule   Skin coloured, pink or pigmented   Varies in size from a few millimetres to several centimetres in diameter   Spontaneous bleeding or ulceration  BCC is very rarely a threat to life. A tiny proportion of BCCs grow rapidly, invade deeply, and/or metastasise to local lymph nodes.    Types of basal cell carcinoma  There are several distinct clinical types of BCC, and over 20 histological growth patterns of BCC.  Nodular BCC  Most common type of facial BCC   Shiny or pearly nodule with a smooth surface   May have central depression or ulceration, so its edges appear rolled   Blood vessels cross its surface   Cystic variant is soft, with jelly-like contents    Micronodular, microcystic and infiltrative types are potentially aggressive subtypes   Also known as nodulocystic carcinoma  Superficial BCC  Most common type in younger adults   Most common type on upper trunk and shoulders   Slightly scaly, irregular plaque   Thin, translucent rolled border   Multiple microerosions  Morphoeaform BCC  Usually found in mid-facial sites   Waxy, scar-like plaque with indistinct borders   Wide and deep subclinical extension   May infiltrate cutaneous nerves (perineural spread)   Also known as morpheic, morphoeiform or sclerosing BCC  Basosquamous carcinoma  Mixed basal cell carcinoma (BCC) and squamous cell carcinoma (SCC)   Infiltrative growth pattern   Potentially more aggressive than other forms of BCC   Also known as basisquamous carcinoma and mixed basal-squamous cell carcinoma       Complications of basal cell carcinoma    Recurrent BCC  Recurrence of BCC after initial treatment is not uncommon. Characteristics of recurrent BCC often include:  Incomplete excision or narrow margins at primary excision   Morphoeic, micronodular, and infiltrative subtypes   Location on head and neck    Advanced BCC  Advanced BCCs are large, often neglected tumours.  They may be several centimetres in diameter   They may be deeply infiltrating into tissues below the skin   They are difficult or impossible to treat surgically    Metastatic BCC  Very rare   Primary tumour is often large, neglected or recurrent, located on head and neck, with aggressive subtype   May have had multiple prior treatments   May arise in site exposed to ionising radiation   Can be fatal    How is basal cell carcinoma diagnosed?  BCC is diagnosed clinically by the presence of a slowly enlarging skin lesion with typical appearance. The diagnosis and  by a diagnostic biopsy or following excision.  Some typical superficial BCCs on trunk and limbs are clinically diagnosed and have non-surgical treatment without  histology.    What is the treatment for primary basal cell carcinoma?  The treatment for a BCC depends on its type, size and location, the number to be treated, patient factors, and the preference or expertise of the doctor. Most BCCs are treated surgically. Long-term follow-up is recommended to check for new lesions and recurrence; the latter may be unnecessary if histology has reported wide clear margins.    Excision biopsy  Excision means the lesion is cut out and the skin stitched up.  Most appropriate treatment for nodular, infiltrative and morphoeic BCCs   Should include 3 to 5 mm margin of normal skin around the tumour   Very large lesions may require flap or skin graft to repair the defect   Pathologist will report deep and lateral margins   Further surgery is recommended for lesions that are incompletely excised    Mohs micrographically controlled excision  Mohs micrographically controlled surgery involves examining carefully marked excised tissue under the microscope, layer by layer, to ensure complete excision.  Very high cure rates achieved by trained Mohs surgeons   Used in high-risk areas of the face around eyes, lips and nose   Suitable for ill-defined, morphoeic, infiltrative and recurrent subtypes   Large defects are repaired by flap or skin graft    Superficial skin surgery  Superficial skin surgery comprises shave, curettage, and electrocautery. It is a rapid technique using local anaesthesia and does not require sutures.  Suitable for small, well-defined nodular or superficial BCCs   Lesions are usually located on trunk or limbs   Wound is left open to heal by secondary intention   Moist wound dressings lead to healing within a few weeks   Eventual scar quality variable    Cryotherapy  Cryotherapy is the treatment of a superficial skin lesion by freezing it, usually with liquid nitrogen.  Suitable for small superficial BCCs on covered areas of trunk and limbs   Best avoided for BCCs on head and  neck, and distal to knees   Double freeze-thaw technique   Results in a blister that crusts over and heals within several weeks.   Leaves permanent white olu    Photodynamic therapy  Photodynamic therapy (PDT) refers to a technique in which BCC is treated with a photosensitising chemical, and exposed to light several hours later.  Topical photosensitisers include aminolevulinic acid lotion and methyl aminolevulinate cream   Suitable for low-risk small, superficial BCCs   Best avoided if tumour in site at high risk of recurrence   Results in inflammatory reaction, maximal 3-4 days after procedure   Treatment repeated 7 days after initial treatment   Excellent cosmetic results    Imiquimod cream  Imiquimod is an immune response modifier.  Best used for superficial BCCs less than 2 cm diameter   Applied three to five times each week, for 6-16 weeks   Results in a variable inflammatory reaction, maximal at three weeks   Minimal scarring is usual    Fluorouracil cream  5-Fluorouracil cream is a topical cytotoxic agent.  Used to treat small superficial basal cell carcinomas   Requires prolonged course, eg twice daily for 6-12 weeks   Causes inflammatory reaction   Has high recurrence rates    Radiotherapy  Radiotherapy or X-ray treatment can be used to treat primary BCCs or as adjunctive treatment if margins are incomplete.  Mainly used if surgery is not suitable   Best avoided in young patients and in genetic conditions predisposing to skin cancer   Best cosmetic results achieved using multiple fractions   Typically, patient attends once-weekly for several weeks   Causes inflammatory reaction followed by scar   Risk of radiodermatitis, late recurrence, and new tumours    What is the treatment for advanced or metastatic basal cell carcinoma?  Locally advanced primary, recurrent or metastatic BCC requires multidisciplinary consultation. Often a combination of treatments is used.  Surgery   Radiotherapy   Targeted  "therapy  Targeted therapy refers to the hedgehog signalling pathway inhibitors, vismodegib and sonidegib. These drugs have some important risks and side effects.    How can basal cell carcinoma be prevented?  The most important way to prevent BCC is to avoid sunburn. This is especially important in childhood and early life. Fair skinned individuals and those with a personal or family history of BCC should protect their skin from sun exposure daily, year-round and lifelong.  Stay indoors or under the shade in the middle of the day   Wear covering clothing   Apply high protection factor SPF50+ broad-spectrum sunscreens generously to exposed skin if outdoors   Avoid indoor tanning (sun beds, solaria)  Oral nicotinamide (vitamin B3) in a dose of 500 mg twice daily may reduce the number and severity of BCCs.    What is the outlook for basal cell carcinoma?  Most BCCs are cured by treatment. Cure is most likely if treatment is undertaken when the lesion is small.  About 50% of people with BCC develop a second one within 3 years of the first. They are also at increased risk of other skin cancers, especially melanoma. Regular self-skin examinations and long-term annual skin checks by an experienced health professional are recommended.    MELANOCYTIC NEVI (\"Moles\")    Melanocytic nevi (\"moles\") are tan or brown, raised or flat areas of the skin which have an increased number of melanocytes. Melanocytes are the cells in our body which make pigment and account for skin color.    Some moles are present at birth (I.e., \"congenital nevi\"), while others come up later in life (i.e., \"acquired nevi\").  The sun can stimulate the body to make more moles.  Sunburns are not the only thing that triggers more moles.  Chronic sun exposure can do it too.     Clinically distinguishing a healthy mole from melanoma may be difficult, even for experienced dermatologists. The \"ABCDE's\" of moles have been suggested as a means of helping to alert a " "person to a suspicious mole and the possible increased risk of melanoma.  The suggestions for raising alert are as follows:    Asymmetry: Healthy moles tend to be symmetric, while melanomas are often asymmetric.  Asymmetry means if you draw a line through the mole, the two halves do not match in color, size, shape, or surface texture. Asymmetry can be a result of rapid enlargement of a mole, the development of a raised area on a previously flat lesion, scaling, ulceration, bleeding or scabbing within the mole.  Any mole that starts to demonstrate \"asymmetry\" should be examined promptly by a board certified dermatologist.     Border: Healthy moles tend to have discrete, even borders.  The border of a melanoma often blends into the normal skin and does not sharply delineate the mole from normal skin.  Any mole that starts to demonstrate \"uneven borders\" should be examined promptly by a board certified dermatologist.     Color: Healthy moles tend to be one color throughout.  Melanomas tend to be made up of different colors ranging from dark black, blue, white, or red.  Any mole that demonstrates a color change should be examined promptly by a board certified dermatologist.     Diameter: Healthy moles tend to be smaller than 0.6 cm in size; an exception are \"congenital nevi\" that can be larger.  Melanomas tend to grow and can often be greater than 0.6 cm (1/4 of an inch, or the size of a pencil eraser). This is only a guideline, and many normal moles may be larger than 0.6 cm without being unhealthy.  Any mole that starts to change in size (small to bigger or bigger to smaller) should be examined promptly by a board certified dermatologist.     Evolving: Healthy moles tend to \"stay the same.\"  Melanomas may often show signs of change or evolution such as a change in size, shape, color, or elevation.  Any mole that starts to itch, bleed, crust, burn, hurt, or ulcerate or demonstrate a change or evolution should be " "examined promptly by a board certified dermatologist.      Dysplastic Nevi  Dysplastic moles are moles that fit the ABCDE rules of melanoma but are not identified as melanomas when examined under the microscope.  They may indicate an increased risk of melanoma in that person. If there is a family history of melanoma, most experts agree that the person may be at an increased risk for developing a melanoma.  Experts still do not agree on what dysplastic moles mean in patients without a personal or family history of melanoma.  Dysplastic moles are usually larger than common moles and have different colors within it with irregular borders. The appearance can be very similar to a melanoma. Biopsies of dysplastic moles may show abnormalities which are different from a regular mole.      Melanoma  Malignant melanoma is a type of skin cancer that can be deadly if it spreads throughout the body. The incidence of melanoma in the [place] is growing faster than any other cancer. Melanoma usually grows near the surface of the skin for a period of time, and then begins to grow deeper into the skin. Once it grows deeper into the skin, the risk of spread to other organs greatly increases. Therefore, early detection and removal of a malignant melanoma may result in a better chance at a complete cure; removal after the tumor has spread may not be as effective, leading to worse clinical outcomes such as death.    The true rate of nevus transformation into a melanoma is unknown. It has been estimated that the lifetime risk for any acquired melanocytic nevus on any 20-year-old individual transforming into melanoma by age 80 is 0.03% (1 in 3,164) for men and 0.009% (1 in 10,800) for women.     The appearance of a \"new mole\" remains one of the most reliable methods for identifying a malignant melanoma.  Occasionally, melanomas appear as rapidly growing, blue-black, dome-shaped bumps within a previous mole or previous area of normal " "skin.  Other times, melanomas are suspected when a mole suddenly appears or changes. Itching, burning, or pain in a pigmented lesion should increase suspicion, but most patients with early melanoma have no skin discomfort whatsoever.  Melanoma can occur anywhere on the skin, including areas that are difficult for self-examination. Many melanomas are first noticed by other family members.  Suspicious-looking moles may be removed for microscopic examination.       You may be able to prevent death from melanoma by doing two simple things:    Try to avoid unnecessary sun exposure and protect your skin when it is exposed to the sun.  People who live near the equator, people who have intermittent exposures to large amounts of sun, and people who have had sunburns in childhood or adolescence have an increased risk for melanoma. Sun sense and vigilant sun protection may be keys to helping to prevent melanoma.  We recommend wearing UPF-rated sun protective clothing and sunglasses whenever possible and applying a moisturizer-sunscreen combination product (SPF 50+) such as Neutrogena Daily Defense to sun exposed areas of skin at least three times a day.    Have your moles regularly examined by a board certified dermatologist AND by yourself or a family member/friend at home.  We recommend that you have your moles examined at least once a year by a board certified dermatologist.  Use your birthday as an annual reminder to have your \"Birthday Suit\" (I.e., your skin) examined; it is a nice birthday gift to yourself to know that your skin is healthy appearing!  Additionally, at-home self examinations may be helpful for detecting a possible melanoma.  Use the ABCDEs we discussed and check your moles once a month at home.        SEBORRHEIC KERATOSIS  A seborrheic keratosis is a harmless warty spot that appears during adult life as a common sign of skin aging.  Seborrheic keratoses can arise on any area of skin, covered or uncovered, " "with the usual exception of the palms and soles. They do not arise from mucous membranes. Seborrheic keratoses can have highly variable appearance.      Seborrheic keratoses are extremely common. It has been estimated that over 90% of adults over the age of 60 years have one or more of them. They occur in males and females of all races, typically beginning to erupt in the 30s or 40s. They are uncommon under the age of 20 years.  The precise cause of seborrhoeic keratoses is not known.  Seborrhoeic keratoses are considered degenerative in nature. As time goes by, seborrheic keratoses tend to become more numerous. Some people inherit a tendency to develop a very large number of them; some people may have hundreds of them.    The name \"seborrheic keratosis\" is misleading, because these lesions are not limited to a seborrhoeic distribution (scalp, mid-face, chest, upper back), nor are they formed from sebaceous glands, nor are they associated with sebum -- which is greasy.  Seborrheic keratosis may also be called \"SK,\" \"Seb K,\" \"basal cell papilloma,\" \"senile wart,\" or \"barnacle.\"      There is no easy way to remove multiple lesions on a single occasion.  Unless a specific lesion is \"inflamed\" and is causing pain or stinging/burning or is bleeding, most insurance companies do not authorize treatment.      ANGIOMA (\"CHERRY ANGIOMA\")  Hernandez angiomas markedly increase in number from about the age of 40, so it has been estimated that 75% of people over 75 years of age have them. Although they also called \"senile angiomas,\" they can occur in young people too - 5% of adolescents have been found to have them.     Cherry angiomas are very common in males and females of any age or race, with no difference in sexes or races affected. They are however more noticeable in white skin than in skin of colour.  There may be a family history of similar lesions. Eruptive (very large number appearing in a short period of time) cherry " angiomas have been rarely reported to be associated with internal malignancy and pregnancy.

## 2023-12-21 NOTE — PROGRESS NOTES
"[facility] Note     Patient Name: Robel Devi  Encounter Date: 12/21/2023     Have you been cared for by a [facility] Dermatologist in the last 3 years and, if so, which description applies to you?    NO.   I am considered a \"new\" patient and must complete all patient intake questions. I am MALE/not capable of bearing children.    REVIEW OF SYSTEMS:  Have you recently had or currently have any of the following? Recent fever or chills? No  Any non-healing wound? No   PAST MEDICAL HISTORY:  Have you personally ever had or currently have any of the following?  If \"YES,\" then please provide more detail. Skin cancer (such as Melanoma, Basal Cell Carcinoma, Squamous Cell Carcinoma?  YES,   Tuberculosis, HIV/AIDS, Hepatitis B or C: No  Radiation Treatment No   HISTORY OF IMMUNOSUPPRESSION:   Do you have a history of any of the following:  Systemic Immunosuppression such as Diabetes, Biologic or Immunotherapy, Chemotherapy, Organ Transplantation, Bone Marrow Transplantation?  No     Answering \"YES\" requires the addition of the dotphrase \"IMMUNOSUPPRESSED\" as the first diagnosis of the patient's visit.   FAMILY HISTORY:  Any \"first degree relatives\" (parent, brother, sister, or child) with the following?    Skin Cancer, Pancreatic or Other Cancer? No   PATIENT EXPERIENCE:    Do you want the Dermatologist to perform a COMPLETE skin exam today including a clinical examination under the \"bra and underwear\" areas?  Yes  If necessary, do we have your permission to call and leave a detailed message on your Preferred Phone number that includes your specific medical information?  Yes      No Known Allergies   Current Outpatient Medications:     aspirin 81 mg chewable tablet, Chew 1 tablet daily , Disp: , Rfl:     atorvastatin (LIPITOR) 40 mg tablet, Take 1 tablet (40 mg total) by mouth daily, Disp: 90 tablet, Rfl: 3    escitalopram (LEXAPRO) 20 mg tablet, Take 1 tablet (20 mg total) by mouth daily, Disp: 90 " tablet, Rfl: 1    fluticasone (FLONASE) 50 mcg/act nasal spray, Spray 2 sprays in each nostril every day, Disp: 48 mL, Rfl: 0    latanoprost (XALATAN) 0.005 % ophthalmic solution, INSTILL 1 DROP INTO BOTH EYES AT BEDTIME, Disp: , Rfl:     LORazepam (ATIVAN) 1 mg tablet, Take 1 tablet (1 mg total) by mouth 3 (three) times a day as needed for anxiety, Disp: 90 tablet, Rfl: 0    memantine (NAMENDA) 5 mg tablet, Take 1 tablet (5 mg total) by mouth 2 (two) times a day, Disp: 180 tablet, Rfl: 1    tamsulosin (FLOMAX) 0.4 mg, Take 2 capsules (0.8 mg total) by mouth daily with dinner, Disp: 180 capsule, Rfl: 2          Whom besides the patient is providing clinical information about today's encounter?   NO ADDITIONAL HISTORIAN (patient alone provided history)      78 year old male-re establish patient with history of skin cancer presents for a yearly skin exam.     Physical Exam and Assessment/Plan by Diagnosis:    HISTORY OF BASAL CELL CARCINOMA    Physical Exam:  Anatomic Location Affected:  mid chest in 2017  Morphological Description of scar:  scar well healed  Suspected Recurrence: No      Additional History of Present Condition:  History of basal cell carcinoma with no sign of recurrence    Assessment and Plan:  Based on a thorough discussion of this condition and the management approach to it (including a comprehensive discussion of the known risks, side effects and potential benefits of treatment), the patient (family) agrees to implement the following specific plan:  Monitor for change  When outside we recommend using a wide brim hat, sunglasses, long sleeve and pants, sunscreen with SPF 30+ with reapplication every 2 hours, or SPF specific clothing        NEOPLASM OF UNCERTAIN BEHAVIOR OF SKIN    Physical Exam:  (Anatomic Location); (Size and Morphological Description); (Differential Diagnosis):  Left temple 8 mm pink, pearly macule r/o Basal cell Cancer      Additional History of Present Condition:  present for  "the past two years.     Assessment and Plan:  I have discussed with the patient that a sample of skin via a \"skin biopsy” would be potentially helpful to further make a specific diagnosis under the microscope.  Based on a thorough discussion of this condition and the management approach to it (including a comprehensive discussion of the known risks, side effects and potential benefits of treatment), the patient (family) agrees to implement the following specific plan:    Procedure:  Skin Biopsy.  After a thorough discussion of treatment options and risk/benefits/alternatives (including but not limited to local pain, scarring, dyspigmentation, blistering, possible superinfection, and inability to confirm a diagnosis via histopathology), verbal and written consent were obtained and portion of the rash was biopsied for tissue sample.  See below for consent that was obtained from patient and subsequent Procedure Note.          PROCEDURE TANGENTIAL (SHAVE) BIOPSY NOTE:    Performing Physician:   Anatomic Location; Clinical Description with size (cm); Pre-Op Diagnosis:   L temple 8 mm pink, pearly macule r/o Basal cell Cancer  Post-op diagnosis: Same     Local anesthesia: 1% xylocaine with epi      Topical anesthesia: None    Hemostasis: Aluminum chloride       After obtaining informed consent  at which time there was a discussion about the purpose of biopsy  and low risks of infection and bleeding.  The area was prepped and draped in the usual fashion. Anesthesia was obtained with 1% lidocaine with epinephrine. A shave biopsy to an appropriate sampling depth was obtained by Shave (Dermablade or 15 blade) The resulting wound was covered with surgical ointment and bandaged appropriately.     The patient tolerated the procedure well without complications and was without signs of functional compromise.      Specimen has been sent for review by Dermatopathology.    Standard post-procedure care has been explained " "and has been included in written form within the patient's copy of Informed Consent.    INFORMED CONSENT DISCUSSION AND POST-OPERATIVE INSTRUCTIONS FOR PATIENT    I.  RATIONALE FOR PROCEDURE  I understand that a skin biopsy allows the Dermatologist to test a lesion or rash under the microscope to obtain a diagnosis.  It usually involves numbing the area with numbing medication and removing a small piece of skin; sometimes the area will be closed with sutures. In this specific procedure, sutures are not usually needed.  If any sutures are placed, then they are usually need to be removed in 2 weeks or less.    I understand that my Dermatologist recommends that a skin \"shave\" biopsy be performed today.  A local anesthetic, similar to the kind that a dentist uses when filling a cavity, will be injected with a very small needle into the skin area to be sampled.  The injected skin and tissue underneath \"will go to sleep” and become numb so no pain should be felt afterwards.  An instrument shaped like a tiny \"razor blade\" (shave biopsy instrument) will be used to cut a small piece of tissue and skin from the area so that a sample of tissue can be taken and examined more closely under the microscope.  A slight amount of bleeding will occur, but it will be stopped with direct pressure and a pressure bandage and any other appropriate methods.  I understands that a scar will form where the wound was created.  Surgical ointment will be applied to help protect the wound.  Sutures are not usually needed.    II.  RISKS AND POTENTIAL COMPLICATIONS   I understand the risks and potential complications of a skin biopsy include but are not limited to the following:  Bleeding  Infection  Pain  Scar/keloid  Skin discoloration  Incomplete Removal  Recurrence  Nerve Damage/Numbness/Loss of Function  Allergic Reaction to Anesthesia  Biopsies are diagnostic procedures and based on findings additional treatment or evaluation may be " "required  Loss or destruction of specimen resulting in no additional findings    My Dermatologist has explained to me the nature of the condition, the nature of the procedure, and the benefits to be reasonably expected compared with alternative approaches.  My Dermatologist has discussed the likelihood of major risks or complications of this procedure including the specific risks listed above, such as bleeding, infection, and scarring/keloid.  I understand that a scar is expected after this procedure.  I understand that my physician cannot predict if the scar will form a \"keloid,\" which extends beyond the borders of the wound that is created.  A keloid is a thick, painful, and bumpy scar.  A keloid can be difficult to treat, as it does not always respond well to therapy, which includes injecting cortisone directly into the keloid every few weeks.  While this usually reduces the pain and size of the scar, it does not eliminate it.      I understand that photographs may be taken before and after the procedure.  These will be maintained as part of the medical providers confidential records and may not be made available to me.  I further authorize the medical provider to use the photographs for teaching purposes or to illustrate scientific papers, books, or lectures if in his/her judgment, medical research, education, or science may benefit from its use.    I have had an opportunity to fully inquire about the risks and benefits of this procedure and its alternatives.   I have been given ample time and opportunity to ask questions and to seek a second opinion if I wished to do so.  I acknowledge that there have specifically been no guarantees as to the cosmetic results from the procedure.  I am aware that with any procedure there is always the possibility of an unexpected complication.    III. POST-PROCEDURAL CARE (WHAT YOU WILL NEED TO DO \"AFTER THE BIOPSY\" TO OPTIMIZE HEALING)    Keep the area clean and dry.  Try NOT " "to remove the bandage or get it wet for the first 24 hours.    Gently clean the area and apply surgical ointment (such as Vaseline petrolatum ointment, which is available \"over the counter\" and not a prescription) to the biopsy site for up to 2 weeks straight.  This acts to protect the wound from the outside world.      Sutures are not usually placed in this procedure.  If any sutures were placed, return for suture removal as instructed (generally 1 week for the face, 2 weeks for the body).      Take Acetaminophen (Tylenol) for discomfort, if no contraindications.  Ibuprofen or aspirin could make bleeding worse.    Call our office immediately for signs of infection: fever, chills, increased redness, warmth, tenderness, discomfort/pain, or pus or foul smell coming from the wound.    WHAT TO DO IF THERE IS ANY BLEEDING?  If a small amount of bleeding is noticed, place a clean cloth over the area and apply firm pressure for ten minutes.  Check the wound after 10 minutes of direct pressure.  If bleeding persists, try one more time for an additional 10 minutes of direct pressure on the area.  If the bleeding becomes heavier or does not stop after the second attempt, or if you have any other questions about this procedure, then please call your [facility]'s Dermatologist by calling [phone] (SKIN).     I hereby acknowledge that I have reviewed and verified the site with my Dermatologist and have requested and authorized my Dermatologist to proceed with the procedure.     MELANOCYTIC NEVI (\"Moles\")    Physical Exam:  Anatomic Location Affected: Mostly on sun-exposed areas of the body  Morphological Description:  Scattered, 1-4mm round to ovoid, symmetrical-appearing, even bordered, skin colored to dark brown macules/papules, mostly in sun-exposed areas      Additional History of Present Condition:  present on exam.     Assessment and Plan:  Based on a thorough discussion of this condition and the management approach to " "it (including a comprehensive discussion of the known risks, side effects and potential benefits of treatment), the patient (family) agrees to implement the following specific plan:  Provided handout with information regarding the ABCDE's of moles   Recommend routine skin exams every year   Sun avoidance, protective clothing (known as UPF clothing), and the use of at least SPF 30 sunscreens is advised. Sunscreen should be reapplied every two hours when outside.       SEBORRHEIC KERATOSIS; NON-INFLAMED    Physical Exam:  Anatomic Location Affected:  scattered across trunk, extremities,  face  Morphological Description:  Flat and raised, waxy, smooth to warty textured, yellow to brownish-grey to dark brown to blackish, discrete, \"stuck-on\" appearing papules.    Additional History of Present Condition:  Patient reports new bumps on the skin.  Denies itch, burn, pain, bleeding or ulceration.  Present constantly; nothing seems to make it worse or better.  No prior treatment.      Assessment and Plan:  Based on a thorough discussion of this condition and the management approach to it (including a comprehensive discussion of the known risks, side effects and potential benefits of treatment), the patient (family) agrees to implement the following specific plan:  Reassured benign        ANGIOMA (\"CHERRY ANGIOMA\")    Physical Exam:  Anatomic Location: scattered across sun exposed areas of the trunk and extremities   Morphologic Description: Firm red to reddish-blue discrete papules    Additional History of Present Condition:  Present on exam.     Assessment and Plan:  Reassured benign        Scribe Attestation      I,:  Livia Cameron am acting as a scribe while in the presence of the attending physician.:       I,:  Armen Avilez MD personally performed the services described in this documentation    as scribed in my presence.:             "

## 2023-12-22 DIAGNOSIS — E78.2 MIXED HYPERLIPIDEMIA: ICD-10-CM

## 2023-12-22 DIAGNOSIS — F09 MILD COGNITIVE DISORDER: ICD-10-CM

## 2023-12-22 DIAGNOSIS — N40.0 BPH WITHOUT URINARY OBSTRUCTION: ICD-10-CM

## 2023-12-22 DIAGNOSIS — F41.9 ANXIETY: ICD-10-CM

## 2023-12-22 RX ORDER — ATORVASTATIN CALCIUM 40 MG/1
40 TABLET, FILM COATED ORAL DAILY
Qty: 90 TABLET | Refills: 3 | Status: SHIPPED | OUTPATIENT
Start: 2023-12-22

## 2023-12-22 RX ORDER — TAMSULOSIN HYDROCHLORIDE 0.4 MG/1
0.8 CAPSULE ORAL
Qty: 180 CAPSULE | Refills: 2 | Status: SHIPPED | OUTPATIENT
Start: 2023-12-22

## 2023-12-22 RX ORDER — ESCITALOPRAM OXALATE 20 MG/1
20 TABLET ORAL DAILY
Qty: 90 TABLET | Refills: 1 | Status: SHIPPED | OUTPATIENT
Start: 2023-12-22

## 2023-12-22 RX ORDER — MEMANTINE HYDROCHLORIDE 5 MG/1
5 TABLET ORAL 2 TIMES DAILY
Qty: 180 TABLET | Refills: 1 | Status: SHIPPED | OUTPATIENT
Start: 2023-12-22

## 2023-12-29 PROCEDURE — 88305 TISSUE EXAM BY PATHOLOGIST: CPT | Performed by: DERMATOLOGY

## 2024-01-09 ENCOUNTER — TELEPHONE (OUTPATIENT)
Dept: DERMATOLOGY | Facility: CLINIC | Age: 79
End: 2024-01-09

## 2024-01-09 ENCOUNTER — TELEPHONE (OUTPATIENT)
Age: 79
End: 2024-01-09

## 2024-01-09 NOTE — LETTER
Robel Devi     1945    [facility] [place] [address]    Dear Robel Devi,    You are scheduled to have the MOHS procedure on 02/12/2024 at 12:00 pm for Basal Cell Carcinoma on the Left [place] with Dr.Ryan Gonzales. Our office is located in [facility] our address is [address] [place] [address]. Once you arrive please check in with our front staff in [address] and they will escort you to the MOHS waiting room.  If you have someone bringing you to your appointment they may wait in the waiting room or accompany you in your visit.      Below you will find some pre-op instructions along with some information regarding the MOHS procedure.     If you have any questions please call our office at [phone].       Thank you,    [facility] MOHS Department         PRE-OPERATIVE INSTRUCTIONS - MOHS    Before your scheduled surgery, there are a number of important precautions and positive steps you should take to help prepare yourself for a successful treatment and speedy recovery.    Some of the steps, which are listed below, may seem unnecessary and inconvenient, but they are important. For example, when you stop smoking, you increase your ability to heal. Occasionally, there may be valid reasons, personal or medical, why you can't comply. In such cases, please call the office so we can discuss possible ways to overcome any obstacles you may be encountering.    If you have any questions about the surgery, or remember additional medical information that you forgot to mention to our staff, please contact the office prior to your surgery.    GENERAL INFORMATION REGARDING MOHS MICROGRAPHIC SURGERY    Mohs surgery is a specialized technique for the removal of skin cancer developed by Dr. Frederick Mohs over 50 years ago to improve the cure rates of skin cancer. Traditionally, skin cancers are treated by destructive methods (radiation,  freezing, scraping, and burning) or excision (cutting out the tissue with standards margins and sending it to an outside laboratory for testing). These methods all yield cure rates between 65%-94%. However, for cancers located in cosmetically sensitive areas, large tumors, or tumors unsuccessfully treated by other means, Mohs surgery offers a higher cure rate. In most cases, Mohs surgery provides you with a 99% cure rate for primary (previously untreated) basal cell cancer and a 95% cure rate for primary squamous cell cancer. In Mohs surgery, tissue is removed and processed in a way that we are able to check 100% of the margins, giving the highest cure rate for any method of treating skin cancers while providing maximal preservation of normal skin. This allows the surgeon to produce an optimal cosmetic result for the patient by maximizing the amount of tissue removed yielding as small a scar as possible    On the day of surgery, you will be given local anesthesia only (similar to what was given to you during your initial biopsy). You will remain awake. You will verify the location of the skin cancer prior to the onset of the surgery. Once the area is numb, the tissue containing the skin cancer will be removed, taking a small safety margin. This margin is usually smaller than what would be taken with a standard excision. Once the tissue is removed, it is marked and oriented. The first layer (“Stage I”) will be processed in our laboratory. The wound will be treated for bleeding and a bandage will be placed to keep you comfortable while you wait an approximate 45 minutes-1 hour (for the processing of the tissue) in your room. Your Mohs surgeon will examine the pathology in the lab, checking all the margins. If any tumor remains, you will need to take a second layer of skin (“Stage 2”). The area will be re-anesthetized and your Mohs surgeon will remove more skin only in the area where the tumor exists. This process will  continue until all the skin cancer is removed. Unfortunately, there is no method to predict how many layers or stages will be taken.    Once the tumor has been removed completely, we will discuss the best ways to close the defect. Most wounds may be closed with stitches. A larger wound may require a skin graft or a flap. In rare instances, especially for cancers around the eye or for larger cancers, we may work with another surgeon (oculoplastic, ENT, plastics) with special skills to assist with reconstruction.    Medications: Please take all your normal medications the morning of your surgery. If you are a diabetic, please bring your insulin or medications with you, as well as a snack to avoid having low blood sugar during your day with us.     Blood Thinners    VERY IMPORTANT: We do NOT stop or hold prescribed blood thinners (such as Coumadin/Warfarin, Plavix, Eliquis, Pradaxa, Brilinta, Apixaban, Xarelto, Lovonox, Rivaroxaban, or Aggrenox) before Mohs surgery. Additionally, If you take aspirin because you have had a stroke, heart attack, heart disease, other condition, or your physician has prescribed you to take it, please continue your aspirin.    Although there is a risk of increased bruising and bleeding, we are still able to safely perform surgery while continuing these medications. Please NEVER stop your prescribed blood thinner without the managing doctor's (the doctor that prescribed the medication) permission or knowledge. If you have any concerns about not holding your blood thinner, please address these with your Mohs surgeon.    Most people should stop all non-steroidal anti-inflammatory medications (Motrin, Naproxen, Advil, Midol, Aleve, etc.) for 7 days prior to your scheduled surgery and 2 days after (unless instructed otherwise after surgery).  You may take Tylenol for pain.     Antibiotics  If you usually require antibiotics prior to dental work, please let the office know at least 24 hours prior  to your surgery. Medical conditions that sometimes require preoperative antibiotics include artificial heart valves, heart murmurs, artificial joints, and related problems. We may give you a different medication than the dentist, so please contact us for the correct antibiotic.  If you were prescribed pre-operative antibiotics by our office, please take the medication 2 hours prior to your procedure.    Vitamins and Supplements  Avoid taking any supplements with Vitamin E, Fish Oil, Gingko, Ginseng, and Garlic for 2 weeks before and 2 days after your surgery. These thin your blood.    Alcohol: Avoid drinking alcohol for 2 days prior to your surgery, and for 2 days afterwards (it thins the blood and causes more bruising and swelling).    Smoking: Try to STOP or reduce smoking significantly the week before your surgery, and especially the week afterwards (it greatly improves how well you heal). Tobacco smoke deprives the blood of oxygen, which is urgently needed by the wound during the healing process.    Contact Lenses: Do not wear them on the day of the surgery. Instead, wear glasses and bring your case, in case we need to remove them.    Clothing: Do not wear your nicest clothing on your surgery day. We recommend wearing a button down shirt that will not disrupt your post-operative dressing when changing later that night.    Bathing: On the morning of your surgery, you may bathe or shower normally. If you get your hair done on a weekly basis, remember to get your hair washed the day before surgery.   - You will need to keep your surgical site dry for a minimum of 48 hours after surgery.    Makeup: If your surgery is on the face, please do not wear any makeup on the day of the surgery.    Jewelry: Please try to avoid wearing jewelry on the day of surgery.    Food: On the morning of surgery, have breakfast but limit your intake of caffeinated beverages. They are diuretic and may inconvenience you during surgery. If  you are following up with another surgeon the same day as your Mohs surgery, you must receive permission to eat breakfast from that surgeon.      What to bring with you on the day of your surgery:  Bring snacks - Since you could be at the office long, you may bring snacks and/or lunch with you. Some snacks and drinks are available at the office as well.   Bring a sweater - Bring a sweater or jacket that buttons or zips down the front and will not disturb your wound dressing during removal.  Bring something to do - You will be spending much of the day in our office. There will be 45-60 minute waiting periods  between layers/stages, and while there is a television with cable in every room, it is nice to have something to keep you occupied such as books, magazines, knitting, music, or work.     Planning Ahead:  Other Appointments - It is important to realize that no matter how small the skin cancer appears to be, looks can be deceiving. Since your surgery may last the entire day, you should not schedule any other appointments that day.  Special Occasions - Surgery often creates swelling and bruising. Also, the post-op dressing may be rather large and obvious. Keep this in mind as you arrange your social/work schedule. If an important event is already planned, please check with your referring physician or your Mohs surgeon to see if the surgery can be postponed.  Activity Limits after Surgery - If surgery was performed on your face, we recommend that you keep your activity level to a minimum for 2-3 days (the blood pressure elevation related to exercise can lead to bleeding). If you have stitches in an area that will be under tension or significant movement (neck, back, arms, legs), you will need to avoid heavy lifting (anything over 5 lbs) or exercise for at least 2 weeks and possibly longer. We also advise that you limit out of town travel for the first 7 days after surgery. You should also wait at least 7 days before  going into a pool or the ocean.  Housework - Since you will need to minimize activity after surgery, plan to do your groceries, laundry, gardening, and other heavy household chores prior to your surgery. Please make arrangements for assistance during the post-op period. If surgery is around your mouth area, you may need to eat soft foods, such as soup, milkshakes, or yogurt for 48 hours.    Purchasing bandage supplies: Prior to surgery, please purchase the following items to care for your surgical wound properly.  Cotton swabs (Q-tips)  Vaseline or Aquaphor  Telfa pads (or any non-stick dressing)  Paper tape or Hypafix tape  Gauze pads (3x3)      We will provide you with some bandage supplies after surgery to get you started.    Transportation: It is often reassuring and comforting to have a  drive you to and from the surgery. He or she is welcome to wait in the office during the surgery. Please note that for safety reasons, only the patient is allowed in the procedure room during surgery. Thank you for your cooperation.    Rescheduling: If you need to reschedule your surgery, please notify the office as soon as possible.

## 2024-01-09 NOTE — TELEPHONE ENCOUNTER
Pre- operative Mohs Telephone Scheduling Note    Do you have a pacemaker, defibrillator, spinal or brain stimulator? no    Do you take antibiotics before skin or dental procedures? no  If yes, will likely require pre-operative antibiotics. Ask  the patient why they take the antibiotics (usually because of joint replacement).    Do you have a history of a joint replacements within the past 2 years? no   If yes, will likely require pre-operative antibiotics. Ask if orthopaedic surgeon has prescribed pre-operative antibiotics to take before procedures/dental work?    Do you take any OTC medications that thin your blood (Aspirin, Aleve, Ibuprofen) or supplements that thin your blood (fish oil, garlic, vitamin E, Ginko Biloba)? yes: ASA 81 mg     Do you take any prescribed medications that thin your blood (Coumadin, Plavix, Xarelto, Eliquis or another prescribed blood thinner)? no    Do you have an allergy to lidocaine or epinephrine? no    Do you have allergies to Iodine? no    Do you wear a lidocaine patch? no    Have you ever been diagnosed with HIV, AIDS, Hep B and Hep C? no    Do you use a cane, walker or wheelchair? no    Is the patient from a nursing home? no If yes, Is there any special accommodations that is needed for patient n/a    Do you smoke? no      If yes,  patient to try and stop 2 days before surgery and 7 days after the surgery. Minimizing smoking as much as possible during this time will improve healing and the cosmetic result after surgery.    Do you use supplemental oxygen? If so, how many liters and can you be off it for a short period of time? No    Date scheduled: 02/12/2024 @ 12 pm with Dr. Gonzales     Coordination of Care with other provider (Oculoplastics, Plastics, ENT) required? no   IF YES, PLEASE FORWARD TO APPROPRIATE PERSONNEL TO HELP COORDINATE.    Are there remaining tumors to be scheduled? no    Was Prior Authorization obtained? No (please use [email or web address] to document prior  auth)

## 2024-01-09 NOTE — TELEPHONE ENCOUNTER
Pt's wife Francoise calling with pt to see about scheduling MOHS    Tfrd call to [place] for further assistance

## 2024-02-12 ENCOUNTER — PROCEDURE VISIT (OUTPATIENT)
Dept: DERMATOLOGY | Facility: CLINIC | Age: 79
End: 2024-02-12
Payer: MEDICARE

## 2024-02-12 VITALS
OXYGEN SATURATION: 98 % | HEART RATE: 84 BPM | TEMPERATURE: 97.7 F | DIASTOLIC BLOOD PRESSURE: 76 MMHG | WEIGHT: 167.8 LBS | HEIGHT: 69 IN | SYSTOLIC BLOOD PRESSURE: 134 MMHG | BODY MASS INDEX: 24.85 KG/M2

## 2024-02-12 DIAGNOSIS — C44.319 BASAL CELL CARCINOMA (BCC) OF LEFT TEMPLE REGION: ICD-10-CM

## 2024-02-12 PROCEDURE — 12052 INTMD RPR FACE/MM 2.6-5.0 CM: CPT | Performed by: DERMATOLOGY

## 2024-02-12 PROCEDURE — 17311 MOHS 1 STAGE H/N/HF/G: CPT | Performed by: DERMATOLOGY

## 2024-02-12 PROCEDURE — 17312 MOHS ADDL STAGE: CPT | Performed by: DERMATOLOGY

## 2024-02-12 NOTE — PROGRESS NOTES
MOHS Procedure Note    Patient: Robel Devi  : 1945  MRN: [id number]  Date: 2024    History of Present Illness: The patient is a 78 y.o. male who presents with complaints of Basal cell carcinoma infiltrative and nodular type of the left temple.    Past Medical History:   Diagnosis Date    Anxiety disorder     last assessed - 2015    Basal cell carcinoma of face 2023    left temple, mohs    BPH (benign prostatic hyperplasia)     benign prostatic hypertrophy; last assessed - 2015    Chronic kidney disease     Colon polyp     Coronary atherosclerosis of native coronary artery     last assessed - 2015    Deep vein thrombosis (DVT) (HCC)     last assessed - 2015    Depression     last assessed - 2015    History of colonoscopy     Fiberoptic; Resolved - 2011    Hyperlipemia     last assessed - 2017    Hypertension     last assessed - 2017    Myocardial infarction (HCC)     Nonmelanoma skin cancer     last assessed - [id number]    Osteoarthritis     last assessed - 2015    Pneumonia     last assessed - 2015    Pulmonary HTN (HCC)     PVD (peripheral vascular disease) (HCC)     Seborrheic dermatitis        Past Surgical History:   Procedure Laterality Date    CARDIAC CATHETERIZATION      procedure outcome successful; last assessed - 2015    CORONARY ARTERY BYPASS GRAFT      last assessed - 2017    IVC FILTER INSERTION      [place]    MOHS SURGERY Left 2024    BCC left temple, Dr Gonzales    TOTAL HIP ARTHROPLASTY      last assessed - 2015         Current Outpatient Medications:     aspirin 81 mg chewable tablet, Chew 1 tablet daily , Disp: , Rfl:     atorvastatin (LIPITOR) 40 mg tablet, Take 1 tablet (40 mg total) by mouth daily, Disp: 90 tablet, Rfl: 3    escitalopram (LEXAPRO) 20 mg tablet, Take 1 tablet (20 mg total) by mouth daily, Disp: 90 tablet, Rfl: 1    fluticasone (FLONASE) 50 mcg/act nasal spray, Spray 2 sprays in each  nostril every day, Disp: 48 mL, Rfl: 0    latanoprost (XALATAN) 0.005 % ophthalmic solution, INSTILL 1 DROP INTO BOTH EYES AT BEDTIME, Disp: , Rfl:     LORazepam (ATIVAN) 1 mg tablet, Take 1 tablet (1 mg total) by mouth 3 (three) times a day as needed for anxiety, Disp: 90 tablet, Rfl: 0    memantine (NAMENDA) 5 mg tablet, Take 1 tablet (5 mg total) by mouth 2 (two) times a day, Disp: 180 tablet, Rfl: 1    tamsulosin (FLOMAX) 0.4 mg, Take 2 capsules (0.8 mg total) by mouth daily with dinner, Disp: 180 capsule, Rfl: 2    No Known Allergies    Physical Exam:   Vitals:    02/12/24 1200   BP: 134/76   Pulse: 84   Temp: 97.7 °F (36.5 °C)   SpO2: 98%     General: Awake, Alert, Oriented x 3, Mood and affect appropriate  Respiratory: Respirations even and unlabored  Cardiovascular: Peripheral pulses intact; no edema  Musculoskeletal Exam: n/a    Skin: 1.1 cm x 0.8 cm pink scar    Assessment: Biopsy confirmed basal cell carcinoma of the left temple.     Plan: Mohs    Time of H&P Completion:1210    MOHS Procedure Timeout      Flowsheet Row Most Recent Value   Timeout: 1220   Patient Identity Verified: Yes   Correct Site Verified: Yes   Correct Procedure Verified: Yes            MOHS Diagnosis/Indication/Location/ID      Flowsheet Row Most Recent Value   Pathology Type Basal cell carcinoma   Anatomic Site left temple   Indications for MOHS tumor location   MOHS ID [id number]            MOHS Site/Accession/Pre-Post      Flowsheet Row Most Recent Value   Original Site Identified (as submitted by referring clinician) Photo, Referral   Biopsy Accession/Specimen # (as submitted by referring clincian) [id number]   Pre-MOHS Size Length (cm) 1.1   Pre-MOHS Size Width (cm) 0.8   Post-MOHS Size-Length (cm) 1.8   Post MOHS Size-Width (cm) 1.7   Repair Type Intermediate layered closure   Suture Type Vicryl, Prolene   Prolene Suture Size 5   Vicryl Suture Size 4   Final repair length (cm): 4   Anesthetic Used 1% Lidocaine with epinephrine   [8.2]            MOHS Tumor Stage 1 Information      Flowsheet Row Most Recent Value   Tissue Sections (blocks) 2   Microscopic Exam Section 1: Emanating from the epidermis and infiltrating the dermis are irregularly shaped islands of basaloid keratinocytes. The nuclei at the periphery of the islands have a palisaded arrangement. Islands are associated with a fibromyxoid stroma and clefting., Irregularly shaped cords and strands of basaloid keratinocytes infiltrate the dermis with a spiky growth pattern. The nuclei at the periphery of the islands have a palisaded arrangement. Islands are associated with a fibromyxoid stroma and clefting.  [*]   Microscopic Exam Section 2: Emanating from the epidermis and infiltrating the dermis are irregularly shaped islands of basaloid keratinocytes. The nuclei at the periphery of the islands have a palisaded arrangement. Islands are associated with a fibromyxoid stroma and clefting., Irregularly shaped cords and strands of basaloid keratinocytes infiltrate the dermis with a spiky growth pattern. The nuclei at the periphery of the islands have a palisaded arrangement. Islands are associated with a fibromyxoid stroma and clefting.  [*]   Tumor Clear After Stage I? No            MOHS Tumor Stage 2 Information      Flowsheet Row Most Recent Value   Tissue Sections (blocks) 1   Microscopic Exam Section 1: No tumor identified in section.   Tumor Clear After Stage II? Yes                        Patient identified, procedure verified, site identified and verified. Time out completed. Surgical removal of the lesion discussed with the patient (risks and benefits, including possibility of scarring, infection, recurrence or potential for further treatment)  I have specifically identified the site with the patient. I have discussed the fact that the patient will have a scar after the procedure regardless of granulation or repair with sutures. I have discussed that the repair options can range  from granulation in some cases to linear or curvilinear closures to larger flaps or grafts.  There are sometimes flaps or grafts used that require multiples stages of surgery and will not be completed today, rather be completed over a series of appointments. I have discussed that occasionally due to location, size or depth of the lesion I may recommend consultation with and transfer of care for further removal or the reconstruction to another provider such as ophthalmology surgery, plastic surgery, ENT surgery, or surgical oncology. There are cases in which other testing such as imaging with MRI or CT scan or testing of lymph nodes is recommended because of the nature/depth/location of tumor seen during the removal. There is a risk of injury to nerves causing temporary or permanent numbness or the inability to move muscles full such as the inability to lift eyebrows. Questions answered and verbal and written consent was obtained.    The tumor qualifies for Mohs based on AUC criteria. Dr. Gonzales served as the surgeon and pathologist during the procedure.    With the patient in the supine position and under adequate local anesthesia with 1% lidocaine with epinephrine 1:100,000, the defect was scrubbed with Chlorhexidine. Sterile drapes were placed from the sterile tray.  Because of the location of the surgical defect, an intermediate closure was judged to give the best possible cosmetic and functional result.  The edges of the defect were carefully debrided removing any dead or coagulated tissue.      Hemostasis was obtained by pinpoint electrocoagulation.  Careful planning of removal of redundant tissue at either end of the defect was drawn out so that the suture lines would fall in the optimal orientation with regard to the relaxed skin tension lines.  These were then removed with a #15 blade scalpel.  The wound was then approximated by a deep layer of buried vertical mattress sutures and the cutaneous margins were  approximated and closed by superficial sutures as noted above.  Estimated blood loss was less than 5 mL.      The patient tolerated the procedure well.  The wound was dressed with petrolatum, a non-stick pad, and a compression dressing.     Richard Gonzales MD served as the surgeon and pathologist during the procedure.    Postoperative care: Wound care discussed at length.  I urged the patient to call us if any problems or question should arise.     Complications: none  Post-op medications: none  Patient condition after procedure: stable  Discharge plans: Plan for return to Mohs for suture removal, as scheduled in 7 days.     BCC cleared with 2 stages of mohs and repaired with 4cm closure.  Well tolerated.  S/R in 1 week.  Scribe Attestation      I,:  Nitza Crowder am acting as a scribe while in the presence of the attending physician.:       I,:  Richard Gonzales MD personally performed the services described in this documentation    as scribed in my presence.:

## 2024-02-12 NOTE — PATIENT INSTRUCTIONS
"Mohs Microscopic Surgery After Care    WOUND CARE AFTER SURGERY:    Do NOT to remove the pressure bandage for 48 hours. Keep the area clean and dry while this bandage is on.    After removing the bandage for the first time, gently clean the area with soap and water. If the bandage is difficult to remove, getting the bandage wet in the shower will sometimes help soften the adhesive and allow it to be removed more easily.     You will now need to cleanse this area daily in the shower with gentle soap. There is no need to scrub the area. Apply plain Vaseline ointment (this is over the counter and not a prescription) to the site followed by a clean appropriately sized bandage to area.  Non stick dressing and paper tape (or Hypafix) are recommended for sensitive skin but a bandaid is fine if it covers the area well.    You will need to continue the above daily wound care until you return for suture removal in 7 days (generally 7 days for the face, 10-14 days off the face)      RESTRICTIONS:     For two DAYS:   - You will need to take it very easy as this time is highest risk for bleeding. Being a \"couch potato\" during these two days is generally recommended.   - For surgeries on the face/neck/scalp: Avoid leaning down to pick things up off the floor as this brings blood up to your head. Instead, squat down to pick things up.     For two WEEKS:   - No heavy lifting (anything greater than 10 pounds)   - You can start to do slow, gentle activities such as slow walking but nothing to increase your heart rate and blood pressure too much (such as cardiovascular exercise). It is important to take it easy as there is still a risk for bleeding and a high risk popping of stitches open during this time.     If we did surgery near the eyes (including the nose, forehead, front part of your scalp, cheeks): It is VERY common to get a large amount of swelling around your eyes (puffy eyes). Although less frequent, this can be enough to " swell your eyes shut and can also come along with bruising. This should not hurt and is very expected and normal. It is typically worst at ~ 3 days out from your surgery and dramatically better 1 week post-operatively.     MANAGING YOUR PAIN AFTER SURGERY     You can expect to have some pain after surgery. This is normal. The pain is typically worse the first two days after surgery, and quickly begins to get better.     The best strategy for controlling your pain after surgery is around the clock pain control. You can take over the counter Acetaminophen (Tylenol) for discomfort, if no contraindications.     If you are taking this at the maximum dose, you can alternate this with Motrin (ibuprofen or Advil) as well. Alternating these medications with each other allows you to maximize your pain control. In addition to Tylenol and Motrin, you can use heating pads or ice packs on your incisions to help reduce your pain.     How will I alternate your regular strength over-the-counter pain medication?  You will take a dose of pain medication every three hours.   Start by taking 650 mg of Tylenol (2 pills of 325 mg)   3 hours later take 600 mg of Motrin (3 pills of 200 mg)   3 hours after taking the Motrin take 650 mg of Tylenol   3 hours after that take 600 mg of Motrin.    See example - if your first dose of Tylenol is at 12:00 PM     12:00 PM  Tylenol 650 mg (2 pills of 325 mg)    3:00 PM  Motrin 600 mg (3 pills of 200 mg)    6:00 PM  Tylenol 650 mg (2 pills of 325 mg)    9:00 PM  Motrin 600 mg (3 pills of 200 mg)    Continue alternating every 3 hours      Important:   Do not take more than 4000mg of Tylenol or 3200mg of Motrin in a 24-hour period.     What if I still have pain?   If you have pain that is not controlled with the over-the-counter pain medications (Tylenol and Motrin or Advil), don't hesitate to call our staff using the number provided. We will help make sure you are managing your pain in the best way  possible, and if necessary, we can provide a prescription for additional pain medication.     CALL OUR OFFICE IMMEDIATELY FOR ANY SIGNS OF INFECTION:    This includes fever, chills, increased redness, warmth, tenderness, severe discomfort/pain, or pus or foul smell coming from the wound. If you are experiencing any of the above, please call [facility] directly at [phone].    IF BLEEDING IS NOTICED:    Place a clean cloth over the area and apply firm pressure for thirty minutes.  Check the wound ONLY after 30 minutes of direct pressure; do not cheat and sneak a peak, as that does not count.  If bleeding persists after 30 minutes of legitimate direct pressure, then try one more round of direct pressure to the area.  Should the bleeding become heavier or not stop after the second attempt, call [facility] directly at [phone]. Your call will get routed to the dermatology surgeon on call even after hours.

## 2024-02-19 ENCOUNTER — OFFICE VISIT (OUTPATIENT)
Age: 79
End: 2024-02-19

## 2024-02-19 VITALS — BODY MASS INDEX: 24.73 KG/M2 | HEIGHT: 69 IN | WEIGHT: 167 LBS

## 2024-02-19 DIAGNOSIS — C44.319 BASAL CELL CARCINOMA (BCC) OF LEFT TEMPLE REGION: Primary | ICD-10-CM

## 2024-02-19 PROCEDURE — 99024 POSTOP FOLLOW-UP VISIT: CPT | Performed by: DERMATOLOGY

## 2024-02-19 NOTE — PROGRESS NOTES
"[facility] Dermat[facility] Clinic Note     Patient Name: Robel Devi  Encounter Date: February 19, 2024     Have you been cared for by a [facility] Dermatologist in the last 3 years and, if so, which description applies to you?    Yes.  I have been here within the last 3 years, and my medical history has NOT changed since that time.  I am MALE/not capable of bearing children.    REVIEW OF SYSTEMS:  Have you recently had or currently have any of the following? No changes in my recent health.   PAST MEDICAL HISTORY:  Have you personally ever had or currently have any of the following?  If \"YES,\" then please provide more detail. No changes in my medical history.   HISTORY OF IMMUNOSUPPRESSION: Do you have a history of any of the following:  Systemic Immunosuppression such as Diabetes, Biologic or Immunotherapy, Chemotherapy, Organ Transplantation, Bone Marrow Transplantation?  No     Answering \"YES\" requires the addition of the dotphrase \"IMMUNOSUPPRESSED\" as the first diagnosis of the patient's visit.   FAMILY HISTORY:  Any \"first degree relatives\" (parent, brother, sister, or child) with the following?    No changes in my family's known health.   PATIENT EXPERIENCE:    Do you want the Dermatologist to perform a COMPLETE skin exam today including a clinical examination under the \"bra and underwear\" areas?  NO  If necessary, do we have your permission to call and leave a detailed message on your Preferred Phone number that includes your specific medical information?  Yes      No Known Allergies   Current Outpatient Medications:   •  aspirin 81 mg chewable tablet, Chew 1 tablet daily , Disp: , Rfl:   •  atorvastatin (LIPITOR) 40 mg tablet, Take 1 tablet (40 mg total) by mouth daily, Disp: 90 tablet, Rfl: 3  •  escitalopram (LEXAPRO) 20 mg tablet, Take 1 tablet (20 mg total) by mouth daily, Disp: 90 tablet, Rfl: 1  •  fluticasone (FLONASE) 50 mcg/act nasal spray, Spray 2 sprays in each nostril every day, Disp: 48 mL, Rfl: " "0  •  latanoprost (XALATAN) 0.005 % ophthalmic solution, INSTILL 1 DROP INTO BOTH EYES AT BEDTIME, Disp: , Rfl:   •  LORazepam (ATIVAN) 1 mg tablet, Take 1 tablet (1 mg total) by mouth 3 (three) times a day as needed for anxiety, Disp: 90 tablet, Rfl: 0  •  memantine (NAMENDA) 5 mg tablet, Take 1 tablet (5 mg total) by mouth 2 (two) times a day, Disp: 180 tablet, Rfl: 1  •  tamsulosin (FLOMAX) 0.4 mg, Take 2 capsules (0.8 mg total) by mouth daily with dinner, Disp: 180 capsule, Rfl: 2          Whom besides the patient is providing clinical information about today's encounter?   NO ADDITIONAL HISTORIAN (patient alone provided history)    Physical Exam and Assessment/Plan by Diagnosis:    Chief complaint: Patient is a 79 y/o male present for a suture removal on the Left [place], s/p Mohs surgery for a BCC.    Suture removal    Date/Time: 2/19/2024 8:30 AM    Performed by: Amelia Gutierrez  Authorized by: Armen Avilez MD  Universal Protocol:  Consent: Verbal consent obtained. Written consent not obtained.  Risks and benefits: risks, benefits and alternatives were discussed  Consent given by: patient  Time out: Immediately prior to procedure a \"time out\" was called to verify the correct patient, procedure, equipment, support staff and site/side marked as required.  Timeout called at: 2/19/2024 8:26 AM.  Patient understanding: patient states understanding of the procedure being performed  Patient consent: the patient's understanding of the procedure matches consent given  Procedure consent: procedure consent matches procedure scheduled  Relevant documents: relevant documents present and verified  Test results: test results available and properly labeled  Site marked: the operative site was not marked  Radiology Images displayed and confirmed. If images not available, report reviewed: imaging studies not available  Patient identity confirmed: verbally with patient      Patient location:  Clinic  Location:     Laterality:  " Left    Location:  Head/neck    Head/neck location: Left [place].  Procedure details:     Tools used:  Suture removal kit    Wound appearance:  No sign(s) of infection, good wound healing and clean  Post-procedure details:     Post-removal:  Steri-Strips applied    Patient tolerance of procedure:  Tolerated well, no immediate complications    Complication (if applicable):  Post - Op Photo taken  Comments:      RTC 6months

## 2024-02-21 ENCOUNTER — TELEPHONE (OUTPATIENT)
Dept: NEPHROLOGY | Facility: CLINIC | Age: 79
End: 2024-02-21

## 2024-02-21 PROBLEM — Z13.89 SCREENING FOR SKIN CONDITION: Status: RESOLVED | Noted: 2018-04-12 | Resolved: 2024-02-21

## 2024-02-21 NOTE — TELEPHONE ENCOUNTER
I called and spoke to the patient and reschedule is appointment 5/24/2024 because of Dr. CHINO Girard on hospital call. The patient understood and was okay with the day and time of his next appointment.

## 2024-04-19 DIAGNOSIS — F41.9 ANXIETY: ICD-10-CM

## 2024-04-19 RX ORDER — ESCITALOPRAM OXALATE 20 MG/1
20 TABLET ORAL DAILY
Qty: 90 TABLET | Refills: 3 | Status: SHIPPED | OUTPATIENT
Start: 2024-04-19

## 2024-05-02 ENCOUNTER — APPOINTMENT (OUTPATIENT)
Dept: LAB | Facility: HOSPITAL | Age: 79
End: 2024-05-02
Payer: MEDICARE

## 2024-05-02 DIAGNOSIS — E03.8 SUBCLINICAL HYPOTHYROIDISM: ICD-10-CM

## 2024-05-02 DIAGNOSIS — I10 ESSENTIAL HYPERTENSION: ICD-10-CM

## 2024-05-02 DIAGNOSIS — R73.01 IMPAIRED FASTING GLUCOSE: ICD-10-CM

## 2024-05-02 LAB
BASOPHILS # BLD AUTO: 0.02 THOUSANDS/ÂΜL (ref 0–0.1)
BASOPHILS NFR BLD AUTO: 0 % (ref 0–1)
CHOLEST SERPL-MCNC: 163 MG/DL
EOSINOPHIL # BLD AUTO: 0.26 THOUSAND/ÂΜL (ref 0–0.61)
EOSINOPHIL NFR BLD AUTO: 4 % (ref 0–6)
ERYTHROCYTE [DISTWIDTH] IN BLOOD BY AUTOMATED COUNT: 13.2 % (ref 11.6–15.1)
EST. AVERAGE GLUCOSE BLD GHB EST-MCNC: 117 MG/DL
HBA1C MFR BLD: 5.7 %
HCT VFR BLD AUTO: 50 % (ref 36.5–49.3)
HDLC SERPL-MCNC: 36 MG/DL
HGB BLD-MCNC: 16 G/DL (ref 12–17)
IMM GRANULOCYTES # BLD AUTO: 0.02 THOUSAND/UL (ref 0–0.2)
IMM GRANULOCYTES NFR BLD AUTO: 0 % (ref 0–2)
LDLC SERPL CALC-MCNC: 96 MG/DL (ref 0–100)
LYMPHOCYTES # BLD AUTO: 1.77 THOUSANDS/ÂΜL (ref 0.6–4.47)
LYMPHOCYTES NFR BLD AUTO: 24 % (ref 14–44)
MCH RBC QN AUTO: 28.5 PG (ref 26.8–34.3)
MCHC RBC AUTO-ENTMCNC: 32 G/DL (ref 31.4–37.4)
MCV RBC AUTO: 89 FL (ref 82–98)
MONOCYTES # BLD AUTO: 0.49 THOUSAND/ÂΜL (ref 0.17–1.22)
MONOCYTES NFR BLD AUTO: 7 % (ref 4–12)
NEUTROPHILS # BLD AUTO: 4.73 THOUSANDS/ÂΜL (ref 1.85–7.62)
NEUTS SEG NFR BLD AUTO: 65 % (ref 43–75)
NONHDLC SERPL-MCNC: 127 MG/DL
NRBC BLD AUTO-RTO: 0 /100 WBCS
PLATELET # BLD AUTO: 205 THOUSANDS/UL (ref 149–390)
PMV BLD AUTO: 10.7 FL (ref 8.9–12.7)
RBC # BLD AUTO: 5.62 MILLION/UL (ref 3.88–5.62)
TRIGL SERPL-MCNC: 157 MG/DL
TSH SERPL DL<=0.05 MIU/L-ACNC: 2.25 UIU/ML (ref 0.45–4.5)
WBC # BLD AUTO: 7.29 THOUSAND/UL (ref 4.31–10.16)

## 2024-05-02 PROCEDURE — 80061 LIPID PANEL: CPT

## 2024-05-02 PROCEDURE — 83036 HEMOGLOBIN GLYCOSYLATED A1C: CPT

## 2024-05-02 PROCEDURE — 36415 COLL VENOUS BLD VENIPUNCTURE: CPT

## 2024-05-02 PROCEDURE — 80053 COMPREHEN METABOLIC PANEL: CPT

## 2024-05-02 PROCEDURE — 85025 COMPLETE CBC W/AUTO DIFF WBC: CPT

## 2024-05-02 PROCEDURE — 84443 ASSAY THYROID STIM HORMONE: CPT

## 2024-05-06 ENCOUNTER — OFFICE VISIT (OUTPATIENT)
Age: 79
End: 2024-05-06
Payer: MEDICARE

## 2024-05-06 VITALS
SYSTOLIC BLOOD PRESSURE: 122 MMHG | HEIGHT: 69 IN | HEART RATE: 70 BPM | RESPIRATION RATE: 16 BRPM | OXYGEN SATURATION: 98 % | WEIGHT: 173 LBS | DIASTOLIC BLOOD PRESSURE: 70 MMHG | BODY MASS INDEX: 25.62 KG/M2

## 2024-05-06 DIAGNOSIS — F09 MILD COGNITIVE DISORDER: ICD-10-CM

## 2024-05-06 DIAGNOSIS — N40.1 BENIGN PROSTATIC HYPERPLASIA WITH NOCTURIA: ICD-10-CM

## 2024-05-06 DIAGNOSIS — E78.2 MIXED HYPERLIPIDEMIA: ICD-10-CM

## 2024-05-06 DIAGNOSIS — I10 ESSENTIAL HYPERTENSION: Primary | ICD-10-CM

## 2024-05-06 DIAGNOSIS — F41.9 ANXIETY: ICD-10-CM

## 2024-05-06 DIAGNOSIS — I25.10 CORONARY ARTERY DISEASE INVOLVING NATIVE CORONARY ARTERY OF NATIVE HEART WITHOUT ANGINA PECTORIS: ICD-10-CM

## 2024-05-06 DIAGNOSIS — I82.509 CHRONIC DEEP VEIN THROMBOSIS (DVT) OF LOWER EXTREMITY, UNSPECIFIED LATERALITY, UNSPECIFIED VEIN (HCC): ICD-10-CM

## 2024-05-06 DIAGNOSIS — R73.01 IMPAIRED FASTING GLUCOSE: ICD-10-CM

## 2024-05-06 DIAGNOSIS — E03.8 SUBCLINICAL HYPOTHYROIDISM: ICD-10-CM

## 2024-05-06 DIAGNOSIS — N18.31 STAGE 3A CHRONIC KIDNEY DISEASE (HCC): ICD-10-CM

## 2024-05-06 DIAGNOSIS — R35.1 BENIGN PROSTATIC HYPERPLASIA WITH NOCTURIA: ICD-10-CM

## 2024-05-06 DIAGNOSIS — N40.0 BPH WITHOUT URINARY OBSTRUCTION: ICD-10-CM

## 2024-05-06 PROCEDURE — G0439 PPPS, SUBSEQ VISIT: HCPCS | Performed by: INTERNAL MEDICINE

## 2024-05-06 PROCEDURE — 99214 OFFICE O/P EST MOD 30 MIN: CPT | Performed by: INTERNAL MEDICINE

## 2024-05-06 RX ORDER — TAMSULOSIN HYDROCHLORIDE 0.4 MG/1
0.8 CAPSULE ORAL
Qty: 180 CAPSULE | Refills: 2 | Status: SHIPPED | OUTPATIENT
Start: 2024-05-06

## 2024-05-06 RX ORDER — ESCITALOPRAM OXALATE 20 MG/1
20 TABLET ORAL DAILY
Qty: 90 TABLET | Refills: 3 | Status: SHIPPED | OUTPATIENT
Start: 2024-05-06

## 2024-05-06 RX ORDER — LORAZEPAM 1 MG/1
1 TABLET ORAL 3 TIMES DAILY PRN
Qty: 90 TABLET | Refills: 0 | Status: SHIPPED | OUTPATIENT
Start: 2024-05-06 | End: 2024-10-19

## 2024-05-06 RX ORDER — ATORVASTATIN CALCIUM 40 MG/1
40 TABLET, FILM COATED ORAL DAILY
Qty: 90 TABLET | Refills: 3 | Status: SHIPPED | OUTPATIENT
Start: 2024-05-06

## 2024-05-06 RX ORDER — MEMANTINE HYDROCHLORIDE 5 MG/1
5 TABLET ORAL 2 TIMES DAILY
Qty: 180 TABLET | Refills: 1 | Status: SHIPPED | OUTPATIENT
Start: 2024-05-06

## 2024-05-06 RX ORDER — ESCITALOPRAM OXALATE 20 MG/1
20 TABLET ORAL DAILY
Qty: 90 TABLET | Refills: 3 | Status: SHIPPED | OUTPATIENT
Start: 2024-05-06 | End: 2024-05-06 | Stop reason: SDUPTHER

## 2024-05-06 NOTE — PROGRESS NOTES
Assessment and Plan:     Problem List Items Addressed This Visit          Cardiovascular and Mediastinum    Coronary artery disease involving native coronary artery of native heart without angina pectoris    Essential hypertension - Primary    Relevant Orders    Lipid panel    Comprehensive metabolic panel    CBC and differential    Chronic deep vein thrombosis (DVT) of lower extremity (HCC)       Endocrine    Impaired fasting glucose    Relevant Orders    Hemoglobin A1C    Subclinical hypothyroidism    Relevant Orders    TSH, 3rd generation with Free T4 reflex       Genitourinary    Benign prostatic hyperplasia    CKD (chronic kidney disease), stage III (HCC)       Behavioral Health    Anxiety    Relevant Medications    LORazepam (ATIVAN) 1 mg tablet    escitalopram (LEXAPRO) 20 mg tablet       Other    Mixed hyperlipidemia     Other Visit Diagnoses       Mild cognitive disorder        Relevant Medications    memantine (NAMENDA) 5 mg tablet             Preventive health issues were discussed with patient, and age appropriate screening tests were ordered as noted in patient's After Visit Summary.  Personalized health advice and appropriate referrals for health education or preventive services given if needed, as noted in patient's After Visit Summary.     History of Present Illness:     Patient presents for a Medicare Wellness Visit    F/u mmp, awv and review labs  Feeling generally well  Dealing w/ Francoise w/ early memory loss  Exercising less lately but keeping active.  No cp/sob.    New Dx Macular Degen s/p laser surgery x 2  LIH-seen by surgery, plans to hold off on surgery as he is asymptomatic.   CAD-Taking full asa daily.   HPL/HTN-tolerating atorvastatin, home bp's 110/70   Anxiety-stable on lexapro, using ativan 1-2x per month     MCI-seen by Dr. Lerner, taking Namenda, thinks its helping.  BPH-improved w/ flomax, still w/ nocturia 3-4 x per night.       Patient Care Team:  Ramesh Aceves MD as PCP -  General  MD Alberto Bee MD (Nephrology)     Review of Systems:     Review of Systems   Constitutional:  Negative for appetite change, chills, diaphoresis, fatigue, fever and unexpected weight change.   HENT:  Negative for congestion, hearing loss and rhinorrhea.    Eyes:  Negative for visual disturbance.   Respiratory:  Negative for cough, chest tightness, shortness of breath and wheezing.    Cardiovascular:  Negative for chest pain, palpitations and leg swelling.   Gastrointestinal:  Negative for abdominal pain and blood in stool.   Endocrine: Negative for cold intolerance, heat intolerance, polydipsia and polyuria.   Genitourinary:  Negative for difficulty urinating, dysuria, frequency and urgency.   Musculoskeletal:  Negative for arthralgias and myalgias.   Skin:  Negative for rash.   Neurological:  Negative for dizziness, weakness, light-headedness and headaches.   Hematological:  Does not bruise/bleed easily.   Psychiatric/Behavioral:  Negative for dysphoric mood and sleep disturbance.         Problem List:     Patient Active Problem List   Diagnosis    Seborrheic keratosis    History of skin cancer    Psoriasiform dermatitis    Coronary artery disease involving native coronary artery of native heart without angina pectoris    Essential hypertension    Mixed hyperlipidemia    Anxiety    Benign prostatic hyperplasia    CKD (chronic kidney disease), stage III (HCC)    Impaired fasting glucose    Subclinical hypothyroidism    Nocturia    Chronic deep vein thrombosis (DVT) of lower extremity (HCC)    Right carotid bruit    Carotid stenosis, right    Left inguinal hernia      Past Medical and Surgical History:     Past Medical History:   Diagnosis Date    Anxiety disorder     last assessed - 06May2015    Basal cell carcinoma of face 12/21/2023    left temple, mohs    BPH (benign prostatic hyperplasia)     benign prostatic hypertrophy; last assessed - 06May2015    Chronic kidney disease     Colon  polyp     Coronary atherosclerosis of native coronary artery     last assessed - 06May2015    Deep vein thrombosis (DVT) (HCC)     last assessed - 06May2015    Depression     last assessed - 06May2015    History of colonoscopy     Fiberoptic; Resolved - 12/1/2011    Hyperlipemia     last assessed - [id number]    Hypertension     last assessed - 07Dec2017    Myocardial infarction (HCC)     Nonmelanoma skin cancer     last assessed - [id number]    Osteoarthritis     last assessed - 06May2015    Pneumonia     last assessed - 06May2015    Pulmonary HTN (HCC)     PVD (peripheral vascular disease) (HCC)     Seborrheic dermatitis      Past Surgical History:   Procedure Laterality Date    CARDIAC CATHETERIZATION      procedure outcome successful; last assessed - 06May2015    CORONARY ARTERY BYPASS GRAFT      last assessed - 07Dec2017    IVC FILTER INSERTION      [place]    MOHS SURGERY Left 02/12/2024    BCC left temple, Dr Gonzales    TOTAL HIP ARTHROPLASTY      last assessed - 06May2015      Family History:     Family History   Problem Relation Age of Onset    Dementia Mother     Rectal cancer Mother     Coronary artery disease Father     Rectal cancer Sister       Social History:     Social History     Socioeconomic History    Marital status: /Civil Union     Spouse name: None    Number of children: None    Years of education: None    Highest education level: None   Occupational History    None   Tobacco Use    Smoking status: Never    Smokeless tobacco: Never   Vaping Use    Vaping status: Never Used   Substance and Sexual Activity    Alcohol use: Not Currently     Comment: socially    Drug use: Yes     Types: Marijuana    Sexual activity: Yes     Partners: Female   Other Topics Concern    None   Social History Narrative    None     Social Determinants of Health     Financial Resource Strain: Low Risk  (4/20/2023)    Overall Financial Resource Strain (CARDIA)     Difficulty of Paying Living Expenses: Not very  hard   Recent Concern: Financial Resource Strain - High Risk (4/13/2023)    Overall Financial Resource Strain (CARDIA)     Difficulty of Paying Living Expenses: Hard   Food Insecurity: No Food Insecurity (5/6/2024)    Hunger Vital Sign     Worried About Running Out of Food in the Last Year: Never true     Ran Out of Food in the Last Year: Never true   Transportation Needs: No Transportation Needs (5/6/2024)    PRAPARE - Transportation     Lack of Transportation (Medical): No     Lack of Transportation (Non-Medical): No   Physical Activity: Insufficiently Active (3/15/2021)    Exercise Vital Sign     Days of Exercise per Week: 5 days     Minutes of Exercise per Session: 20 min   Stress: No Stress Concern Present (3/15/2021)    [employer] [place] [employer] - Occupational Stress Questionnaire     Feeling of Stress : Not at all   Social Connections: Not on file   Intimate Partner Violence: Not on file   Housing Stability: Unknown (5/6/2024)    Housing Stability Vital Sign     Unable to Pay for Housing in the Last Year: No     Number of Places Lived in the Last Year: Not on file     Unstable Housing in the Last Year: No      Medications and Allergies:     Current Outpatient Medications   Medication Sig Dispense Refill    aspirin 81 mg chewable tablet Chew 1 tablet daily       atorvastatin (LIPITOR) 40 mg tablet Take 1 tablet (40 mg total) by mouth daily 90 tablet 3    escitalopram (LEXAPRO) 20 mg tablet Take 1 tablet (20 mg total) by mouth daily 90 tablet 3    fluticasone (FLONASE) 50 mcg/act nasal spray Spray 2 sprays in each nostril every day 48 mL 0    latanoprost (XALATAN) 0.005 % ophthalmic solution INSTILL 1 DROP INTO BOTH EYES AT BEDTIME      LORazepam (ATIVAN) 1 mg tablet Take 1 tablet (1 mg total) by mouth 3 (three) times a day as needed for anxiety 90 tablet 0    memantine (NAMENDA) 5 mg tablet Take 1 tablet (5 mg total) by mouth 2 (two) times a day 180 tablet 1    tamsulosin (FLOMAX) 0.4 mg Take 2  capsules (0.8 mg total) by mouth daily with dinner 180 capsule 2     No current facility-administered medications for this visit.     No Known Allergies   Immunizations:     Immunization History   Administered Date(s) Administered    COVID-19 PFIZER VACCINE 0.3 ML IM 03/19/2021, 04/09/2021, 01/14/2022    INFLUENZA 10/21/2010, 10/18/2011, 10/12/2012, 10/08/2014, 10/01/2015, 11/06/2015, 09/13/2016, 10/31/2017, 10/09/2018, 10/20/2022    Influenza Split High Dose Preservative Free IM 09/13/2016, 10/31/2017, 09/24/2019    Influenza, high dose seasonal 0.7 mL 08/29/2020, 09/23/2021, 10/20/2022, 10/30/2023    Influenza, seasonal, injectable 10/01/2015    Pneumococcal Conjugate 13-Valent 11/18/2015, 05/11/2016    Pneumococcal Polysaccharide PPV23 04/22/2013    Tdap 1946, 11/21/2018    Zoster 04/26/2014      Health Maintenance:         Topic Date Due    Hepatitis C Screening  Completed    Colorectal Cancer Screening  Discontinued         Topic Date Due    COVID-19 Vaccine (4 - 2023-24 season) 09/01/2023      Medicare Screening Tests and Risk Assessments:     Robel is here for his Subsequent Wellness visit.     Health Risk Assessment:   Patient rates overall health as good. Patient feels that their physical health rating is same. Patient is satisfied with their life. Eyesight was rated as same. Hearing was rated as same. Patient feels that their emotional and mental health rating is same. Patients states they are never, rarely angry. Patient states they are never, rarely unusually tired/fatigued. Pain experienced in the last 7 days has been none. Patient states that he has experienced no weight loss or gain in last 6 months.     Depression Screening:   PHQ-2 Score: 0      Fall Risk Screening:   In the past year, patient has experienced: no history of falling in past year      Home Safety:  Patient does not have trouble with stairs inside or outside of their home. Patient has working smoke alarms and has working carbon  monoxide detector. Home safety hazards include: none.     Nutrition:   Current diet is Regular.     Medications:   Patient is currently taking over-the-counter supplements. OTC medications include: see medication list. Patient is able to manage medications.     Activities of Daily Living (ADLs)/Instrumental Activities of Daily Living (IADLs):   Walk and transfer into and out of bed and chair?: Yes  Dress and groom yourself?: Yes    Bathe or shower yourself?: Yes    Feed yourself? Yes  Do your laundry/housekeeping?: Yes  Manage your money, pay your bills and track your expenses?: Yes  Make your own meals?: Yes    Do your own shopping?: Yes    Previous Hospitalizations:   Any hospitalizations or ED visits within the last 12 months?: No      Advance Care Planning:   Living will: Yes    Advanced directive: Yes      Cognitive Screening:   Provider or family/friend/caregiver concerned regarding cognition?: No    PREVENTIVE SCREENINGS      Cardiovascular Screening:    General: Screening Not Indicated and History Lipid Disorder      Diabetes Screening:     General: Screening Current      Colorectal Cancer Screening:     General: Screening Current      Prostate Cancer Screening:    General: Screening Not Indicated      Osteoporosis Screening:    General: Screening Not Indicated      Abdominal Aortic Aneurysm (AAA) Screening:        General: Screening Not Indicated      Lung Cancer Screening:     General: Screening Not Indicated      Hepatitis C Screening:    General: Screening Current    Screening, Brief Intervention, and Referral to Treatment (SBIRT)    Screening  Typical number of drinks in a day: 0  Typical number of drinks in a week: 0  Interpretation: Low risk drinking behavior.    Single Item Drug Screening:  How often have you used an illegal drug (including marijuana) or a prescription medication for non-medical reasons in the past year? never    Single Item Drug Screen Score: 0  Interpretation: Negative screen for  "possible drug use disorder    No results found.     Physical Exam:     /70 (BP Location: Left arm, Patient Position: Sitting, Cuff Size: Standard)   Pulse 70   Resp 16   Ht 5' 9\" (1.753 m)   Wt 78.5 kg (173 lb)   SpO2 98%   BMI 25.55 kg/m²     Physical Exam  Vitals and nursing note reviewed.   Constitutional:       General: He is not in acute distress.     Appearance: He is well-developed.   HENT:      Head: Normocephalic and atraumatic.   Eyes:      Conjunctiva/sclera: Conjunctivae normal.   Cardiovascular:      Rate and Rhythm: Normal rate and regular rhythm.      Heart sounds: No murmur heard.  Pulmonary:      Effort: Pulmonary effort is normal. No respiratory distress.      Breath sounds: Normal breath sounds.   Abdominal:      Palpations: Abdomen is soft.      Tenderness: There is no abdominal tenderness.   Musculoskeletal:         General: No swelling.      Cervical back: Neck supple.   Skin:     General: Skin is warm and dry.      Capillary Refill: Capillary refill takes less than 2 seconds.   Neurological:      Mental Status: He is alert.   Psychiatric:         Mood and Affect: Mood normal.          Ramesh Aceves MD  "

## 2024-05-06 NOTE — PATIENT INSTRUCTIONS
Lab data reviewed in detail and compared to prior    Impaired fasting glucose stable with A1c 5.7    Coronary artery disease stable without angina, continue aggressive risk factor modification as below    Hyperlipidemia-LDL remains at goal on statin however HDL has dropped.  Increase aerobic exercise and watch saturated fats in the diet.  Recheck in 6 months.    Blood pressure remained stable without medication    Anxiety stable on Lexapro, refill lorazepam    MCI stable on Namenda    BPH stable with Flomax    Health maintenance is up-to-date    Routine follow-up after labs in 6 months, sooner as needed.    Medicare Preventive Visit Patient Instructions  Thank you for completing your Welcome to Medicare Visit or Medicare Annual Wellness Visit today. Your next wellness visit will be due in one year (5/7/2025).  The screening/preventive services that you may require over the next 5-10 years are detailed below. Some tests may not apply to you based off risk factors and/or age. Screening tests ordered at today's visit but not completed yet may show as past due. Also, please note that scanned in results may not display below.  Preventive Screenings:  Service Recommendations Previous Testing/Comments   Colorectal Cancer Screening  Colonoscopy    Fecal Occult Blood Test (FOBT)/Fecal Immunochemical Test (FIT)  Fecal DNA/Cologuard Test  Flexible Sigmoidoscopy Age: 45-75 years old   Colonoscopy: every 10 years (May be performed more frequently if at higher risk)  OR  FOBT/FIT: every 1 year  OR  Cologuard: every 3 years  OR  Sigmoidoscopy: every 5 years  Screening may be recommended earlier than age 45 if at higher risk for colorectal cancer. Also, an individualized decision between you and your healthcare provider will decide whether screening between the ages of 76-85 would be appropriate. Colonoscopy: 07/24/2020  FOBT/FIT: Not on file  Cologuard: Not on file  Sigmoidoscopy: Not on file          Prostate Cancer Screening  Individualized decision between patient and health care provider in men between ages of 55-69   Medicare will cover every 12 months beginning on the day after your 50th birthday PSA: 2.0 ng/mL     Screening Not Indicated     Hepatitis C Screening Once for adults born between 1945 and 1965  More frequently in patients at high risk for Hepatitis C Hep C Antibody: 05/18/2020    Screening Current   Diabetes Screening 1-2 times per year if you're at risk for diabetes or have pre-diabetes Fasting glucose: 99 mg/dL (5/2/2024)  A1C: 5.7 % (5/2/2024)  Screening Current   Cholesterol Screening Once every 5 years if you don't have a lipid disorder. May order more often based on risk factors. Lipid panel: 05/02/2024  Screening Not Indicated  History Lipid Disorder      Other Preventive Screenings Covered by Medicare:  Abdominal Aortic Aneurysm (AAA) Screening: covered once if your at risk. You're considered to be at risk if you have a family history of AAA or a male between the age of 65-75 who smoking at least 100 cigarettes in your lifetime.  Lung Cancer Screening: covers low dose CT scan once per year if you meet all of the following conditions: (1) Age 55-77; (2) No signs or symptoms of lung cancer; (3) Current smoker or have quit smoking within the last 15 years; (4) You have a tobacco smoking history of at least 20 pack years (packs per day x number of years you smoked); (5) You get a written order from a healthcare provider.  Glaucoma Screening: covered annually if you're considered high risk: (1) You have diabetes OR (2) Family history of glaucoma OR (3)  aged 50 and older OR (4)  American aged 65 and older  Osteoporosis Screening: covered every 2 years if you meet one of the following conditions: (1) Have a vertebral abnormality; (2) On glucocorticoid therapy for more than 3 months; (3) Have primary hyperparathyroidism; (4) On osteoporosis medications and need to assess response to drug  therapy.  HIV Screening: covered annually if you're between the age of 15-65. Also covered annually if you are younger than 15 and older than 65 with risk factors for HIV infection. For pregnant patients, it is covered up to 3 times per pregnancy.    Immunizations:  Immunization Recommendations   Influenza Vaccine Annual influenza vaccination during flu season is recommended for all persons aged >= 6 months who do not have contraindications   Pneumococcal Vaccine   * Pneumococcal conjugate vaccine = PCV13 (Prevnar 13), PCV15 (Vaxneuvance), PCV20 (Prevnar 20)  * Pneumococcal polysaccharide vaccine = PPSV23 (Pneumovax) Adults 19-65 yo with certain risk factors or if 65+ yo  If never received any pneumonia vaccine: recommend Prevnar 20 (PCV20)  Give PCV20 if previously received 1 dose of PCV13 or PPSV23   Hepatitis B Vaccine 3 dose series if at intermediate or high risk (ex: diabetes, end stage renal disease, liver disease)   Respiratory syncytial virus (RSV) Vaccine - COVERED BY MEDICARE PART D  * RSVPreF3 (Arexvy) CDC recommends that adults 60 years of age and older may receive a single dose of RSV vaccine using shared clinical decision-making (SCDM)   Tetanus (Td) Vaccine - COST NOT COVERED BY MEDICARE PART B Following completion of primary series, a booster dose should be given every 10 years to maintain immunity against tetanus. Td may also be given as tetanus wound prophylaxis.   Tdap Vaccine - COST NOT COVERED BY MEDICARE PART B Recommended at least once for all adults. For pregnant patients, recommended with each pregnancy.   Shingles Vaccine (Shingrix) - COST NOT COVERED BY MEDICARE PART B  2 shot series recommended in those 19 years and older who have or will have weakened immune systems or those 50 years and older     Health Maintenance Due:      Topic Date Due    Hepatitis C Screening  Completed    Colorectal Cancer Screening  Discontinued     Immunizations Due:      Topic Date Due    COVID-19 Vaccine (4 -  2023-24 season) 09/01/2023     Advance Directives   What are advance directives?  Advance directives are legal documents that state your wishes and plans for medical care. These plans are made ahead of time in case you lose your ability to make decisions for yourself. Advance directives can apply to any medical decision, such as the treatments you want, and if you want to donate organs.   What are the types of advance directives?  There are many types of advance directives, and each state has rules about how to use them. You may choose a combination of any of the following:  Living will:  This is a written record of the treatment you want. You can also choose which treatments you do not want, which to limit, and which to stop at a certain time. This includes surgery, medicine, IV fluid, and tube feedings.   Durable power of  for healthcare (DPAHC):  This is a written record that states who you want to make healthcare choices for you when you are unable to make them for yourself. This person, called a proxy, is usually a family member or a friend. You may choose more than 1 proxy.  Do not resuscitate (DNR) order:  A DNR order is used in case your heart stops beating or you stop breathing. It is a request not to have certain forms of treatment, such as CPR. A DNR order may be included in other types of advance directives.  Medical directive:  This covers the care that you want if you are in a coma, near death, or unable to make decisions for yourself. You can list the treatments you want for each condition. Treatment may include pain medicine, surgery, blood transfusions, dialysis, IV or tube feedings, and a ventilator (breathing machine).  Values history:  This document has questions about your views, beliefs, and how you feel and think about life. This information can help others choose the care that you would choose.  Why are advance directives important?  An advance directive helps you control your care.  Although spoken wishes may be used, it is better to have your wishes written down. Spoken wishes can be misunderstood, or not followed. Treatments may be given even if you do not want them. An advance directive may make it easier for your family to make difficult choices about your care.   Weight Management   Why it is important to manage your weight:  Being overweight increases your risk of health conditions such as heart disease, high blood pressure, type 2 diabetes, and certain types of cancer. It can also increase your risk for osteoarthritis, sleep apnea, and other respiratory problems. Aim for a slow, steady weight loss. Even a small amount of weight loss can lower your risk of health problems.  How to lose weight safely:  A safe and healthy way to lose weight is to eat fewer calories and get regular exercise. You can lose up about 1 pound a week by decreasing the number of calories you eat by 500 calories each day.   Healthy meal plan for weight management:  A healthy meal plan includes a variety of foods, contains fewer calories, and helps you stay healthy. A healthy meal plan includes the following:  Eat whole-grain foods more often.  A healthy meal plan should contain fiber. Fiber is the part of grains, fruits, and vegetables that is not broken down by your body. Whole-grain foods are healthy and provide extra fiber in your diet. Some examples of whole-grain foods are whole-wheat breads and pastas, oatmeal, brown rice, and bulgur.  Eat a variety of vegetables every day.  Include dark, leafy greens such as spinach, kale, radha greens, and mustard greens. Eat yellow and orange vegetables such as carrots, sweet potatoes, and winter squash.   Eat a variety of fruits every day.  Choose fresh or canned fruit (canned in its own juice or light syrup) instead of juice. Fruit juice has very little or no fiber.  Eat low-fat dairy foods.  Drink fat-free (skim) milk or 1% milk. Eat fat-free yogurt and low-fat cottage  cheese. Try low-fat cheeses such as mozzarella and other reduced-fat cheeses.  Choose meat and other protein foods that are low in fat.  Choose beans or other legumes such as split peas or lentils. Choose fish, skinless poultry (chicken or turkey), or lean cuts of red meat (beef or pork). Before you cook meat or poultry, cut off any visible fat.   Use less fat and oil.  Try baking foods instead of frying them. Add less fat, such as margarine, sour cream, regular salad dressing and mayonnaise to foods. Eat fewer high-fat foods. Some examples of high-fat foods include french fries, doughnuts, ice cream, and cakes.  Eat fewer sweets.  Limit foods and drinks that are high in sugar. This includes candy, cookies, regular soda, and sweetened drinks.  Exercise:  Exercise at least 30 minutes per day on most days of the week. Some examples of exercise include walking, biking, dancing, and swimming. You can also fit in more physical activity by taking the stairs instead of the elevator or parking farther away from stores. Ask your healthcare provider about the best exercise plan for you.      © Copyright [employer] 2018 Information is for End User's use only and may not be sold, redistributed or otherwise used for commercial purposes. All illustrations and images included in CareNotes® are the copyrighted property of [employer] or [employer]

## 2024-05-08 LAB
ALBUMIN SERPL BCP-MCNC: 4.3 G/DL (ref 3.5–5)
ALP SERPL-CCNC: 72 U/L (ref 34–104)
ALT SERPL W P-5'-P-CCNC: 25 U/L (ref 7–52)
ANION GAP SERPL CALCULATED.3IONS-SCNC: 4 MMOL/L (ref 4–13)
AST SERPL W P-5'-P-CCNC: 28 U/L (ref 13–39)
BILIRUB SERPL-MCNC: 1.44 MG/DL (ref 0.2–1)
BUN SERPL-MCNC: 23 MG/DL (ref 5–25)
CALCIUM SERPL-MCNC: 10.1 MG/DL (ref 8.4–10.2)
CHLORIDE SERPL-SCNC: 107 MMOL/L (ref 96–108)
CO2 SERPL-SCNC: 30 MMOL/L (ref 21–32)
CREAT SERPL-MCNC: 1.2 MG/DL (ref 0.6–1.3)
GFR SERPL CREATININE-BSD FRML MDRD: 57 ML/MIN/1.73SQ M
GLUCOSE P FAST SERPL-MCNC: 99 MG/DL (ref 65–99)
POTASSIUM SERPL-SCNC: 4.3 MMOL/L (ref 3.5–5.3)
PROT SERPL-MCNC: 7.2 G/DL (ref 6.4–8.4)
SODIUM SERPL-SCNC: 141 MMOL/L (ref 135–147)

## 2024-05-21 ENCOUNTER — TELEPHONE (OUTPATIENT)
Dept: NEPHROLOGY | Facility: CLINIC | Age: 79
End: 2024-05-21

## 2024-05-21 NOTE — TELEPHONE ENCOUNTER
Called spoke with patient's spouse miriam, advised patient needs to get non-fasting labs done 1 week prior to 05/31/24 follow up appointment with Dr.Adeem Shaji MD. Miriam verbalized understanding and is okay with it.

## 2024-05-29 ENCOUNTER — TELEPHONE (OUTPATIENT)
Age: 79
End: 2024-05-29

## 2024-05-29 ENCOUNTER — APPOINTMENT (OUTPATIENT)
Dept: LAB | Facility: HOSPITAL | Age: 79
End: 2024-05-29
Payer: MEDICARE

## 2024-05-29 LAB — 25(OH)D3 SERPL-MCNC: 57.8 NG/ML (ref 30–100)

## 2024-05-31 ENCOUNTER — OFFICE VISIT (OUTPATIENT)
Dept: NEPHROLOGY | Facility: CLINIC | Age: 79
End: 2024-05-31
Payer: MEDICARE

## 2024-05-31 VITALS
HEIGHT: 69 IN | BODY MASS INDEX: 24.44 KG/M2 | RESPIRATION RATE: 16 BRPM | DIASTOLIC BLOOD PRESSURE: 60 MMHG | OXYGEN SATURATION: 97 % | SYSTOLIC BLOOD PRESSURE: 100 MMHG | TEMPERATURE: 96.9 F | HEART RATE: 65 BPM | WEIGHT: 165 LBS

## 2024-05-31 DIAGNOSIS — N18.31 STAGE 3A CHRONIC KIDNEY DISEASE (HCC): Primary | ICD-10-CM

## 2024-05-31 PROCEDURE — G2211 COMPLEX E/M VISIT ADD ON: HCPCS | Performed by: INTERNAL MEDICINE

## 2024-05-31 PROCEDURE — 99214 OFFICE O/P EST MOD 30 MIN: CPT | Performed by: INTERNAL MEDICINE

## 2024-05-31 NOTE — PROGRESS NOTES
NEPHROLOGY PROGRESS NOTE    Patient: Robel Devi               Sex: male          DOA: No admission date for patient encounter.   YOB: 1945        Age:  79 y.o.          5/31/2024        BACKGROUND     75-year-old male with past medical history of CAD status post CABG, dyslipidemia, chronic kidney disease stage 3, hypertension, depression who presents to renal clinic for follow-up.    SUBJECTIVE     Patient is being seen after 1 year.  Feels well.  He is eating better and has been taking Lipitor 40 mg daily.    REVIEW OF SYSTEMS     Review of Systems   Constitutional: Negative.    HENT: Negative.     Eyes: Negative.    Respiratory: Negative.     Cardiovascular: Negative.    Gastrointestinal: Negative.    Endocrine: Negative.    Genitourinary: Negative.    Musculoskeletal: Negative.    Skin: Negative.    Allergic/Immunologic: Negative.    Neurological: Negative.    Hematological: Negative.    All other systems reviewed and are negative.      OBJECTIVE     Current Weight: Weight - Scale: 74.8 kg (165 lb)  Vitals:    05/31/24 1447   BP: 100/60   Pulse: 65   Resp: 16   Temp: (!) 96.9 °F (36.1 °C)   SpO2: 97%     Body mass index is 24.37 kg/m².      CURRENT MEDICATIONS       Current Outpatient Medications:     aspirin 81 mg chewable tablet, Chew 1 tablet daily , Disp: , Rfl:     atorvastatin (LIPITOR) 40 mg tablet, Take 1 tablet (40 mg total) by mouth daily, Disp: 90 tablet, Rfl: 3    escitalopram (LEXAPRO) 20 mg tablet, Take 1 tablet (20 mg total) by mouth daily, Disp: 90 tablet, Rfl: 3    fluticasone (FLONASE) 50 mcg/act nasal spray, Spray 2 sprays in each nostril every day (Patient taking differently: if needed Spray 2 sprays in each nostril every day), Disp: 48 mL, Rfl: 0    latanoprost (XALATAN) 0.005 % ophthalmic solution, INSTILL 1 DROP INTO BOTH EYES AT BEDTIME, Disp: , Rfl:     LORazepam (ATIVAN) 1 mg tablet, Take 1 tablet (1 mg total) by mouth 3 (three) times a day as needed for anxiety,  Disp: 90 tablet, Rfl: 0    memantine (NAMENDA) 5 mg tablet, Take 1 tablet (5 mg total) by mouth 2 (two) times a day, Disp: 180 tablet, Rfl: 1    tamsulosin (FLOMAX) 0.4 mg, Take 2 capsules (0.8 mg total) by mouth daily with dinner, Disp: 180 capsule, Rfl: 2      PHYSICAL EXAMINATION     Physical Exam  HENT:      Head: Normocephalic and atraumatic.   Eyes:      Pupils: Pupils are equal, round, and reactive to light.   Neck:      Vascular: No JVD.   Cardiovascular:      Rate and Rhythm: Normal rate and regular rhythm.      Heart sounds: Normal heart sounds. No murmur heard.     No friction rub.   Pulmonary:      Effort: Pulmonary effort is normal.      Breath sounds: Normal breath sounds.   Abdominal:      General: Bowel sounds are normal. There is no distension.      Palpations: Abdomen is soft.      Tenderness: There is no abdominal tenderness. There is no rebound.   Musculoskeletal:         General: No tenderness or edema.      Cervical back: Neck supple.   Skin:     General: Skin is dry.      Findings: No rash.   Neurological:      Mental Status: He is alert and oriented to person, place, and time.   Psychiatric:         Mood and Affect: Mood and affect normal.           LAB RESULTS       Results from last 6 Months   Lab Units 05/29/24  0912 05/02/24  0848   WBC Thousand/uL 7.50 7.29   HEMOGLOBIN g/dL 15.3 16.0   HEMATOCRIT % 46.4 50.0*   PLATELETS Thousands/uL 176 205   POTASSIUM mmol/L 4.5 4.3   CHLORIDE mmol/L 106 107   CO2 mmol/L 30 30   BUN mg/dL 22 23   CREATININE mg/dL 1.10 1.20   CALCIUM mg/dL 9.8 10.1   PHOSPHORUS mg/dL 2.3  --          RADIOLOGY RESULTS      No results found for this or any previous visit.  Results for orders placed during the hospital encounter of 05/31/18   XR chest pa & lateral    Narrative CHEST     INDICATION:   R05: Cough.    COMPARISON:  11/15/2017    EXAM PERFORMED/VIEWS:  XR CHEST PA & LATERAL  The frontal view was performed utilizing dual energy radiographic  technique.      FINDINGS:    Cardiomediastinal silhouette appears unremarkable.  Status post median sternotomy and CABG.    The lungs are clear.  Mild eventration of the right diaphragmatic apex.  No pneumothorax or pleural effusion.    Osseous structures appear within normal limits for patient age.  Old left lower rib fracture.      Impression No acute cardiopulmonary disease.        Workstation performed: [id number]         ASSESSMENT/PLAN       75-year-old male with past medical history of CAD status post CABG, dyslipidemia, chronic kidney disease stage 3, hypertension, BPH who presents to renal clinic for follow-up.    1. Chronic kidney disease stage 2:  Baseline serum creatinine 1.2-1.5 that existed over the past 6 years.  -current serum creatinine is 1.1 mg/dL and at baseline with eGFR of 63  -avoidance of nephrotoxic agents such as NSAIDs reiterated.    2. 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  Patient is asymptomatic.  Currently requiring no antihypertensive.    3. Secondary hyperparathyroidism of renal origin:  PTH intact, 25 hydroxy vitamin-D, calcium and phosphorus are all in good range.    4. Anemia due to CKD:  Hemoglobin is stable at 15.3 g/dl and on target for his level of CKD.    5. CAD status post CABG:  Stable while on aspirin 81 mg once daily. No chest pain reported.     6. Benign prostatic hyperplasia: On Flomax and Avodart with stable urinary symptoms.    7. Dyslipidemia:  On Lipitor 40 mg at bedtime.  Improvement in level of triglyceride level to 157.    8. Nutrition:  Low-potassium, low phosphorus, moderate protein diet recommended.      9. Proteinuria: None exist.      Alberto Girard  Nephrology  5/31/2024

## 2024-06-19 ENCOUNTER — OFFICE VISIT (OUTPATIENT)
Age: 79
End: 2024-06-19
Payer: MEDICARE

## 2024-06-19 VITALS — BODY MASS INDEX: 24.44 KG/M2 | WEIGHT: 165 LBS | HEIGHT: 69 IN | TEMPERATURE: 97.1 F

## 2024-06-19 DIAGNOSIS — D22.9 NEVUS: ICD-10-CM

## 2024-06-19 DIAGNOSIS — Z85.828 HISTORY OF SKIN CANCER: ICD-10-CM

## 2024-06-19 DIAGNOSIS — D18.01 CHERRY ANGIOMA: ICD-10-CM

## 2024-06-19 DIAGNOSIS — Z13.89 SCREENING FOR SKIN CONDITION: Primary | ICD-10-CM

## 2024-06-19 DIAGNOSIS — L82.1 SEBORRHEIC KERATOSIS: ICD-10-CM

## 2024-06-19 PROCEDURE — 99213 OFFICE O/P EST LOW 20 MIN: CPT | Performed by: DERMATOLOGY

## 2024-06-19 NOTE — PROGRESS NOTES
"[facility] Dermatology Clinic Note     Patient Name: Robel Devi  Encounter Date: 06/19/2024     Have you been cared for by a [facility] Dermatologist in the last 3 years and, if so, which description applies to you?    Yes.  I have been here within the last 3 years, and my medical history has NOT changed since that time.  I am MALE/not capable of bearing children.    REVIEW OF SYSTEMS:  Have you recently had or currently have any of the following? No changes in my recent health.   PAST MEDICAL HISTORY:  Have you personally ever had or currently have any of the following?  If \"YES,\" then please provide more detail. No changes in my medical history.   HISTORY OF IMMUNOSUPPRESSION: Do you have a history of any of the following:  Systemic Immunosuppression such as Diabetes, Biologic or Immunotherapy, Chemotherapy, Organ Transplantation, Bone Marrow Transplantation?  No     Answering \"YES\" requires the addition of the dotphrase \"IMMUNOSUPPRESSED\" as the first diagnosis of the patient's visit.   FAMILY HISTORY:  Any \"first degree relatives\" (parent, brother, sister, or child) with the following?    No changes in my family's known health.   PATIENT EXPERIENCE:    Do you want the Dermatologist to perform a COMPLETE skin exam today including a clinical examination under the \"bra and underwear\" areas?  Yes  If necessary, do we have your permission to call and leave a detailed message on your Preferred Phone number that includes your specific medical information?  Yes      No Known Allergies   Current Outpatient Medications:     aspirin 81 mg chewable tablet, Chew 1 tablet daily , Disp: , Rfl:     atorvastatin (LIPITOR) 40 mg tablet, Take 1 tablet (40 mg total) by mouth daily, Disp: 90 tablet, Rfl: 3    escitalopram (LEXAPRO) 20 mg tablet, Take 1 tablet (20 mg total) by mouth daily, Disp: 90 tablet, Rfl: 3    fluticasone (FLONASE) 50 mcg/act nasal spray, Spray 2 sprays in each nostril every day (Patient taking differently: " "if needed Spray 2 sprays in each nostril every day), Disp: 48 mL, Rfl: 0    latanoprost (XALATAN) 0.005 % ophthalmic solution, INSTILL 1 DROP INTO BOTH EYES AT BEDTIME, Disp: , Rfl:     LORazepam (ATIVAN) 1 mg tablet, Take 1 tablet (1 mg total) by mouth 3 (three) times a day as needed for anxiety, Disp: 90 tablet, Rfl: 0    memantine (NAMENDA) 5 mg tablet, Take 1 tablet (5 mg total) by mouth 2 (two) times a day, Disp: 180 tablet, Rfl: 1    tamsulosin (FLOMAX) 0.4 mg, Take 2 capsules (0.8 mg total) by mouth daily with dinner, Disp: 180 capsule, Rfl: 2          Whom besides the patient is providing clinical information about today's encounter?   NO ADDITIONAL HISTORIAN (patient alone provided history)      79 year old male with history of skin cancer present for six months follow up skin exam.     Physical Exam and Assessment/Plan by Diagnosis:    HISTORY OF BASAL CELL CARCINOMA     Physical Exam:  Anatomic Location Affected:  left temple on 12/2023, mid chest in 2017  Morphological Description of scar:  scar well healed  Suspected Recurrence: No        Additional History of Present Condition:  History of basal cell carcinoma with no sign of recurrence     Assessment and Plan:  Based on a thorough discussion of this condition and the management approach to it (including a comprehensive discussion of the known risks, side effects and potential benefits of treatment), the patient (family) agrees to implement the following specific plan:  Monitor for change  When outside we recommend using a wide brim hat, sunglasses, long sleeve and pants, sunscreen with SPF 30+ with reapplication every 2 hours, or SPF specific clothing         MELANOCYTIC NEVI (\"Moles\")    Physical Exam:  Anatomic Location Affected: Mostly on sun-exposed areas of the body  Morphological Description:  Scattered, 1-4mm round to ovoid, symmetrical-appearing, even bordered, skin colored to dark brown macules/papules, mostly in sun-exposed " "areas    Additional History of Present Condition:  present on exam.     Assessment and Plan:  Based on a thorough discussion of this condition and the management approach to it (including a comprehensive discussion of the known risks, side effects and potential benefits of treatment), the patient (family) agrees to implement the following specific plan:  Provided handout with information regarding the ABCDE's of moles   Recommend routine skin exams every year   Sun avoidance, protective clothing (known as UPF clothing), and the use of at least SPF 30 sunscreens is advised. Sunscreen should be reapplied every two hours when outside.       SEBORRHEIC KERATOSIS; NON-INFLAMED    Physical Exam:  Anatomic Location Affected:  scattered across trunk, extremities,  face  Morphological Description:  Flat and raised, waxy, smooth to warty textured, yellow to brownish-grey to dark brown to blackish, discrete, \"stuck-on\" appearing papules.    Additional History of Present Condition:  Patient reports new bumps on the skin.  Denies itch, burn, pain, bleeding or ulceration.  Present constantly; nothing seems to make it worse or better.  No prior treatment.      Assessment and Plan:  Based on a thorough discussion of this condition and the management approach to it (including a comprehensive discussion of the known risks, side effects and potential benefits of treatment), the patient (family) agrees to implement the following specific plan:  Reassured benign        ANGIOMA (\"CHERRY ANGIOMA\")    Physical Exam:  Anatomic Location: scattered across sun exposed areas of the trunk and extremities   Morphologic Description: Firm red to reddish-blue discrete papules    Additional History of Present Condition:  Present on exam.     Assessment and Plan:  Reassured benign      Scribe Attestation      I,:  Livia Cameron am acting as a scribe while in the presence of the attending physician.:       I,:  Armen Avilez MD personally performed " the services described in this documentation    as scribed in my presence.:

## 2024-06-19 NOTE — PATIENT INSTRUCTIONS
"MELANOCYTIC NEVI (\"Moles\")    Melanocytic nevi (\"moles\") are tan or brown, raised or flat areas of the skin which have an increased number of melanocytes. Melanocytes are the cells in our body which make pigment and account for skin color.    Some moles are present at birth (I.e., \"congenital nevi\"), while others come up later in life (i.e., \"acquired nevi\").  The sun can stimulate the body to make more moles.  Sunburns are not the only thing that triggers more moles.  Chronic sun exposure can do it too.     Clinically distinguishing a healthy mole from melanoma may be difficult, even for experienced dermatologists. The \"ABCDE's\" of moles have been suggested as a means of helping to alert a person to a suspicious mole and the possible increased risk of melanoma.  The suggestions for raising alert are as follows:    Asymmetry: Healthy moles tend to be symmetric, while melanomas are often asymmetric.  Asymmetry means if you draw a line through the mole, the two halves do not match in color, size, shape, or surface texture. Asymmetry can be a result of rapid enlargement of a mole, the development of a raised area on a previously flat lesion, scaling, ulceration, bleeding or scabbing within the mole.  Any mole that starts to demonstrate \"asymmetry\" should be examined promptly by a board certified dermatologist.     Border: Healthy moles tend to have discrete, even borders.  The border of a melanoma often blends into the normal skin and does not sharply delineate the mole from normal skin.  Any mole that starts to demonstrate \"uneven borders\" should be examined promptly by a board certified dermatologist.     Color: Healthy moles tend to be one color throughout.  Melanomas tend to be made up of different colors ranging from dark black, blue, white, or red.  Any mole that demonstrates a color change should be examined promptly by a board certified dermatologist.     Diameter: Healthy moles tend to be smaller than 0.6 cm " "in size; an exception are \"congenital nevi\" that can be larger.  Melanomas tend to grow and can often be greater than 0.6 cm (1/4 of an inch, or the size of a pencil eraser). This is only a guideline, and many normal moles may be larger than 0.6 cm without being unhealthy.  Any mole that starts to change in size (small to bigger or bigger to smaller) should be examined promptly by a board certified dermatologist.     Evolving: Healthy moles tend to \"stay the same.\"  Melanomas may often show signs of change or evolution such as a change in size, shape, color, or elevation.  Any mole that starts to itch, bleed, crust, burn, hurt, or ulcerate or demonstrate a change or evolution should be examined promptly by a board certified dermatologist.      Dysplastic Nevi  Dysplastic moles are moles that fit the ABCDE rules of melanoma but are not identified as melanomas when examined under the microscope.  They may indicate an increased risk of melanoma in that person. If there is a family history of melanoma, most experts agree that the person may be at an increased risk for developing a melanoma.  Experts still do not agree on what dysplastic moles mean in patients without a personal or family history of melanoma.  Dysplastic moles are usually larger than common moles and have different colors within it with irregular borders. The appearance can be very similar to a melanoma. Biopsies of dysplastic moles may show abnormalities which are different from a regular mole.      Melanoma  Malignant melanoma is a type of skin cancer that can be deadly if it spreads throughout the body. The incidence of melanoma in the [place] is growing faster than any other cancer. Melanoma usually grows near the surface of the skin for a period of time, and then begins to grow deeper into the skin. Once it grows deeper into the skin, the risk of spread to other organs greatly increases. Therefore, early detection and removal of a malignant " "melanoma may result in a better chance at a complete cure; removal after the tumor has spread may not be as effective, leading to worse clinical outcomes such as death.    The true rate of nevus transformation into a melanoma is unknown. It has been estimated that the lifetime risk for any acquired melanocytic nevus on any 20-year-old individual transforming into melanoma by age 80 is 0.03% (1 in 3,164) for men and 0.009% (1 in 10,800) for women.     The appearance of a \"new mole\" remains one of the most reliable methods for identifying a malignant melanoma.  Occasionally, melanomas appear as rapidly growing, blue-black, dome-shaped bumps within a previous mole or previous area of normal skin.  Other times, melanomas are suspected when a mole suddenly appears or changes. Itching, burning, or pain in a pigmented lesion should increase suspicion, but most patients with early melanoma have no skin discomfort whatsoever.  Melanoma can occur anywhere on the skin, including areas that are difficult for self-examination. Many melanomas are first noticed by other family members.  Suspicious-looking moles may be removed for microscopic examination.       You may be able to prevent death from melanoma by doing two simple things:    Try to avoid unnecessary sun exposure and protect your skin when it is exposed to the sun.  People who live near the equator, people who have intermittent exposures to large amounts of sun, and people who have had sunburns in childhood or adolescence have an increased risk for melanoma. Sun sense and vigilant sun protection may be keys to helping to prevent melanoma.  We recommend wearing UPF-rated sun protective clothing and sunglasses whenever possible and applying a moisturizer-sunscreen combination product (SPF 50+) such as Neutrogena Daily Defense to sun exposed areas of skin at least three times a day.    Have your moles regularly examined by a board certified dermatologist AND by yourself or " "a family member/friend at home.  We recommend that you have your moles examined at least once a year by a board certified dermatologist.  Use your birthday as an annual reminder to have your \"Birthday Suit\" (I.e., your skin) examined; it is a nice birthday gift to yourself to know that your skin is healthy appearing!  Additionally, at-home self examinations may be helpful for detecting a possible melanoma.  Use the ABCDEs we discussed and check your moles once a month at home.        SEBORRHEIC KERATOSIS    A seborrheic keratosis is a harmless warty spot that appears during adult life as a common sign of skin aging.  Seborrheic keratoses can arise on any area of skin, covered or uncovered, with the usual exception of the palms and soles. They do not arise from mucous membranes. Seborrheic keratoses can have highly variable appearance.      Seborrheic keratoses are extremely common. It has been estimated that over 90% of adults over the age of 60 years have one or more of them. They occur in males and females of all races, typically beginning to erupt in the 30s or 40s. They are uncommon under the age of 20 years.  The precise cause of seborrhoeic keratoses is not known.  Seborrhoeic keratoses are considered degenerative in nature. As time goes by, seborrheic keratoses tend to become more numerous. Some people inherit a tendency to develop a very large number of them; some people may have hundreds of them.    The name \"seborrheic keratosis\" is misleading, because these lesions are not limited to a seborrhoeic distribution (scalp, mid-face, chest, upper back), nor are they formed from sebaceous glands, nor are they associated with sebum -- which is greasy.  Seborrheic keratosis may also be called \"SK,\" \"Seb K,\" \"basal cell papilloma,\" \"senile wart,\" or \"barnacle.\"      There is no easy way to remove multiple lesions on a single occasion.  Unless a specific lesion is \"inflamed\" and is causing pain or stinging/burning " "or is bleeding, most insurance companies do not authorize treatment.      ANGIOMA (\"CHERRY ANGIOMA\")    Hernandez angiomas markedly increase in number from about the age of 40, so it has been estimated that 75% of people over 75 years of age have them. Although they also called \"senile angiomas,\" they can occur in young people too - 5% of adolescents have been found to have them.     Cherry angiomas are very common in males and females of any age or race, with no difference in sexes or races affected. They are however more noticeable in white skin than in skin of colour.  There may be a family history of similar lesions. Eruptive (very large number appearing in a short period of time) cherry angiomas have been rarely reported to be associated with internal malignancy and pregnancy.   "

## 2024-09-20 ENCOUNTER — TELEPHONE (OUTPATIENT)
Age: 79
End: 2024-09-20

## 2024-09-20 NOTE — TELEPHONE ENCOUNTER
Patients wife wants to know if her  can come in on Monday to have stitches taken out but there is no space on Monday, advised that she call on Monday morning and try to get in on a same day appointment

## 2024-09-20 NOTE — TELEPHONE ENCOUNTER
Pt wife called in to check on status of appt. Spoke with office and they said only suggestion would be to have pt call first thing Monday morning to use one of the slots [facility] has. Relayed message and they agreed.

## 2024-09-20 NOTE — TELEPHONE ENCOUNTER
Patient's wife called and stated patient fell last week and had to have 4 stitches placed above his left eyebrow.  He needs to have them removed by Tuesday, 9/24, however, there are no appointments to offer until Wednesday.  They would like a call back to let him know if a spot can be made for him on Tuesday or if appointment is OK to wait until Wednesday with [facility].  Please advise.

## 2024-09-21 ENCOUNTER — HOSPITAL ENCOUNTER (EMERGENCY)
Facility: HOSPITAL | Age: 79
Discharge: HOME | End: 2024-09-21
Payer: MEDICARE

## 2024-09-21 VITALS
HEART RATE: 84 BPM | TEMPERATURE: 98.4 F | DIASTOLIC BLOOD PRESSURE: 81 MMHG | SYSTOLIC BLOOD PRESSURE: 185 MMHG | OXYGEN SATURATION: 99 % | RESPIRATION RATE: 16 BRPM

## 2024-09-21 DIAGNOSIS — S41.111A ARM LACERATION, RIGHT, INITIAL ENCOUNTER: Primary | ICD-10-CM

## 2024-09-21 PROCEDURE — 99283 EMERGENCY DEPT VISIT LOW MDM: CPT

## 2024-09-21 PROCEDURE — 99284 EMERGENCY DEPT VISIT MOD MDM: CPT | Performed by: PHYSICIAN ASSISTANT

## 2024-09-21 PROCEDURE — 12002 RPR S/N/AX/GEN/TRNK2.6-7.5CM: CPT | Performed by: PHYSICIAN ASSISTANT

## 2024-09-21 RX ORDER — LIDOCAINE HYDROCHLORIDE AND EPINEPHRINE 10; 10 MG/ML; UG/ML
20 INJECTION, SOLUTION INFILTRATION; PERINEURAL ONCE
Status: COMPLETED | OUTPATIENT
Start: 2024-09-21 | End: 2024-09-21

## 2024-09-21 RX ORDER — CEPHALEXIN 500 MG/1
500 CAPSULE ORAL 3 TIMES DAILY
Qty: 30 CAPSULE | Refills: 0 | Status: SHIPPED | OUTPATIENT
Start: 2024-09-21 | End: 2024-10-01

## 2024-09-21 RX ADMIN — LIDOCAINE HYDROCHLORIDE,EPINEPHRINE BITARTRATE 20 ML: 10; .01 INJECTION, SOLUTION INFILTRATION; PERINEURAL at 12:38

## 2024-09-21 NOTE — ED PROVIDER NOTES
1. Arm laceration, right, initial encounter      ED Disposition       ED Disposition   Discharge    Condition   Stable    Date/Time   Sat Sep 21, 2024  1:27 PM    Comment   Robel Devi discharge to home/self care.                   Assessment & Plan       Medical Decision Making  Medical Decision Making:   Subjective: 79  y.o. male presents to ED for evaluation of right arm injury with laceration that occurred just prior to arrival when he was hold ladder for a friend when branch fell hitting right arm.  Bleeding controled with local pressure.  Denies other injuries. Td up to date. .     Objective: :   Well appearing in NAD, vital signs are normal. A&O x 3, sutured wound to left eyebrown from recent injury appears clean, dry, intact without dehiscence or signs of infection, airway patent, no chest pain or respiratory distress, Abdomen non distended, non tender. M/S no gross deformity, laceration to Right upper extremity.  CHACON x 4,GCS 15      Ddx includes laceration, soft tissue foreign body, cellulitis, injury to underlying structures.     Plan: anesthetize, irrigate, explore to depth, surgical repair of laceration     Assessment & Plan (see ED course for additional MDM)  R upper arm laceration:  normal right upper extremity exam, no neurological deficits and R shoulder has FROM and normal strength.  Wound explored for to depth.  No FB.  Extensively irrigated.  Required sutures for wound closure.         On re-evaluation the patient feels improved.  Laceration repaired. Patient has normal RUE neurovascular motor exam after laceration repair.  Discussed care of sutured wound.   Will coverage with antibiotics due to laceration occurring outdoors from tree branch.    -BP elevation likely situational, remaining vital signs normal  -Stable for discharge and outpatient management.   -Reviewed diagnosis, treatment plan, and expectant coarse  -Verbal and written instructions given to follow up with pcp   -Discussed  reasons to Return to ED.  Patient verbalized understanding of reasons return to the ED.   -Opportunity provided for questions and all questions were answered.        Risk  Prescription drug management.                     Medications   lidocaine-epinephrine (XYLOCAINE/EPINEPHRINE) 1 %-1:100,000 injection 20 mL (20 mL Infiltration Given by Other 9/21/24 8788)       History of Present Illness       79 y.o. male with past medical history significant for H N and CAD presents to ED with chief complaint of right arm laceration  Onset of symptoms reported as just prior to arrival  Location of symptoms reported as right upper arm  Quality is reported as laceration  Severity is reported as moderate  Associated symptoms: denies paralysis paresthesia or weakness to right upper extremity.  Modifying factors: local pressure helped stop bleeding.     Context: Patient sustained laceration while holding a ladder for a friend who was cutting a tree branch that fell and struck him on right arm.  TD up to date.  Right hand dominant. Denies other injuries.            History provided by:  Patient   used: No    Arm Injury  Associated symptoms: no fever        Review of Systems   Constitutional:  Negative for fever.   Respiratory:  Negative for chest tightness, shortness of breath and stridor.    Cardiovascular:  Negative for chest pain.   Musculoskeletal:  Negative for gait problem.   Skin:  Positive for wound. Negative for rash.   Neurological:  Negative for seizures, syncope, facial asymmetry and numbness.   All other systems reviewed and are negative.          Objective     ED Triage Vitals [09/21/24 1159]   Temperature Pulse Blood Pressure Respirations SpO2 Patient Position - Orthostatic VS   98.4 °F (36.9 °C) 84 (!) 185/81 16 95 % Sitting      Temp Source Heart Rate Source BP Location FiO2 (%) Pain Score    Oral Monitor Left arm -- --        Physical Exam  Vitals and nursing note reviewed.   Constitutional:       " General: He is not in acute distress.     Appearance: Normal appearance.   HENT:      Head: Normocephalic and atraumatic.      Right Ear: External ear normal.      Left Ear: External ear normal.      Nose: Nose normal.   Eyes:      General: No scleral icterus.        Right eye: No discharge.         Left eye: No discharge.   Cardiovascular:      Rate and Rhythm: Normal rate.      Pulses: Normal pulses.   Pulmonary:      Effort: Pulmonary effort is normal.      Breath sounds: Normal breath sounds.   Musculoskeletal:         General: Signs of injury present. No deformity. Normal range of motion.      Cervical back: Normal range of motion and neck supple.   Skin:     General: Skin is dry.      Coloration: Skin is not jaundiced.      Findings: No erythema or rash.      Comments: There is a 3.5 cm horiztonally oriented \"V\" shaped laceration present to anterior right upper arm.  There is small abrasion with skin tear to superior aspect of laceration.  Wound is hemostatic.  Explored to depth, no FB present.     Neurological:      General: No focal deficit present.      Mental Status: He is alert and oriented to person, place, and time. Mental status is at baseline.      Sensory: No sensory deficit.      Motor: No weakness.   Psychiatric:         Mood and Affect: Mood normal.         Behavior: Behavior normal.         Thought Content: Thought content normal.         Labs Reviewed - No data to display  No orders to display       Universal Protocol:  Procedure performed by:  Consent: The procedure was performed in an emergent situation.  Patient identity confirmed: verbally with patient  Laceration repair    Date/Time: 9/21/2024 12:40 PM    Performed by: Carlos Ware PA-C  Authorized by: Carlos Ware PA-C  Body area: upper extremity  Location details: right shoulder  Laceration length: 3.5 cm  Contamination: The wound is contaminated.  Foreign bodies: no foreign bodies  Tendon involvement: none  Nerve " involvement: none  Vascular damage: no  Anesthesia: local infiltration    Anesthesia:  Local Anesthetic: lidocaine 1% with epinephrine    Sedation:  Patient sedated: no      Wound Dehiscence:    Secondary closure or dehiscence: complex    Procedure Details:  Preparation: Patient was prepped and draped in the usual sterile fashion.  Irrigation solution: saline  Irrigation method: jet lavage  Amount of cleaning: standard  Debridement: minimal  Degree of undermining: none  Skin closure: 4-0 nylon  Number of sutures: 6  Technique: simple  Approximation: close  Approximation difficulty: complex  Dressing: antibiotic ointment, tube gauze, 4x4 sterile gauze and gauze roll  Patient tolerance: patient tolerated the procedure well with no immediate complications  Comments: Wound anesthetized, copiously irrigated with saline, and explored to depth. Hemostasis obtained. No foreign bodies or injury to underlying structures identified.  Closed with six 4-0 sutures in interrupted fashion. No complications,  Patient tolerated procedure well. Total blood loss Less than 5 ml.              ED Medication and Procedure Management   Prior to Admission Medications   Prescriptions Last Dose Informant Patient Reported? Taking?   LORazepam (ATIVAN) 1 mg tablet  Self No No   Sig: Take 1 tablet (1 mg total) by mouth 3 (three) times a day as needed for anxiety   aspirin 81 mg chewable tablet  Self Yes No   Sig: Chew 1 tablet daily    atorvastatin (LIPITOR) 40 mg tablet  Self No No   Sig: Take 1 tablet (40 mg total) by mouth daily   escitalopram (LEXAPRO) 20 mg tablet  Self No No   Sig: Take 1 tablet (20 mg total) by mouth daily   fluticasone (FLONASE) 50 mcg/act nasal spray  Self No No   Sig: Spray 2 sprays in each nostril every day   Patient taking differently: if needed Spray 2 sprays in each nostril every day   latanoprost (XALATAN) 0.005 % ophthalmic solution  Self Yes No   Sig: INSTILL 1 DROP INTO BOTH EYES AT BEDTIME   memantine (NAMENDA)  5 mg tablet  Self No No   Sig: Take 1 tablet (5 mg total) by mouth 2 (two) times a day   tamsulosin (FLOMAX) 0.4 mg  Self No No   Sig: Take 2 capsules (0.8 mg total) by mouth daily with dinner      Facility-Administered Medications: None     Discharge Medication List as of 9/21/2024  1:29 PM        START taking these medications    Details   cephalexin (KEFLEX) 500 mg capsule Take 1 capsule (500 mg total) by mouth 3 (three) times a day for 10 days, Starting Sat 9/21/2024, Until Tue 10/1/2024, Normal           CONTINUE these medications which have NOT CHANGED    Details   aspirin 81 mg chewable tablet Chew 1 tablet daily , Historical Med      atorvastatin (LIPITOR) 40 mg tablet Take 1 tablet (40 mg total) by mouth daily, Starting Mon 5/6/2024, Normal      escitalopram (LEXAPRO) 20 mg tablet Take 1 tablet (20 mg total) by mouth daily, Starting Mon 5/6/2024, Normal      fluticasone (FLONASE) 50 mcg/act nasal spray Spray 2 sprays in each nostril every day, Normal      latanoprost (XALATAN) 0.005 % ophthalmic solution INSTILL 1 DROP INTO BOTH EYES AT BEDTIME, Historical Med      LORazepam (ATIVAN) 1 mg tablet Take 1 tablet (1 mg total) by mouth 3 (three) times a day as needed for anxiety, Starting Mon 5/6/2024, Until Sat 10/19/2024 at 2359, Normal      memantine (NAMENDA) 5 mg tablet Take 1 tablet (5 mg total) by mouth 2 (two) times a day, Starting Mon 5/6/2024, Normal      tamsulosin (FLOMAX) 0.4 mg Take 2 capsules (0.8 mg total) by mouth daily with dinner, Starting Mon 5/6/2024, Normal           No discharge procedures on file.     Carlos Ware PA-C  09/21/24 6884

## 2024-09-21 NOTE — DISCHARGE INSTRUCTIONS
Suture instructions: suture removal in 10 days - return to ED, any urgent care or primary care physician for suture removal.  apply bacitracin, neosporin or any other triple antibiotic ointment to area 3 times a day until suture removal.  return to ED for any redness, purulent drainage, increasing pain or any other worrisome or worsening symptoms.  do not submerge laceration in water - if it gets wet, pat it dry immediately.  use tylenol or motrin over the counter as needed for pain/swelling.

## 2024-09-24 ENCOUNTER — HOSPITAL ENCOUNTER (EMERGENCY)
Facility: HOSPITAL | Age: 79
Discharge: HOME | End: 2024-09-24
Payer: MEDICARE

## 2024-09-24 VITALS
DIASTOLIC BLOOD PRESSURE: 71 MMHG | TEMPERATURE: 97.5 F | HEART RATE: 82 BPM | SYSTOLIC BLOOD PRESSURE: 138 MMHG | RESPIRATION RATE: 16 BRPM | OXYGEN SATURATION: 97 %

## 2024-09-24 DIAGNOSIS — Z48.02 VISIT FOR SUTURE REMOVAL: Primary | ICD-10-CM

## 2024-09-24 PROCEDURE — 99282 EMERGENCY DEPT VISIT SF MDM: CPT | Performed by: EMERGENCY MEDICINE

## 2024-09-24 RX ORDER — BACITRACIN, NEOMYCIN, POLYMYXIN B 400; 3.5; 5 [USP'U]/G; MG/G; [USP'U]/G
1 OINTMENT TOPICAL ONCE
Status: COMPLETED | OUTPATIENT
Start: 2024-09-24 | End: 2024-09-24

## 2024-09-24 RX ADMIN — BACITRACIN ZINC, NEOMYCIN, POLYMYXIN B 1 SMALL APPLICATION: 400; 3.5; 5 OINTMENT TOPICAL at 07:16

## 2024-09-24 NOTE — ED PROVIDER NOTES
1. Visit for suture removal      ED Disposition       ED Disposition   Discharge    Condition   Stable    Date/Time   Tue Sep 24, 2024  7:06 AM    Comment   Robel Devi discharge to home/self care.                   Assessment & Plan       Medical Decision Making  Eyebrow laceration well healed.   Sutures removed without difficulty.  Discussed care of wound following suture removal.  Stable for discharge and outpatient follow-up as needed.  Discussed continued care of right upper extremity wound as previously directed.                     Medications   neomycin-bacitracin-polymyxin b (NEOSPORIN) ointment 1 small application (1 small application Topical Given 9/24/24 0716)       History of Present Illness       79 y.o. male presents to the ED with chief complaint of suture removal  Patient reports sutures placed 6 days ago  Location is reported as left eyebrow  Quality is reported as sutured wound  Severity is reported as mild.   Associated symptoms: denies bleeding, denies purulent drainage. Denies pain.    Modifiers: patient reports no aggravating factors  Context: patient reports wound healing well.  Patient has subsequent ED visit on 9/21 for laceration to right upper arm after a tree branch fell on it.  R upper arm wound has some local bruising but otherwise is healing well without erythema, dehiscence or bleeding.  Patient has full range of motion to right upper arm and shoulder on exam.  Instructed to have the sutures removed in 6 days.            History provided by:  Patient   used: No    Suture / Staple Removal         Review of Systems   Constitutional:  Negative for fever.   Respiratory:  Negative for chest tightness, shortness of breath and stridor.    Cardiovascular:  Negative for chest pain.   Musculoskeletal:  Negative for gait problem.   Skin:  Positive for wound. Negative for rash.   Neurological:  Negative for seizures, syncope, facial asymmetry and numbness.   All other  systems reviewed and are negative.          Objective     ED Triage Vitals [09/24/24 0657]   Temperature Pulse Blood Pressure Respirations SpO2 Patient Position - Orthostatic VS   97.5 °F (36.4 °C) 82 138/71 16 97 % --      Temp src Heart Rate Source BP Location FiO2 (%) Pain Score    -- Monitor -- -- --        Physical Exam  Vitals and nursing note reviewed.   Constitutional:       General: He is not in acute distress.     Appearance: Normal appearance.   HENT:      Head: Normocephalic and atraumatic.      Right Ear: External ear normal.      Left Ear: External ear normal.      Nose: Nose normal.   Eyes:      General: No scleral icterus.        Right eye: No discharge.         Left eye: No discharge.   Cardiovascular:      Rate and Rhythm: Normal rate.      Pulses: Normal pulses.   Pulmonary:      Effort: Pulmonary effort is normal.      Breath sounds: Normal breath sounds.   Musculoskeletal:         General: No deformity or signs of injury. Normal range of motion.      Cervical back: Normal range of motion and neck supple.   Skin:     General: Skin is dry.      Capillary Refill: Capillary refill takes less than 2 seconds.      Coloration: Skin is not jaundiced.      Findings: No erythema or rash.      Comments: There is a well-healed left eyebrow laceration with sutures intact.  No dehiscence.  No surrounding erythema or bleeding.  There is a well-healing laceration to the right upper arm. Sutures intact,  No dehiscence.  There is ecchymosis but no erythema or bleeding.   Neurological:      General: No focal deficit present.      Mental Status: He is alert and oriented to person, place, and time. Mental status is at baseline.      Sensory: No sensory deficit.      Motor: No weakness.   Psychiatric:         Mood and Affect: Mood normal.         Behavior: Behavior normal.         Thought Content: Thought content normal.         Labs Reviewed - No data to display  No orders to display       Suture  removal    Date/Time: 9/24/2024 7:16 AM    Performed by: Carlos Ware PA-C  Authorized by: Carlos Ware PA-C  Universal Protocol:  procedure performed by consultantConsent: Verbal consent obtained.  Risks and benefits: risks, benefits and alternatives were discussed  Consent given by: patient  Patient identity confirmed: verbally with patient      Patient location:  ED  Location:     Laterality:  Left    Location:  Head/neck    Head/neck location:  Eyebrow    Eyebrow location:  L eyebrow  Procedure details:     Tools used:  Suture removal kit    Wound appearance:  No sign(s) of infection    Number of sutures removed:  4  Post-procedure details:     Post-removal:  No dressing applied and antibiotic ointment applied    Patient tolerance of procedure:  Tolerated well, no immediate complications    Complication (if applicable):  None      ED Medication and Procedure Management   Prior to Admission Medications   Prescriptions Last Dose Informant Patient Reported? Taking?   LORazepam (ATIVAN) 1 mg tablet  Self No No   Sig: Take 1 tablet (1 mg total) by mouth 3 (three) times a day as needed for anxiety   aspirin 81 mg chewable tablet  Self Yes No   Sig: Chew 1 tablet daily    atorvastatin (LIPITOR) 40 mg tablet  Self No No   Sig: Take 1 tablet (40 mg total) by mouth daily   cephalexin (KEFLEX) 500 mg capsule   No No   Sig: Take 1 capsule (500 mg total) by mouth 3 (three) times a day for 10 days   escitalopram (LEXAPRO) 20 mg tablet  Self No No   Sig: Take 1 tablet (20 mg total) by mouth daily   fluticasone (FLONASE) 50 mcg/act nasal spray  Self No No   Sig: Spray 2 sprays in each nostril every day   Patient taking differently: if needed Spray 2 sprays in each nostril every day   latanoprost (XALATAN) 0.005 % ophthalmic solution  Self Yes No   Sig: INSTILL 1 DROP INTO BOTH EYES AT BEDTIME   memantine (NAMENDA) 5 mg tablet  Self No No   Sig: Take 1 tablet (5 mg total) by mouth 2 (two) times a day    tamsulosin (FLOMAX) 0.4 mg  Self No No   Sig: Take 2 capsules (0.8 mg total) by mouth daily with dinner      Facility-Administered Medications: None     Patient's Medications   Discharge Prescriptions    No medications on file     No discharge procedures on file.     Carlos Ware PA-C  09/24/24 0717

## 2024-10-02 ENCOUNTER — HOSPITAL ENCOUNTER (EMERGENCY)
Facility: HOSPITAL | Age: 79
Discharge: HOME | End: 2024-10-02
Attending: EMERGENCY MEDICINE
Payer: MEDICARE

## 2024-10-02 VITALS
OXYGEN SATURATION: 99 % | TEMPERATURE: 98.2 F | HEART RATE: 66 BPM | RESPIRATION RATE: 17 BRPM | SYSTOLIC BLOOD PRESSURE: 147 MMHG | DIASTOLIC BLOOD PRESSURE: 69 MMHG

## 2024-10-02 DIAGNOSIS — Z48.02 VISIT FOR SUTURE REMOVAL: Primary | ICD-10-CM

## 2024-10-02 PROCEDURE — 99283 EMERGENCY DEPT VISIT LOW MDM: CPT

## 2024-10-02 RX ORDER — GINSENG 100 MG
1 CAPSULE ORAL ONCE
Status: COMPLETED | OUTPATIENT
Start: 2024-10-02 | End: 2024-10-02

## 2024-10-02 RX ADMIN — BACITRACIN ZINC 1 LARGE APPLICATION: 500 OINTMENT TOPICAL at 07:22

## 2024-10-02 NOTE — DISCHARGE INSTRUCTIONS
Keep wound clean and dry  Continue to use bacitracin  May use sunscreen to help with scar formation  Follow-up with primary care provider

## 2024-10-02 NOTE — ED PROVIDER NOTES
Final diagnoses:   Visit for suture removal     ED Disposition       ED Disposition   Discharge    Condition   Stable    Date/Time   Wed Oct 2, 2024  7:19 AM    Comment   Robel Devi discharge to home/self care.                   Assessment & Plan       Medical Decision Making  79-year-old male patient presents to the ER for evaluation of suture removal.  Patient had 6 sutures removed on September 21.  Wound does not have any ecchymosis, erythema, lymphatic spread.  Denies fever, chills, nausea or vomiting.  6 sutures were removed and Steri-Strip was placed due to delayed wound healing of the middle of the laceration.  Bacitracin given.  Band-Aid applied.  Advised to follow-up with primary care provider.  Return to the ER for signs of infection.    Risk  OTC drugs.             Medications   bacitracin topical ointment 1 large application (1 large application Topical Given 10/2/24 0722)       ED Risk Strat Scores                                               History of Present Illness       Chief Complaint   Patient presents with    Suture / Staple Removal     Sutures to upper right arm below shoulder in for 11 days, placed origianlly here. Denies any symptoms. Mild redness and swelling to site.        Past Medical History:   Diagnosis Date    Anxiety disorder     last assessed - 06May2015    Basal cell carcinoma of face 12/21/2023    left temple, mohs    BPH (benign prostatic hyperplasia)     benign prostatic hypertrophy; last assessed - 06May2015    Chronic kidney disease     Colon polyp     Coronary atherosclerosis of native coronary artery     last assessed - 06May2015    Deep vein thrombosis (DVT) (HCC)     last assessed - 06May2015    Depression     last assessed - 06May2015    History of colonoscopy     Fiberoptic; Resolved - 12/1/2011    Hyperlipemia     last assessed - [id number]    Hypertension     last assessed - [id number]    Myocardial infarction (HCC)     Nonmelanoma skin cancer     last assessed -  [id number]    Osteoarthritis     last assessed - 06May2015    Pneumonia     last assessed - 06May2015    Pulmonary HTN (HCC)     PVD (peripheral vascular disease) ([facility])     Seborrheic dermatitis       Past Surgical History:   Procedure Laterality Date    CARDIAC CATHETERIZATION      procedure outcome successful; last assessed - 06May2015    CORONARY ARTERY BYPASS GRAFT      last assessed - 07Dec2017    IVC FILTER INSERTION      Jael    MOHS SURGERY Left 02/12/2024    BCC left temple, Dr Gonzales    TOTAL HIP ARTHROPLASTY      last assessed - 06May2015      Family History   Problem Relation Age of Onset    Dementia Mother     Rectal cancer Mother     Coronary artery disease Father     Rectal cancer Sister       Social History     Tobacco Use    Smoking status: Never    Smokeless tobacco: Never   Vaping Use    Vaping status: Never Used   Substance Use Topics    Alcohol use: Not Currently     Comment: socially    Drug use: Not Currently     Types: Marijuana      E-Cigarette/Vaping    E-Cigarette Use Never User       E-Cigarette/Vaping Substances    Nicotine No     THC No     CBD No     Flavoring No     Other No     Unknown No       I have reviewed and agree with the history as documented.     79-year-old male patient presents ER for evaluation of suture removal.  Patient was seen and evaluated on 9/21 for right arm laceration.  Patient stated that he was holding a ladder for friend when a branch fell striking his right arm.  6 sutures were placed.  Tetanus was up-to-date.  Denies fever, chills, nausea or vomiting.  Neurovascularly intact, sensation equal bilaterally.        Suture / Staple Removal         Review of Systems   Constitutional:  Negative for chills and fever.   HENT:  Negative for ear pain and sore throat.    Eyes:  Negative for pain and visual disturbance.   Respiratory:  Negative for cough and shortness of breath.    Cardiovascular:  Negative for chest pain and palpitations.   Gastrointestinal:   Negative for abdominal pain and vomiting.   Genitourinary:  Negative for dysuria and hematuria.   Musculoskeletal:  Negative for arthralgias and back pain.   Skin:  Positive for wound. Negative for color change and rash.   Neurological:  Negative for seizures and syncope.   All other systems reviewed and are negative.          Objective       ED Triage Vitals [10/02/24 0711]   Temperature Pulse Blood Pressure Respirations SpO2 Patient Position - Orthostatic VS   98.2 °F (36.8 °C) 66 147/69 17 99 % Sitting      Temp Source Heart Rate Source BP Location FiO2 (%) Pain Score    Temporal Monitor Left arm -- --      Vitals      Date and Time Temp Pulse SpO2 Resp BP Pain Score FACES Pain Rating User   10/02/24 0711 98.2 °F (36.8 °C) 66 99 % 17 147/69 -- -- ALEC            Physical Exam  Vitals and nursing note reviewed.   Constitutional:       General: He is not in acute distress.     Appearance: He is well-developed.   HENT:      Head: Normocephalic and atraumatic.      Right Ear: External ear normal.      Left Ear: External ear normal.   Eyes:      Conjunctiva/sclera: Conjunctivae normal.   Cardiovascular:      Rate and Rhythm: Normal rate and regular rhythm.      Pulses: Normal pulses.   Pulmonary:      Effort: Pulmonary effort is normal.   Musculoskeletal:         General: No swelling.      Cervical back: Neck supple.   Skin:     General: Skin is warm and dry.      Capillary Refill: Capillary refill takes less than 2 seconds.          Neurological:      Mental Status: He is alert and oriented to person, place, and time.   Psychiatric:         Mood and Affect: Mood normal.         Results Reviewed       None            No orders to display       Suture removal    Date/Time: 10/2/2024 7:23 AM    Performed by: PA Chen  Authorized by: PA Chen  Universal Protocol:  procedure performed by consultantConsent: Verbal consent obtained.  Risks and benefits: risks, benefits and alternatives were  "discussed  Consent given by: patient and parent  Time out: Immediately prior to procedure a \"time out\" was called to verify the correct patient, procedure, equipment, support staff and site/side marked as required.  Timeout called at: 10/2/2024 7:18 AM.  Patient understanding: patient states understanding of the procedure being performed  Required items: required blood products, implants, devices, and special equipment available  Patient identity confirmed: verbally with patient and arm band      Patient location:  ED  Location:     Laterality:  Right    Location:  Upper extremity    Upper extremity location:  Arm    Arm location:  R upper arm  Procedure details:     Tools used:  Suture removal kit    Wound appearance:  No sign(s) of infection    Number of sutures removed:  6  Post-procedure details:     Post-removal:  Antibiotic ointment applied and Steri-Strips applied    Patient tolerance of procedure:  Tolerated well, no immediate complications      ED Medication and Procedure Management   Prior to Admission Medications   Prescriptions Last Dose Informant Patient Reported? Taking?   LORazepam (ATIVAN) 1 mg tablet  Self No No   Sig: Take 1 tablet (1 mg total) by mouth 3 (three) times a day as needed for anxiety   aspirin 81 mg chewable tablet  Self Yes No   Sig: Chew 1 tablet daily    atorvastatin (LIPITOR) 40 mg tablet  Self No No   Sig: Take 1 tablet (40 mg total) by mouth daily   cephalexin (KEFLEX) 500 mg capsule   No No   Sig: Take 1 capsule (500 mg total) by mouth 3 (three) times a day for 10 days   escitalopram (LEXAPRO) 20 mg tablet  Self No No   Sig: Take 1 tablet (20 mg total) by mouth daily   fluticasone (FLONASE) 50 mcg/act nasal spray  Self No No   Sig: Spray 2 sprays in each nostril every day   Patient taking differently: if needed Spray 2 sprays in each nostril every day   latanoprost (XALATAN) 0.005 % ophthalmic solution  Self Yes No   Sig: INSTILL 1 DROP INTO BOTH EYES AT BEDTIME   memantine " (NAMENDA) 5 mg tablet  Self No No   Sig: Take 1 tablet (5 mg total) by mouth 2 (two) times a day   tamsulosin (FLOMAX) 0.4 mg  Self No No   Sig: Take 2 capsules (0.8 mg total) by mouth daily with dinner      Facility-Administered Medications: None     Patient's Medications   Discharge Prescriptions    No medications on file     No discharge procedures on file.  ED SEPSIS DOCUMENTATION   Time reflects when diagnosis was documented in both MDM as applicable and the Disposition within this note       Time User Action Codes Description Comment    10/2/2024  7:19 AM Courtney Rangel Add [Z48.02] Visit for suture removal                  PA Chen  10/02/24 0729

## 2024-11-06 ENCOUNTER — TELEPHONE (OUTPATIENT)
Age: 79
End: 2024-11-06

## 2024-11-06 NOTE — TELEPHONE ENCOUNTER
Pt called to verify that lab work was ordered for his previously schedule office appt on 11/14/24.  Pt advised that lab work was ordered for him. Pt verbalized understanding.

## 2024-11-12 ENCOUNTER — APPOINTMENT (OUTPATIENT)
Dept: LAB | Facility: HOSPITAL | Age: 79
End: 2024-11-12
Attending: INTERNAL MEDICINE
Payer: MEDICARE

## 2024-11-12 DIAGNOSIS — E03.8 SUBCLINICAL HYPOTHYROIDISM: ICD-10-CM

## 2024-11-12 DIAGNOSIS — I10 ESSENTIAL HYPERTENSION: ICD-10-CM

## 2024-11-12 DIAGNOSIS — R73.01 IMPAIRED FASTING GLUCOSE: ICD-10-CM

## 2024-11-12 LAB
ALBUMIN SERPL BCG-MCNC: 4.1 G/DL (ref 3.5–5)
ALP SERPL-CCNC: 69 U/L (ref 34–104)
ALT SERPL W P-5'-P-CCNC: 24 U/L (ref 7–52)
ANION GAP SERPL CALCULATED.3IONS-SCNC: 5 MMOL/L (ref 4–13)
AST SERPL W P-5'-P-CCNC: 22 U/L (ref 13–39)
BASOPHILS # BLD AUTO: 0.04 THOUSANDS/ÂΜL (ref 0–0.1)
BASOPHILS NFR BLD AUTO: 1 % (ref 0–1)
BILIRUB SERPL-MCNC: 0.85 MG/DL (ref 0.2–1)
BUN SERPL-MCNC: 23 MG/DL (ref 5–25)
CALCIUM SERPL-MCNC: 9.7 MG/DL (ref 8.4–10.2)
CHLORIDE SERPL-SCNC: 108 MMOL/L (ref 96–108)
CHOLEST SERPL-MCNC: 159 MG/DL
CO2 SERPL-SCNC: 28 MMOL/L (ref 21–32)
CREAT SERPL-MCNC: 1.14 MG/DL (ref 0.6–1.3)
EOSINOPHIL # BLD AUTO: 0.31 THOUSAND/ÂΜL (ref 0–0.61)
EOSINOPHIL NFR BLD AUTO: 4 % (ref 0–6)
ERYTHROCYTE [DISTWIDTH] IN BLOOD BY AUTOMATED COUNT: 12.5 % (ref 11.6–15.1)
EST. AVERAGE GLUCOSE BLD GHB EST-MCNC: 117 MG/DL
GFR SERPL CREATININE-BSD FRML MDRD: 61 ML/MIN/1.73SQ M
GLUCOSE P FAST SERPL-MCNC: 97 MG/DL (ref 65–99)
HBA1C MFR BLD: 5.7 %
HCT VFR BLD AUTO: 45.9 % (ref 36.5–49.3)
HDLC SERPL-MCNC: 38 MG/DL
HGB BLD-MCNC: 15 G/DL (ref 12–17)
IMM GRANULOCYTES # BLD AUTO: 0.02 THOUSAND/UL (ref 0–0.2)
IMM GRANULOCYTES NFR BLD AUTO: 0 % (ref 0–2)
LDLC SERPL CALC-MCNC: 84 MG/DL (ref 0–100)
LYMPHOCYTES # BLD AUTO: 1.87 THOUSANDS/ÂΜL (ref 0.6–4.47)
LYMPHOCYTES NFR BLD AUTO: 24 % (ref 14–44)
MCH RBC QN AUTO: 29.2 PG (ref 26.8–34.3)
MCHC RBC AUTO-ENTMCNC: 32.7 G/DL (ref 31.4–37.4)
MCV RBC AUTO: 90 FL (ref 82–98)
MONOCYTES # BLD AUTO: 0.5 THOUSAND/ÂΜL (ref 0.17–1.22)
MONOCYTES NFR BLD AUTO: 6 % (ref 4–12)
NEUTROPHILS # BLD AUTO: 5.03 THOUSANDS/ÂΜL (ref 1.85–7.62)
NEUTS SEG NFR BLD AUTO: 65 % (ref 43–75)
NONHDLC SERPL-MCNC: 121 MG/DL
NRBC BLD AUTO-RTO: 0 /100 WBCS
PLATELET # BLD AUTO: 205 THOUSANDS/UL (ref 149–390)
PMV BLD AUTO: 10.3 FL (ref 8.9–12.7)
POTASSIUM SERPL-SCNC: 4.4 MMOL/L (ref 3.5–5.3)
PROT SERPL-MCNC: 6.6 G/DL (ref 6.4–8.4)
RBC # BLD AUTO: 5.13 MILLION/UL (ref 3.88–5.62)
SODIUM SERPL-SCNC: 141 MMOL/L (ref 135–147)
TRIGL SERPL-MCNC: 187 MG/DL
TSH SERPL DL<=0.05 MIU/L-ACNC: 2.36 UIU/ML (ref 0.45–4.5)
WBC # BLD AUTO: 7.77 THOUSAND/UL (ref 4.31–10.16)

## 2024-11-12 PROCEDURE — 80061 LIPID PANEL: CPT

## 2024-11-12 PROCEDURE — 36415 COLL VENOUS BLD VENIPUNCTURE: CPT

## 2024-11-12 PROCEDURE — 84443 ASSAY THYROID STIM HORMONE: CPT

## 2024-11-12 PROCEDURE — 80053 COMPREHEN METABOLIC PANEL: CPT

## 2024-11-12 PROCEDURE — 85025 COMPLETE CBC W/AUTO DIFF WBC: CPT

## 2024-11-12 PROCEDURE — 83036 HEMOGLOBIN GLYCOSYLATED A1C: CPT

## 2024-11-14 ENCOUNTER — OFFICE VISIT (OUTPATIENT)
Age: 79
End: 2024-11-14
Payer: MEDICARE

## 2024-11-14 VITALS
BODY MASS INDEX: 25.62 KG/M2 | SYSTOLIC BLOOD PRESSURE: 116 MMHG | RESPIRATION RATE: 16 BRPM | OXYGEN SATURATION: 98 % | HEART RATE: 68 BPM | DIASTOLIC BLOOD PRESSURE: 86 MMHG | HEIGHT: 69 IN | WEIGHT: 173 LBS

## 2024-11-14 DIAGNOSIS — F41.9 ANXIETY: ICD-10-CM

## 2024-11-14 DIAGNOSIS — N18.31 STAGE 3A CHRONIC KIDNEY DISEASE (HCC): ICD-10-CM

## 2024-11-14 DIAGNOSIS — Z23 ENCOUNTER FOR IMMUNIZATION: Primary | ICD-10-CM

## 2024-11-14 DIAGNOSIS — R73.01 IMPAIRED FASTING GLUCOSE: ICD-10-CM

## 2024-11-14 DIAGNOSIS — E03.8 SUBCLINICAL HYPOTHYROIDISM: ICD-10-CM

## 2024-11-14 DIAGNOSIS — I25.10 CORONARY ARTERY DISEASE INVOLVING NATIVE CORONARY ARTERY OF NATIVE HEART WITHOUT ANGINA PECTORIS: ICD-10-CM

## 2024-11-14 DIAGNOSIS — G31.84 MCI (MILD COGNITIVE IMPAIRMENT): ICD-10-CM

## 2024-11-14 DIAGNOSIS — F09 MILD COGNITIVE DISORDER: ICD-10-CM

## 2024-11-14 DIAGNOSIS — I10 ESSENTIAL HYPERTENSION: ICD-10-CM

## 2024-11-14 DIAGNOSIS — E78.2 MIXED HYPERLIPIDEMIA: ICD-10-CM

## 2024-11-14 DIAGNOSIS — N40.0 BPH WITHOUT URINARY OBSTRUCTION: ICD-10-CM

## 2024-11-14 PROCEDURE — 90662 IIV NO PRSV INCREASED AG IM: CPT | Performed by: INTERNAL MEDICINE

## 2024-11-14 PROCEDURE — G0008 ADMIN INFLUENZA VIRUS VAC: HCPCS | Performed by: INTERNAL MEDICINE

## 2024-11-14 PROCEDURE — 99214 OFFICE O/P EST MOD 30 MIN: CPT | Performed by: INTERNAL MEDICINE

## 2024-11-14 RX ORDER — ATORVASTATIN CALCIUM 40 MG/1
40 TABLET, FILM COATED ORAL DAILY
Qty: 90 TABLET | Refills: 3 | Status: SHIPPED | OUTPATIENT
Start: 2024-11-14

## 2024-11-14 RX ORDER — ASPIRIN 81 MG/1
81 TABLET, CHEWABLE ORAL DAILY
Start: 2024-11-14

## 2024-11-14 RX ORDER — LORAZEPAM 1 MG/1
1 TABLET ORAL 3 TIMES DAILY PRN
Qty: 90 TABLET | Refills: 0 | Status: SHIPPED | OUTPATIENT
Start: 2024-11-14 | End: 2025-04-29

## 2024-11-14 RX ORDER — MEMANTINE HYDROCHLORIDE 5 MG/1
5 TABLET ORAL 2 TIMES DAILY
Qty: 180 TABLET | Refills: 1 | Status: SHIPPED | OUTPATIENT
Start: 2024-11-14

## 2024-11-14 RX ORDER — ESCITALOPRAM OXALATE 20 MG/1
20 TABLET ORAL DAILY
Qty: 90 TABLET | Refills: 3 | Status: SHIPPED | OUTPATIENT
Start: 2024-11-14

## 2024-11-14 RX ORDER — TAMSULOSIN HYDROCHLORIDE 0.4 MG/1
0.8 CAPSULE ORAL
Qty: 180 CAPSULE | Refills: 2 | Status: SHIPPED | OUTPATIENT
Start: 2024-11-14

## 2024-11-14 NOTE — PATIENT INSTRUCTIONS
Lab data reviewed in detail and compared to prior    Hypertension stable on present regimen    Hyperlipidemia-LDL at goal on atorvastatin, HDL remains low and triglycerides slightly elevated, continue with exercise and dietary modification    Impaired fasting glucose stable with A1c 5.7    Subclinical hypothyroidism-TSH has been normal    CKD-GFR improved to 61.  Continue to keep well-hydrated    Routine follow-up after labs in 6 months, sooner as needed.

## 2024-11-14 NOTE — PROGRESS NOTES
Assessment/Plan:    Diagnoses and all orders for this visit:    Encounter for immunization  -     influenza vaccine, high-dose, PF 0.5 mL (Fluzone High Dose)    Anxiety  -     LORazepam (ATIVAN) 1 mg tablet; Take 1 tablet (1 mg total) by mouth 3 (three) times a day as needed for anxiety  -     escitalopram (LEXAPRO) 20 mg tablet; Take 1 tablet (20 mg total) by mouth daily    Mild cognitive disorder  -     memantine (NAMENDA) 5 mg tablet; Take 1 tablet (5 mg total) by mouth 2 (two) times a day    BPH without urinary obstruction  -     tamsulosin (FLOMAX) 0.4 mg; Take 2 capsules (0.8 mg total) by mouth daily with dinner    Essential hypertension    Coronary artery disease involving native coronary artery of native heart without angina pectoris    Subclinical hypothyroidism  -     TSH, 3rd generation with Free T4 reflex; Future    Impaired fasting glucose  -     Hemoglobin A1C; Future    Stage 3a chronic kidney disease (HCC)    Mixed hyperlipidemia  -     aspirin 81 mg chewable tablet; Chew 1 tablet (81 mg total) daily  -     Basic metabolic panel; Future  -     CBC and differential; Future  -     Lipid panel; Future  -     Hemoglobin A1C; Future  -     Hepatic function panel; Future  -     TSH, 3rd generation with Free T4 reflex; Future  -     atorvastatin (LIPITOR) 40 mg tablet; Take 1 tablet (40 mg total) by mouth daily    MCI (mild cognitive impairment)              Patient Instructions   Lab data reviewed in detail and compared to prior    Hypertension stable on present regimen    Hyperlipidemia-LDL at goal on atorvastatin, HDL remains low and triglycerides slightly elevated, continue with exercise and dietary modification    Impaired fasting glucose stable with A1c 5.7    Subclinical hypothyroidism-TSH has been normal    CKD-GFR improved to 61.  Continue to keep well-hydrated    Routine follow-up after labs in 6 months, sooner as needed.    Subjective:      Patient ID: Robel Devi is a 79 y.o. male    F/u mmp,  awv and review labs  Feeling generally well  Dealing w/ Francoise w/ early memory loss  2 recent lacerations on RU arm and L forehead healing well.   Exercising regularly w/ 2 walks per day x 1 mi each.  Uphill w/o cp/sob.   New Dx Macular Degen s/p laser surgery x 2  LIH-seen by surgery, plans to hold off on surgery as he is asymptomatic.   CAD-Taking full asa daily.   HPL/HTN-tolerating atorvastatin, home bp's 110/70   Anxiety-stable on lexapro, using ativan 1-2x per month     MCI-seen by Dr. Lerner, taking Namenda, thinks its helping.  BPH-improved w/ flomax, still w/ nocturia 3-4 x per night.          Current Outpatient Medications:     aspirin 81 mg chewable tablet, Chew 1 tablet (81 mg total) daily, Disp: , Rfl:     atorvastatin (LIPITOR) 40 mg tablet, Take 1 tablet (40 mg total) by mouth daily, Disp: 90 tablet, Rfl: 3    escitalopram (LEXAPRO) 20 mg tablet, Take 1 tablet (20 mg total) by mouth daily, Disp: 90 tablet, Rfl: 3    fluticasone (FLONASE) 50 mcg/act nasal spray, Spray 2 sprays in each nostril every day, Disp: 48 mL, Rfl: 0    latanoprost (XALATAN) 0.005 % ophthalmic solution, INSTILL 1 DROP INTO BOTH EYES AT BEDTIME, Disp: , Rfl:     LORazepam (ATIVAN) 1 mg tablet, Take 1 tablet (1 mg total) by mouth 3 (three) times a day as needed for anxiety, Disp: 90 tablet, Rfl: 0    memantine (NAMENDA) 5 mg tablet, Take 1 tablet (5 mg total) by mouth 2 (two) times a day, Disp: 180 tablet, Rfl: 1    tamsulosin (FLOMAX) 0.4 mg, Take 2 capsules (0.8 mg total) by mouth daily with dinner, Disp: 180 capsule, Rfl: 2    Recent Results (from the past 6 weeks)   Hemoglobin A1C    Collection Time: 11/12/24  7:39 AM   Result Value Ref Range    Hemoglobin A1C 5.7 (H) Normal 4.0-5.6%; PreDiabetic 5.7-6.4%; Diabetic >=6.5%; Glycemic control for adults with diabetes <7.0% %     mg/dl   Lipid panel    Collection Time: 11/12/24  7:39 AM   Result Value Ref Range    Cholesterol 159 See Comment mg/dL    Triglycerides 187 (H) See  Comment mg/dL    HDL, Direct 38 (L) >=40 mg/dL    LDL Calculated 84 0 - 100 mg/dL    Non-HDL-Chol (CHOL-HDL) 121 mg/dl   Comprehensive metabolic panel    Collection Time: 11/12/24  7:39 AM   Result Value Ref Range    Sodium 141 135 - 147 mmol/L    Potassium 4.4 3.5 - 5.3 mmol/L    Chloride 108 96 - 108 mmol/L    CO2 28 21 - 32 mmol/L    ANION GAP 5 4 - 13 mmol/L    BUN 23 5 - 25 mg/dL    Creatinine 1.14 0.60 - 1.30 mg/dL    Glucose, Fasting 97 65 - 99 mg/dL    Calcium 9.7 8.4 - 10.2 mg/dL    AST 22 13 - 39 U/L    ALT 24 7 - 52 U/L    Alkaline Phosphatase 69 34 - 104 U/L    Total Protein 6.6 6.4 - 8.4 g/dL    Albumin 4.1 3.5 - 5.0 g/dL    Total Bilirubin 0.85 0.20 - 1.00 mg/dL    eGFR 61 ml/min/1.73sq m   CBC and differential    Collection Time: 11/12/24  7:39 AM   Result Value Ref Range    WBC 7.77 4.31 - 10.16 Thousand/uL    RBC 5.13 3.88 - 5.62 Million/uL    Hemoglobin 15.0 12.0 - 17.0 g/dL    Hematocrit 45.9 36.5 - 49.3 %    MCV 90 82 - 98 fL    MCH 29.2 26.8 - 34.3 pg    MCHC 32.7 31.4 - 37.4 g/dL    RDW 12.5 11.6 - 15.1 %    MPV 10.3 8.9 - 12.7 fL    Platelets 205 149 - 390 Thousands/uL    nRBC 0 /100 WBCs    Segmented % 65 43 - 75 %    Immature Grans % 0 0 - 2 %    Lymphocytes % 24 14 - 44 %    Monocytes % 6 4 - 12 %    Eosinophils Relative 4 0 - 6 %    Basophils Relative 1 0 - 1 %    Absolute Neutrophils 5.03 1.85 - 7.62 Thousands/µL    Absolute Immature Grans 0.02 0.00 - 0.20 Thousand/uL    Absolute Lymphocytes 1.87 0.60 - 4.47 Thousands/µL    Absolute Monocytes 0.50 0.17 - 1.22 Thousand/µL    Eosinophils Absolute 0.31 0.00 - 0.61 Thousand/µL    Basophils Absolute 0.04 0.00 - 0.10 Thousands/µL   TSH, 3rd generation with Free T4 reflex    Collection Time: 11/12/24  7:39 AM   Result Value Ref Range    TSH 3RD GENERATON 2.362 0.450 - 4.500 uIU/mL       The following portions of the patient's history were reviewed and updated as appropriate: allergies, current medications, past family history, past medical  history, past social history, past surgical history and problem list.     Review of Systems   Constitutional:  Negative for appetite change, chills, diaphoresis, fatigue, fever and unexpected weight change.   HENT:  Negative for congestion, hearing loss and rhinorrhea.    Eyes:  Negative for visual disturbance.   Respiratory:  Negative for cough, chest tightness, shortness of breath and wheezing.    Cardiovascular:  Negative for chest pain, palpitations and leg swelling.   Gastrointestinal:  Negative for abdominal pain and blood in stool.   Endocrine: Negative for cold intolerance, heat intolerance, polydipsia and polyuria.   Genitourinary:  Negative for difficulty urinating, dysuria, frequency and urgency.   Musculoskeletal:  Negative for arthralgias and myalgias.   Skin:  Negative for rash.   Neurological:  Negative for dizziness, weakness, light-headedness and headaches.   Hematological:  Does not bruise/bleed easily.   Psychiatric/Behavioral:  Negative for dysphoric mood and sleep disturbance.          Objective:      Vitals:    11/14/24 1257   BP: 116/86   Pulse: 68   Resp: 16   SpO2: 98%          Physical Exam  Constitutional:       Appearance: He is well-developed.   HENT:      Head: Normocephalic and atraumatic.      Nose: Nose normal.   Eyes:      General: No scleral icterus.     Conjunctiva/sclera: Conjunctivae normal.      Pupils: Pupils are equal, round, and reactive to light.   Neck:      Thyroid: No thyromegaly.      Vascular: No JVD.      Trachea: No tracheal deviation.   Cardiovascular:      Rate and Rhythm: Normal rate and regular rhythm.      Heart sounds: No murmur heard.     No friction rub. No gallop.   Pulmonary:      Effort: Pulmonary effort is normal. No respiratory distress.      Breath sounds: Normal breath sounds. No wheezing or rales.   Musculoskeletal:         General: No deformity.      Cervical back: Normal range of motion and neck supple.   Lymphadenopathy:      Cervical: No cervical  adenopathy.   Skin:     General: Skin is warm and dry.      Coloration: Skin is not pale.      Findings: No erythema or rash.   Neurological:      Mental Status: He is alert and oriented to person, place, and time.      Cranial Nerves: No cranial nerve deficit.   Psychiatric:         Behavior: Behavior normal.         Thought Content: Thought content normal.         Judgment: Judgment normal.

## 2024-12-17 ENCOUNTER — TELEPHONE (OUTPATIENT)
Age: 79
End: 2024-12-17

## 2024-12-17 NOTE — TELEPHONE ENCOUNTER
Called pt regarding rescheduling of appt needed   canceling appt at this time   will call pt back to reschedule when a schedule is available

## 2024-12-23 ENCOUNTER — OFFICE VISIT (OUTPATIENT)
Age: 79
End: 2024-12-23
Payer: MEDICARE

## 2024-12-23 VITALS
SYSTOLIC BLOOD PRESSURE: 100 MMHG | HEART RATE: 76 BPM | OXYGEN SATURATION: 98 % | WEIGHT: 174 LBS | RESPIRATION RATE: 22 BRPM | DIASTOLIC BLOOD PRESSURE: 60 MMHG | HEIGHT: 69 IN | BODY MASS INDEX: 25.77 KG/M2 | TEMPERATURE: 98.7 F

## 2024-12-23 DIAGNOSIS — L82.1 SEBORRHEIC KERATOSIS: ICD-10-CM

## 2024-12-23 DIAGNOSIS — Z13.89 SCREENING FOR SKIN CONDITION: ICD-10-CM

## 2024-12-23 DIAGNOSIS — D22.9 NEVUS: ICD-10-CM

## 2024-12-23 DIAGNOSIS — D18.01 CHERRY ANGIOMA: ICD-10-CM

## 2024-12-23 DIAGNOSIS — L21.9 SEBORRHEIC DERMATITIS: Primary | ICD-10-CM

## 2024-12-23 DIAGNOSIS — D48.9 NEOPLASM OF UNCERTAIN BEHAVIOR: ICD-10-CM

## 2024-12-23 PROCEDURE — 11102 TANGNTL BX SKIN SINGLE LES: CPT | Performed by: DERMATOLOGY

## 2024-12-23 PROCEDURE — 11103 TANGNTL BX SKIN EA SEP/ADDL: CPT | Performed by: DERMATOLOGY

## 2024-12-23 PROCEDURE — 88305 TISSUE EXAM BY PATHOLOGIST: CPT | Performed by: PATHOLOGY

## 2024-12-23 PROCEDURE — 99214 OFFICE O/P EST MOD 30 MIN: CPT | Performed by: DERMATOLOGY

## 2024-12-23 RX ORDER — KETOCONAZOLE 20 MG/G
CREAM TOPICAL DAILY
Qty: 30 G | Refills: 2 | Status: SHIPPED | OUTPATIENT
Start: 2024-12-23

## 2024-12-23 NOTE — PATIENT INSTRUCTIONS
Dr. Avilez Shave/Punch Biopsy After Care Instructions      Remove bandage the next day. Keep bandage dry.    Shower or Bathe as usual the next day.    Cleanse the area once daily with saline or hydrogen peroxide.    Apply Vaseline.  WE ADVISE YOU NOT TO USE NEOSPORIN OR ANY TOPICAL ANTIBIOTIC UNLESS INSTRUCTED BY THE DOCTOR.    Cover area with a dressing or Band-Aid if possible.      Continue treatment until completely healed. (Skin appears in pink).    Try to avoid scab formation.      Slight bleeding may occur after the Band-Aid/Dressing is removed or the first few days after the procedure was done.      Don't panic!!  Apply continuous, direct pressure on the dressing over the wound for 15-20 minutes. DO NOT remove dressing.    HINT:  Set a timer for 15-20 minutes to make sure you press on the wound long enough  SOAKING: the dressing before you remove it should decrease chances of bleeding.  If the wound is on the legs or arms, swelling may occur. Elevating the arm or leg above the level of the heart as much as possible will also decrease swelling, promote healing and decrease chances of bleeding.        ANY QUESTION PLEASE CALL OUR OFFICE AT [phone]  IF AFTER HOURS, THE ANSWERING SERVICE WILL GET A HOLD OF THE DOCTOR.    PLEASE BE ADVISED THAT BIOPSY RESULTS CAN TAKE UP TO 1 TO 2 WEEKS. YOU WILL RECEIVE THE RESULTS IN [place] FIRST, BUT PLEASE WAIT FOR THE DOCTOR OR STAFF TO NOTIFY YOU.      THANK YOU!!

## 2024-12-23 NOTE — PROGRESS NOTES
"[facility] Dermatology Clinic Note     Patient Name: Robel Devi  Encounter Date: 12/23/2024     Have you been cared for by a [facility] Dermatologist in the last 3 years and, if so, which description applies to you?    Yes.  I have been here within the last 3 years, and my medical history has NOT changed since that time.  I am MALE/not capable of bearing children.    REVIEW OF SYSTEMS:  Have you recently had or currently have any of the following? No changes in my recent health.   PAST MEDICAL HISTORY:  Have you personally ever had or currently have any of the following?  If \"YES,\" then please provide more detail. No changes in my medical history.   HISTORY OF IMMUNOSUPPRESSION: Do you have a history of any of the following:  Systemic Immunosuppression such as Diabetes, Biologic or Immunotherapy, Chemotherapy, Organ Transplantation, Bone Marrow Transplantation or Prednisone?  No     Answering \"YES\" requires the addition of the dotphrase \"IMMUNOSUPPRESSED\" as the first diagnosis of the patient's visit.   FAMILY HISTORY:  Any \"first degree relatives\" (parent, brother, sister, or child) with the following?    No changes in my family's known health.   PATIENT EXPERIENCE:    Do you want the Dermatologist to perform a COMPLETE skin exam today including a clinical examination under the \"bra and underwear\" areas?  Yes  If necessary, do we have your permission to call and leave a detailed message on your Preferred Phone number that includes your specific medical information?  Yes      No Known Allergies   Current Outpatient Medications:     aspirin 81 mg chewable tablet, Chew 1 tablet (81 mg total) daily, Disp: , Rfl:     atorvastatin (LIPITOR) 40 mg tablet, Take 1 tablet (40 mg total) by mouth daily, Disp: 90 tablet, Rfl: 3    escitalopram (LEXAPRO) 20 mg tablet, Take 1 tablet (20 mg total) by mouth daily, Disp: 90 tablet, Rfl: 3    fluticasone (FLONASE) 50 mcg/act nasal spray, Spray 2 sprays in each nostril every day, " Disp: 48 mL, Rfl: 0    latanoprost (XALATAN) 0.005 % ophthalmic solution, INSTILL 1 DROP INTO BOTH EYES AT BEDTIME, Disp: , Rfl:     LORazepam (ATIVAN) 1 mg tablet, Take 1 tablet (1 mg total) by mouth 3 (three) times a day as needed for anxiety, Disp: 90 tablet, Rfl: 0    memantine (NAMENDA) 5 mg tablet, Take 1 tablet (5 mg total) by mouth 2 (two) times a day, Disp: 180 tablet, Rfl: 1    tamsulosin (FLOMAX) 0.4 mg, Take 2 capsules (0.8 mg total) by mouth daily with dinner, Disp: 180 capsule, Rfl: 2          Whom besides the patient is providing clinical information about today's encounter?   NO ADDITIONAL HISTORIAN (patient alone provided history)    Physical Exam and Assessment/Plan by Diagnosis:    HISTORY OF BASAL CELL CARCINOMA     Physical Exam:  Anatomic Location Affected:  left temple on 12/2023, mid chest in 2017  Morphological Description of scar:  scar well healed  Suspected Recurrence: No        Additional History of Present Condition:  History of basal cell carcinoma with no sign of recurrence     Assessment and Plan:  Based on a thorough discussion of this condition and the management approach to it (including a comprehensive discussion of the known risks, side effects and potential benefits of treatment), the patient (family) agrees to implement the following specific plan:  Monitor for change  When outside we recommend using a wide brim hat, sunglasses, long sleeve and pants, sunscreen with SPF 30+ with reapplication every 2 hours, or SPF specific clothing       SEBORRHEIC DERMATITIS    Physical Exam:  Anatomic Location Affected:  scalp   Morphological Description:  erythematous scaling   Pertinent Positives:  Pertinent Negatives:    Additional History of Present Condition:  present for awhile     Assessment and Plan:  Based on a thorough discussion of this condition and the management approach to it (including a comprehensive discussion of the known risks, side effects and potential benefits of  "treatment), the patient (family) agrees to implement the following specific plan:  Start Ketoconazole cream       Seborrheic Dermatitis   Seborrheic dermatitis is a common, chronic or relapsing form of eczema/dermatitis that mainly affects the sebaceous, gland-rich regions of the scalp, face, and trunk.  There are infantile and adult forms of seborrhoeic dermatitis. It is sometimes associated with psoriasis and, in that clinical scenario, may be referred to as \"sebo-psoriasis.\"  Seborrheic dermatitis is also known as \"seborrheic eczema.\"  Dandruff (also called \"pityriasis capitis\") is an uninflamed form of seborrhoeic dermatitis. Dandruff presents as bran-like scaly patches scattered within hair-bearing areas of the scalp.  In an infant, this condition may be referred to as \"cradle cap.\"  The cause of seborrheic dermatitis is not completely understood. It is associated with proliferation of various species of the skin commensal Malassezia, in its yeast (non-pathogenic) form. Its metabolites (such as the fatty acids oleic acid, malssezin, and indole-3-carbaldehyde) may cause an inflammatory reaction. Differences in skin barrier lipid content and function may account for individual presentations.    Infantile Seborrheic Dermatitis  Infantile seborrheic dermatitis affects babies under the age of 3 months and usually resolves by 6-12 months of age.  Infantile seborrheic dermatitis causes \"cradle cap\" (diffuse, greasy scaling on scalp). The rash may spread to affect armpit and groin folds (a type of \"napkin dermatitis\").  There may be associated salmon-pink colored patches that may flake or peel.  The rash in this case is usually not especially itchy, so the baby often appears undisturbed by the rash, even when more generalized.    Adult Seborrheic Dermatitis  Adult seborrheic dermatitis tends to begin in late adolescence; prevalence is greatest in young adults and in the elderly. It is more common in males than in " females.    The following factors are sometimes associated with severe adult seborrheic dermatitis:  Oily skin  Familial tendency to seborrhoeic dermatitis or a family history of psoriasis  Immunosuppression: organ transplant recipient, human immunodeficiency virus (HIV) infection and patients with lymphoma  Neurological and psychiatric diseases: Parkinson disease, tardive dyskinesia, depression, epilepsy, facial nerve palsy, spinal cord injury and congenital disorders such as Down syndrome  Treatment for psoriasis with psoralen and ultraviolet A (PUVA) therapy  Lack of sleep  Stressful events.    In adults, seborrheic dermatitis may typically affect the scalp, face (creases around the nose, behind ears, within eyebrows) and upper trunk. Typical clinical features include:  Winter flares, improving in summer following sun exposure  Minimal itch most of the time  Combination oily and dry mid-facial skin  Ill-defined localized scaly patches or diffuse scale in the scalp  Blepharitis; scaly red eyelid margins  [place]-pink, thin, scaly, and ill-defined plaques in skin folds on both sides of the face  Petal or ring-shaped flaky patches on hair-line and on anterior chest  Rash in armpits, under the breasts, in the groin folds and genital creases  Superficial folliculitis (inflamed hair follicles) on cheeks and upper trunk    Seborrheic dermatitis is diagnosed by its clinical appearance and behavior. Skin biopsy may be helpful but is rarely necessary to make this diagnosis.    NEOPLASM OF UNCERTAIN BEHAVIOR OF SKIN    Physical Exam:  (Anatomic Location); (Size and Morphological Description); (Differential Diagnosis):  Specimen A, Left nasal wall 7 mm pink macule r/o basal cell cancer  Specimen B, Right upper arm 9 mm keratotic nodule R/o Squamous cancer          Pertinent Positives:  Pertinent Negatives:    Additional History of Present Condition:  present on exam     Assessment and Plan:  I have discussed with the patient  "that a sample of skin via a \"skin biopsy” would be potentially helpful to further make a specific diagnosis under the microscope.  Based on a thorough discussion of this condition and the management approach to it (including a comprehensive discussion of the known risks, side effects and potential benefits of treatment), the patient (family) agrees to implement the following specific plan:    Procedure:  Skin Biopsy.  After a thorough discussion of treatment options and risk/benefits/alternatives (including but not limited to local pain, scarring, dyspigmentation, blistering, possible superinfection, and inability to confirm a diagnosis via histopathology), verbal and written consent were obtained and portion of the rash was biopsied for tissue sample.  See below for consent that was obtained from patient and subsequent Procedure Note.          PROCEDURE TANGENTIAL (SHAVE) BIOPSY NOTE:    Performing Physician:   Anatomic Location; Clinical Description with size (cm); Pre-Op Diagnosis:   Specimen A, Left nasal wall 7 mm pink macule r/o basal cell cancer  Specimen B, Right upper arm 9 mm keratotic nodule R/o Squamous cancer  Post-op diagnosis: Same     Local anesthesia: 1% xylocaine with epi      Topical anesthesia: None    Hemostasis: Aluminum chloride       After obtaining informed consent  at which time there was a discussion about the purpose of biopsy  and low risks of infection and bleeding.  The area was prepped and draped in the usual fashion. Anesthesia was obtained with 1% lidocaine with epinephrine. A shave biopsy to an appropriate sampling depth was obtained by Shave (Dermablade or 15 blade) The resulting wound was covered with surgical ointment and bandaged appropriately.     The patient tolerated the procedure well without complications and was without signs of functional compromise.      Specimen has been sent for review by Dermatopathology.    Standard post-procedure care has been explained " "and has been included in written form within the patient's copy of Informed Consent.    INFORMED CONSENT DISCUSSION AND POST-OPERATIVE INSTRUCTIONS FOR PATIENT    I.  RATIONALE FOR PROCEDURE  I understand that a skin biopsy allows the Dermatologist to test a lesion or rash under the microscope to obtain a diagnosis.  It usually involves numbing the area with numbing medication and removing a small piece of skin; sometimes the area will be closed with sutures. In this specific procedure, sutures are not usually needed.  If any sutures are placed, then they are usually need to be removed in 2 weeks or less.    I understand that my Dermatologist recommends that a skin \"shave\" biopsy be performed today.  A local anesthetic, similar to the kind that a dentist uses when filling a cavity, will be injected with a very small needle into the skin area to be sampled.  The injected skin and tissue underneath \"will go to sleep” and become numb so no pain should be felt afterwards.  An instrument shaped like a tiny \"razor blade\" (shave biopsy instrument) will be used to cut a small piece of tissue and skin from the area so that a sample of tissue can be taken and examined more closely under the microscope.  A slight amount of bleeding will occur, but it will be stopped with direct pressure and a pressure bandage and any other appropriate methods.  I understands that a scar will form where the wound was created.  Surgical ointment will be applied to help protect the wound.  Sutures are not usually needed.    II.  RISKS AND POTENTIAL COMPLICATIONS   I understand the risks and potential complications of a skin biopsy include but are not limited to the following:  Bleeding  Infection  Pain  Scar/keloid  Skin discoloration  Incomplete Removal  Recurrence  Nerve Damage/Numbness/Loss of Function  Allergic Reaction to Anesthesia  Biopsies are diagnostic procedures and based on findings additional treatment or evaluation may be " "required  Loss or destruction of specimen resulting in no additional findings    My Dermatologist has explained to me the nature of the condition, the nature of the procedure, and the benefits to be reasonably expected compared with alternative approaches.  My Dermatologist has discussed the likelihood of major risks or complications of this procedure including the specific risks listed above, such as bleeding, infection, and scarring/keloid.  I understand that a scar is expected after this procedure.  I understand that my physician cannot predict if the scar will form a \"keloid,\" which extends beyond the borders of the wound that is created.  A keloid is a thick, painful, and bumpy scar.  A keloid can be difficult to treat, as it does not always respond well to therapy, which includes injecting cortisone directly into the keloid every few weeks.  While this usually reduces the pain and size of the scar, it does not eliminate it.      I understand that photographs may be taken before and after the procedure.  These will be maintained as part of the medical providers confidential records and may not be made available to me.  I further authorize the medical provider to use the photographs for teaching purposes or to illustrate scientific papers, books, or lectures if in his/her judgment, medical research, education, or science may benefit from its use.    I have had an opportunity to fully inquire about the risks and benefits of this procedure and its alternatives.   I have been given ample time and opportunity to ask questions and to seek a second opinion if I wished to do so.  I acknowledge that there have specifically been no guarantees as to the cosmetic results from the procedure.  I am aware that with any procedure there is always the possibility of an unexpected complication.    III. POST-PROCEDURAL CARE (WHAT YOU WILL NEED TO DO \"AFTER THE BIOPSY\" TO OPTIMIZE HEALING)    Keep the area clean and dry.  Try NOT " "to remove the bandage or get it wet for the first 24 hours.    Gently clean the area and apply surgical ointment (such as Vaseline petrolatum ointment, which is available \"over the counter\" and not a prescription) to the biopsy site for up to 2 weeks straight.  This acts to protect the wound from the outside world.      Sutures are not usually placed in this procedure.  If any sutures were placed, return for suture removal as instructed (generally 1 week for the face, 2 weeks for the body).      Take Acetaminophen (Tylenol) for discomfort, if no contraindications.  Ibuprofen or aspirin could make bleeding worse.    Call our office immediately for signs of infection: fever, chills, increased redness, warmth, tenderness, discomfort/pain, or pus or foul smell coming from the wound.    WHAT TO DO IF THERE IS ANY BLEEDING?  If a small amount of bleeding is noticed, place a clean cloth over the area and apply firm pressure for ten minutes.  Check the wound after 10 minutes of direct pressure.  If bleeding persists, try one more time for an additional 10 minutes of direct pressure on the area.  If the bleeding becomes heavier or does not stop after the second attempt, or if you have any other questions about this procedure, then please call your [facility]'s Dermatologist by calling [phone] (SKIN).     I hereby acknowledge that I have reviewed and verified the site with my Dermatologist and have requested and authorized my Dermatologist to proceed with the procedure.      Scribe Attestation      I,:  Kylie Davenport MA am acting as a scribe while in the presence of the attending physician.:       I,:  Armen Avilez MD personally performed the services described in this documentation    as scribed in my presence.:             "

## 2024-12-27 PROCEDURE — 88305 TISSUE EXAM BY PATHOLOGIST: CPT | Performed by: PATHOLOGY

## 2025-01-14 ENCOUNTER — TELEPHONE (OUTPATIENT)
Age: 80
End: 2025-01-14

## 2025-04-16 ENCOUNTER — PROCEDURE VISIT (OUTPATIENT)
Age: 80
End: 2025-04-16
Payer: MEDICARE

## 2025-04-16 VITALS
OXYGEN SATURATION: 95 % | HEART RATE: 75 BPM | HEIGHT: 69 IN | WEIGHT: 174 LBS | DIASTOLIC BLOOD PRESSURE: 72 MMHG | BODY MASS INDEX: 25.77 KG/M2 | SYSTOLIC BLOOD PRESSURE: 138 MMHG | TEMPERATURE: 97.3 F

## 2025-04-16 DIAGNOSIS — C44.92 SCC (SQUAMOUS CELL CARCINOMA): ICD-10-CM

## 2025-04-16 DIAGNOSIS — L57.0 KERATOSIS, ACTINIC: Primary | ICD-10-CM

## 2025-04-16 PROCEDURE — 17261 DSTRJ MAL LES T/A/L .6-1.0CM: CPT | Performed by: STUDENT IN AN ORGANIZED HEALTH CARE EDUCATION/TRAINING PROGRAM

## 2025-04-16 PROCEDURE — 17000 DESTRUCT PREMALG LESION: CPT | Performed by: STUDENT IN AN ORGANIZED HEALTH CARE EDUCATION/TRAINING PROGRAM

## 2025-04-16 NOTE — PROGRESS NOTES
"[facility] Dermatology Clinic Note     Patient Name: Robel Devi  Encounter Date: 4/16/25       Have you been cared for by a [facility] Dermatologist in the last 3 years and, if so, which description applies to you? Yes. I have been here within the last 3 years, and my medical history has NOT changed since that time. I am not of child-bearing potential.     REVIEW OF SYSTEMS:  Have you recently had or currently have any of the following? No changes in my recent health.   PAST MEDICAL HISTORY:  Have you personally ever had or currently have any of the following?  If \"YES,\" then please provide more detail. No changes in my medical history.   HISTORY OF IMMUNOSUPPRESSION: Do you have a history of any of the following:  Systemic Immunosuppression such as Diabetes, Biologic or Immunotherapy, Chemotherapy, Organ Transplantation, Bone Marrow Transplantation or Prednisone?  No     Answering \"YES\" requires the addition of the dotphrase \"IMMUNOSUPPRESSED\" as the first diagnosis of the patient's visit.   FAMILY HISTORY:  Any \"first degree relatives\" (parent, brother, sister, or child) with the following?    No changes in my family's known health.   PATIENT EXPERIENCE:    Do you want the Dermatologist to perform a COMPLETE skin exam today including a clinical examination under the \"bra and underwear\" areas?  NO  If necessary, do we have your permission to call and leave a detailed message on your Preferred Phone number that includes your specific medical information?  Yes      No Known Allergies   Current Outpatient Medications:   •  aspirin 81 mg chewable tablet, Chew 1 tablet (81 mg total) daily, Disp: , Rfl:   •  atorvastatin (LIPITOR) 40 mg tablet, Take 1 tablet (40 mg total) by mouth daily, Disp: 90 tablet, Rfl: 3  •  escitalopram (LEXAPRO) 20 mg tablet, Take 1 tablet (20 mg total) by mouth daily, Disp: 90 tablet, Rfl: 3  •  fluticasone (FLONASE) 50 mcg/act nasal spray, Spray 2 sprays in each nostril every day, Disp: 48 " mL, Rfl: 0  •  ketoconazole (NIZORAL) 2 % cream, Apply topically daily, Disp: 30 g, Rfl: 2  •  latanoprost (XALATAN) 0.005 % ophthalmic solution, INSTILL 1 DROP INTO BOTH EYES AT BEDTIME, Disp: , Rfl:   •  LORazepam (ATIVAN) 1 mg tablet, Take 1 tablet (1 mg total) by mouth 3 (three) times a day as needed for anxiety, Disp: 90 tablet, Rfl: 0  •  memantine (NAMENDA) 5 mg tablet, Take 1 tablet (5 mg total) by mouth 2 (two) times a day, Disp: 180 tablet, Rfl: 1  •  tamsulosin (FLOMAX) 0.4 mg, Take 2 capsules (0.8 mg total) by mouth daily with dinner, Disp: 180 capsule, Rfl: 2              Whom besides the patient is providing clinical information about today's encounter?   NO ADDITIONAL HISTORIAN (patient alone provided history)    Physical Exam and Assessment/Plan by Diagnosis:    ACTINIC KERATOSIS WITH CRYOSURGERY PROCEDURE    ACTINIC KERATOSIS    Physical Exam:  Anatomic Location Affected:  nose- 0.7 cm  Morphological Description:  scaly erythematous papule    Additional History of Present Condition:  present on exam     Assessment and Plan:  Based on a thorough discussion of this condition and the management approach to it (including a comprehensive discussion of the known risks, side effects and potential benefits of treatment), the patient (family) agrees to implement the following specific plan:    Cryotherapy performed today   Return precautions provided. If area does not heal, please contact the office.     Actinic keratoses are very common on sites repeatedly exposed to the sun, especially the backs of the hands and the face.  They are considered precancers and have a low risk of turning into squamous cell carcinoma. It is rare for a solitary actinic keratosis to evolve into a squamous cell carcinoma (SCC), but the risk is 10-15% when more than 10 actinic keratoses are present. A tender, thickened, ulcerated or enlarging actinic keratosis is suspicious of SCC.    Actinic keratoses may be prevented by strict  sun protection. If already present, keratoses may improve with a very high sun protection factor (50+) broad-spectrum sunscreen applied at least daily to affected areas, year-round.  We recommend that sun protective clothing and hats and sunglasses be worn whenever possible.  Note that you can make you own UPF 30 rate clothing using just your own washing machine with a product called sun guard    There are several different options for treating actinic keratoses    Topical “medications such as 5-fluorouracil or Aldara  - good for field treatment ie treats what's seen and not seen    Cryotherapy - good for single spots but treats “only what we see” versus a field treatment    Photodynamic therapy - involves application of a light sensitizing medicine and then exposure to a special light, also a good field treatment    SQUAMOUS CELL CARCINOMA - IN SITU    Physical Exam:  Anatomic Location Affected:  right forearm- 0.9 cm   Morphological Description:  scaly erythematous papule    Additional History of Present Condition:  present on exam     Assessment and Plan:  Based on a thorough discussion of this condition and the management approach to it (including a comprehensive discussion of the known risks, side effects and potential benefits of treatment), the patient (family) agrees to implement the following specific plan:  Discussed excision vs ED&C vs cryotherapy vs effudex.   Patient would like to proceed with cryotherapy.   Return precautions provided. If area does not resolve with cryotherapy, patient understands to contact the office.     PROCEDURE:  DESTRUCTION OF MALIGNANT LESION  After a thorough discussion of treatment options and risk/benefits/alternatives (including but not limited to local pain, scarring, dyspigmentation, blistering, and possible superinfection), verbal and written consent were obtained and the aforementioned lesions were treated on with cryotherapy using liquid nitrogen x 3 cycles for 5-10  seconds.    TOTAL NUMBER of 1 malignant lesions were treated today on the ANATOMIC LOCATION: right forearm.     The patient tolerated the procedure well, and after-care instructions were provided.   Scribe Attestation    I,:  Elma Moore am acting as a scribe while in the presence of the attending physician.:       I,:  Eduard Burnett DO personally performed the services described in this documentation    as scribed in my presence.:

## 2025-04-16 NOTE — PATIENT INSTRUCTIONS
"Treatment with Cryotherapy    The doctor has treated your skin with nitrogen, which is 320 degrees Fahrenheit below zero.  He has given the treated area \"frostbite.\"    Stinging should subside within a few hours.  You can take Tylenol for pain, if needed.    Over the next few days, it is normal if the area becomes reddened, a blood blister, or swollen with fluid.  If the lesion treated was near the eye - you could get a swollen eye over the next few days.  Do not panic!  This is all temporary, and will resolve with time.    There is no special treatment - just keep the area clean.  Makeup and BandAids can be used, if preferred.    When the area starts to dry up and peel off, using Vaseline can help healing.    It usually takes up to a month for it to heal.  Some lesions are recurrent and may require repeat treatments.  If a lesion has not healed in one month, please don't hesitate to contact us.      If you have any further questions that are not answered here, please call us.  [phone].    Thank you for allowing us to care for you.   "

## 2025-05-15 ENCOUNTER — TELEPHONE (OUTPATIENT)
Age: 80
End: 2025-05-15

## 2025-05-27 ENCOUNTER — APPOINTMENT (OUTPATIENT)
Dept: LAB | Facility: HOSPITAL | Age: 80
End: 2025-05-27
Payer: MEDICARE

## 2025-05-27 DIAGNOSIS — E03.8 SUBCLINICAL HYPOTHYROIDISM: ICD-10-CM

## 2025-05-27 DIAGNOSIS — E78.2 MIXED HYPERLIPIDEMIA: ICD-10-CM

## 2025-05-27 DIAGNOSIS — N18.31 STAGE 3A CHRONIC KIDNEY DISEASE (HCC): ICD-10-CM

## 2025-05-27 DIAGNOSIS — R73.01 IMPAIRED FASTING GLUCOSE: ICD-10-CM

## 2025-05-27 LAB
25(OH)D3 SERPL-MCNC: 51.4 NG/ML (ref 30–100)
ALBUMIN SERPL BCG-MCNC: 4.2 G/DL (ref 3.5–5)
ALP SERPL-CCNC: 73 U/L (ref 34–104)
ALT SERPL W P-5'-P-CCNC: 20 U/L (ref 7–52)
ANION GAP SERPL CALCULATED.3IONS-SCNC: 3 MMOL/L (ref 4–13)
AST SERPL W P-5'-P-CCNC: 19 U/L (ref 13–39)
BASOPHILS # BLD AUTO: 0.05 THOUSANDS/ÂΜL (ref 0–0.1)
BASOPHILS NFR BLD AUTO: 1 % (ref 0–1)
BILIRUB DIRECT SERPL-MCNC: 0.16 MG/DL (ref 0–0.2)
BILIRUB SERPL-MCNC: 1.01 MG/DL (ref 0.2–1)
BUN SERPL-MCNC: 23 MG/DL (ref 5–25)
CALCIUM SERPL-MCNC: 10 MG/DL (ref 8.4–10.2)
CHLORIDE SERPL-SCNC: 106 MMOL/L (ref 96–108)
CHOLEST SERPL-MCNC: 148 MG/DL (ref ?–200)
CO2 SERPL-SCNC: 31 MMOL/L (ref 21–32)
CREAT SERPL-MCNC: 1.12 MG/DL (ref 0.6–1.3)
EOSINOPHIL # BLD AUTO: 0.29 THOUSAND/ÂΜL (ref 0–0.61)
EOSINOPHIL NFR BLD AUTO: 4 % (ref 0–6)
ERYTHROCYTE [DISTWIDTH] IN BLOOD BY AUTOMATED COUNT: 12.9 % (ref 11.6–15.1)
EST. AVERAGE GLUCOSE BLD GHB EST-MCNC: 123 MG/DL
GFR SERPL CREATININE-BSD FRML MDRD: 62 ML/MIN/1.73SQ M
GLUCOSE P FAST SERPL-MCNC: 100 MG/DL (ref 65–99)
HBA1C MFR BLD: 5.9 %
HCT VFR BLD AUTO: 48.1 % (ref 36.5–49.3)
HDLC SERPL-MCNC: 36 MG/DL
HGB BLD-MCNC: 15.4 G/DL (ref 12–17)
IMM GRANULOCYTES # BLD AUTO: 0.02 THOUSAND/UL (ref 0–0.2)
IMM GRANULOCYTES NFR BLD AUTO: 0 % (ref 0–2)
LDLC SERPL CALC-MCNC: 63 MG/DL (ref 0–100)
LYMPHOCYTES # BLD AUTO: 1.72 THOUSANDS/ÂΜL (ref 0.6–4.47)
LYMPHOCYTES NFR BLD AUTO: 25 % (ref 14–44)
MCH RBC QN AUTO: 28.3 PG (ref 26.8–34.3)
MCHC RBC AUTO-ENTMCNC: 32 G/DL (ref 31.4–37.4)
MCV RBC AUTO: 88 FL (ref 82–98)
MONOCYTES # BLD AUTO: 0.44 THOUSAND/ÂΜL (ref 0.17–1.22)
MONOCYTES NFR BLD AUTO: 6 % (ref 4–12)
NEUTROPHILS # BLD AUTO: 4.44 THOUSANDS/ÂΜL (ref 1.85–7.62)
NEUTS SEG NFR BLD AUTO: 64 % (ref 43–75)
NONHDLC SERPL-MCNC: 112 MG/DL
NRBC BLD AUTO-RTO: 0 /100 WBCS
PHOSPHATE SERPL-MCNC: 3.1 MG/DL (ref 2.3–4.1)
PLATELET # BLD AUTO: 193 THOUSANDS/UL (ref 149–390)
PMV BLD AUTO: 10.8 FL (ref 8.9–12.7)
POTASSIUM SERPL-SCNC: 4.9 MMOL/L (ref 3.5–5.3)
PROT SERPL-MCNC: 6.7 G/DL (ref 6.4–8.4)
PTH-INTACT SERPL-MCNC: 40.3 PG/ML (ref 12–88)
RBC # BLD AUTO: 5.45 MILLION/UL (ref 3.88–5.62)
SODIUM SERPL-SCNC: 140 MMOL/L (ref 135–147)
TRIGL SERPL-MCNC: 247 MG/DL (ref ?–150)
TSH SERPL DL<=0.05 MIU/L-ACNC: 1.59 UIU/ML (ref 0.45–4.5)
WBC # BLD AUTO: 6.96 THOUSAND/UL (ref 4.31–10.16)

## 2025-05-27 PROCEDURE — 82306 VITAMIN D 25 HYDROXY: CPT

## 2025-05-27 PROCEDURE — 84443 ASSAY THYROID STIM HORMONE: CPT

## 2025-05-27 PROCEDURE — 80053 COMPREHEN METABOLIC PANEL: CPT

## 2025-05-27 PROCEDURE — 84100 ASSAY OF PHOSPHORUS: CPT

## 2025-05-27 PROCEDURE — 85025 COMPLETE CBC W/AUTO DIFF WBC: CPT

## 2025-05-27 PROCEDURE — 80061 LIPID PANEL: CPT

## 2025-05-27 PROCEDURE — 82248 BILIRUBIN DIRECT: CPT

## 2025-05-27 PROCEDURE — 83970 ASSAY OF PARATHORMONE: CPT

## 2025-05-27 PROCEDURE — 36415 COLL VENOUS BLD VENIPUNCTURE: CPT

## 2025-05-27 PROCEDURE — 83036 HEMOGLOBIN GLYCOSYLATED A1C: CPT

## 2025-05-29 ENCOUNTER — OFFICE VISIT (OUTPATIENT)
Age: 80
End: 2025-05-29
Payer: MEDICARE

## 2025-05-29 VITALS
HEART RATE: 90 BPM | RESPIRATION RATE: 16 BRPM | HEIGHT: 69 IN | DIASTOLIC BLOOD PRESSURE: 70 MMHG | OXYGEN SATURATION: 98 % | SYSTOLIC BLOOD PRESSURE: 112 MMHG | WEIGHT: 172 LBS | BODY MASS INDEX: 25.48 KG/M2

## 2025-05-29 DIAGNOSIS — R73.01 IMPAIRED FASTING GLUCOSE: ICD-10-CM

## 2025-05-29 DIAGNOSIS — G31.84 MCI (MILD COGNITIVE IMPAIRMENT): ICD-10-CM

## 2025-05-29 DIAGNOSIS — N40.0 BPH WITHOUT URINARY OBSTRUCTION: ICD-10-CM

## 2025-05-29 DIAGNOSIS — K40.90 LEFT INGUINAL HERNIA: ICD-10-CM

## 2025-05-29 DIAGNOSIS — E78.2 MIXED HYPERLIPIDEMIA: ICD-10-CM

## 2025-05-29 DIAGNOSIS — E03.8 SUBCLINICAL HYPOTHYROIDISM: ICD-10-CM

## 2025-05-29 DIAGNOSIS — Z00.00 MEDICARE ANNUAL WELLNESS VISIT, SUBSEQUENT: Primary | ICD-10-CM

## 2025-05-29 DIAGNOSIS — N18.31 STAGE 3A CHRONIC KIDNEY DISEASE (HCC): ICD-10-CM

## 2025-05-29 DIAGNOSIS — R35.1 BENIGN PROSTATIC HYPERPLASIA WITH NOCTURIA: ICD-10-CM

## 2025-05-29 DIAGNOSIS — N40.1 BENIGN PROSTATIC HYPERPLASIA WITH NOCTURIA: ICD-10-CM

## 2025-05-29 DIAGNOSIS — I25.10 CORONARY ARTERY DISEASE INVOLVING NATIVE CORONARY ARTERY OF NATIVE HEART WITHOUT ANGINA PECTORIS: ICD-10-CM

## 2025-05-29 DIAGNOSIS — F41.9 ANXIETY: ICD-10-CM

## 2025-05-29 DIAGNOSIS — I10 ESSENTIAL HYPERTENSION: ICD-10-CM

## 2025-05-29 PROCEDURE — 99214 OFFICE O/P EST MOD 30 MIN: CPT | Performed by: INTERNAL MEDICINE

## 2025-05-29 PROCEDURE — G0439 PPPS, SUBSEQ VISIT: HCPCS | Performed by: INTERNAL MEDICINE

## 2025-05-29 PROCEDURE — G2211 COMPLEX E/M VISIT ADD ON: HCPCS | Performed by: INTERNAL MEDICINE

## 2025-05-29 RX ORDER — ESCITALOPRAM OXALATE 20 MG/1
20 TABLET ORAL DAILY
Qty: 90 TABLET | Refills: 3 | Status: SHIPPED | OUTPATIENT
Start: 2025-05-29

## 2025-05-29 RX ORDER — LORAZEPAM 1 MG/1
1 TABLET ORAL 3 TIMES DAILY PRN
Qty: 90 TABLET | Refills: 0 | Status: SHIPPED | OUTPATIENT
Start: 2025-05-29 | End: 2025-11-11

## 2025-05-29 RX ORDER — TAMSULOSIN HYDROCHLORIDE 0.4 MG/1
0.8 CAPSULE ORAL
Qty: 180 CAPSULE | Refills: 2 | Status: SHIPPED | OUTPATIENT
Start: 2025-05-29

## 2025-05-29 NOTE — ASSESSMENT & PLAN NOTE
LDL at goal on atorvastatin, triglycerides slightly elevated and HDL is low.  Get back to regular exercise once the foot heals  Orders:    Basic metabolic panel; Future    CBC and differential; Future    Lipid panel; Future    Hepatic function panel; Future

## 2025-05-29 NOTE — ASSESSMENT & PLAN NOTE
Lab Results   Component Value Date    EGFR 62 05/27/2025    EGFR 61 11/12/2024    EGFR 63 05/29/2024    CREATININE 1.12 05/27/2025    CREATININE 1.14 11/12/2024    CREATININE 1.10 05/29/2024     GFR improved, continue to keep well hydrated.

## 2025-05-29 NOTE — ASSESSMENT & PLAN NOTE
Stable on Lexapro and lorazepam  Orders:    escitalopram (LEXAPRO) 20 mg tablet; Take 1 tablet (20 mg total) by mouth daily    LORazepam (ATIVAN) 1 mg tablet; Take 1 tablet (1 mg total) by mouth 3 (three) times a day as needed for anxiety

## 2025-05-29 NOTE — ASSESSMENT & PLAN NOTE
Blood pressure was borderline on arrival however done with large blood pressure cuff which was not appropriately sized, repeat 112/70  - BP stable without medication

## 2025-06-16 ENCOUNTER — TELEPHONE (OUTPATIENT)
Age: 80
End: 2025-06-16

## 2025-06-16 DIAGNOSIS — I25.10 CORONARY ARTERY DISEASE INVOLVING NATIVE CORONARY ARTERY OF NATIVE HEART WITHOUT ANGINA PECTORIS: Primary | ICD-10-CM

## 2025-06-16 DIAGNOSIS — E78.2 MIXED HYPERLIPIDEMIA: ICD-10-CM

## 2025-06-17 ENCOUNTER — OFFICE VISIT (OUTPATIENT)
Dept: NEPHROLOGY | Facility: CLINIC | Age: 80
End: 2025-06-17
Payer: MEDICARE

## 2025-06-17 VITALS
WEIGHT: 167 LBS | OXYGEN SATURATION: 98 % | HEIGHT: 69 IN | BODY MASS INDEX: 24.73 KG/M2 | DIASTOLIC BLOOD PRESSURE: 80 MMHG | HEART RATE: 61 BPM | TEMPERATURE: 97.6 F | SYSTOLIC BLOOD PRESSURE: 135 MMHG

## 2025-06-17 DIAGNOSIS — R35.1 BENIGN PROSTATIC HYPERPLASIA WITH NOCTURIA: ICD-10-CM

## 2025-06-17 DIAGNOSIS — I10 ESSENTIAL HYPERTENSION: ICD-10-CM

## 2025-06-17 DIAGNOSIS — N18.2 CKD (CHRONIC KIDNEY DISEASE) STAGE 2, GFR 60-89 ML/MIN: Primary | ICD-10-CM

## 2025-06-17 DIAGNOSIS — N40.1 BENIGN PROSTATIC HYPERPLASIA WITH NOCTURIA: ICD-10-CM

## 2025-06-17 PROCEDURE — G2211 COMPLEX E/M VISIT ADD ON: HCPCS | Performed by: INTERNAL MEDICINE

## 2025-06-17 PROCEDURE — 99214 OFFICE O/P EST MOD 30 MIN: CPT | Performed by: INTERNAL MEDICINE

## 2025-06-17 NOTE — PROGRESS NOTES
Name: Robel Devi      : 1945      MRN: [id number]  Encounter Provider: Alberto Girard MD  Encounter Date: 2025   Encounter department: [facility]  :  Assessment & Plan  CKD (chronic kidney disease) stage 2, GFR 60-89 ml/min  Lab Results   Component Value Date    EGFR 62 2025    EGFR 61 2024    EGFR 63 2024    CREATININE 1.12 2025    CREATININE 1.14 2024    CREATININE 1.10 2024       Orders:    Albumin; Standing    Albumin / creatinine urine ratio; Standing    Calcium; Standing    CBC and differential; Standing    Comprehensive metabolic panel; Standing    Phosphorus; Standing    PTH, intact; Standing    Urinalysis with microscopic; Future    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  Currently on no medications listed.       Benign prostatic hyperplasia with nocturia  Currently on Flomax with stable symptoms.           History of Present Illness   HPI  Robel Devi is a 80 y.o. male who presents to renal office for management of CKD.  Patient fractured his right foot and recently cast was taken off.  Otherwise offers no complaints.  History obtained from: patient    Review of Systems   Constitutional: Negative.    HENT: Negative.     Eyes: Negative.    Respiratory: Negative.     Cardiovascular: Negative.    Gastrointestinal: Negative.    Endocrine: Negative.    Genitourinary: Negative.    Musculoskeletal: Negative.    Skin: Negative.    Allergic/Immunologic: Negative.    Neurological: Negative.    Hematological: Negative.    All other systems reviewed and are negative.    Medical History Reviewed by provider this encounter:     .     Objective   /80 (BP Location: Left arm, Patient Position: Sitting, Cuff Size: Standard)   Pulse 61    "Temp 97.6 °F (36.4 °C) (Tympanic)   Ht 5' 9\" (1.753 m)   Wt 75.8 kg (167 lb)   SpO2 98%   BMI 24.66 kg/m²      Physical Exam  HENT:      Head: Normocephalic and atraumatic.     Eyes:      Pupils: Pupils are equal, round, and reactive to light.     Neck:      Vascular: No JVD.     Cardiovascular:      Rate and Rhythm: Normal rate and regular rhythm.      Heart sounds: Normal heart sounds. No murmur heard.     No friction rub.   Pulmonary:      Effort: Pulmonary effort is normal.      Breath sounds: Normal breath sounds.   Abdominal:      General: Bowel sounds are normal. There is no distension.      Palpations: Abdomen is soft.      Tenderness: There is no abdominal tenderness. There is no rebound.     Musculoskeletal:         General: No tenderness.      Cervical back: Neck supple.     Skin:     General: Skin is dry.      Findings: No rash.     Neurological:      Mental Status: He is alert and oriented to person, place, and time.         Administrative Statements   I have spent a total time of 43 minutes in caring for this patient on the day of the visit/encounter including Diagnostic results, Prognosis, Risks and benefits of tx options, Instructions for management, Patient and family education, Importance of tx compliance, Risk factor reductions, Impressions, and Counseling / Coordination of care.  "

## 2025-07-07 ENCOUNTER — HOSPITAL ENCOUNTER (OUTPATIENT)
Dept: CT IMAGING | Facility: CLINIC | Age: 80
Discharge: HOME | End: 2025-07-07
Payer: COMMERCIAL

## 2025-07-07 DIAGNOSIS — I25.10 CORONARY ARTERY DISEASE INVOLVING NATIVE CORONARY ARTERY OF NATIVE HEART WITHOUT ANGINA PECTORIS: ICD-10-CM

## 2025-07-07 DIAGNOSIS — E78.2 MIXED HYPERLIPIDEMIA: ICD-10-CM

## 2025-07-07 PROCEDURE — 75571 CT HRT W/O DYE W/CA TEST: CPT

## 2025-07-15 ENCOUNTER — OFFICE VISIT (OUTPATIENT)
Age: 80
End: 2025-07-15
Payer: MEDICARE

## 2025-07-15 VITALS
WEIGHT: 165 LBS | BODY MASS INDEX: 24.44 KG/M2 | SYSTOLIC BLOOD PRESSURE: 126 MMHG | HEART RATE: 78 BPM | OXYGEN SATURATION: 98 % | HEIGHT: 69 IN | DIASTOLIC BLOOD PRESSURE: 74 MMHG

## 2025-07-15 DIAGNOSIS — I25.10 CORONARY ARTERY DISEASE INVOLVING NATIVE CORONARY ARTERY OF NATIVE HEART WITHOUT ANGINA PECTORIS: Primary | ICD-10-CM

## 2025-07-15 PROCEDURE — G2211 COMPLEX E/M VISIT ADD ON: HCPCS

## 2025-07-15 PROCEDURE — 99214 OFFICE O/P EST MOD 30 MIN: CPT

## 2025-07-25 ENCOUNTER — TELEPHONE (OUTPATIENT)
Dept: NEPHROLOGY | Facility: CLINIC | Age: 80
End: 2025-07-25